# Patient Record
Sex: FEMALE | Race: WHITE | NOT HISPANIC OR LATINO | Employment: OTHER | URBAN - METROPOLITAN AREA
[De-identification: names, ages, dates, MRNs, and addresses within clinical notes are randomized per-mention and may not be internally consistent; named-entity substitution may affect disease eponyms.]

---

## 2017-01-26 ENCOUNTER — HOSPITAL ENCOUNTER (OUTPATIENT)
Dept: RADIOLOGY | Facility: HOSPITAL | Age: 82
Discharge: HOME | End: 2017-01-26
Attending: NURSE PRACTITIONER
Payer: MEDICARE

## 2017-01-26 ENCOUNTER — OFFICE VISIT (OUTPATIENT)
Dept: FAMILY MEDICINE | Facility: CLINIC | Age: 82
End: 2017-01-26
Payer: MEDICARE

## 2017-01-26 VITALS
RESPIRATION RATE: 18 BRPM | DIASTOLIC BLOOD PRESSURE: 58 MMHG | HEIGHT: 65 IN | OXYGEN SATURATION: 96 % | SYSTOLIC BLOOD PRESSURE: 132 MMHG | TEMPERATURE: 98 F | WEIGHT: 168.88 LBS | BODY MASS INDEX: 28.14 KG/M2 | HEART RATE: 75 BPM

## 2017-01-26 DIAGNOSIS — R05.9 COUGH: Primary | ICD-10-CM

## 2017-01-26 DIAGNOSIS — M25.552 LEFT HIP PAIN: ICD-10-CM
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
## 2017-01-26 DIAGNOSIS — M54.40 ACUTE BILATERAL LOW BACK PAIN WITH SCIATICA, SCIATICA LATERALITY UNSPECIFIED: ICD-10-CM

## 2017-01-26 PROCEDURE — 99213 OFFICE O/P EST LOW 20 MIN: CPT | Mod: S$GLB,,, | Performed by: NURSE PRACTITIONER

## 2017-01-26 PROCEDURE — 73502 X-RAY EXAM HIP UNI 2-3 VIEWS: CPT | Mod: 26,LT,, | Performed by: RADIOLOGY

## 2017-01-26 PROCEDURE — 72100 X-RAY EXAM L-S SPINE 2/3 VWS: CPT | Mod: 26,,, | Performed by: RADIOLOGY

## 2017-01-26 PROCEDURE — 99999 PR PBB SHADOW E&M-EST. PATIENT-LVL V: CPT | Mod: PBBFAC,,, | Performed by: NURSE PRACTITIONER

## 2017-01-26 PROCEDURE — 1160F RVW MEDS BY RX/DR IN RCRD: CPT | Mod: S$GLB,,, | Performed by: NURSE PRACTITIONER

## 2017-01-26 PROCEDURE — 1157F ADVNC CARE PLAN IN RCRD: CPT | Mod: S$GLB,,, | Performed by: NURSE PRACTITIONER

## 2017-01-26 PROCEDURE — 73502 X-RAY EXAM HIP UNI 2-3 VIEWS: CPT | Mod: TC,PO,LT

## 2017-01-26 PROCEDURE — 1159F MED LIST DOCD IN RCRD: CPT | Mod: S$GLB,,, | Performed by: NURSE PRACTITIONER

## 2017-01-26 PROCEDURE — 1125F AMNT PAIN NOTED PAIN PRSNT: CPT | Mod: S$GLB,,, | Performed by: NURSE PRACTITIONER

## 2017-01-26 PROCEDURE — 72100 X-RAY EXAM L-S SPINE 2/3 VWS: CPT | Mod: TC,PO

## 2017-01-26 RX ORDER — AZITHROMYCIN 250 MG/1
250 TABLET, FILM COATED ORAL DAILY
Qty: 10 TABLET | Refills: 0 | Status: SHIPPED | OUTPATIENT
Start: 2017-01-26 | End: 2017-02-05

## 2017-01-26 RX ORDER — METHYLPREDNISOLONE 4 MG/1
TABLET ORAL
Qty: 21 TABLET | Refills: 0 | Status: SHIPPED | OUTPATIENT
Start: 2017-01-26 | End: 2017-02-17

## 2017-01-26 NOTE — MR AVS SNAPSHOT
[facility]  [address] [facility] [place] [address]  Phone: [phone]  Fax: [phone]                  Sheree Pugh   2017 12:40 PM   Office Visit    Description:  Female : 1927   Provider:  Madyson Silva NP   Department:  [facility]           Reason for Visit     Cough     Fall     Fatigue           Diagnoses this Visit        Comments    Cough    -  Primary     Acute bilateral low back pain with sciatica, sciatica laterality unspecified         Fall, initial encounter         Left hip pain                To Do List           Future Appointments        Provider Department Dept Phone    2017 2:45 PM Gus Shahid MD [facility] [phone]      Goals (5 Years of Data)     None      Follow-Up and Disposition     Return if symptoms worsen or fail to improve.       These Medications        Disp Refills Start End    methylPREDNISolone (MEDROL DOSEPACK) 4 mg tablet 21 tablet 0 2017    Take as directed    Ph[facility]acy: [id number] [place] - [address] [facility] Ph #: [phone]       azithromycin (ZITHROMAX Z-TIM) 250 MG tablet 10 tablet 0 2017    Take 1 tablet (250 mg total) by mouth once daily. - Oral    Ph[facility]acy: [id number] [place] - [address] [facility] Ph #: [phone]         [facility]     [facility] Care [facility] -  Assistance  Registered nurses in the [facility] provide clinical advisement, health education, appointment booking, and other advisory services.  Call for this free service at [phone].             Medications           Message regarding Medications     Verify the changes and/or additions to your medication regime listed below are the same as discussed with your clinician today.  If any of these changes or additions are incorrect, please notify your healthcare provider.        START taking these NEW medications      "   Refills    methylPREDNISolone (MEDROL DOSEPACK) 4 mg tablet 0    Sig: Take as directed    Class: Normal    azithromycin (ZITHROMAX Z-TIM) 250 MG tablet 0    Sig: Take 1 tablet (250 mg total) by mouth once daily.    Class: Normal    Route: Oral           Verify that the below list of medications is an accurate representation of the medications you are currently taking.  If none reported, the list may be blank. If incorrect, please contact your healthcare provider. Carry this list with you in case of emergency.           Current Medications     ACETAMINOPHEN (TYLENOL ARTHRITIS ORAL) Take by mouth daily as needed.    albuterol (PROAIR HFA) 90 mcg/actuation inhaler Inhale 2 puffs into the lungs every 6 (six) hours as needed for Wheezing or Shortness of Breath.    ANTIOX #11/OM3/DHA/EPA/LUT/RAND (OCUVITE ADULT 50+ ORAL) Take by mouth once daily.      aspirin (ECOTRIN) 81 MG EC tablet Take by mouth once daily.     atorvastatin (LIPITOR) 20 MG tablet Take 1 tablet (20 mg total) by mouth once daily.    BD LUER-FRANCI SYRINGE 3 mL 25 x 5/8" Syrg FOLLOW DOCTORS DIRECTIONS    biotin 2,500 mcg Cap Take 2 capsules by mouth once daily. 2 capsules (5000 mcg daily)    budesonide-formoterol 160-4.5 mcg (SYMBICORT) 160-4.5 mcg/actuation HFAA Inhale 2 puffs into the lungs 2 (two) times daily.    calcium carbonate-vit D3-min (CALTRATE 600+D PLUS MINERALS) 600 mg calcium- 400 unit Tab Take 1 tablet by mouth once daily.    cholecalciferol, vitamin D3, (VITAMIN D3) 400 unit Tab Take 800 Units by mouth once daily.    clopidogrel (PLAVIX) 75 mg tablet Take 1 tablet (75 mg total) by mouth once daily.    cyanocobalamin 1,000 mcg/mL injection INJECT 1CC (1ML) INTRAMUSCULARLY every three weeks    fosinopril (MONOPRIL) 20 MG tablet Take 1 tablet (20 mg total) by mouth once daily.    furosemide (LASIX) 20 MG tablet Take 1 tablet (20 mg total) by mouth daily as needed.    GLUCOSAMINE HCL/CHONDR FLETCHER A NA (OSTEO BI-FLEX ORAL) Take by mouth.    " "MULTIVITAMIN/IRON/FOLIC ACID (CENTRUM ULTRA WOMEN'S ORAL) Take 1 tablet by mouth once daily.      potassium chloride SA (K-DUR,KLOR-CON) 20 MEQ tablet Take 1 tablet (20 mEq total) by mouth daily as needed.    salmon oil-omega-3 fatty acids (SALMON OIL-1000) 1,000-200 mg Cap Take 2 capsules by mouth once daily.     syringe, disposable, 3 mL Syrg As directed    tiotropium (SPIRIVA WITH HANDIHALER) 18 mcg inhalation capsule USE ONE INHALATION BY MOUTH DAILY    zolpidem (AMBIEN) 10 mg Tab TAKE ONE TABLET BY MOUTH AT BEDTIME AS NEEDED    azithromycin (ZITHROMAX Z-TIM) 250 MG tablet Take 1 tablet (250 mg total) by mouth once daily.    methylPREDNISolone (MEDROL DOSEPACK) 4 mg tablet Take as directed    mometasone (ELOCON) 0.1 % ointment Apply topically once daily. Apply BID x 1 month, then QD x 1 month, then QOD x 1 month, then as needed.           Clinical Reference Information           Vital Signs - Last Recorded  Most recent update: 1/26/2017 12:55 PM by Renetta Gasca MA    BP Pulse Temp Resp Ht Wt    (!) 132/58 75 98.3 °F (36.8 °C) (Oral) 18 5' 5" (1.651 m) 76.6 kg (168 lb 14 oz)    SpO2 BMI             96% 28.1 kg/m2         Blood Pressure          Most Recent Value    BP  (!)  132/58      Allergies as of 1/26/2017     Codeine    Gabapentin    Hydrocodone      Immunizations Administered on Date of Encounter - 1/26/2017     None      Orders Placed During Today's Visit     Future Labs/Procedures Expected by Expires    X-Ray Hip 2 or 3 views Left  1/26/2017 4/26/2017    X-Ray Lumbar Spine Ap And Lateral  1/26/2017 4/26/2017      [facility] Sign-Up     Activating your [facility] account is as easy as 1-2-3!     1) Visit [email or web address], select Sign Up Now, enter this activation code and your date of birth, then select Next.  [email or web address]  Expires: 3/12/2017  1:11 PM      2) Create a username and password to use when you visit [facility] in the future and select a security question in case you lose your password " and select Next.    3) Enter your e-mail address and click Sign Up!    Additional Information  If you have questions, please e-mail [email or web address] or call [phone] to talk to our [facility] staff. Remember, [facility] is NOT to be used for urgent needs. For medical emergencies, dial 911.

## 2017-01-26 NOTE — PROGRESS NOTES
Subjective:       Patient ID: Sheree Pugh is a 89 y.o. female.    Chief Complaint: Cough (otc meds not helping); Fall (fell tues night/ lost balence. lt hip, knees, hands sore); and Fatigue (tired, weak and doesnt feel like eating)    Cough   This is a new problem. Episode onset: over a week. The problem has been waxing and waning. The problem occurs constantly. The cough is productive of sputum. Associated symptoms include nasal congestion and postnasal drip. Pertinent negatives include no chest pain, chills, ear congestion, ear pain, fever, headaches, heartburn, hemoptysis, myalgias, rash, rhinorrhea, sore throat, shortness of breath, sweats, weight loss or wheezing. Treatments tried: tussinex otc for cough and anti-histamine once daily. Symbicort, spririva.   Fall   Incident onset: 3 days ago. She landed on carpet. There was no blood loss. Pain location: left hip and back. The pain is mild. Pertinent negatives include no abdominal pain, bowel incontinence, fever, headaches, hearing loss, hematuria, loss of consciousness, nausea, numbness, tingling, visual change or vomiting. Treatments tried: tylenol arthritis. The treatment provided moderate relief.     Review of Systems   Constitutional: Positive for fatigue. Negative for chills, fever and weight loss.   HENT: Positive for postnasal drip. Negative for ear pain, rhinorrhea and sore throat.    Respiratory: Positive for cough. Negative for hemoptysis, shortness of breath and wheezing.    Cardiovascular: Negative for chest pain.   Gastrointestinal: Negative for abdominal pain, bowel incontinence, heartburn, nausea and vomiting.   Genitourinary: Negative for hematuria.   Musculoskeletal: Negative for myalgias.   Skin: Negative for rash.   Neurological: Negative for tingling, loss of consciousness, numbness and headaches.       Objective:      Physical Exam   Constitutional: She is oriented to person, place, and time. She appears well-nourished.   HENT:   Head:  Normocephalic and atraumatic.   Right Ear: External ear normal.   Left Ear: External ear normal.   Nose: Nose normal.   Mouth/Throat: Oropharynx is clear and moist. No oropharyngeal exudate.   Eyes: Conjunctivae and EOM are normal. Pupils are equal, round, and reactive to light.   Neck: Normal range of motion. Neck supple.   Cardiovascular: Normal rate, regular rhythm, normal heart sounds and intact distal pulses.    Pulmonary/Chest: Effort normal and breath sounds normal. She has no wheezes. She has no rales.   Abdominal: Soft. Bowel sounds are normal. There is no tenderness.   Musculoskeletal:        Lumbar back: She exhibits decreased range of motion, tenderness, bony tenderness, pain and spasm.        Back:    Neurological: She is alert and oriented to person, place, and time.   Skin: Skin is warm and dry. No rash noted.   Vitals reviewed.      Assessment:       1. Cough    2. Acute bilateral low back pain with sciatica, sciatica laterality unspecified    3. Fall, initial encounter    4. Left hip pain        Plan:       Sheree was seen today for cough, fall and fatigue.    Diagnoses and all orders for this visit:    Cough  -     methylPREDNISolone (MEDROL DOSEPACK) 4 mg tablet; Take as directed  -     azithromycin (ZITHROMAX Z-TIM) 250 MG tablet; Take 1 tablet (250 mg total) by mouth once daily.    Acute bilateral low back pain with sciatica, sciatica laterality unspecified  -     X-Ray Lumbar Spine Ap And Lateral; Future  -     X-Ray Hip 2 or 3 views Left; Future  -     methylPREDNISolone (MEDROL DOSEPACK) 4 mg tablet; Take as directed  -     azithromycin (ZITHROMAX Z-TIM) 250 MG tablet; Take 1 tablet (250 mg total) by mouth once daily.    Fall, initial encounter  -     X-Ray Lumbar Spine Ap And Lateral; Future  -     X-Ray Hip 2 or 3 views Left; Future  -     methylPREDNISolone (MEDROL DOSEPACK) 4 mg tablet; Take as directed  -     azithromycin (ZITHROMAX Z-TIM) 250 MG tablet; Take 1 tablet (250 mg total) by  mouth once daily.    Left hip pain  -     X-Ray Lumbar Spine Ap And Lateral; Future  -     X-Ray Hip 2 or 3 views Left; Future  -     methylPREDNISolone (MEDROL DOSEPACK) 4 mg tablet; Take as directed  -     azithromycin (ZITHROMAX Z-TIM) 250 MG tablet; Take 1 tablet (250 mg total) by mouth once daily.

## 2017-02-06 DIAGNOSIS — J44.1 COPD EXACERBATION: ICD-10-CM

## 2017-02-06 NOTE — TELEPHONE ENCOUNTER
----- Message from Ursula Rubio sent at 2/6/2017 10:53 AM CST -----  Contact: [place]- [phone]  Patient called asking for a new rx Symbicort with Anthony Singh on file asap.  Thanks!

## 2017-02-07 RX ORDER — BUDESONIDE AND FORMOTEROL FUMARATE DIHYDRATE 160; 4.5 UG/1; UG/1
2 AEROSOL RESPIRATORY (INHALATION) 2 TIMES DAILY
Qty: 33 G | Refills: 9 | Status: SHIPPED | OUTPATIENT
Start: 2017-02-07 | End: 2017-05-24 | Stop reason: SDUPTHER

## 2017-02-17 ENCOUNTER — OFFICE VISIT (OUTPATIENT)
Dept: FAMILY MEDICINE | Facility: CLINIC | Age: 82
End: 2017-02-17
Payer: MEDICARE

## 2017-02-17 VITALS
RESPIRATION RATE: 18 BRPM | HEART RATE: 64 BPM | BODY MASS INDEX: 28.54 KG/M2 | HEIGHT: 65 IN | DIASTOLIC BLOOD PRESSURE: 60 MMHG | SYSTOLIC BLOOD PRESSURE: 130 MMHG | WEIGHT: 171.31 LBS

## 2017-02-17 DIAGNOSIS — I10 ESSENTIAL HYPERTENSION: Primary | Chronic | ICD-10-CM

## 2017-02-17 DIAGNOSIS — J44.9 CHRONIC OBSTRUCTIVE PULMONARY DISEASE, UNSPECIFIED COPD TYPE: ICD-10-CM

## 2017-02-17 DIAGNOSIS — I73.9 PERIPHERAL VASCULAR DISEASE, UNSPECIFIED: ICD-10-CM

## 2017-02-17 DIAGNOSIS — M51.36 DDD (DEGENERATIVE DISC DISEASE), LUMBAR: ICD-10-CM

## 2017-02-17 PROCEDURE — 1159F MED LIST DOCD IN RCRD: CPT | Mod: S$GLB,,, | Performed by: FAMILY MEDICINE

## 2017-02-17 PROCEDURE — 1157F ADVNC CARE PLAN IN RCRD: CPT | Mod: S$GLB,,, | Performed by: FAMILY MEDICINE

## 2017-02-17 PROCEDURE — 1160F RVW MEDS BY RX/DR IN RCRD: CPT | Mod: S$GLB,,, | Performed by: FAMILY MEDICINE

## 2017-02-17 PROCEDURE — 1125F AMNT PAIN NOTED PAIN PRSNT: CPT | Mod: S$GLB,,, | Performed by: FAMILY MEDICINE

## 2017-02-17 PROCEDURE — 99214 OFFICE O/P EST MOD 30 MIN: CPT | Mod: S$GLB,,, | Performed by: FAMILY MEDICINE

## 2017-02-17 PROCEDURE — 99499 UNLISTED E&M SERVICE: CPT | Mod: S$GLB,,, | Performed by: FAMILY MEDICINE

## 2017-02-17 PROCEDURE — 99999 PR PBB SHADOW E&M-EST. PATIENT-LVL III: CPT | Mod: PBBFAC,,, | Performed by: FAMILY MEDICINE

## 2017-02-17 NOTE — PROGRESS NOTES
Subjective:       Patient ID: Sheree Pugh is a 89 y.o. female    Chief Complaint: Degenerative disc disease (follow up; hm due: tetanus, zoster)    HPI  Here today for interval evaluation  Her primary concern is low back pain with radiation into the left leg.  She has seen Pain Management  They recommended epidural steroids, but she is not interested in that presently  Pain control with Tylenol now.   Improves if she stops during long walks, requesting walker with seat.  Breathing stable, occasional dyspnea.  Not able to afford inhalers    ROS      Objective:   Physical Exam   Constitutional: She appears well-developed and well-nourished.   HENT:   Head: Normocephalic and atraumatic.   Eyes: Conjunctivae and EOM are normal. Pupils are equal, round, and reactive to light. No scleral icterus.   Neck: Normal range of motion. Neck supple. No thyromegaly present.   Cardiovascular: Normal rate, regular rhythm and normal heart sounds.  Exam reveals no gallop and no friction rub.    No murmur heard.  Pulmonary/Chest: Effort normal and breath sounds normal. No respiratory distress. She has no wheezes. She has no rales.   Lymphadenopathy:     She has no cervical adenopathy.   Vitals reviewed.        Assessment:       1. Essential hypertension  Comprehensive metabolic panel    Lipid panel   2. Peripheral vascular disease, unspecified     3. DDD (degenerative disc disease), lumbar     4. Chronic obstructive pulmonary disease, unspecified COPD type           Plan:       Essential hypertension with Peripheral vascular disease, unspecified  -     Comprehensive metabolic panel; Future; Expected date: 2/17/17  -     Lipid panel; Future; Expected date: 2/17/17  - Continue current therapy  - Serial blood pressure monitoring  - Diet and exercise education.     DDD (degenerative disc disease), lumbar  - Walker with seat  - Continue current therapy  - Pain Management as needed    Chronic obstructive pulmonary disease, unspecified  COPD type  - Continue current therapy  - Pharmacy assistance

## 2017-02-17 NOTE — MR AVS SNAPSHOT
[facility]  [address]  [place] [address]  Phone: [phone]  Fax: [phone]                  Sheree Pugh   2017 2:45 PM   Office Visit    Description:  Female : 1927   Provider:  Gus Shahid MD   Department:  [facility]           Reason for Visit     Degenerative disc disease           Diagnoses this Visit        Comments    Essential hypertension    -  Primary     Peripheral vascular disease, unspecified         DDD (degenerative disc disease), lumbar         Chronic obstructive pulmonary disease, unspecified COPD type                To Do List           Future Appointments        Provider Department Dept Phone    2017 1:30 PM Gus Shahid MD [facility] [phone]      Goals (5 Years of Data)     None      Follow-Up and Disposition     Return in about 4 months (around 2017).      [facility]     [facility] On Call Nurse Care Line - 24/7 Assistance  Registered nurses in the [facility] provide clinical advisement, health education, appointment booking, and other advisory services.  Call for this free service at [phone].             Medications           Message regarding Medications     Verify the changes and/or additions to your medication regime listed below are the same as discussed with your clinician today.  If any of these changes or additions are incorrect, please notify your healthcare provider.        STOP taking these medications     methylPREDNISolone (MEDROL DOSEPACK) 4 mg tablet Take as directed           Verify that the below list of medications is an accurate representation of the medications you are currently taking.  If none reported, the list may be blank. If incorrect, please contact your healthcare provider. Carry this list with you in case of emergency.           Current Medications     ACETAMINOPHEN (TYLENOL ARTHRITIS ORAL) Take by mouth daily as needed.     "albuterol (PROAIR HFA) 90 mcg/actuation inhaler Inhale 2 puffs into the lungs every 6 (six) hours as needed for Wheezing or Shortness of Breath.    ANTIOX #11/OM3/DHA/EPA/LUT/RAND (OCUVITE ADULT 50+ ORAL) Take by mouth once daily.      aspirin (ECOTRIN) 81 MG EC tablet Take by mouth once daily.     atorvastatin (LIPITOR) 20 MG tablet Take 1 tablet (20 mg total) by mouth once daily.    BD LUER-FRANCI SYRINGE 3 mL 25 x 5/8" Syrg FOLLOW DOCTORS DIRECTIONS    biotin 2,500 mcg Cap Take 2 capsules by mouth once daily. 2 capsules (5000 mcg daily)    budesonide-formoterol 160-4.5 mcg (SYMBICORT) 160-4.5 mcg/actuation HFAA Inhale 2 puffs into the lungs 2 (two) times daily.    calcium carbonate-vit D3-min (CALTRATE 600+D PLUS MINERALS) 600 mg calcium- 400 unit Tab Take 1 tablet by mouth once daily.    cholecalciferol, vitamin D3, (VITAMIN D3) 400 unit Tab Take 800 Units by mouth once daily.    clopidogrel (PLAVIX) 75 mg tablet Take 1 tablet (75 mg total) by mouth once daily.    cyanocobalamin 1,000 mcg/mL injection INJECT 1CC (1ML) INTRAMUSCULARLY every three weeks    fosinopril (MONOPRIL) 20 MG tablet Take 1 tablet (20 mg total) by mouth once daily.    furosemide (LASIX) 20 MG tablet Take 1 tablet (20 mg total) by mouth daily as needed.    GLUCOSAMINE HCL/CHONDR FLETCHER A NA (OSTEO BI-FLEX ORAL) Take by mouth.    MULTIVITAMIN/IRON/FOLIC ACID (CENTRUM ULTRA WOMEN'S ORAL) Take 1 tablet by mouth once daily.      potassium chloride SA (K-DUR,KLOR-CON) 20 MEQ tablet Take 1 tablet (20 mEq total) by mouth daily as needed.    salmon oil-omega-3 fatty acids (SALMON OIL-1000) 1,000-200 mg Cap Take 2 capsules by mouth once daily.     syringe, disposable, 3 mL Syrg As directed    tiotropium (SPIRIVA WITH HANDIHALER) 18 mcg inhalation capsule USE ONE INHALATION BY MOUTH DAILY    zolpidem (AMBIEN) 10 mg Tab TAKE ONE TABLET BY MOUTH AT BEDTIME AS NEEDED    mometasone (ELOCON) 0.1 % ointment Apply topically once daily. Apply BID x 1 month, then QD x " "1 month, then QOD x 1 month, then as needed.           Clinical Reference Information           Your Vitals Were     BP Pulse Resp Height Weight BMI    130/60 (BP Location: Left arm, Patient Position: Sitting, BP Method: Manual) 64 18 5' 5" (1.651 m) 77.7 kg (171 lb 4.8 oz) 28.51 kg/m2      Blood Pressure          Most Recent Value    BP  130/60      Allergies as of 2/17/2017     Codeine    Gabapentin    Hydrocodone      Immunizations Administered on Date of Encounter - 2/17/2017     None      Orders Placed During Today's Visit     Future Labs/Procedures Expected by Expires    Comprehensive metabolic panel  2/17/2017 5/18/2017    Lipid panel  2/17/2017 5/18/2017      [facility] Sign-Up     Activating your [facility] account is as easy as 1-2-3!     1) Visit [email or web address], select Sign Up Now, enter this activation code and your date of birth, then select Next.  [email or web address]  Expires: 3/12/2017  1:11 PM      2) Create a username and password to use when you visit [facility] in the future and select a security question in case you lose your password and select Next.    3) Enter your e-mail address and click Sign Up!    Additional Information  If you have questions, please e-mail [email or web address] or call [phone] to talk to our [facility] staff. Remember, [facility] is NOT to be used for urgent needs. For medical emergencies, dial 911.         Language Assistance Services     ATTENTION: Language assistance services are available, free of charge. Please call [phone].      ATENCIÓN: Si habla español, tiene a jennings disposición servicios gratuitos de asistencia lingüística. Llame al [phone].     CHÚ Ý: N?u b?n nói Ti?ng Vi?t, có các d?ch v? h? tr? ngôn ng? mi?n phí dành cho b?n. G?i s? [phone].         [facility] complies with applicable Federal civil rights laws and does not discriminate on the basis of race, color, national origin, age, disability, or sex.        "

## 2017-04-20 ENCOUNTER — HOSPITAL ENCOUNTER (OUTPATIENT)
Dept: RADIOLOGY | Facility: HOSPITAL | Age: 82
Discharge: HOME | End: 2017-04-20
Attending: NURSE PRACTITIONER
Payer: MEDICARE

## 2017-04-20 ENCOUNTER — OFFICE VISIT (OUTPATIENT)
Dept: FAMILY MEDICINE | Facility: CLINIC | Age: 82
End: 2017-04-20
Payer: MEDICARE

## 2017-04-20 VITALS
BODY MASS INDEX: 28.06 KG/M2 | HEART RATE: 55 BPM | DIASTOLIC BLOOD PRESSURE: 64 MMHG | WEIGHT: 168.44 LBS | HEIGHT: 65 IN | OXYGEN SATURATION: 95 % | SYSTOLIC BLOOD PRESSURE: 128 MMHG

## 2017-04-20 DIAGNOSIS — S49.92XA LEFT UPPER ARM INJURY, INITIAL ENCOUNTER: ICD-10-CM

## 2017-04-20 DIAGNOSIS — J44.1 COPD EXACERBATION: Primary | ICD-10-CM

## 2017-04-20 PROCEDURE — 99999 PR PBB SHADOW E&M-EST. PATIENT-LVL IV: CPT | Mod: PBBFAC,,, | Performed by: NURSE PRACTITIONER

## 2017-04-20 PROCEDURE — 1159F MED LIST DOCD IN RCRD: CPT | Mod: S$GLB,,, | Performed by: NURSE PRACTITIONER

## 2017-04-20 PROCEDURE — 73060 X-RAY EXAM OF HUMERUS: CPT | Mod: 26,LT,, | Performed by: RADIOLOGY

## 2017-04-20 PROCEDURE — 1160F RVW MEDS BY RX/DR IN RCRD: CPT | Mod: S$GLB,,, | Performed by: NURSE PRACTITIONER

## 2017-04-20 PROCEDURE — 73060 X-RAY EXAM OF HUMERUS: CPT | Mod: TC,PO,LT

## 2017-04-20 PROCEDURE — 1125F AMNT PAIN NOTED PAIN PRSNT: CPT | Mod: S$GLB,,, | Performed by: NURSE PRACTITIONER

## 2017-04-20 PROCEDURE — 99213 OFFICE O/P EST LOW 20 MIN: CPT | Mod: S$GLB,,, | Performed by: NURSE PRACTITIONER

## 2017-04-20 RX ORDER — DOXYCYCLINE 100 MG/1
100 CAPSULE ORAL EVERY 12 HOURS
Qty: 14 CAPSULE | Refills: 0 | Status: SHIPPED | OUTPATIENT
Start: 2017-04-20 | End: 2017-05-17 | Stop reason: ALTCHOICE

## 2017-04-20 RX ORDER — AZELASTINE 1 MG/ML
1 SPRAY, METERED NASAL 2 TIMES DAILY
Qty: 30 ML | Refills: 0 | Status: SHIPPED | OUTPATIENT
Start: 2017-04-20 | End: 2017-05-17

## 2017-04-20 RX ORDER — PREDNISONE 10 MG/1
TABLET ORAL
Qty: 30 TABLET | Refills: 0 | Status: SHIPPED | OUTPATIENT
Start: 2017-04-20 | End: 2017-05-17 | Stop reason: ALTCHOICE

## 2017-04-20 NOTE — PROGRESS NOTES
Subjective:       Patient ID: Sheree Pugh is a 89 y.o. female.    Chief Complaint: Cough    HPI Comments: Patient says a family member was trying to help to get from a table and pulled on her left arm about 3 weeks ago. She did have some bruising after the incident, bruie has resolved but still having pain. 3-4/10.  TETANUS VACCINE due on 08/26/1945  Zoster Vaccine due on 08/26/1987  DEXA SCAN due on 04/25/2017    Past Medical History:  Past Medical History:  No date: Anticoagulant long-term use      Comment: Plavix & Aspirin  No date: ARMD (age related macular degeneration)      Comment: os  No date: Arthritis  No date: Cataract      Comment: os  No date: Coronary artery disease  No date: Disorder of kidney and ureter  No date: DE LOS SANTOS (dyspnea on exertion)  No date: Elevated cholesterol  No date: Heart disease  No date: Hypertension  No date: Insomnia  No date: Macular degeneration      Comment: OS  No date: Obstetrical blood-clot embolism, postpartum  No date: PVD (peripheral vascular disease)  No date: Retinal detachment      Comment: OS  No date: Urinary incontinence  Past Surgical History:  No date: CATARACT EXTRACTION      Comment: od d 6-12-13//  No date: CHOLECYSTECTOMY  2010,2011: CORONARY STENT PLACEMENT      Comment: 4 stents in 2010, 2 in 2011  No date: HYSTERECTOMY  No date: MOUTH SURGERY      Comment: upper & lower denture  No date: VAGINAL DELIVERY      Comment: times 6  Review of patient's allergies indicates:   -- Codeine -- Nausea And Vomiting   -- Gabapentin    -- Hydrocodone -- Nausea And Vomiting    --  Other reaction(s): Vomiting  Current Outpatient Prescriptions on File Prior to Visit:  ACETAMINOPHEN (TYLENOL ARTHRITIS ORAL), Take by mouth daily as needed., Disp: , Rfl:   albuterol (PROAIR HFA) 90 mcg/actuation inhaler, Inhale 2 puffs into the lungs every 6 (six) hours as needed for Wheezing or Shortness of Breath., Disp: 18 g, Rfl: 3  ANTIOX #11/OM3/DHA/EPA/LUT/RAND (OCUVITE ADULT 50+ ORAL),  "Take by mouth once daily.  , Disp: , Rfl:   aspirin (ECOTRIN) 81 MG EC tablet, Take by mouth once daily. , Disp: , Rfl:    atorvastatin (LIPITOR) 20 MG tablet, Take 1 tablet (20 mg total) by mouth once daily., Disp: 90 tablet, Rfl: 5  BD LUER-FRANCI SYRINGE 3 mL 25 x 5/8" Syrg, FOLLOW DOCTORS DIRECTIONS, Disp: 10 each, Rfl: 0  biotin 2,500 mcg Cap, Take 2 capsules by mouth once daily. 2 capsules (5000 mcg daily), Disp: , Rfl:   budesonide-formoterol 160-4.5 mcg (SYMBICORT) 160-4.5 mcg/actuation HFAA, Inhale 2 puffs into the lungs 2 (two) times daily., Disp: 33 g, Rfl: 9  calcium carbonate-vit D3-min (CALTRATE 600+D PLUS MINERALS) 600 mg calcium- 400 unit Tab, Take 1 tablet by mouth once daily., Disp: , Rfl:   cholecalciferol, vitamin D3, (VITAMIN D3) 400 unit Tab, Take 800 Units by mouth once daily., Disp: , Rfl:   clopidogrel (PLAVIX) 75 mg tablet, Take 1 tablet (75 mg total) by mouth once daily., Disp: 90 tablet, Rfl: 4  cyanocobalamin 1,000 mcg/mL injection, INJECT 1CC (1ML) INTRAMUSCULARLY every three weeks, Disp: 10 mL, Rfl: 3  fosinopril (MONOPRIL) 20 MG tablet, Take 1 tablet (20 mg total) by mouth once daily., Disp: 90 tablet, Rfl: 5  furosemide (LASIX) 20 MG tablet, Take 1 tablet (20 mg total) by mouth daily as needed., Disp: 90 tablet, Rfl: 4  GLUCOSAMINE HCL/CHONDR FLETCHER A NA (OSTEO BI-FLEX ORAL), Take by mouth., Disp: , Rfl:   MULTIVITAMIN/IRON/FOLIC ACID (CENTRUM ULTRA WOMEN'S ORAL), Take 1 tablet by mouth once daily.  , Disp: , Rfl:   potassium chloride SA (K-DUR,KLOR-CON) 20 MEQ tablet, Take 1 tablet (20 mEq total) by mouth daily as needed., Disp: 90 tablet, Rfl: 4  salmon oil-omega-3 fatty acids (SALMON OIL-1000) 1,000-200 mg Cap, Take 2 capsules by mouth once daily. , Disp: , Rfl:   syringe, disposable, 3 mL Syrg, As directed, Disp: , Rfl:   tiotropium (SPIRIVA WITH HANDIHALER) 18 mcg inhalation capsule, USE ONE INHALATION BY MOUTH DAILY, Disp: 30 capsule, Rfl: 9  zolpidem (AMBIEN) 10 mg Tab, TAKE ONE " TABLET BY MOUTH AT BEDTIME AS NEEDED, Disp: 30 tablet, Rfl: 3  mometasone (ELOCON) 0.1 % ointment, Apply topically once daily. Apply BID x 1 month, then QD x 1 month, then QOD x 1 month, then as needed., Disp: 45 g, Rfl: 3    No current facility-administered medications on file prior to visit.     Social History    Marital status:              Spouse name:                       Years of education:                 Number of children:               Occupational History    None on file    Social History Main Topics    Smoking status: Former Smoker                                                                Packs/day: 0.00      Years: 0.00           Quit date: 1/3/2000    Smokeless status: Never Used                        Alcohol use: No              Drug use: No              Sexual activity: Not on file          Other Topics            Concern    None on file    Social History Narrative    None on file      Review of patient's family history indicates:    Alzheimer's disease            Father                    Cancer                         Sister                    Cancer                         Sister                    Amblyopia                      Neg Hx                    Blindness                      Neg Hx                    Cataracts                      Neg Hx                    Diabetes                       Neg Hx                    Glaucoma                       Neg Hx                    Hypertension                   Neg Hx                    Macular degeneration           Neg Hx                    Retinal detachment             Neg Hx                    Strabismus                     Neg Hx                    Thyroid disease                Neg Hx                    Stroke                         Mother                    Heart disease                  Mother                      TETANUS VACCINE due on 08/26/1945  Zoster Vaccine due on 08/26/1987  DEXA SCAN due on 04/25/2017    Past Medical  "History:  Past Medical History:  No date: Anticoagulant long-term use      Comment: Plavix & Aspirin  No date: ARMD (age related macular degeneration)      Comment: os  No date: Arthritis  No date: Cataract      Comment: os  No date: Coronary artery disease  No date: Disorder of kidney and ureter  No date: DEL OS SANTOS (dyspnea on exertion)  No date: Elevated cholesterol  No date: Heart disease  No date: Hypertension  No date: Insomnia  No date: Macular degeneration      Comment: OS  No date: Obstetrical blood-clot embolism, postpartum  No date: PVD (peripheral vascular disease)  No date: Retinal detachment      Comment: OS  No date: Urinary incontinence  Past Surgical History:  No date: CATARACT EXTRACTION      Comment: od d 6-12-13//  No date: CHOLECYSTECTOMY  2010,2011: CORONARY STENT PLACEMENT      Comment: 4 stents in 2010, 2 in 2011  No date: HYSTERECTOMY  No date: MOUTH SURGERY      Comment: upper & lower denture  No date: VAGINAL DELIVERY      Comment: times 6  Review of patient's allergies indicates:   -- Codeine -- Nausea And Vomiting   -- Gabapentin    -- Hydrocodone -- Nausea And Vomiting    --  Other reaction(s): Vomiting  Current Outpatient Prescriptions on File Prior to Visit:  ACETAMINOPHEN (TYLENOL ARTHRITIS ORAL), Take by mouth daily as needed., Disp: , Rfl:   albuterol (PROAIR HFA) 90 mcg/actuation inhaler, Inhale 2 puffs into the lungs every 6 (six) hours as needed for Wheezing or Shortness of Breath., Disp: 18 g, Rfl: 3  ANTIOX #11/OM3/DHA/EPA/LUT/RAND (OCUVITE ADULT 50+ ORAL), Take by mouth once daily.  , Disp: , Rfl:   aspirin (ECOTRIN) 81 MG EC tablet, Take by mouth once daily. , Disp: , Rfl:    atorvastatin (LIPITOR) 20 MG tablet, Take 1 tablet (20 mg total) by mouth once daily., Disp: 90 tablet, Rfl: 5  BD LUER-FRANCI SYRINGE 3 mL 25 x 5/8" Syrg, FOLLOW DOCTORS DIRECTIONS, Disp: 10 each, Rfl: 0  biotin 2,500 mcg Cap, Take 2 capsules by mouth once daily. 2 capsules (5000 mcg daily), Disp: , Rfl: "   budesonide-formoterol 160-4.5 mcg (SYMBICORT) 160-4.5 mcg/actuation HFAA, Inhale 2 puffs into the lungs 2 (two) times daily., Disp: 33 g, Rfl: 9  calcium carbonate-vit D3-min (CALTRATE 600+D PLUS MINERALS) 600 mg calcium- 400 unit Tab, Take 1 tablet by mouth once daily., Disp: , Rfl:   cholecalciferol, vitamin D3, (VITAMIN D3) 400 unit Tab, Take 800 Units by mouth once daily., Disp: , Rfl:   clopidogrel (PLAVIX) 75 mg tablet, Take 1 tablet (75 mg total) by mouth once daily., Disp: 90 tablet, Rfl: 4  cyanocobalamin 1,000 mcg/mL injection, INJECT 1CC (1ML) INTRAMUSCULARLY every three weeks, Disp: 10 mL, Rfl: 3  fosinopril (MONOPRIL) 20 MG tablet, Take 1 tablet (20 mg total) by mouth once daily., Disp: 90 tablet, Rfl: 5  furosemide (LASIX) 20 MG tablet, Take 1 tablet (20 mg total) by mouth daily as needed., Disp: 90 tablet, Rfl: 4  GLUCOSAMINE HCL/CHONDR FLETCHER A NA (OSTEO BI-FLEX ORAL), Take by mouth., Disp: , Rfl:   MULTIVITAMIN/IRON/FOLIC ACID (CENTRUM ULTRA WOMEN'S ORAL), Take 1 tablet by mouth once daily.  , Disp: , Rfl:   potassium chloride SA (K-DUR,KLOR-CON) 20 MEQ tablet, Take 1 tablet (20 mEq total) by mouth daily as needed., Disp: 90 tablet, Rfl: 4  salmon oil-omega-3 fatty acids (SALMON OIL-1000) 1,000-200 mg Cap, Take 2 capsules by mouth once daily. , Disp: , Rfl:   syringe, disposable, 3 mL Syrg, As directed, Disp: , Rfl:   tiotropium (SPIRIVA WITH HANDIHALER) 18 mcg inhalation capsule, USE ONE INHALATION BY MOUTH DAILY, Disp: 30 capsule, Rfl: 9  zolpidem (AMBIEN) 10 mg Tab, TAKE ONE TABLET BY MOUTH AT BEDTIME AS NEEDED, Disp: 30 tablet, Rfl: 3  mometasone (ELOCON) 0.1 % ointment, Apply topically once daily. Apply BID x 1 month, then QD x 1 month, then QOD x 1 month, then as needed., Disp: 45 g, Rfl: 3    No current facility-administered medications on file prior to visit.     Social History    Marital status:              Spouse name:                       Years of education:                 Number  of children:               Occupational History    None on file    Social History Main Topics    Smoking status: Former Smoker                                                                Packs/day: 0.00      Years: 0.00           Quit date: 1/3/2000    Smokeless status: Never Used                        Alcohol use: No              Drug use: No              Sexual activity: Not on file          Other Topics            Concern    None on file    Social History Narrative    None on file      Review of patient's family history indicates:    Alzheimer's disease            Father                    Cancer                         Sister                    Cancer                         Sister                    Amblyopia                      Neg Hx                    Blindness                      Neg Hx                    Cataracts                      Neg Hx                    Diabetes                       Neg Hx                    Glaucoma                       Neg Hx                    Hypertension                   Neg Hx                    Macular degeneration           Neg Hx                    Retinal detachment             Neg Hx                    Strabismus                     Neg Hx                    Thyroid disease                Neg Hx                    Stroke                         Mother                    Heart disease                  Mother                          Cough   This is a recurrent (x 3-4 weeks. Last flare up treated with steroids and antibitoic was in january.) problem. The problem has been waxing and waning. The problem occurs constantly. The cough is productive of purulent sputum. Associated symptoms include nasal congestion, postnasal drip, rhinorrhea and wheezing. Pertinent negatives include no chest pain, chills, ear congestion, ear pain, fever, headaches, heartburn, hemoptysis, myalgias, rash, sore throat, shortness of breath, sweats or weight loss. She has tried OTC cough  suppressant, a beta-agonist inhaler, ipratropium inhaler and steroid inhaler (zyrtec, symbicort, spiriva) for the symptoms. The treatment provided no relief. Her past medical history is significant for COPD and environmental allergies. There is no history of asthma, bronchiectasis, bronchitis, emphysema or pneumonia.     Review of Systems   Constitutional: Negative for chills, fever and weight loss.   HENT: Positive for postnasal drip and rhinorrhea. Negative for ear pain and sore throat.    Respiratory: Positive for cough and wheezing. Negative for hemoptysis and shortness of breath.    Cardiovascular: Negative.  Negative for chest pain.   Gastrointestinal: Negative.  Negative for diarrhea, heartburn, nausea and vomiting.   Genitourinary: Negative.    Musculoskeletal: Positive for arthralgias (left arm pain). Negative for myalgias.   Skin: Negative for color change, rash and wound.   Allergic/Immunologic: Positive for environmental allergies.   Neurological: Negative for dizziness and headaches.       Objective:      Physical Exam   Constitutional: She is oriented to person, place, and time. No distress.   HENT:   Head: Head is without raccoon's eyes.   Right Ear: No middle ear effusion.   Left Ear:  No middle ear effusion.   Nose: Rhinorrhea present. No mucosal edema. Right sinus exhibits no maxillary sinus tenderness and no frontal sinus tenderness. Left sinus exhibits no maxillary sinus tenderness and no frontal sinus tenderness.   Mouth/Throat: Uvula is midline. Posterior oropharyngeal erythema present.   Eyes: Pupils are equal, round, and reactive to light.   Neck: Normal range of motion.   Cardiovascular: Normal rate and regular rhythm.  Exam reveals no friction rub.    No murmur heard.  Pulmonary/Chest: Effort normal. No respiratory distress. She has wheezes. She has no rales. She exhibits no tenderness.   Musculoskeletal:        Arms:  Lymphadenopathy:     She has no cervical adenopathy.   Neurological: She  is alert and oriented to person, place, and time.   Skin: She is not diaphoretic.   Vitals reviewed.      Assessment:       1. COPD exacerbation    2. Left upper arm injury, initial encounter        Plan:       1. COPD exacerbation  Follow up if not resolving or for any new or worsneing, continue symbicort and spiriva.  - doxycycline (VIBRAMYCIN) 100 MG Cap; Take 1 capsule (100 mg total) by mouth every 12 (twelve) hours.  Dispense: 14 capsule; Refill: 0  - predniSONE (DELTASONE) 10 MG tablet; 3 a day x 2 days, 2 a day x 2 days, 1 a day x 2 days  Dispense: 30 tablet; Refill: 0  - azelastine (ASTELIN) 137 mcg (0.1 %) nasal spray; 1 spray (137 mcg total) by Nasal route 2 (two) times daily.  Dispense: 30 mL; Refill: 0    2. Left upper arm injury, initial encounter  Likely just bruising, but will check xray to rule out fracture.  - X-Ray Humerus 2 View Left; Future,

## 2017-04-20 NOTE — MR AVS SNAPSHOT
[facility]  [address] [facility] [address] [place] [address]  Phone: [phone]  Fax: [phone]                  Sheree Pugh   2017 3:40 PM   Office Visit    Description:  Female : 1927   Provider:  Guillermina Ryan NP   Department:  [facility]           Reason for Visit     Cough           Diagnoses this Visit        Comments    COPD exacerbation    -  Primary     Left upper arm injury, initial encounter                To Do List           Future Appointments        Provider Department Dept Phone    2017 3:40 PM Guillermina Ryan NP [facility] [phone]    2017 4:00 PM [facility] XRFL1 [facility] [phone]    2017 1:30 PM Gus Shahid MD [facility] [phone]      Goals (5 Years of Data)     None       These Medications        Disp Refills Start End    doxycycline (VIBRAMYCIN) 100 MG Cap 14 capsule 0 2017     Take 1 capsule (100 mg total) by mouth every 12 (twelve) hours. - Oral    Pharmacy: [id number] [place] - [address] [facility] Ph #: [phone]       predniSONE (DELTASONE) 10 MG tablet 30 tablet 0 2017     3 a day x 2 days, 2 a day x 2 days, 1 a day x 2 days    Ph[facility]acy: [id number] - [place] - [address] [facility] Ph #: [phone]       azelastine (ASTELIN) 137 mcg (0.1 %) nasal spray 30 mL 0 2017    1 spray (137 mcg total) by Nasal route 2 (two) times daily. - Nasal    Pharmacy: [id number] - [place] - [address] [facility] Ph #: [phone]         [facility]     [facility] On [facility] Care Line - 24/7 Assistance  Unless otherwise directed by your provider, please contact [facility], our nurse care line that is available for 24/7 assistance.     Registered nurses in the [facility] provide: appointment scheduling, clinical advisement, health education, and other advisory services.  Call:  "[phone] (toll free)               Medications           Message regarding Medications     Verify the changes and/or additions to your medication regime listed below are the same as discussed with your clinician today.  If any of these changes or additions are incorrect, please notify your healthcare provider.        START taking these NEW medications        Refills    doxycycline (VIBRAMYCIN) 100 MG Cap 0    Sig: Take 1 capsule (100 mg total) by mouth every 12 (twelve) hours.    Class: Normal    Route: Oral    predniSONE (DELTASONE) 10 MG tablet 0    Sig: 3 a day x 2 days, 2 a day x 2 days, 1 a day x 2 days    Class: Normal    azelastine (ASTELIN) 137 mcg (0.1 %) nasal spray 0    Si spray (137 mcg total) by Nasal route 2 (two) times daily.    Class: Normal    Route: Nasal           Verify that the below list of medications is an accurate representation of the medications you are currently taking.  If none reported, the list may be blank. If incorrect, please contact your healthcare provider. Carry this list with you in case of emergency.           Current Medications     ACETAMINOPHEN (TYLENOL ARTHRITIS ORAL) Take by mouth daily as needed.    albuterol (PROAIR HFA) 90 mcg/actuation inhaler Inhale 2 puffs into the lungs every 6 (six) hours as needed for Wheezing or Shortness of Breath.    ANTIOX #11/OM3/DHA/EPA/LUT/RAND (OCUVITE ADULT 50+ ORAL) Take by mouth once daily.      aspirin (ECOTRIN) 81 MG EC tablet Take by mouth once daily.     atorvastatin (LIPITOR) 20 MG tablet Take 1 tablet (20 mg total) by mouth once daily.    BD LUER-FRANCI SYRINGE 3 mL 25 x 5/8" Syrg FOLLOW DOCTORS DIRECTIONS    biotin 2,500 mcg Cap Take 2 capsules by mouth once daily. 2 capsules (5000 mcg daily)    budesonide-formoterol 160-4.5 mcg (SYMBICORT) 160-4.5 mcg/actuation HFAA Inhale 2 puffs into the lungs 2 (two) times daily.    calcium carbonate-vit D3-min (CALTRATE 600+D PLUS MINERALS) 600 mg calcium- 400 unit Tab Take 1 tablet by " "mouth once daily.    cholecalciferol, vitamin D3, (VITAMIN D3) 400 unit Tab Take 800 Units by mouth once daily.    clopidogrel (PLAVIX) 75 mg tablet Take 1 tablet (75 mg total) by mouth once daily.    cyanocobalamin 1,000 mcg/mL injection INJECT 1CC (1ML) INTRAMUSCULARLY every three weeks    fosinopril (MONOPRIL) 20 MG tablet Take 1 tablet (20 mg total) by mouth once daily.    furosemide (LASIX) 20 MG tablet Take 1 tablet (20 mg total) by mouth daily as needed.    GLUCOSAMINE HCL/CHONDR FLETCHER A NA (OSTEO BI-FLEX ORAL) Take by mouth.    MULTIVITAMIN/IRON/FOLIC ACID (CENTRUM ULTRA WOMEN'S ORAL) Take 1 tablet by mouth once daily.      potassium chloride SA (K-DUR,KLOR-CON) 20 MEQ tablet Take 1 tablet (20 mEq total) by mouth daily as needed.    salmon oil-omega-3 fatty acids (SALMON OIL-1000) 1,000-200 mg Cap Take 2 capsules by mouth once daily.     syringe, disposable, 3 mL Syrg As directed    tiotropium (SPIRIVA WITH HANDIHALER) 18 mcg inhalation capsule USE ONE INHALATION BY MOUTH DAILY    zolpidem (AMBIEN) 10 mg Tab TAKE ONE TABLET BY MOUTH AT BEDTIME AS NEEDED    azelastine (ASTELIN) 137 mcg (0.1 %) nasal spray 1 spray (137 mcg total) by Nasal route 2 (two) times daily.    doxycycline (VIBRAMYCIN) 100 MG Cap Take 1 capsule (100 mg total) by mouth every 12 (twelve) hours.    mometasone (ELOCON) 0.1 % ointment Apply topically once daily. Apply BID x 1 month, then QD x 1 month, then QOD x 1 month, then as needed.    predniSONE (DELTASONE) 10 MG tablet 3 a day x 2 days, 2 a day x 2 days, 1 a day x 2 days           Clinical Reference Information           Your Vitals Were     BP Pulse Height Weight SpO2 BMI    128/64 55 5' 5" (1.651 m) 76.4 kg (168 lb 6.9 oz) 95% 28.03 kg/m2      Blood Pressure          Most Recent Value    BP  128/64      Allergies as of 4/20/2017     Codeine    Gabapentin    Hydrocodone      Immunizations Administered on Date of Encounter - 4/20/2017     None      Orders Placed During Today's Visit     " Future Labs/Procedures Expected by Expires    X-Ray Humerus 2 View Left  4/20/2017 4/20/2018      [facility] Sign-Up     Activating your [facility] account is as easy as 1-2-3!     1) Visit [email or web address], select Sign Up Now, enter this activation code and your date of birth, then select Next.  [email or web address]  Expires: 6/4/2017  2:57 PM      2) Create a username and password to use when you visit [facility] in the future and select a security question in case you lose your password and select Next.    3) Enter your e-mail address and click Sign Up!    Additional Information  If you have questions, please e-mail [email or web address] or call [phone] to talk to our [facility] staff. Remember, [facility] is NOT to be used for urgent needs. For medical emergencies, dial 911.         Language Assistance Services     ATTENTION: Language assistance services are available, free of charge. Please call [phone].      ATENCIÓN: Si habla español, tiene a jennings disposición servicios gratuitos de asistencia lingüística. Llame al [phone].     CHÚ Ý: N?u b?n nói Ti?ng Vi?t, có các d?ch v? h? tr? ngôn ng? mi?n phí dành cho b?n. G?i s? [phone].         [facility] complies with applicable Federal civil rights laws and does not discriminate on the basis of race, color, national origin, age, disability, or sex.

## 2017-05-09 ENCOUNTER — TELEPHONE (OUTPATIENT)
Dept: CARDIOLOGY | Facility: CLINIC | Age: 82
End: 2017-05-09

## 2017-05-09 DIAGNOSIS — I73.9 PVD (PERIPHERAL VASCULAR DISEASE): ICD-10-CM

## 2017-05-09 DIAGNOSIS — E78.5 DYSLIPIDEMIA: ICD-10-CM

## 2017-05-09 DIAGNOSIS — Z98.61 HISTORY OF PTCA: Primary | ICD-10-CM

## 2017-05-09 NOTE — TELEPHONE ENCOUNTER
----- Message from Tracy Arango sent at 5/9/2017 12:04 PM CDT -----  Contact: self  Has appt scheduled on 8/7 and needs lab orders put into the system before appt.  Please call patient to schedule lab at [phone] (home)

## 2017-05-17 ENCOUNTER — OFFICE VISIT (OUTPATIENT)
Dept: PRIMARY CARE CLINIC | Facility: CLINIC | Age: 82
End: 2017-05-17
Payer: MEDICARE

## 2017-05-17 VITALS
BODY MASS INDEX: 28.28 KG/M2 | TEMPERATURE: 100 F | WEIGHT: 169.75 LBS | SYSTOLIC BLOOD PRESSURE: 136 MMHG | OXYGEN SATURATION: 96 % | DIASTOLIC BLOOD PRESSURE: 60 MMHG | HEART RATE: 83 BPM | HEIGHT: 65 IN

## 2017-05-17 DIAGNOSIS — J44.1 COPD EXACERBATION: Primary | ICD-10-CM

## 2017-05-17 PROCEDURE — 1160F RVW MEDS BY RX/DR IN RCRD: CPT | Mod: S$GLB,,, | Performed by: NURSE PRACTITIONER

## 2017-05-17 PROCEDURE — 94640 AIRWAY INHALATION TREATMENT: CPT | Mod: S$GLB,,, | Performed by: NURSE PRACTITIONER

## 2017-05-17 PROCEDURE — 1125F AMNT PAIN NOTED PAIN PRSNT: CPT | Mod: S$GLB,,, | Performed by: NURSE PRACTITIONER

## 2017-05-17 PROCEDURE — 99213 OFFICE O/P EST LOW 20 MIN: CPT | Mod: 25,S$GLB,, | Performed by: NURSE PRACTITIONER

## 2017-05-17 PROCEDURE — 1159F MED LIST DOCD IN RCRD: CPT | Mod: S$GLB,,, | Performed by: NURSE PRACTITIONER

## 2017-05-17 PROCEDURE — 99999 PR PBB SHADOW E&M-EST. PATIENT-LVL V: CPT | Mod: PBBFAC,,, | Performed by: NURSE PRACTITIONER

## 2017-05-17 RX ORDER — ALBUTEROL SULFATE 0.83 MG/ML
2.5 SOLUTION RESPIRATORY (INHALATION)
Status: COMPLETED | OUTPATIENT
Start: 2017-05-17 | End: 2017-05-17

## 2017-05-17 RX ADMIN — ALBUTEROL SULFATE 2.5 MG: 0.83 SOLUTION RESPIRATORY (INHALATION) at 03:05

## 2017-05-17 NOTE — Clinical Note
Gus Shahid MD,  I saw your patient today in the [facility].  If you have any questions, please do not hesitate to contact me.  Thank you!  KALIE Verma

## 2017-05-17 NOTE — PATIENT INSTRUCTIONS
The blue-Ventolin--can help when you're having trouble breathing but can also help coughing.    Continue Spiriva, Symbicort.    Take the blue pills (antibiotic, doxyccyline) 1 twice a day.  Repeat the prednisone  3 daily for 2 days, 2 daily for 2 days then 1 daily.  Then stop.  These are medications you have at home.    Maintain hydration.    If you are not better in 3 days, call the on-call doctor or [facility].  If worse or you have concerns or questions, please do not hesitate to call.  You can reach us at [phone] Monday through Friday (except holidays) 12 nooon to 6 p.m.    Thank you for using the [facility]!    REEMA Verma, CNP, FNP-BC  [facility]  [facility]

## 2017-05-17 NOTE — PROGRESS NOTES
Sheree Pugh is a 89 y.o. female patient of Gus Shahid MD who presents to the clinic today for   Chief Complaint   Patient presents with    Cough     started 2 days ago    .    HPI    Pt, who is known to me, reports a new problem to me:  Coughing that's worsening.  Cough occ productive.  No RN, ear ache, ST.   Head feels full.  [place]chey.    These symptoms began 2 days ago and status is worsening.     Symptoms are + acute exac of chronic COPD.    Pt denies the following symptoms:  Fever, RN, earache, ST.    Aggravating factors include lying down .    Relieving factors include nothiong .    OTC Medications tried are Tussin--didn't help.    Prescription medications taken for symptoms are Spiriva, Symbicort.  Has ProAir inhaler but didn't try it.    Pertinent medical history:  H/o COPD.    Smoking status:  Former.    ROS    Constitutional:   No  fever, + fatigue, decresed appetite but not r/t the illness.    Head:   + headache  Ears:   No pain.  Eyes:  No sxs  Nose:   No sinus pain, + congestion, no runny nose, no post nasal drip.  Throat:  + ST pain.    Heart:  No palpitations, chest pain.    Lungs:  + difficulty breathing, + coughing, + sputum production, + wheezing.    GI/:  No sxs    MS:  No new bone, joint or muscle problems.    Skin:  No rashes, itching.      PAST MEDICAL HISTORY:  Past Medical History:   Diagnosis Date    Anticoagulant long-term use     Plavix & Aspirin    ARMD (age related macular degeneration)     os    Arthritis     Cataract     os    Coronary artery disease     Disorder of kidney and ureter     DE LOS SANTOS (dyspnea on exertion)     Elevated cholesterol     Heart disease     Hypertension     Insomnia     Macular degeneration     OS    Obstetrical blood-clot embolism, postpartum     PVD (peripheral vascular disease)     Retinal detachment     OS    Urinary incontinence        PAST SURGICAL HISTORY:  Past Surgical History:   Procedure Laterality Date    CATARACT EXTRACTION    "   od d 6-12-13//    CHOLECYSTECTOMY      CORONARY STENT PLACEMENT  2010,2011    4 stents in 2010, 2 in 2011    HYSTERECTOMY      MOUTH SURGERY      upper & lower denture    VAGINAL DELIVERY      times 6       SOCIAL HISTORY:  Social History     Social History    Marital status:      Spouse name: N/A    Number of children: N/A    Years of education: N/A     Occupational History    Not on file.     Social History Main Topics    Smoking status: Former Smoker     Quit date: 1/3/2000    Smokeless tobacco: Never Used    Alcohol use No    Drug use: No    Sexual activity: Not on file     Other Topics Concern    Not on file     Social History Narrative       FAMILY HISTORY:  Family History   Problem Relation Age of Onset    Alzheimer's disease Father     Cancer Sister     Cancer Sister     Amblyopia Neg Hx     Blindness Neg Hx     Cataracts Neg Hx     Diabetes Neg Hx     Glaucoma Neg Hx     Hypertension Neg Hx     Macular degeneration Neg Hx     Retinal detachment Neg Hx     Strabismus Neg Hx     Thyroid disease Neg Hx     Stroke Mother     Heart disease Mother        ALLERGIES AND MEDICATIONS: updated and reviewed.  Review of patient's allergies indicates:   Allergen Reactions    Codeine Nausea And Vomiting    Gabapentin     Hydrocodone Nausea And Vomiting     Other reaction(s): Vomiting     Current Outpatient Prescriptions   Medication Sig Dispense Refill    ACETAMINOPHEN (TYLENOL ARTHRITIS ORAL) Take by mouth daily as needed.      albuterol (PROAIR HFA) 90 mcg/actuation inhaler Inhale 2 puffs into the lungs every 6 (six) hours as needed for Wheezing or Shortness of Breath. 18 g 3    ANTIOX #11/OM3/DHA/EPA/LUT/RAND (OCUVITE ADULT 50+ ORAL) Take by mouth once daily.        aspirin (ECOTRIN) 81 MG EC tablet Take by mouth once daily.       atorvastatin (LIPITOR) 20 MG tablet Take 1 tablet (20 mg total) by mouth once daily. 90 tablet 5    BD LUER-FRANCI SYRINGE 3 mL 25 x 5/8" Syrg " FOLLOW DOCTORS DIRECTIONS 10 each 0    biotin 2,500 mcg Cap Take 2 capsules by mouth once daily. 2 capsules (5000 mcg daily)      budesonide-formoterol 160-4.5 mcg (SYMBICORT) 160-4.5 mcg/actuation HFAA Inhale 2 puffs into the lungs 2 (two) times daily. 33 g 9    calcium carbonate-vit D3-min (CALTRATE 600+D PLUS MINERALS) 600 mg calcium- 400 unit Tab Take 1 tablet by mouth once daily.      cholecalciferol, vitamin D3, (VITAMIN D3) 400 unit Tab Take 800 Units by mouth once daily.      clopidogrel (PLAVIX) 75 mg tablet Take 1 tablet (75 mg total) by mouth once daily. 90 tablet 4    cyanocobalamin 1,000 mcg/mL injection INJECT 1CC (1ML) INTRAMUSCULARLY every three weeks 10 mL 3    fosinopril (MONOPRIL) 20 MG tablet Take 1 tablet (20 mg total) by mouth once daily. 90 tablet 5    furosemide (LASIX) 20 MG tablet Take 1 tablet (20 mg total) by mouth daily as needed. (Patient taking differently: Take 20 mg by mouth every other day. ) 90 tablet 4    GLUCOSAMINE HCL/CHONDR FLETCHER A NA (OSTEO BI-FLEX ORAL) Take by mouth.      MULTIVITAMIN/IRON/FOLIC ACID (CENTRUM ULTRA WOMEN'S ORAL) Take 1 tablet by mouth once daily.        potassium chloride SA (K-DUR,KLOR-CON) 20 MEQ tablet Take 1 tablet (20 mEq total) by mouth daily as needed. (Patient taking differently: Take 20 mEq by mouth every other day. ) 90 tablet 4    salmon oil-omega-3 fatty acids (SALMON OIL-1000) 1,000-200 mg Cap Take 2 capsules by mouth once daily.       syringe, disposable, 3 mL Syrg As directed      tiotropium (SPIRIVA WITH HANDIHALER) 18 mcg inhalation capsule USE ONE INHALATION BY MOUTH DAILY 30 capsule 9    zolpidem (AMBIEN) 10 mg Tab TAKE ONE TABLET BY MOUTH AT BEDTIME AS NEEDED 30 tablet 3     No current facility-administered medications for this visit.              PHYSICAL EXAM    Alert, coop 89 y.o. female patient in distress r/t coughing.    Vitals:    05/17/17 1457   BP: 136/60   Pulse: 83   Temp: 99.9 °F (37.7 °C)     VS reviewed.  VS  stable.  CC, nursing note, medications & PMH all reviewed today.    Head:  Normocephalic, atraumatic.    EENT:  EACs patent.  TMs no erythema, dull LR, no effusions, no TM perforation.                              Eye lids normal, no discharge present.       Sinus tenderness to palp--none.               Nares--no edema, no d/c present.    Pharynx not injected.                Tonsils not erythematous , not enlarged, no exudate present.    No anterior, no posterior cervical lymph nodes palpable.    No submental, submandibular or supraclavicular lymph nodes palp.             Resp:  Respirations even, mildly labored.  Freq coughing.   Lungs CTA bilat.  No wheezing.  No crackles.  Diminished air to bases bilat.              Post neb moves air to bases, little or no coughing.    Heart:  RRR, no MRG.    MS:  Ambulates with assistance.             NEURO:  Alert and oriented x 4.  Responds appropriately during interaction.    Skin:  Warm, dry, color good.    COPD exacerbation  -     albuterol nebulizer solution 2.5 mg; Take 3 mLs (2.5 mg total) by nebulization one time.      Pt today presents with worsening COPD exac over the past 2-3 days, productive cough..  She has doxycycline and the balance of prednisone she received during an April visit.    She didn't understand why she was to take the doxycycline, so didn't.  She did take the prednisone but did had > needed, sufficient for another round.    This is a new problem to me.  No work up is planned.      Neb treatment given with good results.  Declines having one at home for use with future exacerbations.  Has her Ventolin that she can use, if needed.    Pt advised to perform comfort measures recommended on patient instruction sheet .    If not better in 3 days, the patient is advised to call us.  If worse or concerns, the patient is advised to call us.  Explained exam findings, diagnosis and treatment plan to patient.  Questions answered and patient states  understanding.

## 2017-05-17 NOTE — MR AVS SNAPSHOT
[facility]  [address] [facility] [address]  [facility] [address]  Phone: [phone]                  Sheree Pugh   2017 3:00 PM   Office Visit    Description:  Female : 1927   Provider:  Sarah Diggs NP   Department:  [facility]           Reason for Visit     Cough           Diagnoses this Visit        Comments    COPD exacerbation    -  Primary            To Do List           Future Appointments        Provider Department Dept Phone    2017 1:30 PM Gus Shahid MD [facility] [phone]    2017 2:00 PM Terell Jay MD [facility] [phone]      Goals (5 Years of Data)     None      [facility]     [facility] Nurse Care Line -  Assistance  Unless otherwise directed by your provider, please contact [facility], our nurse care line that is available for  assistance.     Registered nurses in the [facility] provide: appointment scheduling, clinical advisement, health education, and other advisory services.  Call: [phone] (toll free)               Medications           Message regarding Medications     Verify the changes and/or additions to your medication regime listed below are the same as discussed with your clinician today.  If any of these changes or additions are incorrect, please notify your healthcare provider.        These medications were administered today        Dose Freq    albuterol nebulizer solution 2.5 mg 2.5 mg Clinic/HOD 1 time    Sig: Take 3 mLs (2.5 mg total) by nebulization one time.    Class: Normal    Route: Nebulization      STOP taking these medications     azelastine (ASTELIN) 137 mcg (0.1 %) nasal spray 1 spray (137 mcg total) by Nasal route 2 (two) times daily.    doxycycline (VIBRAMYCIN) 100 MG Cap Take 1 capsule (100 mg total) by mouth every 12 (twelve) hours.    mometasone (ELOCON) 0.1 % ointment Apply topically once daily. Apply BID x 1 month, then QD x  "1 month, then QOD x 1 month, then as needed.    predniSONE (DELTASONE) 10 MG tablet 3 a day x 2 days, 2 a day x 2 days, 1 a day x 2 days           Verify that the below list of medications is an accurate representation of the medications you are currently taking.  If none reported, the list may be blank. If incorrect, please contact your healthcare provider. Carry this list with you in case of emergency.           Current Medications     ACETAMINOPHEN (TYLENOL ARTHRITIS ORAL) Take by mouth daily as needed.    albuterol (PROAIR HFA) 90 mcg/actuation inhaler Inhale 2 puffs into the lungs every 6 (six) hours as needed for Wheezing or Shortness of Breath.    ANTIOX #11/OM3/DHA/EPA/LUT/RAND (OCUVITE ADULT 50+ ORAL) Take by mouth once daily.      aspirin (ECOTRIN) 81 MG EC tablet Take by mouth once daily.     atorvastatin (LIPITOR) 20 MG tablet Take 1 tablet (20 mg total) by mouth once daily.    BD LUER-FRANCI SYRINGE 3 mL 25 x 5/8" Syrg FOLLOW DOCTORS DIRECTIONS    biotin 2,500 mcg Cap Take 2 capsules by mouth once daily. 2 capsules (5000 mcg daily)    budesonide-formoterol 160-4.5 mcg (SYMBICORT) 160-4.5 mcg/actuation HFAA Inhale 2 puffs into the lungs 2 (two) times daily.    calcium carbonate-vit D3-min (CALTRATE 600+D PLUS MINERALS) 600 mg calcium- 400 unit Tab Take 1 tablet by mouth once daily.    cholecalciferol, vitamin D3, (VITAMIN D3) 400 unit Tab Take 800 Units by mouth once daily.    clopidogrel (PLAVIX) 75 mg tablet Take 1 tablet (75 mg total) by mouth once daily.    cyanocobalamin 1,000 mcg/mL injection INJECT 1CC (1ML) INTRAMUSCULARLY every three weeks    fosinopril (MONOPRIL) 20 MG tablet Take 1 tablet (20 mg total) by mouth once daily.    furosemide (LASIX) 20 MG tablet Take 1 tablet (20 mg total) by mouth daily as needed.    GLUCOSAMINE HCL/CHONDR FLETCHER A NA (OSTEO BI-FLEX ORAL) Take by mouth.    MULTIVITAMIN/IRON/FOLIC ACID (CENTRUM ULTRA WOMEN'S ORAL) Take 1 tablet by mouth once daily.      potassium chloride " "SA (K-DUR,KLOR-CON) 20 MEQ tablet Take 1 tablet (20 mEq total) by mouth daily as needed.    salmon oil-omega-3 fatty acids (SALMON OIL-1000) 1,000-200 mg Cap Take 2 capsules by mouth once daily.     syringe, disposable, 3 mL Syrg As directed    tiotropium (SPIRIVA WITH HANDIHALER) 18 mcg inhalation capsule USE ONE INHALATION BY MOUTH DAILY    zolpidem (AMBIEN) 10 mg Tab TAKE ONE TABLET BY MOUTH AT BEDTIME AS NEEDED           Clinical Reference Information           Your Vitals Were     BP Pulse Temp Height Weight SpO2    136/60 (BP Location: Right arm, Patient Position: Sitting, BP Method: Manual) 83 99.9 °F (37.7 °C) (Oral) 5' 5" (1.651 m) 77 kg (169 lb 12.1 oz) 96%    BMI                28.25 kg/m2          Blood Pressure          Most Recent Value    BP  136/60      Allergies as of 5/17/2017     Codeine    Gabapentin    Hydrocodone      Immunizations Administered on Date of Encounter - 5/17/2017     None      Administrations This Visit     albuterol nebulizer solution 2.5 mg     Admin Date Action Dose Route Administered By             05/17/2017 Given 2.5 mg Nebulization Shelli Valdes LPN                      [facility] Sign-Up     Activating your [facility] account is as easy as 1-2-3!     1) Visit [email or web address], select Sign Up Now, enter this activation code and your date of birth, then select Next.  [email or web address]  Expires: 6/4/2017  2:57 PM      2) Create a username and password to use when you visit [facility] in the future and select a security question in case you lose your password and select Next.    3) Enter your e-mail address and click Sign Up!    Additional Information  If you have questions, please e-mail [email or web address] or call [phone] to talk to our [facility] staff. Remember, [facility] is NOT to be used for urgent needs. For medical emergencies, dial 911.         Instructions    The blue-Ventolin--can help when you're having trouble breathing but can also help coughing.  "   Continue Spiriva, Symbicort.    Take the blue pills (antibiotic, doxyccyline) 1 twice a day.  Repeat the prednisone  3 daily for 2 days, 2 daily for 2 days then 1 daily.  Then stop.  These are medications you have at home.    Maintain hydration.    If you are not better in 3 days, call the on-call doctor or [facility].  If worse or you have concerns or questions, please do not hesitate to call.  You can reach us at [phone] Monday through Friday (except holidays) 12 nooon to 6 p.m.    Thank you for using the [facility]!    Ale Dontae, REEMA, CNP, FNP-BC  [facility]  [facility]         Language Assistance Services     ATTENTION: Language assistance services are available, free of charge. Please call [phone].      ATENCIÓN: Si habla diana, tiene a jennings disposición servicios gratuitos de asistencia lingüística. Llame al [phone].     [facility] Ý: N?u b?n nói Ti?ng Vi?t, có các d?ch v? h? tr? ngôn ng? mi?n phí dành cho b?n. G?i s? [phone].         [facility] complies with applicable Federal civil rights laws and does not discriminate on the basis of race, color, national origin, age, disability, or sex.

## 2017-05-23 ENCOUNTER — TELEPHONE (OUTPATIENT)
Dept: PHARMACY | Facility: CLINIC | Age: 82
End: 2017-05-23

## 2017-05-23 ENCOUNTER — TELEPHONE (OUTPATIENT)
Dept: PRIMARY CARE CLINIC | Facility: CLINIC | Age: 82
End: 2017-05-23

## 2017-05-23 DIAGNOSIS — J44.1 COPD EXACERBATION: ICD-10-CM

## 2017-05-23 DIAGNOSIS — J42 CHRONIC BRONCHITIS, UNSPECIFIED CHRONIC BRONCHITIS TYPE: ICD-10-CM

## 2017-05-23 NOTE — TELEPHONE ENCOUNTER
----- Message from Lindsey Arrington sent at 5/23/2017  4:40 PM CDT -----  Contact: Lindsey Arrington, Pharmacy Patient Assistance  Hello,    I have assisted pt with the cost of her Spiriva, Symbicort and Ventolin thru [facility], however pt will need 90 day supplies of each medication sent to her local [facility] pharmacy to receive the assistance. Please send Rx's for each to pharmacy.       Thanks a jaime,    Lindsey ext [phone]

## 2017-05-24 DIAGNOSIS — J44.1 COPD EXACERBATION: ICD-10-CM

## 2017-05-24 DIAGNOSIS — J42 CHRONIC BRONCHITIS, UNSPECIFIED CHRONIC BRONCHITIS TYPE: ICD-10-CM

## 2017-05-24 RX ORDER — ALBUTEROL SULFATE 90 UG/1
2 AEROSOL, METERED RESPIRATORY (INHALATION) EVERY 6 HOURS PRN
Qty: 18 G | Refills: 2 | Status: SHIPPED | OUTPATIENT
Start: 2017-05-24 | End: 2017-05-24 | Stop reason: SDUPTHER

## 2017-05-24 RX ORDER — TIOTROPIUM BROMIDE 18 UG/1
CAPSULE ORAL; RESPIRATORY (INHALATION)
Qty: 90 CAPSULE | Refills: 2 | Status: SHIPPED | OUTPATIENT
Start: 2017-05-24 | End: 2018-07-26 | Stop reason: SDUPTHER

## 2017-05-24 RX ORDER — BUDESONIDE AND FORMOTEROL FUMARATE DIHYDRATE 160; 4.5 UG/1; UG/1
2 AEROSOL RESPIRATORY (INHALATION) 2 TIMES DAILY
Qty: 33 G | Refills: 2 | Status: SHIPPED | OUTPATIENT
Start: 2017-05-24 | End: 2017-11-17 | Stop reason: ALTCHOICE

## 2017-05-24 RX ORDER — TIOTROPIUM BROMIDE 18 UG/1
CAPSULE ORAL; RESPIRATORY (INHALATION)
Qty: 90 CAPSULE | Refills: 3 | Status: CANCELLED | OUTPATIENT
Start: 2017-05-24

## 2017-05-24 RX ORDER — BUDESONIDE AND FORMOTEROL FUMARATE DIHYDRATE 160; 4.5 UG/1; UG/1
2 AEROSOL RESPIRATORY (INHALATION) 2 TIMES DAILY
Qty: 33 G | Refills: 9 | Status: CANCELLED | OUTPATIENT
Start: 2017-05-24

## 2017-05-24 RX ORDER — ALBUTEROL SULFATE 90 UG/1
2 AEROSOL, METERED RESPIRATORY (INHALATION) EVERY 6 HOURS PRN
Qty: 18 G | Refills: 3 | Status: CANCELLED | OUTPATIENT
Start: 2017-05-24

## 2017-05-25 RX ORDER — ALBUTEROL SULFATE 90 UG/1
AEROSOL, METERED RESPIRATORY (INHALATION)
Qty: 18 G | Refills: 2 | Status: SHIPPED | OUTPATIENT
Start: 2017-05-25 | End: 2018-06-25 | Stop reason: SDUPTHER

## 2017-05-25 NOTE — TELEPHONE ENCOUNTER
----- Message from Kavita Mccormack sent at 5/25/2017 12:09 PM CDT -----  Contact: Patient  Patient needs a prescription Ventolin to be sent [facility]. Any questions call patient [phone].

## 2017-06-05 ENCOUNTER — TELEPHONE (OUTPATIENT)
Dept: FAMILY MEDICINE | Facility: CLINIC | Age: 82
End: 2017-06-05

## 2017-06-05 NOTE — TELEPHONE ENCOUNTER
----- Message from Kathi Trejo sent at 6/5/2017  1:39 PM CDT -----  Contact: self   Patient wants to speak with a nurse regarding clarification on scheduling bloodwork patient wants to know what she need please call back at [phone]

## 2017-06-06 NOTE — TELEPHONE ENCOUNTER
----- Message from Milton JEMIMA Frisard sent at 6/6/2017 12:46 PM CDT -----  Contact: same  Patient called in and stated that someone called her earlier today Tuesday 6/6/17 regarding getting her lab work done before her scheduled appt on 6/20/17.    Patient call back number is [phone]

## 2017-06-06 NOTE — TELEPHONE ENCOUNTER
Called pt, she needs CMP and lipid this week before appt with PCP. However, Dr. Jay ordered CMP, Lipid, and CBC to have done 2 weeks before she sees him in august. But pt is saying that she needs labs prior to PCP appt. Please advise.

## 2017-06-14 ENCOUNTER — LAB VISIT (OUTPATIENT)
Dept: LAB | Facility: HOSPITAL | Age: 82
End: 2017-06-14
Attending: INTERNAL MEDICINE
Payer: MEDICARE

## 2017-06-14 DIAGNOSIS — I73.9 PVD (PERIPHERAL VASCULAR DISEASE): ICD-10-CM

## 2017-06-14 DIAGNOSIS — I25.10 CORONARY ARTERY DISEASE INVOLVING NATIVE CORONARY ARTERY WITHOUT ANGINA PECTORIS, UNSPECIFIED WHETHER NATIVE OR TRANSPLANTED HEART: Chronic | ICD-10-CM

## 2017-06-14 DIAGNOSIS — E78.5 DYSLIPIDEMIA: ICD-10-CM

## 2017-06-14 DIAGNOSIS — I65.21 STENOSIS OF RIGHT CAROTID ARTERY: Chronic | ICD-10-CM

## 2017-06-14 DIAGNOSIS — I35.0 AORTIC VALVE STENOSIS: Chronic | ICD-10-CM

## 2017-06-14 DIAGNOSIS — R06.02 SOB (SHORTNESS OF BREATH) ON EXERTION: Chronic | ICD-10-CM

## 2017-06-14 DIAGNOSIS — Z91.81 AT RISK FOR FALLS: Chronic | ICD-10-CM

## 2017-06-14 DIAGNOSIS — Z98.61 HISTORY OF PTCA: Chronic | ICD-10-CM

## 2017-06-14 DIAGNOSIS — I73.9 PERIPHERAL VASCULAR DISEASE: ICD-10-CM

## 2017-06-14 LAB
ALBUMIN SERPL BCP-MCNC: 3.4 G/DL
ALP SERPL-CCNC: 96 U/L
ALT SERPL W/O P-5'-P-CCNC: 22 U/L
ANION GAP SERPL CALC-SCNC: 10 MMOL/L
AST SERPL-CCNC: 28 U/L
BASOPHILS # BLD AUTO: 0.05 K/UL
BASOPHILS # BLD AUTO: 0.05 K/UL
BASOPHILS NFR BLD: 0.9 %
BASOPHILS NFR BLD: 0.9 %
BILIRUB SERPL-MCNC: 0.5 MG/DL
BUN SERPL-MCNC: 25 MG/DL
CALCIUM SERPL-MCNC: 9.6 MG/DL
CHLORIDE SERPL-SCNC: 106 MMOL/L
CHOLEST/HDLC SERPL: 3.5 {RATIO}
CO2 SERPL-SCNC: 26 MMOL/L
CREAT SERPL-MCNC: 1.2 MG/DL
DIFFERENTIAL METHOD: ABNORMAL
DIFFERENTIAL METHOD: ABNORMAL
EOSINOPHIL # BLD AUTO: 0.3 K/UL
EOSINOPHIL # BLD AUTO: 0.3 K/UL
EOSINOPHIL NFR BLD: 4.5 %
EOSINOPHIL NFR BLD: 4.5 %
ERYTHROCYTE [DISTWIDTH] IN BLOOD BY AUTOMATED COUNT: 14.8 %
ERYTHROCYTE [DISTWIDTH] IN BLOOD BY AUTOMATED COUNT: 14.8 %
EST. GFR  (AFRICAN AMERICAN): 46.3 ML/MIN/1.73 M^2
EST. GFR  (NON AFRICAN AMERICAN): 40.2 ML/MIN/1.73 M^2
GLUCOSE SERPL-MCNC: 87 MG/DL
HCT VFR BLD AUTO: 35.7 %
HCT VFR BLD AUTO: 35.7 %
HDL/CHOLESTEROL RATIO: 28.9 %
HDLC SERPL-MCNC: 190 MG/DL
HDLC SERPL-MCNC: 55 MG/DL
HGB BLD-MCNC: 11.7 G/DL
HGB BLD-MCNC: 11.7 G/DL
LDLC SERPL CALC-MCNC: 110.8 MG/DL
LYMPHOCYTES # BLD AUTO: 2.2 K/UL
LYMPHOCYTES # BLD AUTO: 2.2 K/UL
LYMPHOCYTES NFR BLD: 40 %
LYMPHOCYTES NFR BLD: 40 %
MCH RBC QN AUTO: 30.6 PG
MCH RBC QN AUTO: 30.6 PG
MCHC RBC AUTO-ENTMCNC: 32.8 %
MCHC RBC AUTO-ENTMCNC: 32.8 %
MCV RBC AUTO: 94 FL
MCV RBC AUTO: 94 FL
MONOCYTES # BLD AUTO: 0.6 K/UL
MONOCYTES # BLD AUTO: 0.6 K/UL
MONOCYTES NFR BLD: 11.4 %
MONOCYTES NFR BLD: 11.4 %
NEUTROPHILS # BLD AUTO: 2.4 K/UL
NEUTROPHILS # BLD AUTO: 2.4 K/UL
NEUTROPHILS NFR BLD: 42.8 %
NEUTROPHILS NFR BLD: 42.8 %
NONHDLC SERPL-MCNC: 135 MG/DL
PLATELET # BLD AUTO: 239 K/UL
PLATELET # BLD AUTO: 239 K/UL
PMV BLD AUTO: 9.4 FL
PMV BLD AUTO: 9.4 FL
POTASSIUM SERPL-SCNC: 5 MMOL/L
PROT SERPL-MCNC: 6.8 G/DL
RBC # BLD AUTO: 3.82 M/UL
RBC # BLD AUTO: 3.82 M/UL
SODIUM SERPL-SCNC: 142 MMOL/L
TRIGL SERPL-MCNC: 121 MG/DL
WBC # BLD AUTO: 5.6 K/UL
WBC # BLD AUTO: 5.6 K/UL

## 2017-06-14 PROCEDURE — 80061 LIPID PANEL: CPT

## 2017-06-14 PROCEDURE — 80053 COMPREHEN METABOLIC PANEL: CPT

## 2017-06-14 PROCEDURE — 85025 COMPLETE CBC W/AUTO DIFF WBC: CPT

## 2017-06-14 PROCEDURE — 36415 COLL VENOUS BLD VENIPUNCTURE: CPT | Mod: PO

## 2017-06-20 ENCOUNTER — OFFICE VISIT (OUTPATIENT)
Dept: FAMILY MEDICINE | Facility: CLINIC | Age: 82
End: 2017-06-20
Payer: MEDICARE

## 2017-06-20 VITALS
HEIGHT: 65 IN | WEIGHT: 172.38 LBS | DIASTOLIC BLOOD PRESSURE: 60 MMHG | BODY MASS INDEX: 28.72 KG/M2 | HEART RATE: 60 BPM | SYSTOLIC BLOOD PRESSURE: 142 MMHG

## 2017-06-20 DIAGNOSIS — J44.1 COPD EXACERBATION: Primary | ICD-10-CM

## 2017-06-20 PROCEDURE — 1159F MED LIST DOCD IN RCRD: CPT | Mod: S$GLB,,, | Performed by: FAMILY MEDICINE

## 2017-06-20 PROCEDURE — 99213 OFFICE O/P EST LOW 20 MIN: CPT | Mod: S$GLB,,, | Performed by: FAMILY MEDICINE

## 2017-06-20 PROCEDURE — 1125F AMNT PAIN NOTED PAIN PRSNT: CPT | Mod: S$GLB,,, | Performed by: FAMILY MEDICINE

## 2017-06-20 PROCEDURE — 99999 PR PBB SHADOW E&M-EST. PATIENT-LVL III: CPT | Mod: PBBFAC,,, | Performed by: FAMILY MEDICINE

## 2017-06-20 NOTE — PROGRESS NOTES
Subjective:       Patient ID: Sheree Pugh is a 89 y.o. female    Chief Complaint: Follow-up (labs, congestion) and Cough (productive)    HPI  Here today for interval evaluation  She reports mild sciatic pain  She reports mild cough and congestion, but stable.    Review of Systems      Objective:   Physical Exam   Constitutional: She appears well-developed and well-nourished.   HENT:   Head: Normocephalic and atraumatic.   Eyes: Conjunctivae and EOM are normal. Pupils are equal, round, and reactive to light. No scleral icterus.   Neck: Normal range of motion. Neck supple. No thyromegaly present.   Cardiovascular: Normal rate, regular rhythm and normal heart sounds.  Exam reveals no gallop and no friction rub.    No murmur heard.  Pulmonary/Chest: Effort normal and breath sounds normal. No respiratory distress. She has no wheezes. She has no rales.   Lymphadenopathy:     She has no cervical adenopathy.   Vitals reviewed.    Results for orders placed or performed in visit on 06/14/17   CBC auto differential   Result Value Ref Range    WBC 5.60 3.90 - 12.70 K/uL    RBC 3.82 (L) 4.00 - 5.40 M/uL    Hemoglobin 11.7 (L) 12.0 - 16.0 g/dL    Hematocrit 35.7 (L) 37.0 - 48.5 %    MCV 94 82 - 98 fL    MCH 30.6 27.0 - 31.0 pg    MCHC 32.8 32.0 - 36.0 %    RDW 14.8 (H) 11.5 - 14.5 %    Platelets 239 150 - 350 K/uL    MPV 9.4 9.2 - 12.9 fL    Gran # 2.4 1.8 - 7.7 K/uL    Lymph # 2.2 1.0 - 4.8 K/uL    Mono # 0.6 0.3 - 1.0 K/uL    Eos # 0.3 0.0 - 0.5 K/uL    Baso # 0.05 0.00 - 0.20 K/uL    Gran% 42.8 38.0 - 73.0 %    Lymph% 40.0 18.0 - 48.0 %    Mono% 11.4 4.0 - 15.0 %    Eosinophil% 4.5 0.0 - 8.0 %    Basophil% 0.9 0.0 - 1.9 %    Differential Method Automated    Comprehensive metabolic panel   Result Value Ref Range    Sodium 142 136 - 145 mmol/L    Potassium 5.0 3.5 - 5.1 mmol/L    Chloride 106 95 - 110 mmol/L    CO2 26 23 - 29 mmol/L    Glucose 87 70 - 110 mg/dL    BUN, Bld 25 (H) 8 - 23 mg/dL    Creatinine 1.2 0.5 - 1.4  mg/dL    Calcium 9.6 8.7 - 10.5 mg/dL    Total Protein 6.8 6.0 - 8.4 g/dL    Albumin 3.4 (L) 3.5 - 5.2 g/dL    Total Bilirubin 0.5 0.1 - 1.0 mg/dL    Alkaline Phosphatase 96 55 - 135 U/L    AST 28 10 - 40 U/L    ALT 22 10 - 44 U/L    Anion Gap 10 8 - 16 mmol/L    eGFR if African American 46.3 (A) >60 mL/min/1.73 m^2    eGFR if non  40.2 (A) >60 mL/min/1.73 m^2   CBC auto differential   Result Value Ref Range    WBC 5.60 3.90 - 12.70 K/uL    RBC 3.82 (L) 4.00 - 5.40 M/uL    Hemoglobin 11.7 (L) 12.0 - 16.0 g/dL    Hematocrit 35.7 (L) 37.0 - 48.5 %    MCV 94 82 - 98 fL    MCH 30.6 27.0 - 31.0 pg    MCHC 32.8 32.0 - 36.0 %    RDW 14.8 (H) 11.5 - 14.5 %    Platelets 239 150 - 350 K/uL    MPV 9.4 9.2 - 12.9 fL    Gran # 2.4 1.8 - 7.7 K/uL    Lymph # 2.2 1.0 - 4.8 K/uL    Mono # 0.6 0.3 - 1.0 K/uL    Eos # 0.3 0.0 - 0.5 K/uL    Baso # 0.05 0.00 - 0.20 K/uL    Gran% 42.8 38.0 - 73.0 %    Lymph% 40.0 18.0 - 48.0 %    Mono% 11.4 4.0 - 15.0 %    Eosinophil% 4.5 0.0 - 8.0 %    Basophil% 0.9 0.0 - 1.9 %    Differential Method Automated    Lipid panel   Result Value Ref Range    Cholesterol 190 120 - 199 mg/dL    Triglycerides 121 30 - 150 mg/dL    HDL 55 40 - 75 mg/dL    LDL Cholesterol 110.8 63.0 - 159.0 mg/dL    HDL/Chol Ratio 28.9 20.0 - 50.0 %    Total Cholesterol/HDL Ratio 3.5 2.0 - 5.0    Non-HDL Cholesterol 135 mg/dL         Assessment:       1. COPD exacerbation           Plan:       COPD exacerbation  - Continue current therapy  - Return in about 6 months (around 12/20/2017).

## 2017-07-12 RX ORDER — SYRINGE WITH NEEDLE, 1 ML 25GX5/8"
SYRINGE, EMPTY DISPOSABLE MISCELLANEOUS
Qty: 10 EACH | Refills: 0 | Status: SHIPPED | OUTPATIENT
Start: 2017-07-12 | End: 2018-09-01

## 2017-08-07 ENCOUNTER — OFFICE VISIT (OUTPATIENT)
Dept: CARDIOLOGY | Facility: CLINIC | Age: 82
End: 2017-08-07
Payer: MEDICARE

## 2017-08-07 VITALS
SYSTOLIC BLOOD PRESSURE: 140 MMHG | WEIGHT: 173.06 LBS | HEIGHT: 65 IN | DIASTOLIC BLOOD PRESSURE: 58 MMHG | HEART RATE: 64 BPM | BODY MASS INDEX: 28.83 KG/M2

## 2017-08-07 DIAGNOSIS — I10 ESSENTIAL HYPERTENSION: Chronic | ICD-10-CM

## 2017-08-07 DIAGNOSIS — I25.10 CORONARY ARTERY DISEASE INVOLVING NATIVE CORONARY ARTERY OF NATIVE HEART WITHOUT ANGINA PECTORIS: Chronic | ICD-10-CM

## 2017-08-07 DIAGNOSIS — I35.0 NONRHEUMATIC AORTIC VALVE STENOSIS: Chronic | ICD-10-CM

## 2017-08-07 DIAGNOSIS — I65.21 STENOSIS OF RIGHT CAROTID ARTERY: Chronic | ICD-10-CM

## 2017-08-07 DIAGNOSIS — J44.9 CHRONIC OBSTRUCTIVE PULMONARY DISEASE, UNSPECIFIED COPD TYPE: Primary | ICD-10-CM

## 2017-08-07 DIAGNOSIS — Z98.61 HISTORY OF PTCA: Primary | Chronic | ICD-10-CM

## 2017-08-07 DIAGNOSIS — E78.5 DYSLIPIDEMIA: Chronic | ICD-10-CM

## 2017-08-07 DIAGNOSIS — I73.9 PERIPHERAL VASCULAR DISEASE: ICD-10-CM

## 2017-08-07 PROCEDURE — 1126F AMNT PAIN NOTED NONE PRSNT: CPT | Mod: S$GLB,,, | Performed by: INTERNAL MEDICINE

## 2017-08-07 PROCEDURE — 1159F MED LIST DOCD IN RCRD: CPT | Mod: S$GLB,,, | Performed by: INTERNAL MEDICINE

## 2017-08-07 PROCEDURE — 99999 PR PBB SHADOW E&M-EST. PATIENT-LVL III: CPT | Mod: PBBFAC,,, | Performed by: INTERNAL MEDICINE

## 2017-08-07 PROCEDURE — 99214 OFFICE O/P EST MOD 30 MIN: CPT | Mod: S$GLB,,, | Performed by: INTERNAL MEDICINE

## 2017-08-07 PROCEDURE — 99499 UNLISTED E&M SERVICE: CPT | Mod: S$GLB,,, | Performed by: INTERNAL MEDICINE

## 2017-08-07 NOTE — PROGRESS NOTES
Subjective:    Patient ID:  Sheree Pugh is a 89 y.o. female who presents for follow-up of Hypertension (9 month f/u - review labs ) and Aortic Stenosis      HPI  Here for f/u CAD/AS/ cutting PTCA for ISR of RCA. C/o increasing [place] now minimally relieved with inhaler. No CP. Pt's angina is SOB. Very active still drives.    Review of Systems   Constitution: Negative for malaise/fatigue.   Eyes: Negative for blurred vision.   Cardiovascular: Negative for chest pain, claudication, cyanosis, dyspnea on exertion, irregular heartbeat, leg swelling, near-syncope, orthopnea, palpitations, paroxysmal nocturnal dyspnea and syncope.   Respiratory: Negative for cough and shortness of breath.    Hematologic/Lymphatic: Does not bruise/bleed easily.   Musculoskeletal: Negative for back pain, falls, joint pain, muscle cramps, muscle weakness and myalgias.   Gastrointestinal: Negative for abdominal pain, change in bowel habit, nausea and vomiting.   Genitourinary: Negative for urgency.   Neurological: Negative for dizziness, focal weakness and light-headedness.        Objective:    Physical Exam   Constitutional: She is oriented to person, place, and time. She appears well-developed and well-nourished.   HENT:   Head: Normocephalic.   Eyes: Conjunctivae are normal.   Neck: Normal range of motion. Neck supple. No JVD present.   Cardiovascular: Normal rate, regular rhythm and intact distal pulses.    Murmur heard.   Harsh midsystolic murmur is present with a grade of 2/6  at the upper right sternal border radiating to the neck  Pulses:       Carotid pulses are 2+ on the right side, and 2+ on the left side.       Radial pulses are 2+ on the right side, and 2+ on the left side.        Dorsalis pedis pulses are 2+ on the right side, and 2+ on the left side.        Posterior tibial pulses are 2+ on the right side, and 2+ on the left side.   Pulmonary/Chest: Effort normal and breath sounds normal.   Abdominal: Soft. Bowel sounds are  normal.   Musculoskeletal: She exhibits no edema or tenderness.   Neurological: She is alert and oriented to person, place, and time. Gait normal.   Skin: Skin is warm, dry and intact. No cyanosis. Nails show no clubbing.   Psychiatric: She has a normal mood and affect. Her speech is normal and behavior is normal. Thought content normal.             ..    Chemistry        Component Value Date/Time     06/14/2017 0753    K 5.0 06/14/2017 0753     06/14/2017 0753    CO2 26 06/14/2017 0753    BUN 25 (H) 06/14/2017 0753    CREATININE 1.2 06/14/2017 0753    GLU 87 06/14/2017 0753        Component Value Date/Time    CALCIUM 9.6 06/14/2017 0753    ALKPHOS 96 06/14/2017 0753    AST 28 06/14/2017 0753    ALT 22 06/14/2017 0753    BILITOT 0.5 06/14/2017 0753    ESTGFRAFRICA 46.3 (A) 06/14/2017 0753    EGFRNONAA 40.2 (A) 06/14/2017 0753            ..  Lab Results   Component Value Date    CHOL 190 06/14/2017    CHOL 170 02/19/2016    CHOL 188 03/03/2015     Lab Results   Component Value Date    HDL 55 06/14/2017    HDL 46 02/19/2016    HDL 59 03/03/2015     Lab Results   Component Value Date    LDLCALC 110.8 06/14/2017    LDLCALC 85.2 02/19/2016    LDLCALC 109.8 03/03/2015     Lab Results   Component Value Date    TRIG 121 06/14/2017    TRIG 194 (H) 02/19/2016    TRIG 96 03/03/2015     Lab Results   Component Value Date    CHOLHDL 28.9 06/14/2017    CHOLHDL 27.1 02/19/2016    CHOLHDL 31.4 03/03/2015     ..  Lab Results   Component Value Date    WBC 5.60 06/14/2017    WBC 5.60 06/14/2017    HGB 11.7 (L) 06/14/2017    HGB 11.7 (L) 06/14/2017    HCT 35.7 (L) 06/14/2017    HCT 35.7 (L) 06/14/2017    MCV 94 06/14/2017    MCV 94 06/14/2017     06/14/2017     06/14/2017       Test(s) Reviewed  I have reviewed the following in detail:  [] Stress test   [] Angiography   [x] Echocardiogram   [x] Labs   [] Other:       Assessment:         ICD-10-CM ICD-9-CM   1. History of PTCA Z98.61 V45.82   2. Dyslipidemia  E78.5 272.4   3. Stenosis of right carotid artery I65.21 433.10   4. Coronary artery disease involving native coronary artery of native heart without angina pectoris I25.10 414.01   5. Nonrheumatic aortic valve stenosis I35.0 424.1   6. Peripheral vascular disease I73.9 443.9   7. Essential hypertension I10 401.9     Problem List Items Addressed This Visit     Aortic valve stenosis (Chronic)    Coronary artery disease involving native coronary artery of native heart without angina pectoris (Chronic)    Dyslipidemia (Chronic)    Essential hypertension (Chronic)    History of PTCA - Primary (Chronic)    Overview     4/14 1. Moderate disease LAD/CX.  2. Diffuse significant ISR of RCA successfully treated 2.5 and 3.0 POBA only.  3. Patent LM and right renal stent    9/10 LM- promus 4x8, LAD [id number], RCA promus 2.5x23 & 2.75x8           Peripheral vascular disease    Stenosis of right carotid artery (Chronic)      Other Visit Diagnoses    None.          Plan:           Return to clinic 4 months   Low level/low impact aerobic exercise 5x's/wk. Heart healthy diet and risk factor modification.    See labs and med orders.  porsha-stress, cfd if negative f/u pulmonary

## 2017-08-08 ENCOUNTER — TELEPHONE (OUTPATIENT)
Dept: CARDIOLOGY | Facility: CLINIC | Age: 82
End: 2017-08-08

## 2017-08-16 ENCOUNTER — OFFICE VISIT (OUTPATIENT)
Dept: FAMILY MEDICINE | Facility: CLINIC | Age: 82
End: 2017-08-16
Payer: MEDICARE

## 2017-08-16 VITALS
WEIGHT: 166 LBS | HEIGHT: 65 IN | DIASTOLIC BLOOD PRESSURE: 59 MMHG | BODY MASS INDEX: 27.66 KG/M2 | HEART RATE: 61 BPM | SYSTOLIC BLOOD PRESSURE: 124 MMHG

## 2017-08-16 DIAGNOSIS — I65.21 STENOSIS OF RIGHT CAROTID ARTERY: Chronic | ICD-10-CM

## 2017-08-16 DIAGNOSIS — H25.12 NUCLEAR SCLEROSIS OF LEFT EYE: ICD-10-CM

## 2017-08-16 DIAGNOSIS — N18.30 CHRONIC KIDNEY DISEASE, STAGE III (MODERATE): ICD-10-CM

## 2017-08-16 DIAGNOSIS — G62.9 PERIPHERAL POLYNEUROPATHY: ICD-10-CM

## 2017-08-16 DIAGNOSIS — Z91.81 AT RISK FOR FALLS: Chronic | ICD-10-CM

## 2017-08-16 DIAGNOSIS — Z00.00 ENCOUNTER FOR PREVENTIVE HEALTH EXAMINATION: Primary | ICD-10-CM

## 2017-08-16 DIAGNOSIS — H43.813 POSTERIOR VITREOUS DETACHMENT OF BOTH EYES: ICD-10-CM

## 2017-08-16 DIAGNOSIS — N90.4 LICHEN SCLEROSUS ET ATROPHICUS OF THE VULVA: ICD-10-CM

## 2017-08-16 DIAGNOSIS — I73.9 PERIPHERAL VASCULAR DISEASE: ICD-10-CM

## 2017-08-16 DIAGNOSIS — H35.3220 EXUDATIVE AGE-RELATED MACULAR DEGENERATION OF LEFT EYE: ICD-10-CM

## 2017-08-16 DIAGNOSIS — I35.0 NONRHEUMATIC AORTIC VALVE STENOSIS: Chronic | ICD-10-CM

## 2017-08-16 DIAGNOSIS — M19.90 ARTHRITIS: ICD-10-CM

## 2017-08-16 DIAGNOSIS — E78.5 DYSLIPIDEMIA: Chronic | ICD-10-CM

## 2017-08-16 DIAGNOSIS — I77.819 ECTASIS AORTA: ICD-10-CM

## 2017-08-16 DIAGNOSIS — Z98.61 HISTORY OF PTCA: Chronic | ICD-10-CM

## 2017-08-16 DIAGNOSIS — I25.10 CORONARY ARTERY DISEASE INVOLVING NATIVE CORONARY ARTERY OF NATIVE HEART WITHOUT ANGINA PECTORIS: Chronic | ICD-10-CM

## 2017-08-16 DIAGNOSIS — I77.1 TORTUOUS AORTA: ICD-10-CM

## 2017-08-16 DIAGNOSIS — Z96.1 PSEUDOPHAKIA OF RIGHT EYE: ICD-10-CM

## 2017-08-16 DIAGNOSIS — I10 ESSENTIAL HYPERTENSION: Chronic | ICD-10-CM

## 2017-08-16 DIAGNOSIS — J44.9 CHRONIC OBSTRUCTIVE PULMONARY DISEASE, UNSPECIFIED COPD TYPE: ICD-10-CM

## 2017-08-16 DIAGNOSIS — H35.3110 NONEXUDATIVE AGE-RELATED MACULAR DEGENERATION OF RIGHT EYE: ICD-10-CM

## 2017-08-16 DIAGNOSIS — G47.00 INSOMNIA, UNSPECIFIED TYPE: ICD-10-CM

## 2017-08-16 DIAGNOSIS — D51.9 ANEMIA DUE TO VITAMIN B12 DEFICIENCY, UNSPECIFIED B12 DEFICIENCY TYPE: ICD-10-CM

## 2017-08-16 DIAGNOSIS — I70.0 AORTIC ATHEROSCLEROSIS: ICD-10-CM

## 2017-08-16 DIAGNOSIS — M51.36 DDD (DEGENERATIVE DISC DISEASE), LUMBAR: ICD-10-CM

## 2017-08-16 DIAGNOSIS — M85.80 OSTEOPENIA, UNSPECIFIED LOCATION: ICD-10-CM

## 2017-08-16 DIAGNOSIS — H54.8 LEGAL BLINDNESS: ICD-10-CM

## 2017-08-16 DIAGNOSIS — R32 URINARY INCONTINENCE, UNSPECIFIED TYPE: ICD-10-CM

## 2017-08-16 PROCEDURE — G0439 PPPS, SUBSEQ VISIT: HCPCS | Mod: S$GLB,,, | Performed by: NURSE PRACTITIONER

## 2017-08-16 PROCEDURE — 99999 PR PBB SHADOW E&M-EST. PATIENT-LVL V: CPT | Mod: PBBFAC,,, | Performed by: NURSE PRACTITIONER

## 2017-08-16 PROCEDURE — 99499 UNLISTED E&M SERVICE: CPT | Mod: S$GLB,,, | Performed by: NURSE PRACTITIONER

## 2017-08-16 NOTE — PATIENT INSTRUCTIONS
Counseling and Referral of Other Preventative  (Italic type indicates deductible and co-insurance are waived)    Patient Name: Sheree Pugh  Today's Date: 8/16/2017      SERVICE LIMITATIONS RECOMMENDATION    Vaccines    · Pneumococcal (once after 65)    · Influenza (annually)    · Hepatitis B (if medium/high risk)    · Prevnar 13      Hepatitis B medium/high risk factors:       - End-stage renal disease       - Hemophiliacs who received Factor VII or         IX concentrates       - Clients of institutions for the mentally             retarded       - Persons who live in the same house as          a HepB carrier       - Homosexual men       - Illicit injectable drug abusers     Pneumococcal: Done, no repeat necessary     Influenza: N/A     Hepatitis B: N/A     Prevnar 13: Done, no repeat necessary    Mammogram (biennial age 50-74)  Annually (age 40 or over)  N/A    Pap (up to age 70 and after 70 if unknown history or abnormal study last 10 years)    N/A     The USPSTF recommends against screening for cervical cancer in women older than age 65 years who have had adequate prior screening and are not otherwise at high risk for cervical cancer.      Colorectal cancer screening (to age 75)    · Fecal occult blood test (annual)  · Flexible sigmoidoscopy (5y)  · Screening colonoscopy (10y)  · Barium enema   N/A    Diabetes self-management training (no USPSTF recommendations)  Requires referral by treating physician for patient with diabetes or renal disease. 10 hours of initial DSMT sessions of no less than 30 minutes each in a continuous 12-month period. 2 hours of follow-up DSMT in subsequent years.  N/A    Bone mass measurements (age 65 & older, biennial)  Requires diagnosis related to osteoporosis or estrogen deficiency. Biennial benefit unless patient has history of long-term glucocorticoid  N/A    Glaucoma screening (no USPSTF recommendation)  Diabetes mellitus, family history   , age 50 or over     American, age 65 or over  Recommend follow up with eye care professional regularly    Medical nutrition therapy for diabetes or renal disease (no recommended schedule)  Requires referral by treating physician for patient with diabetes or renal disease or kidney transplant within the past 3 years.  Can be provided in same year as diabetes self-management training (DSMT), and CMS recommends medical nutrition therapy take place after DSMT. Up to 3 hours for initial year and 2 hours in subsequent years.  N/A    Cardiovascular screening blood tests (every 5 years)  · Fasting lipid panel  Order as a panel if possible  Done this year, repeat every year    Diabetes screening tests (at least every 3 years, Medicare covers annually or at 6-month intervals for prediabetic patients)  · Fasting blood sugar (FBS) or glucose tolerance test (GTT)  Patient must be diagnosed with one of the following:       - Hypertension       - Dyslipidemia       - Obesity (BMI 30kg/m2)       - Previous elevated impaired FBS or GTT       ... or any two of the following:       - Overweight (BMI 25 but <30)       - Family history of diabetes       - Age 65 or older       - History of gestational diabetes or birth of baby weighing more than 9 pounds  Done this year, repeat every year    HIV screening (annually for increased risk patients)  · HIV-1 and HIV-2 by EIA, or IRA, rapid antibody test or oral mucosa transudate  Patients must be at increased risk for HIV infection per USPSTF guidelines or pregnant. Tests covered annually for patient at increased risk or as requested by the patient. Pregnant patients may receive up to 3 tests during pregnancy.  Risks discussed, screening is not recommended    Smoking cessation counseling (up to 8 sessions per year)  Patients must be asymptomatic of tobacco-related conditions to receive as a preventative service.  Non-smoker    Subsequent annual wellness visit  At least 12 months since last AWV  Return  in one year     The following information is provided to all patients.  This information is to help you find resources for any of the problems found today that may be affecting your health:                Living healthy guide: www.[facility].[place].[facility]      Understanding Diabetes: www.diabetes.org      Eating healthy: www.cdc.gov/healthyweight      CDC home safety checklist: www.cdc.gov/steadi/patient.html      Agency on Aging: www.goea.[place].[facility]      Alcoholics anonymous (AA): www.aa.org      Physical Activity: www.josr.nih.gov/rz0dxoq      Tobacco use: www.quitwithusla.org

## 2017-08-17 NOTE — PROGRESS NOTES
"Sheree Pugh presented for a  Medicare AWV and comprehensive Health Risk Assessment today. The following components were reviewed and updated:    · Medical history  · Family History  · Social history  · Allergies and Current Medications  · Health Risk Assessment  · Health Maintenance  · Care Team     ** See Completed Assessments for Annual Wellness Visit within the encounter summary.**       The following assessments were completed:  · Living Situation  · CAGE  · Depression Screening  · Timed Get Up and Go  · Whisper Test  · Cognitive Function Screening  · Nutrition Screening  · ADL Screening  · PAQ Screening    Vitals:    08/16/17 1309   BP: (!) 124/59   BP Location: Left arm   Patient Position: Sitting   BP Method: Medium (Automatic)   Pulse: 61   Weight: 75.3 kg (166 lb 0.1 oz)   Height: 5' 5" (1.651 m)     Body mass index is 27.62 kg/m².  Physical Exam   Constitutional: She is oriented to person, place, and time. She appears well-developed and well-nourished. No distress.   HENT:   Head: Normocephalic and atraumatic.   Neck: Carotid bruit is not present.   Cardiovascular: Normal rate, regular rhythm and normal heart sounds.    No murmur heard.  Pulmonary/Chest: Effort normal and breath sounds normal. No respiratory distress. She has no wheezes.   Neurological: She is alert and oriented to person, place, and time. No cranial nerve deficit.   Skin: Skin is warm and dry.   Psychiatric: She has a normal mood and affect. Her behavior is normal. Judgment and thought content normal.         Diagnoses and health risks identified today and associated recommendations/orders:    1. Encounter for preventive health examination  Recommend yearly HRA visit    2. Aortic atherosclerosis  Taking statin  Followed by Misty.   Lumbar XR 1/26/17    3. Ectasis aorta  Bp well controlled  Followed by Misty.   Lumbar XR 1/26/17    4. Tortuous aorta  Bp well controlled  Followed by Misty.   CXR 12/6/16    5. Nonexudative age-related macular " degeneration of right eye  Stable.     Followed by ophthalmology.       6. Stenosis of right carotid artery  Continue to monitor  Followed by Misty.       7. Exudative age-related macular degeneration of left eye  Follow ophthalmology recs  Followed by ophthalmology.       8. Chronic obstructive pulmonary disease, unspecified COPD type  Stable.   Using symbicort and ventolin  Followed by Gus Shahid MD .       9. Coronary artery disease involving native coronary artery of native heart without angina pectoris  Stable.   No CP  Followed by Misty.       10. Legal blindness - Left Eye  Unchanged  Followed by ophthalomology.       11. Nonrheumatic aortic valve stenosis  Continue to monitor   Followed by Misty.       12. Essential hypertension  Stable.   Taking acei  Followed by Misty.       13. History of PTCA  Stable.     Followed by misty.       14. Peripheral vascular disease  Elevate ble while at rest  Followed by Gus Shahid MD .       15. Dyslipidemia  Stable.   Taking statin  Followed by Misty.       16. Chronic kidney disease, stage III (moderate)  Stable.   Continue to monitor   Followed by Gus Shahid MD .       17. Anemia due to vitamin B12 deficiency, unspecified B12 deficiency type  Stable.   Continue to monitor   Followed by Gus Shahid MD .       18. Osteopenia, unspecified location  Taking vit D + Ca  Followed by Gus Shahid MD .       19. Arthritis  Stable.     Followed by Gus Shahid MD .       20. At risk for falls  Stable.     Followed by Gus Shahid MD .       21. Peripheral polyneuropathy  Stable.     Followed by Gus Shahid MD .       22. DDD (degenerative disc disease), lumbar  Stable.     Followed by Gus Shahid MD .       23. Insomnia, unspecified type  Stable.   Taking ambien  Followed by Gus Shahid MD .       24. Urinary incontinence, unspecified type  Stable.     Followed by Gus Shahid MD .       25. Lichen sclerosus et  atrophicus of the vulva  Stable.     Followed by Gian.       26. Pseudophakia of right eye  Stable.     Followed by ophthalmology.       27. Posterior vitreous detachment of both eyes  Stable.     Followed by ophthalmology.       28. Nuclear sclerosis of left eye  Stable.     Followed by ophthalmology.         Provided Sheree with a 5-10 year written screening schedule and personal prevention plan. Recommendations were developed using the USPSTF age appropriate recommendations. Education, counseling, and referrals were provided as needed. After Visit Summary printed and given to patient which includes a list of additional screenings\tests needed.    Return in about 1 year (around 8/16/2018).    Lea Flores NP

## 2017-09-07 ENCOUNTER — HOSPITAL ENCOUNTER (OUTPATIENT)
Dept: RADIOLOGY | Facility: HOSPITAL | Age: 82
Discharge: HOME | End: 2017-09-07
Attending: FAMILY MEDICINE
Payer: MEDICARE

## 2017-09-07 ENCOUNTER — CLINICAL SUPPORT (OUTPATIENT)
Dept: CARDIOLOGY | Facility: CLINIC | Age: 82
End: 2017-09-07
Payer: MEDICARE

## 2017-09-07 DIAGNOSIS — I65.21 STENOSIS OF RIGHT CAROTID ARTERY: Chronic | ICD-10-CM

## 2017-09-07 DIAGNOSIS — I25.10 CORONARY ARTERY DISEASE INVOLVING NATIVE CORONARY ARTERY OF NATIVE HEART WITHOUT ANGINA PECTORIS: Chronic | ICD-10-CM

## 2017-09-07 DIAGNOSIS — Z98.61 HISTORY OF PTCA: Chronic | ICD-10-CM

## 2017-09-07 LAB
AORTIC VALVE REGURGITATION: ABNORMAL
DIASTOLIC DYSFUNCTION: NO
DIASTOLIC DYSFUNCTION: YES
ESTIMATED PA SYSTOLIC PRESSURE: 31.14
MITRAL VALVE REGURGITATION: ABNORMAL
RETIRED EF AND QEF - SEE NOTES: 60 (ref 55–65)
TRICUSPID VALVE REGURGITATION: ABNORMAL

## 2017-09-07 PROCEDURE — 78452 HT MUSCLE IMAGE SPECT MULT: CPT | Mod: 26,,, | Performed by: INTERNAL MEDICINE

## 2017-09-07 PROCEDURE — 93306 TTE W/DOPPLER COMPLETE: CPT | Mod: S$GLB,,, | Performed by: INTERNAL MEDICINE

## 2017-09-07 PROCEDURE — 93015 CV STRESS TEST SUPVJ I&R: CPT | Mod: S$GLB,,, | Performed by: INTERNAL MEDICINE

## 2017-09-20 RX ORDER — ZOLPIDEM TARTRATE 10 MG/1
TABLET ORAL
Qty: 30 TABLET | Refills: 3 | Status: SHIPPED | OUTPATIENT
Start: 2017-09-20 | End: 2018-05-02 | Stop reason: ALTCHOICE

## 2017-09-26 ENCOUNTER — TELEPHONE (OUTPATIENT)
Dept: FAMILY MEDICINE | Facility: CLINIC | Age: 82
End: 2017-09-26

## 2017-09-26 NOTE — TELEPHONE ENCOUNTER
----- Message from Ying Joshi sent at 9/26/2017 12:53 PM CDT -----  Contact: self  Patient [phone] is calling to check when she had a pneumonia vaccine and TB vaccine/please advise

## 2017-09-27 NOTE — TELEPHONE ENCOUNTER
Needs date of last pneumococcal for pulmonology appt.  Advised Prevnar 13 was administered on 2/19/2016.

## 2017-09-27 NOTE — TELEPHONE ENCOUNTER
----- Message from Adolfo Garcia sent at 9/27/2017 11:55 AM CDT -----  Contact: pt   Pt is returning arturo, call placed to pod no answer   Call Back#[phone]  Thanks

## 2017-10-21 ENCOUNTER — OFFICE VISIT (OUTPATIENT)
Dept: URGENT CARE | Facility: CLINIC | Age: 82
End: 2017-10-21
Payer: MEDICARE

## 2017-10-21 VITALS
DIASTOLIC BLOOD PRESSURE: 64 MMHG | BODY MASS INDEX: 28.66 KG/M2 | TEMPERATURE: 98 F | WEIGHT: 172 LBS | RESPIRATION RATE: 18 BRPM | OXYGEN SATURATION: 97 % | SYSTOLIC BLOOD PRESSURE: 140 MMHG | HEIGHT: 65 IN | HEART RATE: 74 BPM

## 2017-10-21 DIAGNOSIS — J20.9 ACUTE BRONCHITIS, UNSPECIFIED ORGANISM: Primary | ICD-10-CM

## 2017-10-21 PROCEDURE — 99213 OFFICE O/P EST LOW 20 MIN: CPT | Mod: S$GLB,,, | Performed by: INTERNAL MEDICINE

## 2017-10-21 RX ORDER — BENZONATATE 200 MG/1
200 CAPSULE ORAL 3 TIMES DAILY PRN
Qty: 30 CAPSULE | Refills: 1 | Status: SHIPPED | OUTPATIENT
Start: 2017-10-21 | End: 2017-10-31

## 2017-10-21 RX ORDER — AMOXICILLIN AND CLAVULANATE POTASSIUM 875; 125 MG/1; MG/1
1 TABLET, FILM COATED ORAL 2 TIMES DAILY
Qty: 20 TABLET | Refills: 0 | Status: SHIPPED | OUTPATIENT
Start: 2017-10-21 | End: 2017-10-31

## 2017-10-21 NOTE — PATIENT INSTRUCTIONS
Acute Bronchitis  Your healthcare provider has told you that you have acute bronchitis. Bronchitis is infection or inflammation of the bronchial tubes (airways in the lungs). Normally, air moves easily in and out of the airways. Bronchitis narrows the airways, making it harder for air to flow in and out of the lungs. This causes symptoms such as shortness of breath, coughing up yellow or green mucus, and wheezing. Bronchitis can be acute or chronic. Acute means the condition comes on quickly and goes away in a short time, usually within 3 to 10 days. Chronic means a condition lasts a long time and often comes back.    What causes acute bronchitis?  Acute bronchitis almost always starts as a viral respiratory infection, such as a cold or the flu. Certain factors make it more likely for a cold or flu to turn into bronchitis. These include being very young, being elderly, having a heart or lung problem, or having a weak immune system. Cigarette smoking also makes bronchitis more likely.  When bronchitis develops, the airways become swollen. The airways may also become infected with bacteria. This is known as a secondary infection.  Diagnosing acute bronchitis  Your healthcare provider will examine you and ask about your symptoms and health history. You may also have a sputum culture to test the fluid in your lungs. Chest X-rays may be done to look for infection in the lungs.  Treating acute bronchitis  Bronchitis usually clears up as the cold or flu goes away. You can help feel better faster by doing the following:  · Take medicine as directed. You may be told to take ibuprofen or other over-the-counter medicines. These help relieve inflammation in your bronchial tubes. Your healthcare provider may prescribe an inhaler to help open up the bronchial tubes. Most of the time, acute bronchitis is caused by a viral infection. Antibiotics are usually not prescribed for viral infections.  · Drink plenty of fluids, such as  water, juice, or warm soup. Fluids loosen mucus so that you can cough it up. This helps you breathe more easily. Fluids also prevent dehydration.  · Make sure you get plenty of rest.  · Do not smoke. Do not allow anyone else to smoke in your home.  Recovery and follow-up  Follow up with your doctor as you are told. You will likely feel better in a week or two. But a dry cough can linger beyond that time. Let your doctor know if you still have symptoms (other than a dry cough) after 2 weeks, or if youre prone to getting bronchial infections. Take steps to protect yourself from future infections. These steps include stopping smoking and avoiding tobacco smoke, washing your hands often, and getting a yearly flu shot.  When to call your healthcare provider  Call the healthcare provider if you have any of the following:  · Fever of 100.4°F (38.0°C) or higher, or as advised  · Symptoms that get worse, or new symptoms  · Trouble breathing  · Symptoms that dont start to improve within a week, or within 3 days of taking antibiotics

## 2017-10-21 NOTE — PROGRESS NOTES
"Subjective:       Patient ID: Sheree Pugh is a 89 y.o. female.    Vitals:  height is 5' 5" (1.651 m) and weight is 78 kg (172 lb). Her oral temperature is 97.6 °F (36.4 °C). Her blood pressure is 140/64 (abnormal) and her pulse is 74. Her respiration is 18 and oxygen saturation is 97%.     Chief Complaint: Cough (Productive cough with yellow mucus for 2 days)    Cough worse at night and productive of yellow sputum for 2 days.      Cough   This is a new problem. The current episode started in the past 7 days. The problem has been gradually worsening. The problem occurs every few minutes. The cough is productive of sputum. Associated symptoms include a sore throat. Pertinent negatives include no chest pain, chills, ear pain, eye redness, fever, headaches, myalgias, shortness of breath or wheezing. She has tried prescription cough suppressant for the symptoms. The treatment provided moderate relief. Her past medical history is significant for bronchitis and COPD.     Review of Systems   Constitution: Negative for chills, fever and malaise/fatigue.   HENT: Positive for sore throat. Negative for congestion, ear pain and hoarse voice.    Eyes: Negative for discharge and redness.   Cardiovascular: Negative for chest pain, dyspnea on exertion and leg swelling.   Respiratory: Positive for cough. Negative for shortness of breath, sputum production and wheezing.    Hematologic/Lymphatic: Negative for adenopathy.   Musculoskeletal: Negative for myalgias.   Gastrointestinal: Negative for abdominal pain, diarrhea and nausea.   Neurological: Negative for dizziness, headaches and light-headedness.       Objective:      Physical Exam   Constitutional: She is oriented to person, place, and time. She appears well-developed and well-nourished. She is cooperative.  Non-toxic appearance. She does not appear ill. No distress.   HENT:   Head: Normocephalic and atraumatic.   Right Ear: Hearing, tympanic membrane, external ear and ear " canal normal.   Left Ear: Hearing, tympanic membrane, external ear and ear canal normal.   Nose: Nose normal. No mucosal edema, rhinorrhea or nasal deformity. No epistaxis. Right sinus exhibits no maxillary sinus tenderness and no frontal sinus tenderness. Left sinus exhibits no maxillary sinus tenderness and no frontal sinus tenderness.   Mouth/Throat: Uvula is midline, oropharynx is clear and moist and mucous membranes are normal. No posterior oropharyngeal erythema.   Eyes: Conjunctivae, EOM and lids are normal. Pupils are equal, round, and reactive to light.   Sclera clear bilat   Neck: Trachea normal, full passive range of motion without pain and phonation normal. Neck supple.   Cardiovascular: Normal rate, regular rhythm, normal heart sounds, intact distal pulses and normal pulses.    Pulmonary/Chest: Effort normal and breath sounds normal. No respiratory distress.   Few coarse rales at left base   Abdominal: Soft. Normal appearance and bowel sounds are normal. She exhibits no distension. There is no tenderness.   Musculoskeletal: Normal range of motion. She exhibits no edema.   Lymphadenopathy:     She has no cervical adenopathy.   Neurological: She is alert and oriented to person, place, and time. She exhibits normal muscle tone. Coordination normal.   Skin: Skin is warm, dry and intact. No rash noted. She is not diaphoretic. No pallor.   Psychiatric: She has a normal mood and affect. Her speech is normal. Cognition and memory are normal.   Nursing note and vitals reviewed.      Assessment:       1. Acute bronchitis, unspecified organism        Plan:         Acute bronchitis, unspecified organism    Other orders  -     amoxicillin-clavulanate 875-125mg (AUGMENTIN) 875-125 mg per tablet; Take 1 tablet by mouth 2 (two) times daily.  Dispense: 20 tablet; Refill: 0  -     benzonatate (TESSALON) 200 MG capsule; Take 1 capsule (200 mg total) by mouth 3 (three) times daily as needed for Cough.  Dispense: 30  capsule; Refill: 1

## 2017-10-24 ENCOUNTER — OFFICE VISIT (OUTPATIENT)
Dept: FAMILY MEDICINE | Facility: CLINIC | Age: 82
End: 2017-10-24
Payer: MEDICARE

## 2017-10-24 ENCOUNTER — TELEPHONE (OUTPATIENT)
Dept: URGENT CARE | Facility: CLINIC | Age: 82
End: 2017-10-24

## 2017-10-24 VITALS
TEMPERATURE: 98 F | OXYGEN SATURATION: 95 % | HEIGHT: 65 IN | BODY MASS INDEX: 29.02 KG/M2 | RESPIRATION RATE: 18 BRPM | SYSTOLIC BLOOD PRESSURE: 122 MMHG | HEART RATE: 68 BPM | DIASTOLIC BLOOD PRESSURE: 54 MMHG | WEIGHT: 174.19 LBS

## 2017-10-24 DIAGNOSIS — J44.1 CHRONIC OBSTRUCTIVE PULMONARY DISEASE WITH ACUTE EXACERBATION: Primary | ICD-10-CM

## 2017-10-24 DIAGNOSIS — R05.9 COUGH: ICD-10-CM

## 2017-10-24 DIAGNOSIS — I10 ESSENTIAL HYPERTENSION: Chronic | ICD-10-CM

## 2017-10-24 PROCEDURE — 96372 THER/PROPH/DIAG INJ SC/IM: CPT | Mod: S$GLB,,, | Performed by: FAMILY MEDICINE

## 2017-10-24 PROCEDURE — 99214 OFFICE O/P EST MOD 30 MIN: CPT | Mod: 25,S$GLB,, | Performed by: NURSE PRACTITIONER

## 2017-10-24 PROCEDURE — 99499 UNLISTED E&M SERVICE: CPT | Mod: S$GLB,,, | Performed by: NURSE PRACTITIONER

## 2017-10-24 PROCEDURE — 99999 PR PBB SHADOW E&M-EST. PATIENT-LVL III: CPT | Mod: PBBFAC,,, | Performed by: NURSE PRACTITIONER

## 2017-10-24 RX ORDER — METHYLPREDNISOLONE 4 MG/1
TABLET ORAL
Qty: 1 PACKAGE | Refills: 0 | Status: SHIPPED | OUTPATIENT
Start: 2017-10-25 | End: 2017-11-14

## 2017-10-24 RX ORDER — DEXAMETHASONE SODIUM PHOSPHATE 4 MG/ML
4 INJECTION, SOLUTION INTRA-ARTICULAR; INTRALESIONAL; INTRAMUSCULAR; INTRAVENOUS; SOFT TISSUE
Status: COMPLETED | OUTPATIENT
Start: 2017-10-24 | End: 2017-10-24

## 2017-10-24 RX ADMIN — DEXAMETHASONE SODIUM PHOSPHATE 4 MG: 4 INJECTION, SOLUTION INTRA-ARTICULAR; INTRALESIONAL; INTRAMUSCULAR; INTRAVENOUS; SOFT TISSUE at 04:10

## 2017-10-24 NOTE — PROGRESS NOTES
Subjective:       Patient ID: Sheree Pugh is a 89 y.o. female.    Chief Complaint: Follow-up (urgent care f/u)    HPI   Ms. Pugh is a new patient to me. She presents today for bronchitis follow up. She was seen in urgent care on 10/21/17 for this problem, prescribed augmentin and tessalon pearls. She reports cough has not subsided--tessalon pearls do not provide any relief. Cough nonproductive. She denies SOB, +intermittent wheezing. She does suffer from COPD and is compliant with daily symbicort and spiriva. Has not needed to use rescue lately. She has an appointment to establish with pulmonology next month.   Vitals:    10/24/17 1358   BP: (!) 122/54   Pulse: 68   Resp: 18   Temp: 98.2 °F (36.8 °C)     Review of Systems   Constitutional: Positive for fatigue. Negative for chills, diaphoresis and fever.   HENT: Negative.  Negative for facial swelling and trouble swallowing.    Eyes: Negative.  Negative for discharge and redness.   Respiratory: Positive for cough and wheezing. Negative for shortness of breath.    Cardiovascular: Negative.  Negative for chest pain and palpitations.   Gastrointestinal: Negative.  Negative for abdominal pain and vomiting.   Genitourinary: Negative.  Negative for difficulty urinating and flank pain.   Musculoskeletal: Negative.  Negative for back pain and neck pain.   Skin: Negative.  Negative for rash and wound.   Neurological: Negative.  Negative for dizziness and light-headedness.   Hematological: Negative.    Psychiatric/Behavioral: Negative.  Negative for confusion. The patient is not nervous/anxious.        Past Medical History:   Diagnosis Date    Anticoagulant long-term use     Plavix & Aspirin    ARMD (age related macular degeneration)     os    Arthritis     Cataract     os    Coronary artery disease     Disorder of kidney and ureter     DE LOS SANTOS (dyspnea on exertion)     Elevated cholesterol     Heart disease     Hypertension     Insomnia     Macular  degeneration     OS    Obstetrical blood-clot embolism, postpartum     PVD (peripheral vascular disease)     Retinal detachment     OS    Urinary incontinence      Objective:      Physical Exam   Constitutional: She is oriented to person, place, and time. She appears well-developed.  Non-toxic appearance. She does not have a sickly appearance. She does not appear ill. No distress.   HENT:   Head: Normocephalic and atraumatic.   Right Ear: Hearing and external ear normal.   Left Ear: Hearing and external ear normal.   Nose: Nose normal.   Eyes: Conjunctivae, EOM and lids are normal. Right eye exhibits no discharge. Left eye exhibits no discharge.   Neck: Trachea normal and normal range of motion. Neck supple. No JVD present. No tracheal deviation present.   Cardiovascular: Normal rate, regular rhythm, S1 normal and S2 normal.   No extrasystoles are present. Exam reveals no gallop, no S3, no S4, no distant heart sounds and no friction rub.    Murmur heard.  Pulmonary/Chest: Effort normal. No accessory muscle usage. No respiratory distress. She has no wheezes. She has no rhonchi. She has no rales. She exhibits no tenderness.   Tight breath sounds noted to all lobes; +intermittent dry cough   Abdominal: Soft. Normal appearance and bowel sounds are normal. She exhibits no distension.   Musculoskeletal: Normal range of motion. She exhibits no edema or deformity.   Neurological: She is alert and oriented to person, place, and time.   Skin: Skin is warm, dry and intact. She is not diaphoretic. No pallor.   Psychiatric: She has a normal mood and affect. Her speech is normal and behavior is normal. Judgment and thought content normal. Cognition and memory are normal.   Nursing note and vitals reviewed.      Assessment:       1. Chronic obstructive pulmonary disease with acute exacerbation    2. Cough    3. Essential hypertension        Plan:       Chronic obstructive pulmonary disease with acute exacerbation  -      dexamethasone injection 4 mg; Inject 1 mL (4 mg total) into the muscle one time.  -     methylPREDNISolone (MEDROL DOSEPACK) 4 mg tablet; use as directed  Dispense: 1 Package; Refill: 0  - Continue augmentin as now    Cough   Delsym otc    Essential hypertension   stable        Return if symptoms worsen or fail to improve.   FU with pulmonology as scheduled  FU with PCP in December as scheduled

## 2017-10-31 ENCOUNTER — HOSPITAL ENCOUNTER (OUTPATIENT)
Dept: RADIOLOGY | Facility: HOSPITAL | Age: 82
Discharge: HOME | End: 2017-10-31
Attending: INTERNAL MEDICINE
Payer: MEDICARE

## 2017-10-31 DIAGNOSIS — J44.9 CHRONIC OBSTRUCTIVE PULMONARY DISEASE (COPD): ICD-10-CM

## 2017-10-31 PROCEDURE — 71020 XR CHEST PA AND LATERAL: CPT | Mod: 26,,, | Performed by: RADIOLOGY

## 2017-10-31 PROCEDURE — 71020 XR CHEST PA AND LATERAL: CPT | Mod: TC

## 2017-11-17 ENCOUNTER — IMMUNIZATION (OUTPATIENT)
Dept: FAMILY MEDICINE | Facility: CLINIC | Age: 82
End: 2017-11-17
Payer: MEDICARE

## 2017-11-17 ENCOUNTER — OFFICE VISIT (OUTPATIENT)
Dept: PRIMARY CARE CLINIC | Facility: CLINIC | Age: 82
End: 2017-11-17
Payer: MEDICARE

## 2017-11-17 VITALS
SYSTOLIC BLOOD PRESSURE: 116 MMHG | WEIGHT: 171.06 LBS | BODY MASS INDEX: 28.5 KG/M2 | HEIGHT: 65 IN | DIASTOLIC BLOOD PRESSURE: 52 MMHG | HEART RATE: 72 BPM

## 2017-11-17 DIAGNOSIS — R07.81 RIB PAIN ON RIGHT SIDE: Primary | ICD-10-CM

## 2017-11-17 PROCEDURE — 99999 PR PBB SHADOW E&M-EST. PATIENT-LVL V: CPT | Mod: PBBFAC,,, | Performed by: NURSE PRACTITIONER

## 2017-11-17 PROCEDURE — G0008 ADMIN INFLUENZA VIRUS VAC: HCPCS | Mod: S$GLB,,, | Performed by: NURSE PRACTITIONER

## 2017-11-17 PROCEDURE — 90662 IIV NO PRSV INCREASED AG IM: CPT | Mod: S$GLB,,, | Performed by: NURSE PRACTITIONER

## 2017-11-17 PROCEDURE — 99213 OFFICE O/P EST LOW 20 MIN: CPT | Mod: S$GLB,,, | Performed by: NURSE PRACTITIONER

## 2017-11-17 NOTE — Clinical Note
Gus Shahid MD,  I saw your patient today in the [facility].  If you have any questions, please do not hesitate to contact me.  Thank you!  KALIE Verma

## 2017-11-17 NOTE — PATIENT INSTRUCTIONS
The rib pain seems to be due inflammation.      Treatment is rest and analgesics.  Tylenol Arthritis 2 tabs twice a day until impoved.    Rib pain can last up to 8 weeks but should continue to get better over time.    Often we breathe more shallowly with rib pain and pneumonia can develop.    Cough and deep breathe 4 times daily to prevent fluid build up in the lungs that can lead to pneumonia.    If fever, yellow sputum or feeling ill, call or recheck.  If you do not continue to improve over time, call or recheck    If you have any questions, please call.  You can reach us at [phone] Monday and Tuesday next week 10 a.m. 6 p.m.    Thank you for using the [facility]!    REEMA Verma, CNP, FNP-BC  [facility]  [facility]

## 2017-11-17 NOTE — MEDICAL/APP STUDENT
"Subjective:       Patient ID: Sheree Pugh is a 89 y.o. female.    Chief Complaint: Rib Injury (right side ) and Muscle Pain (right side )    HPI     Presents to the clinic with right sided rib pain which began yesterday (11/16/17) while she was cooking. She reports she was unable to sleep last night (11/16/17) and was having "андрей horses" down her right leg. Denies fall or injury. She has sharp pain when she takes a deep breath. She reports it does not radiate down her leg anymore today.  Describes the pain as achy and "like a bruise", rated 4-6/10, and hurts worse when she presses on the location.  She denies shortness of breath but reports its difficult to take a deep breath d/t pain. Recently saw pulmonologist and he stopped her Spiriva and Symbicort.  She took 2 tylenol yesterday when the pain began which allowed her to continue her ADLs.      Review of Systems   Constitutional: Negative for chills and fatigue.   Respiratory: Negative for cough, chest tightness and shortness of breath.    Cardiovascular: Negative for chest pain and palpitations.   Gastrointestinal: Positive for diarrhea (11/16/17 ). Negative for constipation, nausea and vomiting.   Musculoskeletal: Positive for arthralgias. Negative for back pain and neck stiffness.   Skin: Negative for rash and wound.   Neurological: Negative for dizziness, weakness, numbness and headaches.   Psychiatric/Behavioral: Positive for sleep disturbance (d/t pain and lorena horses.).       Objective:      Physical Exam   Constitutional: She is oriented to person, place, and time. She appears well-developed and well-nourished. No distress.   HENT:   Head: Normocephalic and atraumatic.   Eyes: EOM are normal. Pupils are equal, round, and reactive to light. Right eye exhibits no discharge. Left eye exhibits no discharge.   Neck: Normal range of motion.   Cardiovascular: Normal rate and regular rhythm.    No murmur heard.  Pulmonary/Chest: Effort normal and breath " sounds normal. No respiratory distress. She has no wheezes. She exhibits bony tenderness (R lateral chest wall with palpation/pressure). She exhibits no laceration, no crepitus, no edema and no swelling.       Abdominal: Soft. Bowel sounds are normal. She exhibits no distension and no mass. There is no tenderness. There is no rebound and no guarding.   Musculoskeletal: Normal range of motion. She exhibits tenderness. She exhibits no edema.   Neurological: She is alert and oriented to person, place, and time. No sensory deficit.   Skin: Skin is warm and dry. She is not diaphoretic.   Psychiatric: She has a normal mood and affect. Her behavior is normal.       Assessment:       No diagnosis found.    Plan:       Rib pain on right side    -Possible costochonriditis.  -Take Tylenol Arthritis 2 tabs twice daily for sx relief.  -Apply muscle rubs and ice for sx relief as well.  -Cough and deep breathe 4x daily to keep lung fields open and prevent infection in the lungs.  -Red flags reviewed. Go to urgent care or ER if sx do not improve or worsen, or development of SOB, productive cough, or fever, over the weekend.

## 2017-11-20 ENCOUNTER — HOSPITAL ENCOUNTER (OUTPATIENT)
Dept: RADIOLOGY | Facility: HOSPITAL | Age: 82
Discharge: HOME | End: 2017-11-20
Attending: NURSE PRACTITIONER
Payer: MEDICARE

## 2017-11-20 ENCOUNTER — OFFICE VISIT (OUTPATIENT)
Dept: PRIMARY CARE CLINIC | Facility: CLINIC | Age: 82
End: 2017-11-20
Payer: MEDICARE

## 2017-11-20 VITALS
DIASTOLIC BLOOD PRESSURE: 50 MMHG | TEMPERATURE: 98 F | HEIGHT: 65 IN | HEART RATE: 68 BPM | BODY MASS INDEX: 28.69 KG/M2 | WEIGHT: 172.19 LBS | SYSTOLIC BLOOD PRESSURE: 106 MMHG

## 2017-11-20 DIAGNOSIS — J18.9 PNEUMONIA OF RIGHT LOWER LOBE DUE TO INFECTIOUS ORGANISM: Primary | ICD-10-CM

## 2017-11-20 DIAGNOSIS — M54.6 ACUTE RIGHT-SIDED THORACIC BACK PAIN: Primary | ICD-10-CM

## 2017-11-20 DIAGNOSIS — M54.16 LUMBAR BACK PAIN WITH RADICULOPATHY AFFECTING RIGHT LOWER EXTREMITY: ICD-10-CM

## 2017-11-20 DIAGNOSIS — R10.9 ACUTE RIGHT FLANK PAIN: ICD-10-CM

## 2017-11-20 DIAGNOSIS — R10.9 ACUTE RIGHT FLANK PAIN: Primary | ICD-10-CM

## 2017-11-20 PROCEDURE — 71100 X-RAY EXAM RIBS UNI 2 VIEWS: CPT | Mod: 26,,, | Performed by: RADIOLOGY

## 2017-11-20 PROCEDURE — 72100 X-RAY EXAM L-S SPINE 2/3 VWS: CPT | Mod: 26,,, | Performed by: RADIOLOGY

## 2017-11-20 PROCEDURE — 72100 X-RAY EXAM L-S SPINE 2/3 VWS: CPT | Mod: TC,PO

## 2017-11-20 PROCEDURE — 99214 OFFICE O/P EST MOD 30 MIN: CPT | Mod: S$GLB,,, | Performed by: NURSE PRACTITIONER

## 2017-11-20 PROCEDURE — 71100 X-RAY EXAM RIBS UNI 2 VIEWS: CPT | Mod: TC,PO

## 2017-11-20 PROCEDURE — 99999 PR PBB SHADOW E&M-EST. PATIENT-LVL V: CPT | Mod: PBBFAC,,, | Performed by: NURSE PRACTITIONER

## 2017-11-20 RX ORDER — DOXYCYCLINE 100 MG/1
100 CAPSULE ORAL 2 TIMES DAILY
Qty: 20 CAPSULE | Refills: 0 | Status: SHIPPED | OUTPATIENT
Start: 2017-11-20 | End: 2017-11-30

## 2017-11-20 NOTE — Clinical Note
Gus Shahid MD,  I saw your patient today in the [facility].  If you have any questions, please do not hesitate to contact me.  Thank you!  KALIE Verma

## 2017-11-20 NOTE — PROGRESS NOTES
Returning for re-eval of right flank pain that's radiating to the leg.  Xrays ordered prior to visit.

## 2017-11-20 NOTE — PROGRESS NOTES
Pt coming in later today for re-evaluation of the right side pain.  Today's complaint states the pain is radiating down the right leg.  Xrays ordered to be done before the visit.    Sheree Pugh is a 89 y.o. female patient of Dr. Gus Shahid MD who presents to the clinic today for   Chief Complaint   Patient presents with    Shoulder Pain     right     Flank Pain     right side going up to shoulder    .    HPI      Pt, who is known to me, reports a changing problem to me:  Right side pain still continues, tender to touch.  But now she has shoulder pain bilat.  Seems like the pain is going up to the right shoulder now.  The bilat shoulders are painful, as well.  No pain with breathing.  No rash..    These symptoms began 11/16/17  ago and status is changing, now in the right shoulder and some pain in the left as well.     Pt denies the following symptoms:  Falling, rahs    Aggravating factors include none, no pain with insp/exp .    Relieving factors include Tylenol Arthritis helped a lot. .    OTC Medications tried are Tylenol arthritis.  Also uses [facility] for left sciatic pain..    Prescription medications taken for symptoms are none.    Pertinent medical history:  H/o left sided sciatic nerve pain.    ROS    Constitutional:  No fever, no unusual fatigue.    HEENT:  Negative    Heart/Lung:  No new sxs    GI/:  No new sxs.    MS:  See Chief Complaint/HPI    Skin:  No rashes, itching..      PAST MEDICAL HISTORY:  Past Medical History:   Diagnosis Date    Anticoagulant long-term use     Plavix & Aspirin    ARMD (age related macular degeneration)     os    Arthritis     Cataract     os    Coronary artery disease     Disorder of kidney and ureter     DE LOS SANTOS (dyspnea on exertion)     Elevated cholesterol     Heart disease     Hypertension     Insomnia     Macular degeneration     OS    Obstetrical blood-clot embolism, postpartum     PVD (peripheral vascular disease)     Retinal detachment     OS  "   Urinary incontinence        PAST SURGICAL HISTORY:  Past Surgical History:   Procedure Laterality Date    CATARACT EXTRACTION      od d 6-12-13//    CHOLECYSTECTOMY      CORONARY STENT PLACEMENT  2010,2011    4 stents in 2010, 2 in 2011    HYSTERECTOMY      MOUTH SURGERY      upper & lower denture    VAGINAL DELIVERY      times 6       SOCIAL HISTORY:  Social History     Social History    Marital status:      Spouse name: N/A    Number of children: N/A    Years of education: N/A     Occupational History    Not on file.     Social History Main Topics    Smoking status: Former Smoker     Quit date: 1/3/2000    Smokeless tobacco: Never Used    Alcohol use No    Drug use: No    Sexual activity: Not on file     Other Topics Concern    Not on file     Social History Narrative    No narrative on file       FAMILY HISTORY:  Family History   Problem Relation Age of Onset    Alzheimer's disease Father     Cancer Sister     Cancer Sister     Stroke Mother     Heart disease Mother     Amblyopia Neg Hx     Blindness Neg Hx     Cataracts Neg Hx     Diabetes Neg Hx     Glaucoma Neg Hx     Hypertension Neg Hx     Macular degeneration Neg Hx     Retinal detachment Neg Hx     Strabismus Neg Hx     Thyroid disease Neg Hx        ALLERGIES AND MEDICATIONS: updated and reviewed.  Review of patient's allergies indicates:   Allergen Reactions    Codeine Nausea And Vomiting    Gabapentin     Hydrocodone Nausea And Vomiting     Other reaction(s): Vomiting     Current Outpatient Prescriptions   Medication Sig Dispense Refill    ACETAMINOPHEN (TYLENOL ARTHRITIS ORAL) Take by mouth daily as needed.      ANTIOX #11/OM3/DHA/EPA/LUT/RAND (OCUVITE ADULT 50+ ORAL) Take by mouth once daily.        aspirin (ECOTRIN) 81 MG EC tablet Take by mouth once daily.       atorvastatin (LIPITOR) 20 MG tablet Take 1 tablet (20 mg total) by mouth once daily. 90 tablet 5    BD LUER-FRANCI SYRINGE 3 mL 25 x 5/8" Syrg " FOLLOW DOCTORS DIRECTIONS 10 each 0    biotin 5,000 mcg TbDL Take 1 capsule by mouth once daily. 2 capsules (5000 mcg daily)       calcium carbonate-vit D3-min (CALTRATE 600+D PLUS MINERALS) 600 mg calcium- 400 unit Tab Take 1 tablet by mouth once daily.      clopidogrel (PLAVIX) 75 mg tablet Take 1 tablet (75 mg total) by mouth once daily. 90 tablet 4    cyanocobalamin 1,000 mcg/mL injection INJECT 1CC (1ML) INTRAMUSCULARLY every three weeks 10 mL 3    fosinopril (MONOPRIL) 20 MG tablet Take 1 tablet (20 mg total) by mouth once daily. 90 tablet 5    furosemide (LASIX) 20 MG tablet Take 1 tablet (20 mg total) by mouth daily as needed. (Patient taking differently: Take 20 mg by mouth every other day. Monday, Wednesday and Friday) 90 tablet 4    GLUCOSAMINE HCL/CHONDR FLETCHER A NA (OSTEO BI-FLEX ORAL) Take by mouth.      MULTIVITAMIN/IRON/FOLIC ACID (CENTRUM ULTRA WOMEN'S ORAL) Take 1 tablet by mouth once daily.        potassium chloride SA (K-DUR,KLOR-CON) 20 MEQ tablet Take 1 tablet (20 mEq total) by mouth daily as needed. (Patient taking differently: Take 20 mEq by mouth every other day. Monday Wednesday and Friday with lasix) 90 tablet 4    salmon oil-omega-3 fatty acids (SALMON OIL-1000) 1,000-200 mg Cap Take 2 capsules by mouth once daily.       syringe, disposable, 3 mL Syrg As directed      tiotropium (SPIRIVA WITH HANDIHALER) 18 mcg inhalation capsule USE ONE INHALATION BY MOUTH DAILY 90 capsule 2    VENTOLIN HFA 90 mcg/actuation inhaler TWO PUFFS INTO THE LUNGS EVERY 6 HOURS AS NEEDED FOR WHEEZING OR SHORTNESS OF BREATH 18 g 2    zolpidem (AMBIEN) 10 mg Tab TAKE ONE TABLET BY MOUTH AT BEDTIME AS NEEDED 30 tablet 3     No current facility-administered medications for this visit.          PHYSICAL EXAM    Alert, coop 89 y.o. female patient in no acute distress.    Vitals:    11/20/17 1525   BP: (!) 106/50   Pulse: 68   Temp: 98.1 °F (36.7 °C)     VS reviewed.  VS SBP lower the past 3 readings than  usual.  CC, nursing note, medications & PMH all reviewed today.    Head:  Normocephalic, atraumatic.    EENT: Ext nose/ears normal.  Eyes with normal lids, not injected.           Resp:  Respirations even, unlabored.              Lungs CTA bilat.     Heart:  Normal rate.  RRR, no MRG  CV:  Cap RF brisk.    MS:  The right lat rib area is/are noted to have no edema, no erythema, no ecchymosis.  The affected area is tender to palp.  Pain elicited with palp over the right lat 6th rib .  Muscle tension and soreness is not noted .  ROM of the affected area is normal.  ROM of the upper extremities is symmetrical.  The other areas of the back and ribs are without erythema, edema, ecchymosis or pain.  Bilat shoulder joints are tender to palp in the AC joints, right > left               NEURO:  Alert and oriented x 4.  Responds appropriately during interaction.                  Sensation to light touch intact over UEs.    Skin:  Warm, dry, color good.    Psych:  Responds appropriately throughout the visit.               Relaxed.  Well-groomed.    Acute right-sided thoracic back pain    Lumbar back pain with radiculopathy affecting right lower extremity    Other orders  -     Cancel: X-Ray Ribs 2 View Right; Future; Expected date: 11/20/2017  -     Cancel: X-Ray Lumbar Spine AP And Lateral; Future; Expected date: 11/20/2017      Pt today presents with continued right side pain.  Now both shoulders are hurting.   She does have sciatica but that is under control..    This is a new problem to me and the following work up is planned.    Lab & Radiological Tests Ordered: lumbar spine and right rib xray.  The results are pending.      Pt advised to perform comfort measures recommended on patient instruction sheet .    Further recommendations will be based on the outcome of the xrays.  Pt wanted to leave before they were read.  Explained exam findings, diagnosis and treatment plan to patient and her daughter-in-law, with her during  the visit.  Questions answered and patient states understanding.      Addendum:  Right lower lobe with effusion.  This could be causing the pain.  Will treat with AB and have her f/u with primary care next week.  If any worse or short of breath.  Pt to go to the ER.  (See xray result note from today.)

## 2017-11-20 NOTE — PATIENT INSTRUCTIONS
The exam today shows no concerns.  We still are waiting for the xrays, though.  Ice or heat to the area.  Activity as tolerated.  Muscle rub to the shoulders and the ribs.  We'll call with the xray results.     If you have any questions, please call.  You can reach us at [phone] Tuesday through Friday (except holidays) 10 a.m. to 6 p.m.    Thank you for using the [facility]!    REEMA Verma, CNP, FNP-BC  [facility]  [facility]

## 2017-11-21 ENCOUNTER — TELEPHONE (OUTPATIENT)
Dept: PRIMARY CARE CLINIC | Facility: CLINIC | Age: 82
End: 2017-11-21

## 2017-11-21 NOTE — PROGRESS NOTES
Pt has been having sudden onset of right rib area pain 11/16/17.  No injury or unusual activity.  No cough or illness sxs.  Xray shows right lower lobe atelectasis vs pneumonitis but no acute bony abnormalities.  Will treat with AB doxycycline.  F/u with PCP next week.

## 2017-11-21 NOTE — TELEPHONE ENCOUNTER
Made follow up call today to schedule pt an appt with ROSAMARIA Nichole and we scheduled appt. Pt wanted to know if she should be taking her bp meds as she is having the low bp as in office. Please advise.

## 2017-11-21 NOTE — TELEPHONE ENCOUNTER
Please advise her to take 1/2 of the monopril and monitor her BP daily.  If it goes up over 130/80, she can resume taking the entire dose.  If it remains low, ask her to call Dr. Jay's nurse.  Thank you!

## 2017-11-22 NOTE — TELEPHONE ENCOUNTER
Informed pt take 1/2 tablet of Monopril (10 mg) and monitor her BP daily to make sure her BP does not get too high or too low. Informed pt to resume taking entire dose of monopril (20 mg) if BP gets above 130/80. Pt confirmed and wrote it down on paper. Stated she has an old BP machine but not sure if it works. Asked if there was family yo ensure BP is being monitored and med taken as prescribed but pt stated she only takes care of herself and has no family. Informed pt there are BP machines that are readily available at local drug stores like [employer], and [employer]. Pt confirmed and said she will use that if her BP machine at home does not work. Confirmed ROSAMARIA Vaca appt for 12/1 and call Misty nurse or us with any questions or concerns.

## 2017-11-28 RX ORDER — CYANOCOBALAMIN 1000 UG/ML
INJECTION, SOLUTION INTRAMUSCULAR; SUBCUTANEOUS
Qty: 10 ML | Refills: 3 | Status: SHIPPED | OUTPATIENT
Start: 2017-11-28 | End: 2019-02-21

## 2017-12-01 ENCOUNTER — OFFICE VISIT (OUTPATIENT)
Dept: FAMILY MEDICINE | Facility: CLINIC | Age: 82
End: 2017-12-01
Payer: MEDICARE

## 2017-12-01 VITALS
SYSTOLIC BLOOD PRESSURE: 118 MMHG | WEIGHT: 169.06 LBS | DIASTOLIC BLOOD PRESSURE: 50 MMHG | BODY MASS INDEX: 28.17 KG/M2 | HEART RATE: 69 BPM | OXYGEN SATURATION: 97 % | RESPIRATION RATE: 18 BRPM | HEIGHT: 65 IN

## 2017-12-01 DIAGNOSIS — J18.9 PNEUMONIA OF RIGHT LOWER LOBE DUE TO INFECTIOUS ORGANISM: Primary | ICD-10-CM

## 2017-12-01 PROCEDURE — 99999 PR PBB SHADOW E&M-EST. PATIENT-LVL IV: CPT | Mod: PBBFAC,,, | Performed by: PHYSICIAN ASSISTANT

## 2017-12-01 PROCEDURE — 99214 OFFICE O/P EST MOD 30 MIN: CPT | Mod: S$GLB,,, | Performed by: PHYSICIAN ASSISTANT

## 2017-12-01 NOTE — PROGRESS NOTES
Subjective:       Patient ID: Sheree Pugh is a 89 y.o. female    Chief Complaint: Pneumonia (follo wup on pneumonia.Feeling much better)    HPI  Mrs Pugh presents today for follow up after she was diagnosed with Pneumonia of the left lower lobe.  She has complted doxycycline 100mg BID X 10 days.  She reports that she feels great.  She denies any back pain or shortness of breath.  She states that she never had a cough.      Review of Systems   Constitutional: Negative for chills and fever.   HENT: Negative for congestion.    Eyes: Negative for visual disturbance.   Respiratory: Negative for cough.    Cardiovascular: Negative for chest pain.   Gastrointestinal: Negative for abdominal pain, diarrhea, nausea and vomiting.   Skin: Negative for rash.   Neurological: Negative for headaches.        Objective:   Physical Exam   Constitutional: She is oriented to person, place, and time. She appears well-developed and well-nourished. No distress.   HENT:   Head: Normocephalic and atraumatic.   Eyes: EOM are normal. Pupils are equal, round, and reactive to light.   Neck: Normal range of motion. Neck supple.   Cardiovascular: Normal rate, regular rhythm, normal heart sounds and intact distal pulses.  Exam reveals no gallop and no friction rub.    No murmur heard.  Pulmonary/Chest: Effort normal and breath sounds normal. No respiratory distress. She has no wheezes. She has no rales. She exhibits no tenderness.   Musculoskeletal: Normal range of motion.   Neurological: She is alert and oriented to person, place, and time.   Skin: Skin is warm and dry. No rash noted. She is not diaphoretic.   Psychiatric: She has a normal mood and affect. Her behavior is normal. Judgment and thought content normal.        Assessment:       1. Pneumonia of right lower lobe due to infectious organism          Plan:       Pneumonia of right lower lobe due to infectious organism    - resolved   - patient instructed to RTC immediately for any  pain, cough or any other new or worsening symtpoms

## 2017-12-27 ENCOUNTER — OFFICE VISIT (OUTPATIENT)
Dept: URGENT CARE | Facility: CLINIC | Age: 82
End: 2017-12-27
Payer: MEDICARE

## 2017-12-27 VITALS
RESPIRATION RATE: 18 BRPM | TEMPERATURE: 98 F | OXYGEN SATURATION: 97 % | DIASTOLIC BLOOD PRESSURE: 69 MMHG | SYSTOLIC BLOOD PRESSURE: 145 MMHG | BODY MASS INDEX: 31.91 KG/M2 | HEART RATE: 75 BPM | WEIGHT: 169 LBS | HEIGHT: 61 IN

## 2017-12-27 DIAGNOSIS — M79.674 GREAT TOE PAIN, RIGHT: Primary | ICD-10-CM

## 2017-12-27 PROCEDURE — 99214 OFFICE O/P EST MOD 30 MIN: CPT | Mod: 25,S$GLB,, | Performed by: PHYSICIAN ASSISTANT

## 2017-12-27 PROCEDURE — 96372 THER/PROPH/DIAG INJ SC/IM: CPT | Mod: S$GLB,,, | Performed by: EMERGENCY MEDICINE

## 2017-12-27 RX ORDER — BETAMETHASONE SODIUM PHOSPHATE AND BETAMETHASONE ACETATE 3; 3 MG/ML; MG/ML
6 INJECTION, SUSPENSION INTRA-ARTICULAR; INTRALESIONAL; INTRAMUSCULAR; SOFT TISSUE ONCE
Status: COMPLETED | OUTPATIENT
Start: 2017-12-27 | End: 2017-12-27

## 2017-12-27 RX ORDER — METHYLPREDNISOLONE 4 MG/1
TABLET ORAL
Qty: 1 PACKAGE | Refills: 0 | Status: SHIPPED | OUTPATIENT
Start: 2017-12-28 | End: 2018-01-18

## 2017-12-27 RX ADMIN — BETAMETHASONE SODIUM PHOSPHATE AND BETAMETHASONE ACETATE 6 MG: 3; 3 INJECTION, SUSPENSION INTRA-ARTICULAR; INTRALESIONAL; INTRAMUSCULAR; SOFT TISSUE at 05:12

## 2017-12-27 NOTE — PATIENT INSTRUCTIONS
- Please return here or go to the Emergency Department for any concerns or worsening of condition.   - If you were given a steroid shot in the clinic and have also been given a prescription for a steroid such as Prednisone or a Medrol Dose Pack, please begin taking them tomorrow as instructed or as listed on medication directions.    - Please follow up with your primary care provider (PCP) or discussed specialist(s) as needed.             Gout    Gout is an inflammation of a joint due to a build-up of gout crystals in the joint fluid. This occurs when there is an excess of uric acid (a normal waste product) in the body. Uric acid builds up in the body when the kidneys are unable to filter enough of it from the blood. This may occur with age. It is also associated with kidney disease. Gout occurs more often in people with obesity, diabetes, high blood pressure, or high levels of fats in the blood. It may run in families. Gout tends to come and go. A flare up of gout is called an attack. Drinking alcohol or eating certain foods (such as shellfish or foods with additives such as high-fructose corn syrup) may increase uric acid levels in the blood and cause a gout attack.  During a gout attack, the affected joint may become a hot, red, swollen and painful. If you have had one attack of gout, you are likely to have another. An attack of gout can be treated with medicine. If these attacks become frequent, a daily medicine may be prescribed to help the kidneys remove uric acid from the body.  Home care  During a gout attack:  · Rest painful joints. If gout affects the joints of your foot or leg, you may want to use crutches for the first few days to keep from bearing weight on the affected joint.  · When sitting or lying down, raise the painful joint to a level higher than your heart.  · Apply an ice pack (ice cubes in a plastic bag wrapped in a thin towel) over the injured area for 20 minutes every 1 to 2 hours the first  day for pain relief. Continue this 3 to 4 times a day for swelling and pain.  · Avoid alcohol and foods listed below (see Preventing attacks) during a gout attack. Drink extra fluid to help flush the uric acid through your kidneys.  · If you were prescribed a medicine to treat gout, take it as your healthcare provider has instructed. Don't skip doses.  · Take anti-inflammatory medicine as directed.   · If pain medicines have been prescribed, take them exactly as directed.    Preventing attacks  · Minimize or avoid alcohol use. Excess alcohol intake can cause a gout attack.  · Limit these foods and beverages:  ¨ Organ meats, such as kidneys and liver  ¨ Certain seafoods (anchovies, sardines, shrimp, scallops, herring, mackerel)  ¨ Wild game, meat extracts and meat gravies  ¨ Foods and beverages sweetened with high-fructose corn syrup, such as sodas  · Eat a healthy diet including low-fat and nonfat dairy, whole grains, and vegetables.  · If you are overweight, talk to your healthcare provider about a weight reduction plan. Avoid fasting or extreme low calorie diets (less than 900 calories per day). This will increase uric acid levels in the body.  · If you have diabetes or high blood pressure, work with your doctor to manage these conditions.  · Protect the joint from injury. Trauma can trigger a gout attack.  Follow-up care  Follow up with your healthcare provider, or as advised.   When to seek medical advice  Call your healthcare provider if you have any of the following:  · Fever over 100.4°F (38.ºC) with worsening joint pain  · Increasing redness around the joint  · Pain developing in another joint  · Repeated vomiting, abdominal pain, or blood in the vomit or stool (black or red color)  Date Last Reviewed: 3/1/2017  © 8207-7635 [employer]. [address] [place] [address], [place] [address]. All rights reserved. This information is not intended as a substitute for professional medical care. Always follow  your healthcare professional's instructions.

## 2017-12-27 NOTE — PROGRESS NOTES
"Subjective:       Patient ID: Sheree Pugh is a 89 y.o. female.    Vitals:  height is 5' 1" (1.549 m) and weight is 76.7 kg (169 lb). Her oral temperature is 97.5 °F (36.4 °C). Her blood pressure is 145/69 (abnormal) and her pulse is 75. Her respiration is 18 and oxygen saturation is 97%.     Chief Complaint: Foot Swelling (Right foot swelling and pain, no known injury)    Patient states her right foot started hurting last night after taking her shoe off, no known injury, pain is constant and worse with weight bearing, and is achy in nature sharp radiating to her ankle at times. No history of gouty arthritis reported. No history of recent or remote trauma reported.       Pain   This is a new problem. The current episode started yesterday. The problem occurs constantly. The problem has been unchanged. Associated symptoms include joint swelling (great right toe). Pertinent negatives include no abdominal pain, chest pain, chills, congestion, coughing, fever, headaches, nausea, neck pain, numbness, rash, sore throat or vomiting. The symptoms are aggravated by walking. She has tried nothing for the symptoms.     Active Ambulatory Problems     Diagnosis Date Noted    History of PTCA 01/03/2012    Dyslipidemia 01/03/2012    Legal blindness - Left Eye 01/13/2012    At risk for falls 05/25/2012    Exudative age-related macular degeneration of left eye 08/13/2012    Posterior vitreous detachment of both eyes 08/13/2012    Peripheral vascular disease 04/17/2013    Lichen sclerosus et atrophicus of the vulva 03/29/2016    Chronic kidney disease, stage III (moderate) 06/23/2016    Urinary incontinence 06/23/2016    Chronic obstructive pulmonary disease 06/23/2016    Stenosis of right carotid artery 06/23/2016    Coronary artery disease involving native coronary artery of native heart without angina pectoris 06/23/2016    Aortic valve stenosis 06/23/2016    Essential hypertension 06/23/2016    Nuclear sclerosis " of left eye 06/23/2016    Pseudophakia of right eye 06/23/2016    Anemia due to vitamin B12 deficiency 06/23/2016    Peripheral polyneuropathy 06/23/2016    Insomnia 06/23/2016    Osteopenia 06/23/2016    Arthritis 06/23/2016    DDD (degenerative disc disease), lumbar 08/31/2016    Aortic atherosclerosis 08/16/2017    Ectasis aorta 08/16/2017    Tortuous aorta 08/16/2017     Resolved Ambulatory Problems     Diagnosis Date Noted    DE LOS SANTOS (dyspnea on exertion) 02/27/2013    Renal failure 04/17/2013    Obstructive chronic bronchitis with acute bronchitis 05/22/2013    Urinary complication 06/23/2016     Past Medical History:   Diagnosis Date    Anticoagulant long-term use     ARMD (age related macular degeneration)     Arthritis     Cataract     Coronary artery disease     Disorder of kidney and ureter     DE LOS SANTOS (dyspnea on exertion)     Elevated cholesterol     Heart disease     Hypertension     Insomnia     Macular degeneration     Obstetrical blood-clot embolism, postpartum     PVD (peripheral vascular disease)     Retinal detachment     Urinary incontinence       Review of Systems   Constitution: Negative for chills, fever and malaise/fatigue.   HENT: Negative for congestion, ear pain and sore throat.    Eyes: Negative for blurred vision, pain and visual disturbance.   Cardiovascular: Negative for chest pain, near-syncope and syncope.   Respiratory: Negative for cough, shortness of breath and wheezing.    Hematologic/Lymphatic: Negative for adenopathy.   Skin: Negative for itching and rash.   Musculoskeletal: Positive for joint pain (great right toe) and joint swelling (great right toe). Negative for back pain, neck pain and stiffness.   Gastrointestinal: Negative for abdominal pain, diarrhea, nausea and vomiting.   Neurological: Negative for dizziness, headaches, light-headedness and numbness.   Psychiatric/Behavioral: Negative for altered mental status. The patient is not  nervous/anxious.    Allergic/Immunologic: Negative for hives.   All other systems reviewed and are negative.      Objective:      Physical Exam   Constitutional: She is oriented to person, place, and time. She appears well-developed and well-nourished.   HENT:   Head: Normocephalic and atraumatic. Head is without abrasion, without contusion and without laceration.   Right Ear: External ear normal.   Left Ear: External ear normal.   Nose: Nose normal.   Mouth/Throat: Oropharynx is clear and moist.   Eyes: Conjunctivae, EOM and lids are normal. Pupils are equal, round, and reactive to light.   Neck: Trachea normal, full passive range of motion without pain and phonation normal. Neck supple.   Cardiovascular: Normal rate, regular rhythm and normal heart sounds.    Pulmonary/Chest: Effort normal and breath sounds normal. No stridor. No respiratory distress.   Musculoskeletal:        Right foot: There is decreased range of motion (1st MTP), tenderness (1st MTP) and swelling (1st MTP). There is no bony tenderness, normal capillary refill, no crepitus, no deformity and no laceration.        Feet:    Neurological: She is alert and oriented to person, place, and time.   Skin: Skin is warm, dry and intact. Capillary refill takes less than 2 seconds. No abrasion, no bruising, no burn, no ecchymosis, no laceration, no lesion and no rash noted. No erythema.   Psychiatric: She has a normal mood and affect. Her speech is normal and behavior is normal. Judgment and thought content normal. Cognition and memory are normal.   Nursing note and vitals reviewed.      Assessment:       1. Great toe pain, right        Plan:         Great toe pain, right  -     betamethasone acetate-betamethasone sodium phosphate injection 6 mg; Inject 1 mL (6 mg total) into the muscle once.  -     methylPREDNISolone (MEDROL DOSEPACK) 4 mg tablet; use as directed  Dispense: 1 Package; Refill: 0    - Please return here or go to the Emergency Department for  any concerns or worsening of condition.   - If you were given a steroid shot in the clinic and have also been given a prescription for a steroid such as Prednisone or a Medrol Dose Pack, please begin taking them tomorrow as instructed or as listed on medication directions.    - Please follow up with your primary care provider (PCP) or discussed specialist(s) as needed.

## 2017-12-31 ENCOUNTER — OFFICE VISIT (OUTPATIENT)
Dept: URGENT CARE | Facility: CLINIC | Age: 82
End: 2017-12-31
Payer: MEDICARE

## 2017-12-31 VITALS
RESPIRATION RATE: 18 BRPM | SYSTOLIC BLOOD PRESSURE: 155 MMHG | HEIGHT: 61 IN | WEIGHT: 169 LBS | DIASTOLIC BLOOD PRESSURE: 71 MMHG | HEART RATE: 95 BPM | TEMPERATURE: 101 F | OXYGEN SATURATION: 95 % | BODY MASS INDEX: 31.91 KG/M2

## 2017-12-31 DIAGNOSIS — R50.9 FEVER, UNSPECIFIED FEVER CAUSE: ICD-10-CM

## 2017-12-31 DIAGNOSIS — J20.9 ACUTE BRONCHITIS, UNSPECIFIED ORGANISM: Primary | ICD-10-CM

## 2017-12-31 LAB
CTP QC/QA: YES
FLUAV AG NPH QL: NEGATIVE
FLUBV AG NPH QL: NEGATIVE

## 2017-12-31 PROCEDURE — 87804 INFLUENZA ASSAY W/OPTIC: CPT | Mod: QW,S$GLB,, | Performed by: INTERNAL MEDICINE

## 2017-12-31 PROCEDURE — 99213 OFFICE O/P EST LOW 20 MIN: CPT | Mod: S$GLB,,, | Performed by: INTERNAL MEDICINE

## 2017-12-31 RX ORDER — ACETAMINOPHEN 325 MG/1
325 TABLET ORAL
Status: COMPLETED | OUTPATIENT
Start: 2017-12-31 | End: 2017-12-31

## 2017-12-31 RX ORDER — AMOXICILLIN AND CLAVULANATE POTASSIUM 875; 125 MG/1; MG/1
1 TABLET, FILM COATED ORAL 2 TIMES DAILY
Qty: 20 TABLET | Refills: 0 | Status: SHIPPED | OUTPATIENT
Start: 2017-12-31 | End: 2018-01-10

## 2017-12-31 RX ORDER — BENZONATATE 200 MG/1
200 CAPSULE ORAL 3 TIMES DAILY PRN
Qty: 30 CAPSULE | Refills: 1 | Status: SHIPPED | OUTPATIENT
Start: 2017-12-31 | End: 2018-01-10

## 2017-12-31 RX ORDER — ACETAMINOPHEN 325 MG/1
650 TABLET ORAL
Status: COMPLETED | OUTPATIENT
Start: 2017-12-31 | End: 2017-12-31

## 2017-12-31 RX ADMIN — ACETAMINOPHEN 325 MG: 325 TABLET ORAL at 03:12

## 2017-12-31 RX ADMIN — ACETAMINOPHEN 650 MG: 325 TABLET ORAL at 03:12

## 2017-12-31 NOTE — PROGRESS NOTES
"Subjective:       Patient ID: Sheree Pugh is a 89 y.o. female.    Vitals:  height is 5' 1" (1.549 m) and weight is 76.7 kg (169 lb). Her oral temperature is 101.1 °F (38.4 °C) (abnormal). Her blood pressure is 155/71 (abnormal) and her pulse is 95. Her respiration is 18 and oxygen saturation is 95%.     Chief Complaint: Cough (Cough for 2 days)    Cough   This is a new problem. The current episode started in the past 7 days. The problem has been gradually worsening. The problem occurs every few minutes. Associated symptoms include a fever. Pertinent negatives include no chest pain, chills, ear pain, eye redness, headaches, myalgias, sore throat, shortness of breath or wheezing. She has tried OTC cough suppressant for the symptoms. The treatment provided mild relief.     Review of Systems   Constitution: Positive for fever. Negative for chills and malaise/fatigue.   HENT: Negative for congestion, ear pain, hoarse voice and sore throat.    Eyes: Negative for discharge and redness.   Cardiovascular: Negative for chest pain, dyspnea on exertion and leg swelling.   Respiratory: Positive for cough. Negative for shortness of breath, sputum production and wheezing.    Musculoskeletal: Negative for myalgias.   Gastrointestinal: Negative for abdominal pain and nausea.   Neurological: Negative for headaches.       Objective:      Physical Exam   Constitutional: She is oriented to person, place, and time. She appears well-developed and well-nourished.   HENT:   Head: Normocephalic and atraumatic.   Right Ear: External ear normal.   Left Ear: External ear normal.   Nose: Nose normal.   Mouth/Throat: Oropharynx is clear and moist.   Eyes: Conjunctivae, EOM and lids are normal. Pupils are equal, round, and reactive to light.   Neck: Trachea normal, full passive range of motion without pain and phonation normal. Neck supple.   Cardiovascular: Normal rate, regular rhythm and normal heart sounds.    Pulmonary/Chest: Effort " normal. She has wheezes.   Mild diffuse expiratory wheezes   Musculoskeletal: Normal range of motion.   Lymphadenopathy:     She has no cervical adenopathy.   Neurological: She is alert and oriented to person, place, and time.   Skin: Skin is warm, dry and intact. No rash noted.   Psychiatric: She has a normal mood and affect. Her speech is normal. Cognition and memory are normal.   Nursing note and vitals reviewed.      Assessment:       1. Acute bronchitis, unspecified organism    2. Fever, unspecified fever cause        Plan:         Acute bronchitis, unspecified organism  -     amoxicillin-clavulanate 875-125mg (AUGMENTIN) 875-125 mg per tablet; Take 1 tablet by mouth 2 (two) times daily.  Dispense: 20 tablet; Refill: 0  -     benzonatate (TESSALON) 200 MG capsule; Take 1 capsule (200 mg total) by mouth 3 (three) times daily as needed for Cough.  Dispense: 30 capsule; Refill: 1    Fever, unspecified fever cause  -     POCT Influenza A/B  -     acetaminophen tablet 650 mg; Take 2 tablets (650 mg total) by mouth one time.  -     acetaminophen tablet 325 mg; Take 1 tablet (325 mg total) by mouth one time.

## 2017-12-31 NOTE — PATIENT INSTRUCTIONS
Bronchitis, Antibiotic Treatment (Adult)    Bronchitis is an infection of the air passages (bronchial tubes) in your lungs. It often occurs when you have a cold. This illness is contagious during the first few days and is spread through the air by coughing and sneezing, or by direct contact (touching the sick person and then touching your own eyes, nose, or mouth).  Symptoms of bronchitis include cough with mucus (phlegm) and low-grade fever. Bronchitis usually lasts 7 to 14 days. Mild cases can be treated with simple home remedies. More severe infection is treated with an antibiotic.  Home care  Follow these guidelines when caring for yourself at home:  · If your symptoms are severe, rest at home for the first 2 to 3 days. When you go back to your usual activities, don't let yourself get too tired.  · Do not smoke. Also avoid being exposed to secondhand smoke.  · You may use over-the-counter medicines to control fever or pain, unless another medicine was prescribed. (Note: If you have chronic liver or kidney disease or have ever had a stomach ulcer or gastrointestinal bleeding, talk with your healthcare provider before using these medicines. Also talk to your provider if you are taking medicine to prevent blood clots.) Aspirin should never be given to anyone younger than 18 years of age who is ill with a viral infection or fever. It may cause severe liver or brain damage.  · Your appetite may be poor, so a light diet is fine. Avoid dehydration by drinking 6 to 8 glasses of fluids per day (such as water, soft drinks, sports drinks, juices, tea, or soup). Extra fluids will help loosen secretions in the nose and lungs.  · Over-the-counter cough, cold, and sore-throat medicines will not shorten the length of the illness, but they may be helpful to reduce symptoms. (Note: Do not use decongestants if you have high blood pressure.)  · Finish all antibiotic medicine. Do this even if you are feeling better after only a  few days.  Follow-up care  Follow up with your healthcare provider, or as advised. If you had an X-ray or ECG (electrocardiogram), a specialist will review it. You will be notified of any new findings that may affect your care.  Note: If you are age 65 or older, or if you have a chronic lung disease or condition that affects your immune system, or you smoke, talk to your healthcare provider about having pneumococcal vaccinations and a yearly influenza vaccination (flu shot).  When to seek medical advice  Call your healthcare provider right away if any of these occur:  · Fever of 100.4°F (38°C) or higher  · Coughing up increased amounts of colored sputum  · Weakness, drowsiness, headache, facial pain, ear pain, or a stiff neck  Call 911, or get immediate medical care  Contact emergency services right away if any of these occur.  · Coughing up blood  · Worsening weakness, drowsiness, headache, or stiff neck  · Trouble breathing, wheezing, or pain with breathing    Follow up with your PCP in 1- 2 days.

## 2018-01-02 DIAGNOSIS — I65.21 STENOSIS OF RIGHT CAROTID ARTERY: Chronic | ICD-10-CM

## 2018-01-02 DIAGNOSIS — E78.5 DYSLIPIDEMIA: Chronic | ICD-10-CM

## 2018-01-02 DIAGNOSIS — N18.30 CHRONIC KIDNEY DISEASE, STAGE III (MODERATE): ICD-10-CM

## 2018-01-02 DIAGNOSIS — I25.10 CORONARY ARTERY DISEASE INVOLVING NATIVE CORONARY ARTERY OF NATIVE HEART WITHOUT ANGINA PECTORIS: Chronic | ICD-10-CM

## 2018-01-02 DIAGNOSIS — I35.0 NONRHEUMATIC AORTIC VALVE STENOSIS: Chronic | ICD-10-CM

## 2018-01-02 DIAGNOSIS — I73.9 PERIPHERAL VASCULAR DISEASE: ICD-10-CM

## 2018-01-02 DIAGNOSIS — Z98.61 HISTORY OF PTCA: Chronic | ICD-10-CM

## 2018-01-02 RX ORDER — ATORVASTATIN CALCIUM 20 MG/1
20 TABLET, FILM COATED ORAL DAILY
Qty: 90 TABLET | Refills: 0 | Status: SHIPPED | OUTPATIENT
Start: 2018-01-02 | End: 2018-01-25 | Stop reason: SDUPTHER

## 2018-01-02 NOTE — TELEPHONE ENCOUNTER
----- Message from Francie Spence sent at 1/2/2018  9:26 AM CST -----  Contact: self  Patient called requesting a medication refill. Patient states the pharmacy is telling the patient they don't know the patient. Please call the patient. Please see details below.  Medication Name:atorvastatin  Medication Strength:20 mg  [facility]  First time calling about this refill (y/n):n  Call Back Number:[phone]    [id number] [place] - [address]  [address]  [place] [address]  Phone: [phone] Fax: [phone]

## 2018-01-18 ENCOUNTER — OFFICE VISIT (OUTPATIENT)
Dept: FAMILY MEDICINE | Facility: CLINIC | Age: 83
End: 2018-01-18
Payer: MEDICARE

## 2018-01-18 VITALS
HEART RATE: 68 BPM | WEIGHT: 166.25 LBS | RESPIRATION RATE: 18 BRPM | HEIGHT: 61 IN | DIASTOLIC BLOOD PRESSURE: 64 MMHG | BODY MASS INDEX: 31.39 KG/M2 | SYSTOLIC BLOOD PRESSURE: 122 MMHG

## 2018-01-18 DIAGNOSIS — M47.816 LUMBAR FACET ARTHROPATHY: ICD-10-CM

## 2018-01-18 DIAGNOSIS — J44.89 CHRONIC OBSTRUCTIVE BRONCHITIS: Primary | ICD-10-CM

## 2018-01-18 DIAGNOSIS — I70.0 AORTIC ATHEROSCLEROSIS: ICD-10-CM

## 2018-01-18 PROCEDURE — 99499 UNLISTED E&M SERVICE: CPT | Mod: S$GLB,,, | Performed by: FAMILY MEDICINE

## 2018-01-18 PROCEDURE — 99213 OFFICE O/P EST LOW 20 MIN: CPT | Mod: S$GLB,,, | Performed by: FAMILY MEDICINE

## 2018-01-18 PROCEDURE — 99999 PR PBB SHADOW E&M-EST. PATIENT-LVL III: CPT | Mod: PBBFAC,,, | Performed by: FAMILY MEDICINE

## 2018-01-19 DIAGNOSIS — I35.0 NONRHEUMATIC AORTIC VALVE STENOSIS: Chronic | ICD-10-CM

## 2018-01-19 DIAGNOSIS — I25.10 CORONARY ARTERY DISEASE INVOLVING NATIVE CORONARY ARTERY OF NATIVE HEART WITHOUT ANGINA PECTORIS: Chronic | ICD-10-CM

## 2018-01-19 DIAGNOSIS — N18.30 CHRONIC KIDNEY DISEASE, STAGE III (MODERATE): ICD-10-CM

## 2018-01-19 DIAGNOSIS — Z98.61 HISTORY OF PTCA: Chronic | ICD-10-CM

## 2018-01-19 DIAGNOSIS — I73.9 PERIPHERAL VASCULAR DISEASE: ICD-10-CM

## 2018-01-19 DIAGNOSIS — I65.21 STENOSIS OF RIGHT CAROTID ARTERY: Chronic | ICD-10-CM

## 2018-01-19 DIAGNOSIS — E78.5 DYSLIPIDEMIA: Chronic | ICD-10-CM

## 2018-01-19 RX ORDER — CLOPIDOGREL BISULFATE 75 MG/1
75 TABLET ORAL DAILY
Qty: 90 TABLET | Refills: 2 | Status: SHIPPED | OUTPATIENT
Start: 2018-01-19 | End: 2018-01-25 | Stop reason: SDUPTHER

## 2018-01-22 ENCOUNTER — TELEPHONE (OUTPATIENT)
Dept: PHARMACY | Facility: CLINIC | Age: 83
End: 2018-01-22

## 2018-01-22 ENCOUNTER — TELEPHONE (OUTPATIENT)
Dept: FAMILY MEDICINE | Facility: CLINIC | Age: 83
End: 2018-01-22

## 2018-01-22 NOTE — TELEPHONE ENCOUNTER
----- Message from Nury Gillis sent at 1/22/2018  9:30 AM CST -----  Contact: self [phone]  She is requesting that you reapply to the [facility] so she will be able to get the Spiriva and symbicort.  Thank you!

## 2018-01-23 NOTE — PROGRESS NOTES
Subjective:       Patient ID: Sheree Pugh is a 89 y.o. female    Chief Complaint: Hypertension (follow up; hm due: tetanus, zoster ) and Bronchitis (pt states this has been going on for about 3 weeks and states she is still coughing )    HPI  Here today to discuss persistent symptoms of cough with productive phlegm.  No fever.  No new SOB, but still baseline dyspnea with exertion.  No chest pain.  She does have nasal congestion.  Symptoms for 3 weeks.  Treated 12/31 with Augmentin    Review of Systems     Objective:   Physical Exam   Constitutional: She appears well-developed and well-nourished.   HENT:   Head: Normocephalic and atraumatic.   Eyes: Conjunctivae and EOM are normal. Pupils are equal, round, and reactive to light. No scleral icterus.   Neck: Normal range of motion. Neck supple. No thyromegaly present.   Cardiovascular: Normal rate, regular rhythm and normal heart sounds.  Exam reveals no gallop and no friction rub.    No murmur heard.  Pulmonary/Chest: Effort normal and breath sounds normal. No respiratory distress. She has no wheezes. She has no rales.   Lymphadenopathy:     She has no cervical adenopathy.   Vitals reviewed.       Assessment:       1. Chronic obstructive bronchitis     2. Aortic atherosclerosis     3. Lumbar facet arthropathy          Plan:       Chronic obstructive bronchitis  - Continue current therapy  - Consider prednisone taper    Aortic atherosclerosis  - Continue current therapy  - Serial blood pressure monitoring  - Diet and exercise education.    Lumbar facet arthropathy  - Continue current therapy  - Pain Management as needed

## 2018-01-25 ENCOUNTER — OFFICE VISIT (OUTPATIENT)
Dept: CARDIOLOGY | Facility: CLINIC | Age: 83
End: 2018-01-25
Payer: MEDICARE

## 2018-01-25 VITALS
SYSTOLIC BLOOD PRESSURE: 123 MMHG | WEIGHT: 166 LBS | BODY MASS INDEX: 27.66 KG/M2 | HEART RATE: 78 BPM | DIASTOLIC BLOOD PRESSURE: 59 MMHG | HEIGHT: 65 IN

## 2018-01-25 DIAGNOSIS — E78.5 DYSLIPIDEMIA: Chronic | ICD-10-CM

## 2018-01-25 DIAGNOSIS — I65.21 STENOSIS OF RIGHT CAROTID ARTERY: Chronic | ICD-10-CM

## 2018-01-25 DIAGNOSIS — I25.10 CORONARY ARTERY DISEASE INVOLVING NATIVE CORONARY ARTERY OF NATIVE HEART WITHOUT ANGINA PECTORIS: Chronic | ICD-10-CM

## 2018-01-25 DIAGNOSIS — N18.30 CHRONIC KIDNEY DISEASE, STAGE III (MODERATE): ICD-10-CM

## 2018-01-25 DIAGNOSIS — I73.9 PERIPHERAL VASCULAR DISEASE: Primary | ICD-10-CM

## 2018-01-25 DIAGNOSIS — Z98.61 HISTORY OF PTCA: Chronic | ICD-10-CM

## 2018-01-25 DIAGNOSIS — I35.0 NONRHEUMATIC AORTIC VALVE STENOSIS: Chronic | ICD-10-CM

## 2018-01-25 DIAGNOSIS — I10 ESSENTIAL HYPERTENSION: Chronic | ICD-10-CM

## 2018-01-25 PROCEDURE — 99999 PR PBB SHADOW E&M-EST. PATIENT-LVL III: CPT | Mod: PBBFAC,,, | Performed by: INTERNAL MEDICINE

## 2018-01-25 PROCEDURE — 99214 OFFICE O/P EST MOD 30 MIN: CPT | Mod: S$GLB,,, | Performed by: INTERNAL MEDICINE

## 2018-01-25 PROCEDURE — 99499 UNLISTED E&M SERVICE: CPT | Mod: S$GLB,,, | Performed by: INTERNAL MEDICINE

## 2018-01-25 RX ORDER — ATORVASTATIN CALCIUM 20 MG/1
20 TABLET, FILM COATED ORAL DAILY
Qty: 90 TABLET | Refills: 4 | Status: SHIPPED | OUTPATIENT
Start: 2018-01-25 | End: 2018-03-29 | Stop reason: SDUPTHER

## 2018-01-25 RX ORDER — CLOPIDOGREL BISULFATE 75 MG/1
75 TABLET ORAL DAILY
Qty: 90 TABLET | Refills: 3 | Status: SHIPPED | OUTPATIENT
Start: 2018-01-25 | End: 2018-03-29 | Stop reason: SDUPTHER

## 2018-01-25 NOTE — PROGRESS NOTES
Subjective:    Patient ID:  Sheree Pugh is a 89 y.o. female who presents for follow-up of No chief complaint on file.      HPI  Here for f/u CAD/AS/ cutting PTCA for ISR of RCA. Stopped Spiriva SOB increased resumed it for past week feeling better. No CP.     Review of Systems   Constitution: Negative for malaise/fatigue.   Eyes: Negative for blurred vision.   Cardiovascular: Negative for chest pain, claudication, cyanosis, dyspnea on exertion, irregular heartbeat, leg swelling, near-syncope, orthopnea, palpitations, paroxysmal nocturnal dyspnea and syncope.   Respiratory: Negative for cough and shortness of breath.    Hematologic/Lymphatic: Does not bruise/bleed easily.   Musculoskeletal: Negative for back pain, falls, joint pain, muscle cramps, muscle weakness and myalgias.   Gastrointestinal: Negative for abdominal pain, change in bowel habit, nausea and vomiting.   Genitourinary: Negative for urgency.   Neurological: Negative for dizziness, focal weakness and light-headedness.        Objective:    Physical Exam   Constitutional: She is oriented to person, place, and time. She appears well-developed and well-nourished.   HENT:   Head: Normocephalic.   Eyes: Conjunctivae are normal.   Neck: Normal range of motion. Neck supple. No JVD present.   Cardiovascular: Normal rate, regular rhythm and intact distal pulses.    Murmur heard.   Harsh midsystolic murmur is present with a grade of 2/6  at the upper right sternal border radiating to the neck  Pulses:       Carotid pulses are 2+ on the right side, and 2+ on the left side.       Radial pulses are 2+ on the right side, and 2+ on the left side.        Dorsalis pedis pulses are 2+ on the right side, and 2+ on the left side.        Posterior tibial pulses are 2+ on the right side, and 2+ on the left side.   Pulmonary/Chest: Effort normal and breath sounds normal.   Abdominal: Soft. Bowel sounds are normal.   Musculoskeletal: She exhibits no edema or tenderness.    Neurological: She is alert and oriented to person, place, and time. Gait normal.   Skin: Skin is warm, dry and intact. No cyanosis. Nails show no clubbing.   Psychiatric: She has a normal mood and affect. Her speech is normal and behavior is normal. Thought content normal.             ..    Chemistry        Component Value Date/Time     06/14/2017 0753    K 5.0 06/14/2017 0753     06/14/2017 0753    CO2 26 06/14/2017 0753    BUN 25 (H) 06/14/2017 0753    CREATININE 1.2 06/14/2017 0753    GLU 87 06/14/2017 0753        Component Value Date/Time    CALCIUM 9.6 06/14/2017 0753    ALKPHOS 96 06/14/2017 0753    AST 28 06/14/2017 0753    ALT 22 06/14/2017 0753    BILITOT 0.5 06/14/2017 0753    ESTGFRAFRICA 46.3 (A) 06/14/2017 0753    EGFRNONAA 40.2 (A) 06/14/2017 0753            ..  Lab Results   Component Value Date    CHOL 190 06/14/2017    CHOL 170 02/19/2016    CHOL 188 03/03/2015     Lab Results   Component Value Date    HDL 55 06/14/2017    HDL 46 02/19/2016    HDL 59 03/03/2015     Lab Results   Component Value Date    LDLCALC 110.8 06/14/2017    LDLCALC 85.2 02/19/2016    LDLCALC 109.8 03/03/2015     Lab Results   Component Value Date    TRIG 121 06/14/2017    TRIG 194 (H) 02/19/2016    TRIG 96 03/03/2015     Lab Results   Component Value Date    CHOLHDL 28.9 06/14/2017    CHOLHDL 27.1 02/19/2016    CHOLHDL 31.4 03/03/2015     ..  Lab Results   Component Value Date    WBC 5.60 06/14/2017    WBC 5.60 06/14/2017    HGB 11.7 (L) 06/14/2017    HGB 11.7 (L) 06/14/2017    HCT 35.7 (L) 06/14/2017    HCT 35.7 (L) 06/14/2017    MCV 94 06/14/2017    MCV 94 06/14/2017     06/14/2017     06/14/2017       Test(s) Reviewed  I have reviewed the following in detail:  [x] Stress test   [] Angiography   [x] Echocardiogram   [x] Labs   [] Other:       Assessment:         ICD-10-CM ICD-9-CM   1. Peripheral vascular disease I73.9 443.9   2. Essential hypertension I10 401.9   3. Nonrheumatic aortic valve  stenosis I35.0 424.1   4. Stenosis of right carotid artery I65.21 433.10   5. Dyslipidemia E78.5 272.4   6. Coronary artery disease involving native coronary artery of native heart without angina pectoris I25.10 414.01   7. History of PTCA Z98.61 V45.82     Problem List Items Addressed This Visit     Aortic valve stenosis (Chronic)    Coronary artery disease involving native coronary artery of native heart without angina pectoris (Chronic)    Dyslipidemia (Chronic)    Essential hypertension (Chronic)    Peripheral vascular disease - Primary    Stenosis of right carotid artery (Chronic)    Overview     4/14   1. Moderate disease LAD/CX.  2. Diffuse significant ISR of RCA successfully treated 2.5 and 3.0 POBA only.  3. Patent LM and right renal stent    9/10 LM- promus 4x8, LAD [id number], RCA promus 2.5x23 & 2.75x8               Other Visit Diagnoses     History of PTCA  (Chronic)              Plan:           Return to clinic 1 year   See labs and med orders.  Stable CV status

## 2018-02-01 DIAGNOSIS — N18.30 CHRONIC KIDNEY DISEASE, STAGE III (MODERATE): ICD-10-CM

## 2018-02-01 DIAGNOSIS — I35.0 NONRHEUMATIC AORTIC VALVE STENOSIS: Chronic | ICD-10-CM

## 2018-02-01 DIAGNOSIS — E78.5 DYSLIPIDEMIA: Chronic | ICD-10-CM

## 2018-02-01 DIAGNOSIS — I65.21 STENOSIS OF RIGHT CAROTID ARTERY: Chronic | ICD-10-CM

## 2018-02-01 DIAGNOSIS — I73.9 PERIPHERAL VASCULAR DISEASE: ICD-10-CM

## 2018-02-01 DIAGNOSIS — I10 ESSENTIAL HYPERTENSION: Primary | ICD-10-CM

## 2018-02-01 DIAGNOSIS — I25.10 CORONARY ARTERY DISEASE INVOLVING NATIVE CORONARY ARTERY OF NATIVE HEART WITHOUT ANGINA PECTORIS: Chronic | ICD-10-CM

## 2018-02-01 DIAGNOSIS — Z98.61 HISTORY OF PTCA: Chronic | ICD-10-CM

## 2018-02-02 RX ORDER — FOSINOPRIL SODIUM 20 MG/1
20 TABLET ORAL DAILY
Qty: 90 TABLET | Refills: 4 | Status: SHIPPED | OUTPATIENT
Start: 2018-02-02 | End: 2018-03-29 | Stop reason: SDUPTHER

## 2018-02-21 DIAGNOSIS — I35.0 NONRHEUMATIC AORTIC VALVE STENOSIS: Chronic | ICD-10-CM

## 2018-02-21 DIAGNOSIS — Z98.61 HISTORY OF PTCA: Chronic | ICD-10-CM

## 2018-02-21 DIAGNOSIS — I73.9 PERIPHERAL VASCULAR DISEASE: ICD-10-CM

## 2018-02-21 DIAGNOSIS — N18.30 CHRONIC KIDNEY DISEASE, STAGE III (MODERATE): ICD-10-CM

## 2018-02-21 DIAGNOSIS — I25.10 CORONARY ARTERY DISEASE INVOLVING NATIVE CORONARY ARTERY OF NATIVE HEART WITHOUT ANGINA PECTORIS: Chronic | ICD-10-CM

## 2018-02-21 DIAGNOSIS — I65.21 STENOSIS OF RIGHT CAROTID ARTERY: Chronic | ICD-10-CM

## 2018-02-21 DIAGNOSIS — E78.5 DYSLIPIDEMIA: Chronic | ICD-10-CM

## 2018-02-21 NOTE — TELEPHONE ENCOUNTER
----- Message from Nabila Garg sent at 2/21/2018  2:03 PM CST -----  Patient states that office needs to call in refill to her pharmacy for her furosemide (LASIX) 20 MG tablet and potassium chloride SA (K-DUR,KLOR-CON) 20 MEQ tablet. She only has a few days left. Please remit to:      [id number] - [place], [place] - [address]  [address]  [place] [address]  Phone: [phone] Fax: [phone]    Please call her when completed as she is worried at [phone].

## 2018-02-26 ENCOUNTER — TELEPHONE (OUTPATIENT)
Dept: CARDIOLOGY | Facility: CLINIC | Age: 83
End: 2018-02-26

## 2018-02-26 NOTE — TELEPHONE ENCOUNTER
----- Message from Cynthia Brand sent at 2/26/2018  8:55 AM CST -----  Patient requested refill on furosemide (LASIX) 20 MG tablet,potassium chloride SA (K-DUR,KLOR-CON) 20 MEQ tablet call [facility] at  [phone], patient states pharmacy has requested this refill several time and patient has called in with request with no response Please call patient at  [phone] when medication has been sent to  pharmacy or  if you have any questions. Thank you.         [id number] [place] - [address]  [address]  [place] [address]  Phone: [phone] Fax: [phone]

## 2018-02-26 NOTE — TELEPHONE ENCOUNTER
Spoke to patient, informed her Dr. Jay has the refill request and will get to them as soon as he can. Patient verbalized understanding.

## 2018-02-27 RX ORDER — POTASSIUM CHLORIDE 20 MEQ/1
20 TABLET, EXTENDED RELEASE ORAL DAILY PRN
Qty: 90 TABLET | Refills: 3 | Status: SHIPPED | OUTPATIENT
Start: 2018-02-27 | End: 2018-03-29 | Stop reason: SDUPTHER

## 2018-02-27 RX ORDER — FUROSEMIDE 20 MG/1
20 TABLET ORAL DAILY PRN
Qty: 90 TABLET | Refills: 3 | Status: SHIPPED | OUTPATIENT
Start: 2018-02-27 | End: 2018-03-29 | Stop reason: SDUPTHER

## 2018-03-23 RX ORDER — BUDESONIDE AND FORMOTEROL FUMARATE DIHYDRATE 160; 4.5 UG/1; UG/1
AEROSOL RESPIRATORY (INHALATION)
Qty: 33 INHALER | Refills: 3 | Status: SHIPPED | OUTPATIENT
Start: 2018-03-23 | End: 2018-04-18 | Stop reason: SDUPTHER

## 2018-03-29 DIAGNOSIS — N18.30 CHRONIC KIDNEY DISEASE, STAGE III (MODERATE): ICD-10-CM

## 2018-03-29 DIAGNOSIS — I25.10 CORONARY ARTERY DISEASE INVOLVING NATIVE CORONARY ARTERY OF NATIVE HEART WITHOUT ANGINA PECTORIS: Chronic | ICD-10-CM

## 2018-03-29 DIAGNOSIS — I35.0 NONRHEUMATIC AORTIC VALVE STENOSIS: Chronic | ICD-10-CM

## 2018-03-29 DIAGNOSIS — E78.5 DYSLIPIDEMIA: Chronic | ICD-10-CM

## 2018-03-29 DIAGNOSIS — I73.9 PERIPHERAL VASCULAR DISEASE: ICD-10-CM

## 2018-03-29 DIAGNOSIS — Z98.61 HISTORY OF PTCA: Chronic | ICD-10-CM

## 2018-03-29 DIAGNOSIS — I10 ESSENTIAL HYPERTENSION: ICD-10-CM

## 2018-03-29 DIAGNOSIS — I65.21 STENOSIS OF RIGHT CAROTID ARTERY: Chronic | ICD-10-CM

## 2018-03-29 RX ORDER — ATORVASTATIN CALCIUM 20 MG/1
20 TABLET, FILM COATED ORAL DAILY
Qty: 90 TABLET | Refills: 4 | Status: SHIPPED | OUTPATIENT
Start: 2018-03-29 | End: 2019-06-17

## 2018-03-29 RX ORDER — POTASSIUM CHLORIDE 20 MEQ/1
20 TABLET, EXTENDED RELEASE ORAL DAILY PRN
Qty: 90 TABLET | Refills: 3 | Status: SHIPPED | OUTPATIENT
Start: 2018-03-29 | End: 2019-05-29

## 2018-03-29 RX ORDER — FOSINOPRIL SODIUM 20 MG/1
20 TABLET ORAL DAILY
Qty: 90 TABLET | Refills: 4 | Status: SHIPPED | OUTPATIENT
Start: 2018-03-29 | End: 2018-11-02 | Stop reason: SDUPTHER

## 2018-03-29 RX ORDER — FUROSEMIDE 20 MG/1
20 TABLET ORAL DAILY PRN
Qty: 90 TABLET | Refills: 3 | Status: SHIPPED | OUTPATIENT
Start: 2018-03-29 | End: 2019-05-29

## 2018-03-29 RX ORDER — CLOPIDOGREL BISULFATE 75 MG/1
75 TABLET ORAL DAILY
Qty: 90 TABLET | Refills: 3 | Status: SHIPPED | OUTPATIENT
Start: 2018-03-29 | End: 2019-04-10

## 2018-03-29 NOTE — TELEPHONE ENCOUNTER
----- Message from Mirian Breen sent at 3/29/2018 10:14 AM CDT -----  Contact: Self  Patient states that she needs the doctor send all of her scripts to [facility] [address] [facility].  Her scripts are fosinopril (MONOPRIL) 20 MG tablet, clopidogrel (PLAVIX) 75 mg tablet,  atorvastatin (LIPITOR) 20 MG tablet, furosemide (LASIX) 20 MG tablet, and  potassium chloride SA (K-DUR,KLOR-CON) 20 MEQ tablet.  She is moving all of her scripts to the [facility] on [address].  She will be out of her Lipitor this Sunday.  Thank you!    [facility]   Phone: [phone]

## 2018-04-06 ENCOUNTER — OFFICE VISIT (OUTPATIENT)
Dept: URGENT CARE | Facility: CLINIC | Age: 83
End: 2018-04-06
Payer: MEDICARE

## 2018-04-06 ENCOUNTER — TELEPHONE (OUTPATIENT)
Dept: FAMILY MEDICINE | Facility: CLINIC | Age: 83
End: 2018-04-06

## 2018-04-06 VITALS
BODY MASS INDEX: 27.66 KG/M2 | DIASTOLIC BLOOD PRESSURE: 82 MMHG | HEIGHT: 65 IN | SYSTOLIC BLOOD PRESSURE: 140 MMHG | HEART RATE: 78 BPM | WEIGHT: 166 LBS | OXYGEN SATURATION: 97 % | TEMPERATURE: 97 F | RESPIRATION RATE: 16 BRPM

## 2018-04-06 DIAGNOSIS — J40 BRONCHITIS: Primary | ICD-10-CM

## 2018-04-06 PROCEDURE — 99214 OFFICE O/P EST MOD 30 MIN: CPT | Mod: S$GLB,,, | Performed by: PHYSICIAN ASSISTANT

## 2018-04-06 PROCEDURE — 96372 THER/PROPH/DIAG INJ SC/IM: CPT | Mod: S$GLB,,, | Performed by: FAMILY MEDICINE

## 2018-04-06 RX ORDER — BETAMETHASONE SODIUM PHOSPHATE AND BETAMETHASONE ACETATE 3; 3 MG/ML; MG/ML
9 INJECTION, SUSPENSION INTRA-ARTICULAR; INTRALESIONAL; INTRAMUSCULAR; SOFT TISSUE ONCE
Status: COMPLETED | OUTPATIENT
Start: 2018-04-06 | End: 2018-04-06

## 2018-04-06 RX ORDER — AZITHROMYCIN 250 MG/1
TABLET, FILM COATED ORAL
Qty: 6 TABLET | Refills: 0 | Status: SHIPPED | OUTPATIENT
Start: 2018-04-06 | End: 2018-04-18

## 2018-04-06 RX ADMIN — BETAMETHASONE SODIUM PHOSPHATE AND BETAMETHASONE ACETATE 9 MG: 3; 3 INJECTION, SUSPENSION INTRA-ARTICULAR; INTRALESIONAL; INTRAMUSCULAR; SOFT TISSUE at 03:04

## 2018-04-06 NOTE — PROGRESS NOTES
"Subjective:       Patient ID: Sheree Pugh is a 89 y.o. female.    Vitals:  height is 5' 5" (1.651 m) and weight is 75.3 kg (166 lb). Her temperature is 97.2 °F (36.2 °C). Her blood pressure is 140/82 (abnormal) and her pulse is 78. Her respiration is 16 and oxygen saturation is 97%.     Chief Complaint: Cough    Cough   This is a new problem. The current episode started today. The problem has been unchanged. The cough is productive of bloody sputum (blood twinged. 1 episode. Also had a mild nose bleed eariler today that resolved.). Pertinent negatives include no chest pain, chills, ear congestion, ear pain, eye redness, fever, headaches, heartburn, myalgias, nasal congestion, postnasal drip, rash, rhinorrhea, sore throat, shortness of breath, sweats or wheezing. Her past medical history is significant for bronchitis and COPD.     Active Ambulatory Problems     Diagnosis Date Noted    Dyslipidemia 01/03/2012    Legal blindness - Left Eye 01/13/2012    At risk for falls 05/25/2012    Exudative age-related macular degeneration of left eye 08/13/2012    Posterior vitreous detachment of both eyes 08/13/2012    Peripheral vascular disease 04/17/2013    Lichen sclerosus et atrophicus of the vulva 03/29/2016    Chronic kidney disease, stage III (moderate) 06/23/2016    Urinary incontinence 06/23/2016    Chronic obstructive pulmonary disease 06/23/2016    Stenosis of right carotid artery 06/23/2016    Coronary artery disease involving native coronary artery of native heart without angina pectoris 06/23/2016    Aortic valve stenosis 06/23/2016    Essential hypertension 06/23/2016    Nuclear sclerosis of left eye 06/23/2016    Pseudophakia of right eye 06/23/2016    Anemia due to vitamin B12 deficiency 06/23/2016    Peripheral polyneuropathy 06/23/2016    Insomnia 06/23/2016    Osteopenia 06/23/2016    Arthritis 06/23/2016    DDD (degenerative disc disease), lumbar 08/31/2016    Aortic " atherosclerosis 08/16/2017    Tortuous aorta 08/16/2017     Resolved Ambulatory Problems     Diagnosis Date Noted    DE LOS SANTOS (dyspnea on exertion) 02/27/2013    Renal failure 04/17/2013    Obstructive chronic bronchitis with acute bronchitis 05/22/2013    Urinary complication 06/23/2016     Past Medical History:   Diagnosis Date    Anticoagulant long-term use     ARMD (age related macular degeneration)     Arthritis     Cataract     Coronary artery disease     Disorder of kidney and ureter     DE LOS SANTOS (dyspnea on exertion)     Elevated cholesterol     Heart disease     Hypertension     Insomnia     Macular degeneration     Obstetrical blood-clot embolism, postpartum     PVD (peripheral vascular disease)     Retinal detachment     Urinary incontinence       Past Surgical History:   Procedure Laterality Date    CATARACT EXTRACTION      od d 6-12-13//    CHOLECYSTECTOMY      CORONARY STENT PLACEMENT  2010,2011    4 stents in 2010, 2 in 2011    HYSTERECTOMY      MOUTH SURGERY      upper & lower denture    VAGINAL DELIVERY      times 6      Review of Systems   Constitution: Negative for chills, fever and malaise/fatigue.   HENT: Negative for congestion, ear pain, hoarse voice, postnasal drip, rhinorrhea and sore throat.    Eyes: Negative for blurred vision, discharge, pain, redness and visual disturbance.   Cardiovascular: Negative for chest pain, dyspnea on exertion, leg swelling, near-syncope and syncope.   Respiratory: Positive for cough and sputum production. Negative for shortness of breath and wheezing.    Hematologic/Lymphatic: Negative for adenopathy.   Skin: Negative for itching and rash.   Musculoskeletal: Negative for back pain, myalgias, neck pain and stiffness.   Gastrointestinal: Negative for abdominal pain, diarrhea, heartburn, nausea and vomiting.   Neurological: Negative for dizziness, headaches, light-headedness and numbness.   Psychiatric/Behavioral: Negative for altered mental  status.   Allergic/Immunologic: Negative for hives.   All other systems reviewed and are negative.      Objective:      Physical Exam   Constitutional: She is oriented to person, place, and time. She appears well-developed and well-nourished.  Non-toxic appearance. She does not have a sickly appearance. She does not appear ill. No distress.   HENT:   Head: Normocephalic and atraumatic.   Right Ear: Hearing, tympanic membrane, external ear and ear canal normal.   Left Ear: Hearing, tympanic membrane, external ear and ear canal normal.   Nose: No mucosal edema. No epistaxis. Right sinus exhibits no maxillary sinus tenderness and no frontal sinus tenderness. Left sinus exhibits no maxillary sinus tenderness and no frontal sinus tenderness.   Mouth/Throat: Uvula is midline and mucous membranes are normal. No uvula swelling. Posterior oropharyngeal erythema present. No oropharyngeal exudate.   Eyes: Pupils are equal, round, and reactive to light.   Neck: Normal range of motion and full passive range of motion without pain. Neck supple. No neck rigidity.   Cardiovascular: Normal rate, regular rhythm and normal heart sounds.  Exam reveals no gallop and no friction rub.    No murmur heard.  Pulmonary/Chest: Effort normal. No accessory muscle usage. No tachypnea and no bradypnea. No respiratory distress. She has no decreased breath sounds. She has no wheezes. She has rhonchi (mild) in the right upper field, the right middle field, the right lower field, the left upper field, the left middle field and the left lower field. She has no rales. She exhibits no tenderness and no crepitus.   Musculoskeletal: Normal range of motion.   Lymphadenopathy:        Head (right side): No submental, no submandibular, no preauricular, no posterior auricular and no occipital adenopathy present.        Head (left side): No submental, no submandibular, no preauricular, no posterior auricular and no occipital adenopathy present.     She has no  cervical adenopathy.        Right cervical: No posterior cervical adenopathy present.       Left cervical: No posterior cervical adenopathy present.        Right: No supraclavicular adenopathy present.        Left: No supraclavicular adenopathy present.   Neurological: She is alert and oriented to person, place, and time. She is not disoriented. Coordination and gait normal.   Skin: No abrasion, no ecchymosis, no laceration and no rash noted. No erythema.   Psychiatric: She has a normal mood and affect. Her behavior is normal. Judgment normal. Cognition and memory are normal.   Nursing note and vitals reviewed.      Assessment:       1. Bronchitis        Plan:         Bronchitis  -     azithromycin (ZITHROMAX Z-TIM) 250 MG tablet; Two tablets once on day one followed by one tablet daily for 5 days  Dispense: 6 tablet; Refill: 0  -     betamethasone acetate-betamethasone sodium phosphate injection 9 mg; Inject 1.5 mLs (9 mg total) into the muscle once.    - Please return here or go to the Emergency Department for any concerns or worsening of condition.   - If you were prescribed antibiotics, please take them to completion.  - Please follow up with your primary care provider (PCP) or discussed specialist(s) as needed.

## 2018-04-06 NOTE — TELEPHONE ENCOUNTER
----- Message from Hodan Panchal sent at 4/6/2018 12:04 PM CDT -----  Contact: Pt  Pt is calling states she's coming down with bronchitis and is coughing up blood. Pt is requesting appointment access for today. Pt can be reached at [phone] or [phone] (home)

## 2018-04-06 NOTE — TELEPHONE ENCOUNTER
Spoke to pt. Pt states she thinks she is getting bronchitis and would like an appt today. Advised pt that there are no available appointments for this afternoon. Pt verbalized understanding and states she will go to an urgent care clinic today to be evaluated.

## 2018-04-08 ENCOUNTER — TELEPHONE (OUTPATIENT)
Dept: URGENT CARE | Facility: CLINIC | Age: 83
End: 2018-04-08

## 2018-04-18 ENCOUNTER — OFFICE VISIT (OUTPATIENT)
Dept: FAMILY MEDICINE | Facility: CLINIC | Age: 83
End: 2018-04-18
Payer: MEDICARE

## 2018-04-18 VITALS
OXYGEN SATURATION: 95 % | HEART RATE: 69 BPM | HEIGHT: 65 IN | SYSTOLIC BLOOD PRESSURE: 118 MMHG | BODY MASS INDEX: 27.81 KG/M2 | WEIGHT: 166.88 LBS | DIASTOLIC BLOOD PRESSURE: 64 MMHG

## 2018-04-18 DIAGNOSIS — I77.1 TORTUOUS AORTA: ICD-10-CM

## 2018-04-18 DIAGNOSIS — J44.9 CHRONIC OBSTRUCTIVE PULMONARY DISEASE, UNSPECIFIED COPD TYPE: ICD-10-CM

## 2018-04-18 DIAGNOSIS — I70.0 AORTIC ATHEROSCLEROSIS: ICD-10-CM

## 2018-04-18 DIAGNOSIS — I73.9 PERIPHERAL VASCULAR DISEASE: ICD-10-CM

## 2018-04-18 DIAGNOSIS — M85.80 OSTEOPENIA, UNSPECIFIED LOCATION: ICD-10-CM

## 2018-04-18 DIAGNOSIS — I10 ESSENTIAL HYPERTENSION: Chronic | ICD-10-CM

## 2018-04-18 DIAGNOSIS — I25.10 CORONARY ARTERY DISEASE INVOLVING NATIVE CORONARY ARTERY OF NATIVE HEART WITHOUT ANGINA PECTORIS: Chronic | ICD-10-CM

## 2018-04-18 DIAGNOSIS — E78.5 DYSLIPIDEMIA: Chronic | ICD-10-CM

## 2018-04-18 DIAGNOSIS — Z00.00 ENCOUNTER FOR PREVENTIVE HEALTH EXAMINATION: Primary | ICD-10-CM

## 2018-04-18 DIAGNOSIS — R32 URINARY INCONTINENCE, UNSPECIFIED TYPE: ICD-10-CM

## 2018-04-18 DIAGNOSIS — Z91.81 AT RISK FOR FALLS: Chronic | ICD-10-CM

## 2018-04-18 DIAGNOSIS — N18.30 CHRONIC KIDNEY DISEASE, STAGE III (MODERATE): ICD-10-CM

## 2018-04-18 DIAGNOSIS — D51.9 ANEMIA DUE TO VITAMIN B12 DEFICIENCY, UNSPECIFIED B12 DEFICIENCY TYPE: ICD-10-CM

## 2018-04-18 DIAGNOSIS — G47.00 INSOMNIA, UNSPECIFIED TYPE: ICD-10-CM

## 2018-04-18 DIAGNOSIS — I35.0 NONRHEUMATIC AORTIC VALVE STENOSIS: Chronic | ICD-10-CM

## 2018-04-18 DIAGNOSIS — I65.21 STENOSIS OF RIGHT CAROTID ARTERY: Chronic | ICD-10-CM

## 2018-04-18 PROCEDURE — G0439 PPPS, SUBSEQ VISIT: HCPCS | Mod: S$GLB,,, | Performed by: NURSE PRACTITIONER

## 2018-04-18 PROCEDURE — 99999 PR PBB SHADOW E&M-EST. PATIENT-LVL V: CPT | Mod: PBBFAC,,, | Performed by: NURSE PRACTITIONER

## 2018-04-18 PROCEDURE — 99499 UNLISTED E&M SERVICE: CPT | Mod: S$GLB,,, | Performed by: NURSE PRACTITIONER

## 2018-04-18 RX ORDER — BUDESONIDE AND FORMOTEROL FUMARATE DIHYDRATE 160; 4.5 UG/1; UG/1
2 AEROSOL RESPIRATORY (INHALATION) 2 TIMES DAILY
Qty: 33 INHALER | Refills: 3 | Status: SHIPPED | OUTPATIENT
Start: 2018-04-18 | End: 2018-08-16 | Stop reason: SDUPTHER

## 2018-04-18 NOTE — PROGRESS NOTES
"[facility] presented for a  Medicare AWV and comprehensive Health Risk Assessment today. The following components were reviewed and updated:    · Medical history  · Family History  · Social history  · Allergies and Current Medications  · Health Risk Assessment  · Health Maintenance  · Care Team     Review of Systems   Constitutional: Negative for chills, fever, malaise/fatigue and weight loss.   Respiratory: Negative for cough, shortness of breath and wheezing.    Cardiovascular: Negative for chest pain.   Gastrointestinal: Negative for abdominal pain, nausea and vomiting.   Skin: Negative for rash.   Neurological: Negative for weakness.     ** See Completed Assessments for Annual Wellness Visit within the encounter summary.**     The following assessments were completed:  · Living Situation  · CAGE  · Depression Screening  · Timed Get Up and Go  · Whisper Test  · Cognitive Function Screening        Nutrition Screening  · ADL Screening  · PAQ Screening    Vitals:    04/18/18 1338   BP: 118/64   BP Location: Left arm   Patient Position: Sitting   BP Method: Medium (Manual)   Pulse: 69   SpO2: 95%   Weight: 75.7 kg (166 lb 14.2 oz)   Height: 5' 5" (1.651 m)     Body mass index is 27.77 kg/m².  Physical Exam   Constitutional: She is oriented to person, place, and time. She appears well-nourished.   Cardiovascular: Normal rate, regular rhythm, normal heart sounds and intact distal pulses.    Pulmonary/Chest: Effort normal and breath sounds normal. She has no wheezes. She has no rales.   Neurological: She is alert and oriented to person, place, and time.   Skin: Skin is warm and dry. No rash noted.   Vitals reviewed.        Diagnoses and health risks identified today and associated recommendations/orders:    1. Encounter for preventive health examination  Reviewed and discussed health maintenance.    Declined update on tetanus and shingles vaccines today    2. Chronic obstructive pulmonary disease, unspecified COPD " type  Stable- continue current treatment and follow up routinely with PCP   - budesonide-formoterol 160-4.5 mcg (SYMBICORT) 160-4.5 mcg/actuation HFAA; Inhale 2 puffs into the lungs 2 (two) times daily. Controller  Dispense: 33 Inhaler; Refill: 3    3. Dyslipidemia  Stable- continue current treatment and follow up routinely with PCP and cardiology (Dr. Jay)    4. Tortuous aorta  Stable- continue current treatment and follow up routinely with PCP and cardiology (Dr. Jay)    5. Aortic atherosclerosis  Stable- continue current treatment and follow up routinely with PCP and cardiology (Dr. Jay)    6. Peripheral vascular disease  Stable- continue current treatment and follow up routinely with PCP and cardiology (Dr. Jay)  Compression stockings. Elevate extremities     7. Essential hypertension  Stable- continue current treatment and follow up routinely with PCP and cardiology (Dr. Jay)    8. Nonrheumatic aortic valve stenosis  Stable- continue current treatment and follow up routinely with PCP and cardiology (Dr. Jay)    9. Coronary artery disease involving native coronary artery of native heart without angina pectoris  Stable- continue current treatment and follow up routinely with PCP and cardiology (Dr. Jay)    10. Stenosis of right carotid artery  Stable- continue current treatment and follow up routinely with PCP and cardiology (Dr. Jay)    11. Chronic kidney disease, stage III (moderate)  Stable- continue current treatment and follow up routinely with PCP  Labs reviewed. Avoid NSAIDs and increase water intake    12. Urinary incontinence, unspecified type  Stable- continue current treatment and follow up routinely with PCP     13. Anemia due to vitamin B12 deficiency, unspecified B12 deficiency type  Stable- continue current treatment and follow up routinely with PCP    14. Osteopenia, unspecified location  Stable- continue current treatment and follow up routinely with PCP    15. Insomnia, unspecified type  Stable-  continue current treatment and follow up routinely with PCP    16. At risk for falls  Safety issues discussed and reviewed     I offered to discuss end of life issues, including information on how to make advance directives that the patient could use to name someone who would make medical decisions on their behalf if they became too ill to make themselves.    ___Patient declined  _X_Patient is interested, I provided paper work and offered to discuss.    Provided Sheree with a 5-10 year written screening schedule and personal prevention plan. Recommendations were developed using the USPSTF age appropriate recommendations. Education, counseling, and referrals were provided as needed. After Visit Summary printed and given to patient which includes a list of additional screenings\tests needed.    Madyson Silva, NP

## 2018-04-18 NOTE — PATIENT INSTRUCTIONS
Counseling and Referral of Other Preventative  (Italic type indicates deductible and co-insurance are waived)    Patient Name: Sheree Pugh  Today's Date: 4/18/2018    Health Maintenance       Date Due Completion Date    TETANUS VACCINE 08/26/1945 ---    Zoster Vaccine 08/26/1987 ---    Lipid Panel 06/14/2022 6/14/2017        No orders of the defined types were placed in this encounter.    The following information is provided to all patients.  This information is to help you find resources for any of the problems found today that may be affecting your health:                Living healthy guide: www.[facility].[place].[facility]      Understanding Diabetes: www.diabetes.org      Eating healthy: www.cdc.gov/healthyweight      CDC home safety checklist: www.cdc.gov/steadi/patient.html      Agency on Aging: www.goea.[place].[facility]      Alcoholics anonymous (AA): www.aa.org      Physical Activity: www.josr.nih.gov/yf8jkhg      Tobacco use: www.quitwithusla.org

## 2018-05-02 ENCOUNTER — OFFICE VISIT (OUTPATIENT)
Dept: FAMILY MEDICINE | Facility: CLINIC | Age: 83
End: 2018-05-02
Payer: MEDICARE

## 2018-05-02 ENCOUNTER — DOCUMENTATION ONLY (OUTPATIENT)
Dept: FAMILY MEDICINE | Facility: CLINIC | Age: 83
End: 2018-05-02

## 2018-05-02 VITALS
OXYGEN SATURATION: 96 % | WEIGHT: 168 LBS | DIASTOLIC BLOOD PRESSURE: 82 MMHG | HEIGHT: 65 IN | HEART RATE: 68 BPM | BODY MASS INDEX: 27.99 KG/M2 | SYSTOLIC BLOOD PRESSURE: 130 MMHG

## 2018-05-02 DIAGNOSIS — J44.9 CHRONIC OBSTRUCTIVE PULMONARY DISEASE, UNSPECIFIED COPD TYPE: Primary | ICD-10-CM

## 2018-05-02 DIAGNOSIS — M54.31 SCIATICA OF RIGHT SIDE: ICD-10-CM

## 2018-05-02 PROCEDURE — 99999 PR PBB SHADOW E&M-EST. PATIENT-LVL III: CPT | Mod: PBBFAC,,, | Performed by: FAMILY MEDICINE

## 2018-05-02 PROCEDURE — 99499 UNLISTED E&M SERVICE: CPT | Mod: S$PBB,,, | Performed by: FAMILY MEDICINE

## 2018-05-02 PROCEDURE — 99214 OFFICE O/P EST MOD 30 MIN: CPT | Mod: S$GLB,,, | Performed by: FAMILY MEDICINE

## 2018-05-02 RX ORDER — NORTRIPTYLINE HYDROCHLORIDE 10 MG/1
10 CAPSULE ORAL NIGHTLY
Qty: 30 CAPSULE | Refills: 11 | Status: SHIPPED | OUTPATIENT
Start: 2018-05-02 | End: 2018-11-02 | Stop reason: SDUPTHER

## 2018-05-02 NOTE — PROGRESS NOTES
Subjective:       Patient ID: Sheree Pugh is a 89 y.o. female    Chief Complaint: Cough (Patient here for a 3 month follow up claims the cough is still there and is productive) and Hip Pain    HPI  Cough has improved after betamethasone IM at urgent care  Now with persistent pain in the right hip.  Radiates to the right leg.    Review of Systems     Objective:   Physical Exam   Constitutional: She is oriented to person, place, and time. She appears well-developed and well-nourished.   HENT:   Head: Normocephalic and atraumatic.   Eyes: Conjunctivae and EOM are normal. Pupils are equal, round, and reactive to light. No scleral icterus.   Neck: Normal range of motion. Neck supple. No thyromegaly present.   Cardiovascular: Normal rate, regular rhythm and normal heart sounds.  Exam reveals no gallop and no friction rub.    No murmur heard.  Pulmonary/Chest: Effort normal and breath sounds normal. No respiratory distress. She has no wheezes. She has no rales.   Lymphadenopathy:     She has no cervical adenopathy.   Neurological: She is alert and oriented to person, place, and time.   Vitals reviewed.    Assessment:       1. Chronic obstructive pulmonary disease, unspecified COPD type     2. Sciatica of right side  nortriptyline (PAMELOR) 10 MG capsule        Plan:       Chronic obstructive pulmonary disease, unspecified COPD type  - Continue current therapy  - Recheck as needed  - Follow-up in about 6 months (around 11/2/2018).    Sciatica of right side  - Discussed PT  - Add Pamelor 10mg QHS    Will stop Ambien due to no benefit

## 2018-06-25 RX ORDER — ALBUTEROL SULFATE 90 UG/1
AEROSOL, METERED RESPIRATORY (INHALATION)
Qty: 18 G | Refills: 3 | Status: SHIPPED | OUTPATIENT
Start: 2018-06-25 | End: 2019-10-31 | Stop reason: ALTCHOICE

## 2018-06-25 NOTE — TELEPHONE ENCOUNTER
----- Message from Chad Melendez sent at 6/25/2018 10:13 AM CDT -----  Contact: Patient  Type:  RX Refill Request    Who Called:  Patient  Refill or New Rx:  refill  RX Name and Strength:  VENTOLIN HFA 90 mcg/actuation inhaler  How is the patient currently taking it? (ex. 1XDay):    Is this a 30 day or 90 day RX:    Preferred Pharmacy with phone number:  [facility] [phone]  Local or Mail Order:  local  Ordering Provider:  Dr.Riddell Lindsay Call Back Number:  [phone]  Additional Information:  Requesting a call back when script has been sent

## 2018-07-23 RX ORDER — ZOLPIDEM TARTRATE 10 MG/1
10 TABLET ORAL NIGHTLY PRN
Qty: 30 TABLET | Refills: 3 | OUTPATIENT
Start: 2018-07-23

## 2018-07-23 NOTE — TELEPHONE ENCOUNTER
----- Message from Nick Saunders sent at 7/23/2018  4:19 PM CDT -----  Contact: [phone]  Patient requesting a refill on zolpidem.       Patient will be using   [id number] - [place] - [address]  [address]  [place] [address]  Phone: [phone] Fax: [phone]    Please call patient at [phone]. Thanks!

## 2018-07-26 DIAGNOSIS — J42 CHRONIC BRONCHITIS, UNSPECIFIED CHRONIC BRONCHITIS TYPE: ICD-10-CM

## 2018-07-26 RX ORDER — TIOTROPIUM BROMIDE 18 UG/1
1 CAPSULE ORAL; RESPIRATORY (INHALATION) DAILY
Qty: 90 CAPSULE | Refills: 3 | Status: SHIPPED | OUTPATIENT
Start: 2018-07-26 | End: 2019-08-01 | Stop reason: SDUPTHER

## 2018-07-31 DIAGNOSIS — G47.00 INSOMNIA, UNSPECIFIED TYPE: Primary | ICD-10-CM

## 2018-08-01 RX ORDER — ZOLPIDEM TARTRATE 10 MG/1
TABLET ORAL
Qty: 30 TABLET | Refills: 3 | Status: SHIPPED | OUTPATIENT
Start: 2018-08-01 | End: 2019-02-06

## 2018-08-16 DIAGNOSIS — J44.9 CHRONIC OBSTRUCTIVE PULMONARY DISEASE, UNSPECIFIED COPD TYPE: ICD-10-CM

## 2018-08-16 RX ORDER — BUDESONIDE AND FORMOTEROL FUMARATE DIHYDRATE 160; 4.5 UG/1; UG/1
AEROSOL RESPIRATORY (INHALATION)
Qty: 11 G | Refills: 2 | Status: SHIPPED | OUTPATIENT
Start: 2018-08-16 | End: 2018-11-07 | Stop reason: SDUPTHER

## 2018-09-01 PROBLEM — K57.92 DIVERTICULITIS: Status: ACTIVE | Noted: 2018-09-01

## 2018-11-02 ENCOUNTER — LAB VISIT (OUTPATIENT)
Dept: LAB | Facility: HOSPITAL | Age: 83
End: 2018-11-02
Attending: FAMILY MEDICINE
Payer: MEDICARE

## 2018-11-02 ENCOUNTER — OFFICE VISIT (OUTPATIENT)
Dept: FAMILY MEDICINE | Facility: CLINIC | Age: 83
End: 2018-11-02
Payer: MEDICARE

## 2018-11-02 VITALS
RESPIRATION RATE: 16 BRPM | BODY MASS INDEX: 28.65 KG/M2 | OXYGEN SATURATION: 94 % | HEIGHT: 65 IN | SYSTOLIC BLOOD PRESSURE: 90 MMHG | TEMPERATURE: 98 F | WEIGHT: 171.94 LBS | HEART RATE: 67 BPM | DIASTOLIC BLOOD PRESSURE: 44 MMHG

## 2018-11-02 DIAGNOSIS — E78.5 DYSLIPIDEMIA: Chronic | ICD-10-CM

## 2018-11-02 DIAGNOSIS — R60.9 EDEMA, UNSPECIFIED TYPE: ICD-10-CM

## 2018-11-02 DIAGNOSIS — I10 ESSENTIAL HYPERTENSION: ICD-10-CM

## 2018-11-02 DIAGNOSIS — I65.21 STENOSIS OF RIGHT CAROTID ARTERY: Chronic | ICD-10-CM

## 2018-11-02 DIAGNOSIS — I25.10 CORONARY ARTERY DISEASE INVOLVING NATIVE CORONARY ARTERY OF NATIVE HEART WITHOUT ANGINA PECTORIS: Chronic | ICD-10-CM

## 2018-11-02 DIAGNOSIS — M54.31 SCIATICA OF RIGHT SIDE: ICD-10-CM

## 2018-11-02 DIAGNOSIS — Z98.61 HISTORY OF PTCA: Chronic | ICD-10-CM

## 2018-11-02 DIAGNOSIS — K57.92 DIVERTICULITIS: Primary | ICD-10-CM

## 2018-11-02 DIAGNOSIS — H35.3220 EXUDATIVE AGE-RELATED MACULAR DEGENERATION OF LEFT EYE, UNSPECIFIED STAGE: ICD-10-CM

## 2018-11-02 DIAGNOSIS — I73.9 PERIPHERAL VASCULAR DISEASE: ICD-10-CM

## 2018-11-02 DIAGNOSIS — I35.0 NONRHEUMATIC AORTIC VALVE STENOSIS: Chronic | ICD-10-CM

## 2018-11-02 DIAGNOSIS — N18.30 CHRONIC KIDNEY DISEASE, STAGE III (MODERATE): ICD-10-CM

## 2018-11-02 DIAGNOSIS — K57.92 DIVERTICULITIS: ICD-10-CM

## 2018-11-02 LAB
ALBUMIN SERPL BCP-MCNC: 3.4 G/DL
ALP SERPL-CCNC: 102 U/L
ALT SERPL W/O P-5'-P-CCNC: 19 U/L
ANION GAP SERPL CALC-SCNC: 9 MMOL/L
AST SERPL-CCNC: 19 U/L
BASOPHILS # BLD AUTO: 0.05 K/UL
BASOPHILS NFR BLD: 0.7 %
BILIRUB SERPL-MCNC: 0.4 MG/DL
BUN SERPL-MCNC: 36 MG/DL
CALCIUM SERPL-MCNC: 9.4 MG/DL
CHLORIDE SERPL-SCNC: 110 MMOL/L
CO2 SERPL-SCNC: 23 MMOL/L
CREAT SERPL-MCNC: 1.2 MG/DL
DIFFERENTIAL METHOD: ABNORMAL
EOSINOPHIL # BLD AUTO: 0.2 K/UL
EOSINOPHIL NFR BLD: 2.2 %
ERYTHROCYTE [DISTWIDTH] IN BLOOD BY AUTOMATED COUNT: 13.5 %
EST. GFR  (AFRICAN AMERICAN): 45.7 ML/MIN/1.73 M^2
EST. GFR  (NON AFRICAN AMERICAN): 39.6 ML/MIN/1.73 M^2
GLUCOSE SERPL-MCNC: 129 MG/DL
HCT VFR BLD AUTO: 35.4 %
HGB BLD-MCNC: 11.5 G/DL
IMM GRANULOCYTES # BLD AUTO: 0.03 K/UL
IMM GRANULOCYTES NFR BLD AUTO: 0.4 %
LYMPHOCYTES # BLD AUTO: 1.9 K/UL
LYMPHOCYTES NFR BLD: 24.9 %
MCH RBC QN AUTO: 31.1 PG
MCHC RBC AUTO-ENTMCNC: 32.5 G/DL
MCV RBC AUTO: 96 FL
MONOCYTES # BLD AUTO: 0.8 K/UL
MONOCYTES NFR BLD: 10 %
NEUTROPHILS # BLD AUTO: 4.8 K/UL
NEUTROPHILS NFR BLD: 61.8 %
NRBC BLD-RTO: 0 /100 WBC
PLATELET # BLD AUTO: 225 K/UL
PMV BLD AUTO: 10.1 FL
POTASSIUM SERPL-SCNC: 4.8 MMOL/L
PROT SERPL-MCNC: 6.7 G/DL
RBC # BLD AUTO: 3.7 M/UL
SODIUM SERPL-SCNC: 142 MMOL/L
WBC # BLD AUTO: 7.68 K/UL

## 2018-11-02 PROCEDURE — 99499 UNLISTED E&M SERVICE: CPT | Mod: S$GLB,,, | Performed by: FAMILY MEDICINE

## 2018-11-02 PROCEDURE — 80053 COMPREHEN METABOLIC PANEL: CPT | Mod: HCWC

## 2018-11-02 PROCEDURE — 1101F PT FALLS ASSESS-DOCD LE1/YR: CPT | Mod: CPTII,HCWC,S$GLB, | Performed by: FAMILY MEDICINE

## 2018-11-02 PROCEDURE — G0008 ADMIN INFLUENZA VIRUS VAC: HCPCS | Mod: HCWC,S$GLB,, | Performed by: FAMILY MEDICINE

## 2018-11-02 PROCEDURE — 99999 PR PBB SHADOW E&M-EST. PATIENT-LVL V: CPT | Mod: PBBFAC,HCWC,, | Performed by: FAMILY MEDICINE

## 2018-11-02 PROCEDURE — 90662 IIV NO PRSV INCREASED AG IM: CPT | Mod: HCWC,S$GLB,, | Performed by: FAMILY MEDICINE

## 2018-11-02 PROCEDURE — 36415 COLL VENOUS BLD VENIPUNCTURE: CPT | Mod: HCWC,PO

## 2018-11-02 PROCEDURE — 85025 COMPLETE CBC W/AUTO DIFF WBC: CPT | Mod: HCWC

## 2018-11-02 PROCEDURE — 99214 OFFICE O/P EST MOD 30 MIN: CPT | Mod: HCWC,25,S$GLB, | Performed by: FAMILY MEDICINE

## 2018-11-02 RX ORDER — FOSINOPIRL SODIUM 10 MG/1
10 TABLET ORAL DAILY
Qty: 90 TABLET | Refills: 3 | Status: SHIPPED | OUTPATIENT
Start: 2018-11-02 | End: 2019-08-27

## 2018-11-02 RX ORDER — NORTRIPTYLINE HYDROCHLORIDE 10 MG/1
10 CAPSULE ORAL NIGHTLY
Qty: 90 CAPSULE | Refills: 3 | Status: SHIPPED | OUTPATIENT
Start: 2018-11-02 | End: 2019-10-08 | Stop reason: SDUPTHER

## 2018-11-07 DIAGNOSIS — J44.9 CHRONIC OBSTRUCTIVE PULMONARY DISEASE, UNSPECIFIED COPD TYPE: ICD-10-CM

## 2018-11-07 RX ORDER — BUDESONIDE AND FORMOTEROL FUMARATE DIHYDRATE 160; 4.5 UG/1; UG/1
AEROSOL RESPIRATORY (INHALATION)
Qty: 11 G | Refills: 1 | Status: SHIPPED | OUTPATIENT
Start: 2018-11-07 | End: 2019-01-07

## 2018-11-13 ENCOUNTER — OFFICE VISIT (OUTPATIENT)
Dept: PRIMARY CARE CLINIC | Facility: CLINIC | Age: 83
End: 2018-11-13
Payer: MEDICARE

## 2018-11-13 VITALS
HEIGHT: 65 IN | BODY MASS INDEX: 28.43 KG/M2 | HEART RATE: 75 BPM | TEMPERATURE: 98 F | SYSTOLIC BLOOD PRESSURE: 138 MMHG | WEIGHT: 170.63 LBS | DIASTOLIC BLOOD PRESSURE: 60 MMHG | OXYGEN SATURATION: 95 %

## 2018-11-13 DIAGNOSIS — M62.830 MUSCLE SPASM OF BACK: Primary | ICD-10-CM

## 2018-11-13 DIAGNOSIS — M79.89 SWELLING OF RIGHT THUMB: ICD-10-CM

## 2018-11-13 PROCEDURE — 1101F PT FALLS ASSESS-DOCD LE1/YR: CPT | Mod: CPTII,HCWC,S$GLB, | Performed by: NURSE PRACTITIONER

## 2018-11-13 PROCEDURE — 99999 PR PBB SHADOW E&M-EST. PATIENT-LVL V: CPT | Mod: PBBFAC,HCWC,, | Performed by: NURSE PRACTITIONER

## 2018-11-13 PROCEDURE — 99214 OFFICE O/P EST MOD 30 MIN: CPT | Mod: HCWC,S$GLB,, | Performed by: NURSE PRACTITIONER

## 2018-11-13 RX ORDER — METHYLPREDNISOLONE 4 MG/1
TABLET ORAL
Qty: 1 PACKAGE | Refills: 0 | Status: SHIPPED | OUTPATIENT
Start: 2018-11-13 | End: 2019-05-10

## 2018-11-13 NOTE — PROGRESS NOTES
Sheree Pugh is a 89 y.o. female patient of Dr. Gus Shahid MD who presents to the clinic today for   Chief Complaint   Patient presents with    Generalized Body Aches    Swelling     right hand    .    HPI      Pt, who is known to me, reports a new problem to me:  Got the flu shot on 11/3/18.  Then 3 days later she started with coughing and spitting up.  The nurse where she lives thought it could be r/t the flu shot.  Yesterday morning her left neck started to hurt.  The nurse came and rubbed her neck with Aspercreme, which felt good. Throughout the day the pain went down the left back.  Hurts to look up, straighten up to walk.    Right hand (thumb) swelled up around the same time.  She's had gout in the right foot in the past.    These symptoms began 1 day ago and status is unchanged.     Pt denies the following symptoms:  injury    Aggravating factors include moving, trying to stand up straight, using her right hand .    Relieving factors include none .    OTC Medications tried are Tylenol.    Prescription medications taken for symptoms are none.    Pertinent medical history:  Remote h/o gout >20 yrs ago    ROS    Constitutional:  No fever, no unusual fatigue.      MS:  See Chief Complaint/HPI    Skin:  No rashes, itching..      PAST MEDICAL HISTORY:  Past Medical History:   Diagnosis Date    Anticoagulant long-term use     Plavix & Aspirin    ARMD (age related macular degeneration)     os    Arthritis     Cataract     os    Coronary artery disease     Disorder of kidney and ureter     DE LOS SANTOS (dyspnea on exertion)     Elevated cholesterol     Heart disease     Hypertension     Insomnia     Macular degeneration     OS    Obstetrical blood-clot embolism, postpartum     PVD (peripheral vascular disease)     Retinal detachment     OS    Urinary incontinence        PAST SURGICAL HISTORY:  Past Surgical History:   Procedure Laterality Date    CATARACT EXTRACTION      od d 6-12-13//     CATHETERIZATION, HEART, LEFT Left 2014    Performed [facility] at [facility] CATH LAB    CHOLECYSTECTOMY      CORONARY STENT PLACEMENT  ,    4 stents in , 2 in     HYSTERECTOMY      INSERTION, CATHETER, RIGHT HEART Right 2014    Performed by [facility]rell [facility] at [facility] CATH LAB    MOUTH SURGERY      upper & lower denture    PHACOEMULSIFICATION, CATARACT Right 2013    Performed by Jennifer Lance [facility] at [facility] OR    VAGINAL DELIVERY      times 6       SOCIAL HISTORY:  Social History     Socioeconomic History    Marital status:      Spouse name: Not on file    Number of children: Not on file    Years of education: Not on file    Highest education level: Not on file   Social Needs    Financial resource strain: Not on file    Food insecurity - worry: Not on file    Food insecurity - inability: Not on file    Transportation needs - medical: Not on file    Transportation needs - non-medical: Not on file   Occupational History    Not on file   Tobacco Use    Smoking status: Former Smoker     Last attempt to quit: 1/3/2000     Years since quittin.8    Smokeless tobacco: Never Used   Substance and Sexual Activity    Alcohol use: No    Drug use: No    Sexual activity: Not on file   Other Topics Concern    Not on file   Social History Narrative    Not on file       FAMILY HISTORY:  Family History   Problem Relation Age of Onset    Alzheimer's disease Father     Cancer Sister     Cancer Sister     Stroke Mother     Heart disease Mother     Amblyopia Neg Hx     Blindness Neg Hx     Cataracts Neg Hx     Diabetes Neg Hx     Glaucoma Neg Hx     Hypertension Neg Hx     Macular degeneration Neg Hx     Retinal detachment Neg Hx     Strabismus Neg Hx     Thyroid disease Neg Hx        ALLERGIES AND MEDICATIONS: updated and reviewed.  Review of patient's allergies indicates:   Allergen Reactions    Codeine Nausea And Vomiting    Gabapentin      Hydrocodone Nausea And Vomiting     Other reaction(s): Vomiting     Current Outpatient Medications   Medication Sig Dispense Refill    ACETAMINOPHEN (TYLENOL ARTHRITIS ORAL) Take 2 tablets by mouth daily as needed.       ANTIOX #11/OM3/DHA/EPA/LUT/RAND (OCUVITE ADULT 50+ ORAL) Take by mouth once daily.        aspirin (ECOTRIN) 81 MG EC tablet Take by mouth once daily.       atorvastatin (LIPITOR) 20 MG tablet Take 1 tablet (20 mg total) by mouth once daily. (Patient taking differently: Take 20 mg by mouth every evening. ) 90 tablet 4    biotin 5,000 mcg TbDL Take 5,000 mcg by mouth once daily.       calcium carbonate-vit D3-min (CALTRATE 600+D PLUS MINERALS) 600 mg calcium- 400 unit Tab Take 1 tablet by mouth once daily.      cholecalciferol, vitamin D3, (VITAMIN D3) 400 unit Chew Take 800 Units by mouth once daily.      clopidogrel (PLAVIX) 75 mg tablet Take 1 tablet (75 mg total) by mouth once daily. 90 tablet 3    cyanocobalamin 1,000 mcg/mL injection INJECT 1CC (1ML) INTRAMUSCULARLY EVERY THREE WEEKS 10 mL 3    fosinopril (MONOPRIL) 10 MG Tab Take 1 tablet (10 mg total) by mouth once daily. 90 tablet 3    furosemide (LASIX) 20 MG tablet Take 1 tablet (20 mg total) by mouth daily as needed. (Patient taking differently: Take 20 mg by mouth once daily. ) 90 tablet 3    GLUCOSAMINE HCL/CHONDR FLETCHER A NA (OSTEO BI-FLEX ORAL) Take 2 tablets by mouth.       L.acidophil,parac-S.therm-Bif. (RISAQUAD) Cap capsule Take 1 capsule by mouth once daily.      MULTIVITAMIN/IRON/FOLIC ACID (CENTRUM ULTRA WOMEN'S ORAL) Take 1 tablet by mouth once daily.        nortriptyline (PAMELOR) 10 MG capsule Take 1 capsule (10 mg total) by mouth every evening. 90 capsule 3    potassium chloride SA (K-DUR,KLOR-CON) 20 MEQ tablet Take 1 tablet (20 mEq total) by mouth daily as needed. (Patient taking differently: Take 20 mEq by mouth once daily. ) 90 tablet 3    salmon oil-omega-3 fatty acids (SALMON OIL-1000) 1,000-200 mg Cap Take  2 capsules by mouth once daily.       SPIRIVA WITH HANDIHALER 18 mcg inhalation capsule ONE INHALATION ONCE A DAY AS DIRECTED (Patient taking differently: ONE INHALATION ONCE A DAY AS DIRECTED after lunch) 90 capsule 3    SYMBICORT 160-4.5 mcg/actuation HFAA INHALE 2 PUFFS INTO THE LUNGS 2 (TWO) TIMES DAILY. CONTROLLER 11 g 1    syringe, disposable, 3 mL Syrg As directed      VENTOLIN HFA 90 mcg/actuation inhaler TWO PUFFS INTO THE LUNGS EVERY 6 HOURS AS NEEDED FOR WHEEZING OR SHORTNESS OF BREATH 18 g 3    zolpidem (AMBIEN) 10 mg Tab TAKE ONE TABLET BY MOUTH AT BEDTIME AS NEEDED (Patient taking differently: Take 3.3 mg by mouth nightly as needed. Pt cuts 10mg tablet in 3 pieces) 30 tablet 3     No current facility-administered medications for this visit.          PHYSICAL EXAM    Alert, coop 89 y.o. female patient in some distress r/t pain.    Vitals:    11/13/18 1440   BP: 138/60   Pulse: 75   Temp: 97.5 °F (36.4 °C)     VS reviewed.  VS stable.  CC, nursing note, medications & PMH all reviewed today.    Head:  Normocephalic, atraumatic.    EENT: Ext nose/ears normal.  Eyes with normal lids, not injected.           Resp:  Respirations even, unlabored     Heart:  Normal rate.  CV:  Cap RF brisk.      MS:  The paraspinal muscles of the right neck and upper back is/are noted to have no edema, no erythema, no ecchymosis.  The affected area is tender to palp.  Pain elicited with palp, hyperextension of her neck .  Muscle tension and soreness is noted in several areas .  No vertebrae are tender to palp.  ROM of the affected area is limited by pain.  Right thumb and MCP are edematous, tender to touch.  ROM of the left upper extremity and right hand is limited by pain, as well.  The other areas of the back and right wrist/arm/shoulder are NT to palp.            NEURO:  Alert and oriented x 4.  Responds appropriately during interaction.                  Sensation to light touch intact over both UEs and back.     Skin:   Warm, dry, color good.    Psych:  Responds appropriately throughout the visit.               Relaxed.  Well-groomed.    Muscle spasm of back  -     methylPREDNISolone (MEDROL DOSEPACK) 4 mg tablet; use as directed  Dispense: 1 Package; Refill: 0    Swelling of right thumb  -     methylPREDNISolone (MEDROL DOSEPACK) 4 mg tablet; use as directed  Dispense: 1 Package; Refill: 0      Pt today presents with pain in the left neck and upper back muscles w/o h/o injury x 2 days.  Swelling and pain in the right thumb and MCP w/o h/o injury x 2 days.  On exam there are several areas of tender muscles noted in the right paraspinal muscles of the neck and upper back.  No vertebrae are tender to palp.   The right thumb is notably edematous and tender to palp.  She has had medrol dose packs many times in the past for COPD and tolerated them well.    This is a new problem to me.  No work up is planned.        Pt advised to perform comfort measures recommended on patient instruction sheet .    If not better in 3 days, the patient is advised to call her PCP.  If worse or concerns, the patient is advised to call us or her PCP.  Explained exam findings, diagnosis and treatment plan to patient.  Questions answered and patient states understanding.

## 2018-11-13 NOTE — Clinical Note
Gus Shahid MD,  I saw your patient today in the [facility] ([place]).  If you have any questions, please do not hesitate to contact me.  Thank you!  KALIE Verma

## 2018-11-13 NOTE — PATIENT INSTRUCTIONS
Your exam and symptoms today are consistent with left upper back muscle pain, left arm pain, right thumb swelling an pain.     Use steroid pack tablets for inflammation and pain.    Comfort measures:    Aspercreme to the sore areas 2-3 x daily until improved.    Ice or heat to the area.    Activity as tolerated.    If you are not better in Friday, if worse or you have concerns or questions, please do not hesitate to call.  You can reach us at [phone] Monday through Thursday (except holidays) 10 a.m. to 6 p.m.  Call Dr. Shahid or Dayanara Mcintyre's nurse or go to the Urgent Care on Friday.    Suggestion:  Fill the ambien before the end of the year.  Talk with Dr. Shahid at your appointment about what to do next.      Thank you for using the [facility]!    REEMA Verma, CNP, FNP-BC  [facility]

## 2018-11-16 ENCOUNTER — TELEPHONE (OUTPATIENT)
Dept: FAMILY MEDICINE | Facility: CLINIC | Age: 83
End: 2018-11-16

## 2018-11-16 NOTE — TELEPHONE ENCOUNTER
----- Message from Mahsa Sheridan sent at 11/16/2018 11:58 AM CST -----    Pt  Is  Calling  With a  Good  Report  Doing  Better and  doesn't  t  Have  To  Be seen    Anytime  Soon , unless she  Has a  Relapse //info  only

## 2019-01-07 DIAGNOSIS — J44.9 CHRONIC OBSTRUCTIVE PULMONARY DISEASE, UNSPECIFIED COPD TYPE: ICD-10-CM

## 2019-01-07 RX ORDER — BUDESONIDE AND FORMOTEROL FUMARATE DIHYDRATE 160; 4.5 UG/1; UG/1
AEROSOL RESPIRATORY (INHALATION)
Qty: 11 G | Refills: 11 | Status: SHIPPED | OUTPATIENT
Start: 2019-01-07 | End: 2019-12-03 | Stop reason: SDUPTHER

## 2019-01-11 ENCOUNTER — OFFICE VISIT (OUTPATIENT)
Dept: URGENT CARE | Facility: CLINIC | Age: 84
End: 2019-01-11
Payer: MEDICARE

## 2019-01-11 VITALS
HEART RATE: 89 BPM | SYSTOLIC BLOOD PRESSURE: 149 MMHG | HEIGHT: 65 IN | WEIGHT: 170 LBS | TEMPERATURE: 98 F | OXYGEN SATURATION: 97 % | DIASTOLIC BLOOD PRESSURE: 73 MMHG | BODY MASS INDEX: 28.32 KG/M2 | RESPIRATION RATE: 18 BRPM

## 2019-01-11 DIAGNOSIS — L03.115 CELLULITIS OF RIGHT LOWER EXTREMITY: Primary | ICD-10-CM

## 2019-01-11 PROCEDURE — 99213 PR OFFICE/OUTPT VISIT, EST, LEVL III, 20-29 MIN: ICD-10-PCS | Mod: S$GLB,,, | Performed by: FAMILY MEDICINE

## 2019-01-11 PROCEDURE — 99213 OFFICE O/P EST LOW 20 MIN: CPT | Mod: S$GLB,,, | Performed by: FAMILY MEDICINE

## 2019-01-11 RX ORDER — SULFAMETHOXAZOLE AND TRIMETHOPRIM 800; 160 MG/1; MG/1
1 TABLET ORAL 2 TIMES DAILY
Qty: 20 TABLET | Refills: 0 | Status: SHIPPED | OUTPATIENT
Start: 2019-01-11 | End: 2019-01-21

## 2019-01-11 NOTE — PROGRESS NOTES
"Subjective:       Patient ID: Sheree Pugh is a 89 y.o. female.    Vitals:  height is 5' 5" (1.651 m) and weight is 77.1 kg (170 lb). Her oral temperature is 97.7 °F (36.5 °C). Her blood pressure is 149/73 (abnormal) and her pulse is 89. Her respiration is 18 and oxygen saturation is 97%.     Chief Complaint: Leg Pain    New problem last night pt states looked at her leg and noticed right leg is red and some swelling      Leg Pain    The incident occurred 12 to 24 hours ago. The incident occurred at a nursing home. There was no injury mechanism. She reports no foreign bodies present. The symptoms are aggravated by weight bearing and palpation. She has tried nothing for the symptoms. The treatment provided no relief.       Constitution: Negative for chills, fatigue and fever.   HENT: Negative for congestion and sore throat.    Neck: Negative for painful lymph nodes.   Cardiovascular: Negative for chest pain and leg swelling.   Eyes: Negative for double vision and blurred vision.   Respiratory: Negative for cough and shortness of breath.    Gastrointestinal: Negative for nausea, vomiting and diarrhea.   Genitourinary: Negative for dysuria, frequency, urgency and history of kidney stones.   Musculoskeletal: Negative for joint pain, joint swelling, muscle cramps and muscle ache.   Skin: Negative for color change, pale, rash and bruising.   Allergic/Immunologic: Negative for seasonal allergies.   Neurological: Negative for dizziness, history of vertigo, light-headedness, passing out and headaches.   Hematologic/Lymphatic: Negative for swollen lymph nodes.   Psychiatric/Behavioral: Negative for nervous/anxious, sleep disturbance and depression. The patient is not nervous/anxious.        Objective:      Physical Exam   Constitutional: She appears well-developed and well-nourished.   HENT:   Head: Normocephalic and atraumatic.   Right Ear: External ear normal.   Left Ear: External ear normal.   Nose: Nose normal. "   Mouth/Throat: Oropharynx is clear and moist. No oropharyngeal exudate.   Eyes: Conjunctivae are normal. Right eye exhibits no discharge. Left eye exhibits no discharge.   Cardiovascular: Normal rate, regular rhythm and normal heart sounds.   Pulmonary/Chest: Effort normal and breath sounds normal. She has no wheezes.   Skin: Skin is warm and dry.   Right leg with erythema and warmth to low leg at ankle extending about 4-5 cm   Psychiatric: She has a normal mood and affect. Her behavior is normal.   Vitals reviewed.      Assessment:       1. Cellulitis of right lower extremity        Plan:         Cellulitis of right lower extremity    Other orders  -     sulfamethoxazole-trimethoprim 800-160mg (BACTRIM DS) 800-160 mg Tab; Take 1 tablet by mouth 2 (two) times daily. for 10 days  Dispense: 20 tablet; Refill: 0    rtc tomorrow for recheck

## 2019-01-11 NOTE — PATIENT INSTRUCTIONS
Please return here or go to the Emergency Department for any concerns or worsening of condition.  If you were prescribed antibiotics, please take them to completion.  If you were prescribed a narcotic medication, do not drive or operate heavy equipment or machinery while taking these medications.  Please follow up with your primary care doctor or specialist as needed.  If you  smoke, please stop smoking.  Abscess/cellulitis    If you have been diagnosed with an abscess, this is an infection which causes a collection of pus under the skin. Symptoms include swelling, redness and pain.   If your abscess does not need to be opened and drained, in the next few days, your absess MAY improve with the antibiotics and warm compresses, OR it may collect in a manner which will necessitate opening and draining it.   If it was drained, you should return in 2-3 days to have the packing removed.     If you were diagnosed with cellulitis, this is an infection in the layers of the skin. It is usually the result of an infected bite or injury to the top surface of the skin or from poor venous circulation.     You should take your antibiotics as directed - until gone.  You should follow-up with your Primary Care Doctor in 2-3 days for a re-check.  Watch for worseniong symptoms such as increased swelling, worsening of infection, increased pain, red streaks, and fever.   Go to the ER if you suddenly worsen with severe symptoms    Please return here or go to the Emergency Department for any concerns or worsening of condition.  If you were prescribed antibiotics, please take them to completion.  If you were prescribed a narcotic medication, do not drive or operate heavy equipment or machinery while taking these medications.  Please follow up with your primary care doctor or specialist as needed.  If you  smoke, please stop smoking.      Return tomorrow for followup

## 2019-01-12 ENCOUNTER — OFFICE VISIT (OUTPATIENT)
Dept: URGENT CARE | Facility: CLINIC | Age: 84
End: 2019-01-12
Payer: MEDICARE

## 2019-01-12 VITALS
BODY MASS INDEX: 28.32 KG/M2 | TEMPERATURE: 98 F | WEIGHT: 170 LBS | HEART RATE: 94 BPM | RESPIRATION RATE: 16 BRPM | OXYGEN SATURATION: 95 % | HEIGHT: 65 IN | SYSTOLIC BLOOD PRESSURE: 157 MMHG | DIASTOLIC BLOOD PRESSURE: 73 MMHG

## 2019-01-12 DIAGNOSIS — L03.115 CELLULITIS OF RIGHT LOWER EXTREMITY: Primary | ICD-10-CM

## 2019-01-12 PROCEDURE — 99214 PR OFFICE/OUTPT VISIT, EST, LEVL IV, 30-39 MIN: ICD-10-PCS | Mod: S$GLB,,, | Performed by: PHYSICIAN ASSISTANT

## 2019-01-12 PROCEDURE — 99214 OFFICE O/P EST MOD 30 MIN: CPT | Mod: S$GLB,,, | Performed by: PHYSICIAN ASSISTANT

## 2019-01-12 RX ORDER — MUPIROCIN 20 MG/G
OINTMENT TOPICAL
Qty: 22 G | Refills: 1 | Status: SHIPPED | OUTPATIENT
Start: 2019-01-12 | End: 2019-05-10

## 2019-01-12 NOTE — PROGRESS NOTES
"Subjective:       Patient ID: Sheree Pugh is a 89 y.o. female.    Vitals:  height is 5' 5" (1.651 m) and weight is 77.1 kg (170 lb). Her temperature is 98.1 °F (36.7 °C). Her blood pressure is 157/73 (abnormal) and her pulse is 94. Her respiration is 16 and oxygen saturation is 95%.     Chief Complaint: Recurrent Skin Infections    Pt was seen here yesterday and was told to come back today if cellulitis wasn't improving. Pt was advised if cellulitis wasn't improving, she may be sent to get IV antibiotics due to the severity.      Rash   This is a new problem. The current episode started yesterday. The problem is unchanged. Pertinent negatives include no cough, fever or sore throat.       Constitution: Negative for chills and fever.   HENT: Negative for facial swelling and sore throat.    Neck: Negative for painful lymph nodes.   Eyes: Negative for eye itching and eyelid swelling.   Respiratory: Negative for cough.    Musculoskeletal: Negative for joint pain and joint swelling.   Skin: Positive for rash. Negative for color change, pale, wound, abrasion, laceration, lesion, skin thickening/induration, puncture wound, erythema, bruising, abscess, avulsion and hives.   Allergic/Immunologic: Positive for itching. Negative for environmental allergies, immunocompromised state and hives.   Hematologic/Lymphatic: Negative for swollen lymph nodes.       Objective:      Physical Exam   Constitutional: She is oriented to person, place, and time. She appears well-developed and well-nourished.   HENT:   Head: Normocephalic and atraumatic. Head is without abrasion, without contusion and without laceration.   Right Ear: External ear normal.   Left Ear: External ear normal.   Nose: Nose normal.   Mouth/Throat: Oropharynx is clear and moist.   Eyes: Conjunctivae, EOM and lids are normal. Pupils are equal, round, and reactive to light.   Neck: Trachea normal, full passive range of motion without pain and phonation normal. Neck " supple.   Cardiovascular: Normal rate, regular rhythm and normal heart sounds.   Pulmonary/Chest: Effort normal and breath sounds normal. No stridor. No respiratory distress.   Musculoskeletal: Normal range of motion.   Neurological: She is alert and oriented to person, place, and time.   Skin: Skin is warm, dry and intact. Capillary refill takes less than 2 seconds. No abrasion, no bruising, no burn, no ecchymosis, no laceration, no lesion and no rash noted. No erythema.        Psychiatric: She has a normal mood and affect. Her speech is normal and behavior is normal. Judgment and thought content normal. Cognition and memory are normal.   Nursing note and vitals reviewed.      Assessment:       1. Cellulitis of right lower extremity        Plan:         Cellulitis of right lower extremity  -     mupirocin (BACTROBAN) 2 % ointment; Apply to affected area 3 times daily  Dispense: 22 g; Refill: 1      Patient Instructions       Discharge Instructions for Cellulitis  You have been diagnosed with cellulitis. This is an infection in the deepest layer of the skin. In some cases, the infection also affects the muscle. Cellulitis is caused by bacteria. The bacteria can enter the body through broken skin. This can happen with a cut, scratch, animal bite, or an insect bite that has been scratched. You may have been treated in the hospital with antibiotics and fluids. You will likely be given a prescription for antibiotics to take at home. This sheet will help you take care of yourself at home.  Home care  When you are home:  · Take the prescribed antibiotic medicine you are given as directed until it is gone. Take it even if you feel better. It treats the infection and stops it from returning. Not taking all the medicine can make future infections hard to treat.  · Keep the infected area clean.  · When possible, raise the infected area above the level of your heart. This helps keep swelling down.  · Talk with your healthcare  provider if you are in pain. Ask what kind of over-the-counter medicine you can take for pain.  · Apply clean bandages as advised.  · Take your temperature once a day for a week.  · Wash your hands often to prevent spreading the infection.  In the future, wash your hands before and after you touch cuts, scratches, or bandages. This will help prevent infection.   When to call your healthcare provider  Call your healthcare provider immediately if you have any of the following:  · Difficulty or pain when moving the joints above or below the infected area  · Discharge or pus draining from the area  · Fever of 100.4°F (38°C) or higher, or as directed by your healthcare provider  · Pain that gets worse in or around the infected   · Redness that gets worse in or around the infected area, particularly if the area of redness expands to a wider area  · Shaking chills  · Swelling of the infected area  · Vomiting   Date Last Reviewed: 8/1/2016  © 8911-5800 [employer] [address] [place] [address]. All rights reserved. This information is not intended as a substitute for professional medical care. Always follow your healthcare professional's instructions.       If not allergic,take tylenol (acetominophen) for fever control, chills, or body aches every 4 hours. Do not exceed 4000 mg/ day.If not allergic, take Motrin (Ibuprofen) every 4 hours for fever, chills, pain or inflammation. Do not exceed 2400 mg/day. You can alternate taking tylenol and motrin.  If you were prescribed a narcotic medication, do not drive or operate heavy equipment or machinery while taking these medications.  You must understand that you've received an Urgent Care treatment only and that you may be released before all your medical problems are known or treated. You, the patient, will arrange for follow up care as instructed.  Follow up with your PCP or specialty clinic as directed in the next 1-2 weeks if not improved or as  needed.  You can call [phone] to schedule an appointment with the appropriate provider.  If your condition worsens we recommend that you receive another evaluation at the emergency room immediately or contact your primary medical clinics after hours call service to discuss your concerns.  Please return here or go to the Emergency Department for any concerns or worsening of condition.

## 2019-01-12 NOTE — PATIENT INSTRUCTIONS
Discharge Instructions for Cellulitis  You have been diagnosed with cellulitis. This is an infection in the deepest layer of the skin. In some cases, the infection also affects the muscle. Cellulitis is caused by bacteria. The bacteria can enter the body through broken skin. This can happen with a cut, scratch, animal bite, or an insect bite that has been scratched. You may have been treated in the hospital with antibiotics and fluids. You will likely be given a prescription for antibiotics to take at home. This sheet will help you take care of yourself at home.  Home care  When you are home:  · Take the prescribed antibiotic medicine you are given as directed until it is gone. Take it even if you feel better. It treats the infection and stops it from returning. Not taking all the medicine can make future infections hard to treat.  · Keep the infected area clean.  · When possible, raise the infected area above the level of your heart. This helps keep swelling down.  · Talk with your healthcare provider if you are in pain. Ask what kind of over-the-counter medicine you can take for pain.  · Apply clean bandages as advised.  · Take your temperature once a day for a week.  · Wash your hands often to prevent spreading the infection.  In the future, wash your hands before and after you touch cuts, scratches, or bandages. This will help prevent infection.   When to call your healthcare provider  Call your healthcare provider immediately if you have any of the following:  · Difficulty or pain when moving the joints above or below the infected area  · Discharge or pus draining from the area  · Fever of 100.4°F (38°C) or higher, or as directed by your healthcare provider  · Pain that gets worse in or around the infected   · Redness that gets worse in or around the infected area, particularly if the area of redness expands to a wider area  · Shaking chills  · Swelling of the infected area  · Vomiting   Date Last Reviewed:  8/1/2016  © 5320-3995 [employer]. [address] [place] [address], [place] [address]. All rights reserved. This information is not intended as a substitute for professional medical care. Always follow your healthcare professional's instructions.       If not allergic,take tylenol (acetominophen) for fever control, chills, or body aches every 4 hours. Do not exceed 4000 mg/ day.If not allergic, take Motrin (Ibuprofen) every 4 hours for fever, chills, pain or inflammation. Do not exceed 2400 mg/day. You can alternate taking tylenol and motrin.  If you were prescribed a narcotic medication, do not drive or operate heavy equipment or machinery while taking these medications.  You must understand that you've received an Urgent Care treatment only and that you may be released before all your medical problems are known or treated. You, the patient, will arrange for follow up care as instructed.  Follow up with your PCP or specialty clinic as directed in the next 1-2 weeks if not improved or as needed.  You can call [phone] to schedule an appointment with the appropriate provider.  If your condition worsens we recommend that you receive another evaluation at the emergency room immediately or contact your primary medical clinics after hours call service to discuss your concerns.  Please return here or go to the Emergency Department for any concerns or worsening of condition.

## 2019-01-14 ENCOUNTER — TELEPHONE (OUTPATIENT)
Dept: URGENT CARE | Facility: CLINIC | Age: 84
End: 2019-01-14

## 2019-01-15 ENCOUNTER — TELEPHONE (OUTPATIENT)
Dept: URGENT CARE | Facility: CLINIC | Age: 84
End: 2019-01-15

## 2019-02-06 ENCOUNTER — OFFICE VISIT (OUTPATIENT)
Dept: FAMILY MEDICINE | Facility: CLINIC | Age: 84
End: 2019-02-06
Payer: MEDICARE

## 2019-02-06 VITALS
WEIGHT: 170.44 LBS | HEART RATE: 72 BPM | BODY MASS INDEX: 28.36 KG/M2 | DIASTOLIC BLOOD PRESSURE: 82 MMHG | SYSTOLIC BLOOD PRESSURE: 124 MMHG

## 2019-02-06 DIAGNOSIS — I10 ESSENTIAL HYPERTENSION: Chronic | ICD-10-CM

## 2019-02-06 DIAGNOSIS — H35.3220 EXUDATIVE AGE-RELATED MACULAR DEGENERATION OF LEFT EYE, UNSPECIFIED STAGE: ICD-10-CM

## 2019-02-06 DIAGNOSIS — N18.30 CHRONIC KIDNEY DISEASE, STAGE III (MODERATE): ICD-10-CM

## 2019-02-06 DIAGNOSIS — R60.9 EDEMA, UNSPECIFIED TYPE: Primary | ICD-10-CM

## 2019-02-06 DIAGNOSIS — I70.0 AORTIC ATHEROSCLEROSIS: ICD-10-CM

## 2019-02-06 DIAGNOSIS — J44.9 CHRONIC OBSTRUCTIVE PULMONARY DISEASE, UNSPECIFIED COPD TYPE: ICD-10-CM

## 2019-02-06 PROCEDURE — 99499 UNLISTED E&M SERVICE: CPT | Mod: HCNC,S$GLB,, | Performed by: FAMILY MEDICINE

## 2019-02-06 PROCEDURE — 1101F PR PT FALLS ASSESS DOC 0-1 FALLS W/OUT INJ PAST YR: ICD-10-PCS | Mod: CPTII,S$GLB,, | Performed by: FAMILY MEDICINE

## 2019-02-06 PROCEDURE — 99214 PR OFFICE/OUTPT VISIT, EST, LEVL IV, 30-39 MIN: ICD-10-PCS | Mod: S$GLB,,, | Performed by: FAMILY MEDICINE

## 2019-02-06 PROCEDURE — 99214 OFFICE O/P EST MOD 30 MIN: CPT | Mod: S$GLB,,, | Performed by: FAMILY MEDICINE

## 2019-02-06 PROCEDURE — 99499 RISK ADDL DX/OHS AUDIT: ICD-10-PCS | Mod: HCNC,S$GLB,, | Performed by: FAMILY MEDICINE

## 2019-02-06 PROCEDURE — 99999 PR PBB SHADOW E&M-EST. PATIENT-LVL III: CPT | Mod: PBBFAC,,, | Performed by: FAMILY MEDICINE

## 2019-02-06 PROCEDURE — 1101F PT FALLS ASSESS-DOCD LE1/YR: CPT | Mod: CPTII,S$GLB,, | Performed by: FAMILY MEDICINE

## 2019-02-06 PROCEDURE — 99999 PR PBB SHADOW E&M-EST. PATIENT-LVL III: ICD-10-PCS | Mod: PBBFAC,,, | Performed by: FAMILY MEDICINE

## 2019-02-06 RX ORDER — TRAZODONE HYDROCHLORIDE 50 MG/1
50 TABLET ORAL NIGHTLY PRN
Qty: 90 TABLET | Refills: 3 | Status: SHIPPED | OUTPATIENT
Start: 2019-02-06 | End: 2019-12-23

## 2019-02-18 ENCOUNTER — TELEPHONE (OUTPATIENT)
Dept: CARDIOLOGY | Facility: CLINIC | Age: 84
End: 2019-02-18

## 2019-02-18 NOTE — TELEPHONE ENCOUNTER
----- Message from Cynthia Brand sent at 2/18/2019 10:23 AM CST -----  Type: Needs Medical Advice    Who Called:  Patient   Best Call Back Number: [phone]  Additional Information: patient Is scheduled on 03/04/19 for her annual visit, please contact to advise if any labs or test are needed prior to this visit.     Thank you

## 2019-02-18 NOTE — TELEPHONE ENCOUNTER
Spoke to pt. Reschedule appointment, informed pt. No labs nor stress test has been ordered. Pt. Verbally accepted appointment and verbalized understanding.

## 2019-02-22 RX ORDER — CYANOCOBALAMIN 1000 UG/ML
INJECTION, SOLUTION INTRAMUSCULAR; SUBCUTANEOUS
Qty: 10 ML | Refills: 3 | Status: SHIPPED | OUTPATIENT
Start: 2019-02-22 | End: 2020-04-23

## 2019-02-25 ENCOUNTER — OFFICE VISIT (OUTPATIENT)
Dept: CARDIOLOGY | Facility: CLINIC | Age: 84
End: 2019-02-25
Payer: MEDICARE

## 2019-02-25 VITALS
SYSTOLIC BLOOD PRESSURE: 139 MMHG | BODY MASS INDEX: 28.72 KG/M2 | HEART RATE: 68 BPM | HEIGHT: 65 IN | DIASTOLIC BLOOD PRESSURE: 61 MMHG | WEIGHT: 172.38 LBS

## 2019-02-25 DIAGNOSIS — I25.10 CORONARY ARTERY DISEASE INVOLVING NATIVE CORONARY ARTERY OF NATIVE HEART WITHOUT ANGINA PECTORIS: Chronic | ICD-10-CM

## 2019-02-25 DIAGNOSIS — I70.1 RAS (RENAL ARTERY STENOSIS): ICD-10-CM

## 2019-02-25 DIAGNOSIS — I65.21 STENOSIS OF RIGHT CAROTID ARTERY: Primary | Chronic | ICD-10-CM

## 2019-02-25 DIAGNOSIS — Z95.5 STENTED CORONARY ARTERY: ICD-10-CM

## 2019-02-25 DIAGNOSIS — I10 ESSENTIAL HYPERTENSION: Chronic | ICD-10-CM

## 2019-02-25 DIAGNOSIS — Z98.61 HISTORY OF PTCA: ICD-10-CM

## 2019-02-25 DIAGNOSIS — I35.0 NONRHEUMATIC AORTIC VALVE STENOSIS: Chronic | ICD-10-CM

## 2019-02-25 DIAGNOSIS — I70.0 AORTIC ATHEROSCLEROSIS: ICD-10-CM

## 2019-02-25 DIAGNOSIS — I73.9 PERIPHERAL VASCULAR DISEASE: ICD-10-CM

## 2019-02-25 PROCEDURE — 3288F PR FALLS RISK ASSESSMENT DOCUMENTED: ICD-10-PCS | Mod: HCNC,CPTII,S$GLB, | Performed by: INTERNAL MEDICINE

## 2019-02-25 PROCEDURE — 99214 PR OFFICE/OUTPT VISIT, EST, LEVL IV, 30-39 MIN: ICD-10-PCS | Mod: HCNC,S$GLB,, | Performed by: INTERNAL MEDICINE

## 2019-02-25 PROCEDURE — 99999 PR PBB SHADOW E&M-EST. PATIENT-LVL IV: ICD-10-PCS | Mod: PBBFAC,HCNC,, | Performed by: INTERNAL MEDICINE

## 2019-02-25 PROCEDURE — 3288F FALL RISK ASSESSMENT DOCD: CPT | Mod: HCNC,CPTII,S$GLB, | Performed by: INTERNAL MEDICINE

## 2019-02-25 PROCEDURE — 99214 OFFICE O/P EST MOD 30 MIN: CPT | Mod: HCNC,S$GLB,, | Performed by: INTERNAL MEDICINE

## 2019-02-25 PROCEDURE — 1100F PR PT FALLS ASSESS DOC 2+ FALLS/FALL W/INJURY/YR: ICD-10-PCS | Mod: HCNC,CPTII,S$GLB, | Performed by: INTERNAL MEDICINE

## 2019-02-25 PROCEDURE — 1100F PTFALLS ASSESS-DOCD GE2>/YR: CPT | Mod: HCNC,CPTII,S$GLB, | Performed by: INTERNAL MEDICINE

## 2019-02-25 PROCEDURE — 99999 PR PBB SHADOW E&M-EST. PATIENT-LVL IV: CPT | Mod: PBBFAC,HCNC,, | Performed by: INTERNAL MEDICINE

## 2019-02-25 NOTE — PROGRESS NOTES
Subjective:    Patient ID:  Sheree Pugh is a 89 y.o. female who presents for follow-up of CAD F/U      HPI  Here for follow up of CAD-PCI (4/14 )/ANTWAN. Doing well only c/o mild LE edema. No Cp.     Review of Systems   Constitution: Negative for malaise/fatigue.   Eyes: Negative for blurred vision.   Cardiovascular: Negative for chest pain, claudication, cyanosis, dyspnea on exertion, irregular heartbeat, leg swelling, near-syncope, orthopnea, palpitations, paroxysmal nocturnal dyspnea and syncope.   Respiratory: Negative for cough and shortness of breath.    Hematologic/Lymphatic: Does not bruise/bleed easily.   Musculoskeletal: Negative for back pain, falls, joint pain, muscle cramps, muscle weakness and myalgias.   Gastrointestinal: Negative for abdominal pain, change in bowel habit, nausea and vomiting.   Genitourinary: Negative for urgency.   Neurological: Negative for dizziness, focal weakness and light-headedness.        Objective:    Physical Exam   Constitutional: She is oriented to person, place, and time. She appears well-developed and well-nourished.   HENT:   Head: Normocephalic.   Eyes: Conjunctivae are normal.   Neck: Normal range of motion. Neck supple. No JVD present.   Cardiovascular: Normal rate, regular rhythm and intact distal pulses.   Murmur heard.   Harsh midsystolic murmur is present with a grade of 2/6 at the upper right sternal border radiating to the neck.  Pulses:       Carotid pulses are 2+ on the right side, and 2+ on the left side.       Radial pulses are 2+ on the right side, and 2+ on the left side.        Dorsalis pedis pulses are 2+ on the right side, and 2+ on the left side.        Posterior tibial pulses are 2+ on the right side, and 2+ on the left side.   Pulmonary/Chest: Effort normal and breath sounds normal.   Abdominal: Soft. Bowel sounds are normal.   Musculoskeletal: She exhibits no edema or tenderness.   Neurological: She is alert and oriented to person, place, and  time. Gait normal.   Skin: Skin is warm, dry and intact. No cyanosis. Nails show no clubbing.   Psychiatric: She has a normal mood and affect. Her speech is normal and behavior is normal. Thought content normal.               ..    Chemistry        Component Value Date/Time     11/02/2018 1400    K 4.8 11/02/2018 1400     11/02/2018 1400    CO2 23 11/02/2018 1400    BUN 36 (H) 11/02/2018 1400    CREATININE 1.2 11/02/2018 1400     (H) 11/02/2018 1400        Component Value Date/Time    CALCIUM 9.4 11/02/2018 1400    ALKPHOS 102 11/02/2018 1400    AST 19 11/02/2018 1400    ALT 19 11/02/2018 1400    BILITOT 0.4 11/02/2018 1400    ESTGFRAFRICA 45.7 (A) 11/02/2018 1400    EGFRNONAA 39.6 (A) 11/02/2018 1400            ..  Lab Results   Component Value Date    CHOL 190 06/14/2017    CHOL 170 02/19/2016    CHOL 188 03/03/2015     Lab Results   Component Value Date    HDL 55 06/14/2017    HDL 46 02/19/2016    HDL 59 03/03/2015     Lab Results   Component Value Date    LDLCALC 110.8 06/14/2017    LDLCALC 85.2 02/19/2016    LDLCALC 109.8 03/03/2015     Lab Results   Component Value Date    TRIG 121 06/14/2017    TRIG 194 (H) 02/19/2016    TRIG 96 03/03/2015     Lab Results   Component Value Date    CHOLHDL 28.9 06/14/2017    CHOLHDL 27.1 02/19/2016    CHOLHDL 31.4 03/03/2015     ..  Lab Results   Component Value Date    WBC 7.68 11/02/2018    HGB 11.5 (L) 11/02/2018    HCT 35.4 (L) 11/02/2018    MCV 96 11/02/2018     11/02/2018       Test(s) Reviewed  I have reviewed the following in detail:  [] Stress test   [] Angiography   [x] Echocardiogram   [x] Labs   [] Other:       Assessment:         ICD-10-CM ICD-9-CM   1. Stenosis of right carotid artery I65.21 433.10   2. Peripheral vascular disease I73.9 443.9   3. Essential hypertension I10 401.9   4. Coronary artery disease involving native coronary artery of native heart without angina pectoris I25.10 414.01   5. Nonrheumatic aortic valve stenosis  I35.0 424.1   6. Aortic atherosclerosis I70.0 440.0   7. History of PTCA Z98.61 V45.82   8. ANTWAN (renal artery stenosis) I70.1 440.1   9. Stented coronary artery Z95.5 V45.82     Problem List Items Addressed This Visit     Stented coronary artery    Overview     4/14  1. Moderate disease LAD/CX.  2. Diffuse significant ISR of RCA successfully treated 2.5 and 3.0 POBA only.  3. Patent LM and right renal stent.         Stenosis of right carotid artery - Primary (Chronic)    Overview     4/14   1. Moderate disease LAD/CX.  2. Diffuse significant ISR of RCA successfully treated 2.5 and 3.0 POBA only.  3. Patent LM and right renal stent    9/10 LM- promus 4x8, LAD [id number], RCA promus 2.5x23 & 2.75x8             ANTWAN (renal artery stenosis)    Overview     4/2014   Patent right renal stent.         Peripheral vascular disease    Essential hypertension (Chronic)    Coronary artery disease involving native coronary artery of native heart without angina pectoris (Chronic)    Aortic valve stenosis (Chronic)    Aortic atherosclerosis    Overview     Lumbar xr 1/26/17           Other Visit Diagnoses     History of PTCA               Plan:           Return to clinic 9 month    cfd due to AS  See labs and med orders.      Portions of this note may have been created with voice recognition software.  Grammatical, syntax and spelling errors may be inevitable.

## 2019-03-18 ENCOUNTER — OFFICE VISIT (OUTPATIENT)
Dept: FAMILY MEDICINE | Facility: CLINIC | Age: 84
End: 2019-03-18
Payer: MEDICARE

## 2019-03-18 VITALS
DIASTOLIC BLOOD PRESSURE: 68 MMHG | HEART RATE: 69 BPM | SYSTOLIC BLOOD PRESSURE: 128 MMHG | OXYGEN SATURATION: 98 % | BODY MASS INDEX: 28.73 KG/M2 | WEIGHT: 172.63 LBS

## 2019-03-18 DIAGNOSIS — M51.36 DDD (DEGENERATIVE DISC DISEASE), LUMBAR: ICD-10-CM

## 2019-03-18 DIAGNOSIS — H43.813 POSTERIOR VITREOUS DETACHMENT OF BOTH EYES: ICD-10-CM

## 2019-03-18 DIAGNOSIS — Z91.81 AT RISK FOR FALLS: Chronic | ICD-10-CM

## 2019-03-18 DIAGNOSIS — I35.0 NONRHEUMATIC AORTIC VALVE STENOSIS: Chronic | ICD-10-CM

## 2019-03-18 DIAGNOSIS — N90.4 LICHEN SCLEROSUS ET ATROPHICUS OF THE VULVA: ICD-10-CM

## 2019-03-18 DIAGNOSIS — H25.12 NUCLEAR SCLEROSIS OF LEFT EYE: ICD-10-CM

## 2019-03-18 DIAGNOSIS — K57.92 DIVERTICULITIS: ICD-10-CM

## 2019-03-18 DIAGNOSIS — Z96.1 PSEUDOPHAKIA OF RIGHT EYE: ICD-10-CM

## 2019-03-18 DIAGNOSIS — R32 URINARY INCONTINENCE, UNSPECIFIED TYPE: ICD-10-CM

## 2019-03-18 DIAGNOSIS — I77.1 TORTUOUS AORTA: ICD-10-CM

## 2019-03-18 DIAGNOSIS — H54.8 LEGAL BLINDNESS: ICD-10-CM

## 2019-03-18 DIAGNOSIS — I65.21 STENOSIS OF RIGHT CAROTID ARTERY: Chronic | ICD-10-CM

## 2019-03-18 DIAGNOSIS — G62.9 PERIPHERAL POLYNEUROPATHY: ICD-10-CM

## 2019-03-18 DIAGNOSIS — I73.9 PERIPHERAL VASCULAR DISEASE: ICD-10-CM

## 2019-03-18 DIAGNOSIS — H35.3220 EXUDATIVE AGE-RELATED MACULAR DEGENERATION OF LEFT EYE, UNSPECIFIED STAGE: ICD-10-CM

## 2019-03-18 DIAGNOSIS — J44.9 CHRONIC OBSTRUCTIVE PULMONARY DISEASE, UNSPECIFIED COPD TYPE: ICD-10-CM

## 2019-03-18 DIAGNOSIS — I10 ESSENTIAL HYPERTENSION: Chronic | ICD-10-CM

## 2019-03-18 DIAGNOSIS — D51.8 OTHER VITAMIN B12 DEFICIENCY ANEMIA: ICD-10-CM

## 2019-03-18 DIAGNOSIS — G47.00 INSOMNIA, UNSPECIFIED TYPE: ICD-10-CM

## 2019-03-18 DIAGNOSIS — E78.5 DYSLIPIDEMIA: Chronic | ICD-10-CM

## 2019-03-18 DIAGNOSIS — I70.0 AORTIC ATHEROSCLEROSIS: ICD-10-CM

## 2019-03-18 DIAGNOSIS — M19.90 ARTHRITIS: ICD-10-CM

## 2019-03-18 DIAGNOSIS — Z00.00 ENCOUNTER FOR PREVENTIVE HEALTH EXAMINATION: Primary | ICD-10-CM

## 2019-03-18 DIAGNOSIS — N18.30 CHRONIC KIDNEY DISEASE, STAGE III (MODERATE): ICD-10-CM

## 2019-03-18 DIAGNOSIS — Z95.5 STENTED CORONARY ARTERY: ICD-10-CM

## 2019-03-18 DIAGNOSIS — I25.10 CORONARY ARTERY DISEASE INVOLVING NATIVE CORONARY ARTERY OF NATIVE HEART WITHOUT ANGINA PECTORIS: Chronic | ICD-10-CM

## 2019-03-18 DIAGNOSIS — I70.1 RAS (RENAL ARTERY STENOSIS): ICD-10-CM

## 2019-03-18 DIAGNOSIS — M85.80 OSTEOPENIA, UNSPECIFIED LOCATION: ICD-10-CM

## 2019-03-18 PROCEDURE — 99999 PR PBB SHADOW E&M-EST. PATIENT-LVL V: ICD-10-PCS | Mod: PBBFAC,,, | Performed by: NURSE PRACTITIONER

## 2019-03-18 PROCEDURE — 99999 PR PBB SHADOW E&M-EST. PATIENT-LVL V: CPT | Mod: PBBFAC,,, | Performed by: NURSE PRACTITIONER

## 2019-03-18 PROCEDURE — G0439 PPPS, SUBSEQ VISIT: HCPCS | Mod: S$GLB,,, | Performed by: NURSE PRACTITIONER

## 2019-03-18 PROCEDURE — G0439 PR MEDICARE ANNUAL WELLNESS SUBSEQUENT VISIT: ICD-10-PCS | Mod: S$GLB,,, | Performed by: NURSE PRACTITIONER

## 2019-03-18 NOTE — PATIENT INSTRUCTIONS
Counseling and Referral of Other Preventative  (Italic type indicates deductible and co-insurance are waived)    Patient Name: Sheree Pugh  Today's Date: 3/18/2019    Health Maintenance       Date Due Completion Date    TETANUS VACCINE 08/26/1945 ---    Zoster Vaccine 08/26/1987 ---    Aspirin/Antiplatelet Therapy 03/18/2020 3/18/2019    Lipid Panel 06/14/2022 6/14/2017        No orders of the defined types were placed in this encounter.    The following information is provided to all patients.  This information is to help you find resources for any of the problems found today that may be affecting your health:                Living healthy guide: www.[facility].[place].[facility]      Understanding Diabetes: www.diabetes.org      Eating healthy: www.cdc.gov/healthyweight      [facility] home safety checklist: www.cdc.gov/steadi/patient.html      Agency on Aging: www.goea.[place].[facility]      Alcoholics anonymous (AA): www.aa.org      Physical Activity: www.josr.nih.gov/oj7tpjv      Tobacco use: www.quitwithusla.org

## 2019-03-22 NOTE — PROGRESS NOTES
Sheree Pugh presented for a  Medicare AWV and comprehensive Health Risk Assessment today. The following components were reviewed and updated:    · Medical history  · Family History  · Social history  · Allergies and Current Medications  · Health Risk Assessment  · Health Maintenance  · Care Team     ** See Completed Assessments for Annual Wellness Visit within the encounter summary.**       The following assessments were completed:  · Living Situation  · CAGE  · Depression Screening  · Timed Get Up and Go  · Whisper Test  · Cognitive Function Screening          · Nutrition Screening  · ADL Screening  · PAQ Screening    Vitals:    03/18/19 1235   BP: 128/68   Pulse: 69   SpO2: 98%   Weight: 78.3 kg (172 lb 9.9 oz)     Body mass index is 28.73 kg/m².  Physical Exam   Constitutional: She is oriented to person, place, and time. She appears well-nourished.   Cardiovascular: Normal rate, regular rhythm, normal heart sounds and intact distal pulses.   Pulmonary/Chest: Effort normal and breath sounds normal. She has no wheezes. She has no rales.   Neurological: She is alert and oriented to person, place, and time.   Skin: Skin is warm and dry. No rash noted.   Psychiatric: She has a normal mood and affect.   Vitals reviewed.        Diagnoses and health risks identified today and associated recommendations/orders:    1. Encounter for preventive health examination  Reviewed health maintenance and provided recommendations    declines tdap at this time    2. DDD (degenerative disc disease), lumbar  Continue to monitor  Followed by Gus Shahid MD .      3. Peripheral polyneuropathy  Continue to monitor  Followed by Gus Shahid MD .      4. Exudative age-related macular degeneration of left eye, unspecified stage  Continue to monitor  Followed by Gus Shahid MD .      5. Legal blindness - Left Eye  Continue to monitor  Followed by Gus Shahid MD .      6. Nuclear sclerosis of left eye  Continue to  monitor  Followed by Gus Shahid MD .      7. Posterior vitreous detachment of both eyes  Continue to monitor  Followed by Gus Shahid MD .      8. Pseudophakia of right eye  Continue to monitor  Followed by Gus Shahid MD .      9. Chronic obstructive pulmonary disease, unspecified COPD type  Continue to monitor  Followed by Gus Shahid MD .      10. Aortic atherosclerosis  Continue to monitor  Followed by Gus Shahid MD .      11. Nonrheumatic aortic valve stenosis  Continue to monitor  Followed by Gus Shahid MD .      12. Coronary artery disease involving native coronary artery of native heart without angina pectoris  Continue to monitor  Followed by Misty.      13. Dyslipidemia  Continue to monitor  Followed by Gus Shahid MD .      14. Essential hypertension  Continue to monitor  Followed by Gus Shahid MD .      15. Peripheral vascular disease  Continue to monitor  Followed by Gus Shahid MD .      16. ANTWAN (renal artery stenosis)  Continue to monitor  Followed by Karyn.      17. Stenosis of right carotid artery  Continue to monitor  Followed by Misty.      18. Stented coronary artery  Continue to monitor  Followed by Misty  No CP.      19. Tortuous aorta  Continue to monitor  Followed by Misty.      20. Chronic kidney disease, stage III (moderate)  Continue to monitor  Followed by Gus Shahid MD   Encourage adequate hydration avoid nsaids.      21. Lichen sclerosus et atrophicus of the vulva  Continue to monitor  Followed by Miquel.      22. Urinary incontinence, unspecified type  Continue to monitor  Followed by Miquel.      23. Other vitamin B12 deficiency anemia  Continue to monitor  Followed by Gus Shahid MD .      24. Diverticulitis  Continue to monitor  Followed by Gus Shahid MD .      25. Arthritis  Continue to monitor  Followed by Gus Shahid MD .      26. Osteopenia, unspecified location  Continue to monitor  Followed  by Gus Shahid MD .      27. At risk for falls  Continue to monitor  Followed by Gus Shahid MD .      28. Insomnia, unspecified type  Continue to monitor  Followed by Gus Shahid MD .        Provided Sheree with a 5-10 year written screening schedule and personal prevention plan. Recommendations were developed using the USPSTF age appropriate recommendations. Education, counseling, and referrals were provided as needed. After Visit Summary printed and given to patient which includes a list of additional screenings\tests needed.    Follow-up in about 1 year (around 3/18/2020).    Lea Flores NP

## 2019-04-10 DIAGNOSIS — Z98.61 HISTORY OF PTCA: Chronic | ICD-10-CM

## 2019-04-10 DIAGNOSIS — I35.0 NONRHEUMATIC AORTIC VALVE STENOSIS: Chronic | ICD-10-CM

## 2019-04-10 DIAGNOSIS — I25.10 CORONARY ARTERY DISEASE INVOLVING NATIVE CORONARY ARTERY OF NATIVE HEART WITHOUT ANGINA PECTORIS: Chronic | ICD-10-CM

## 2019-04-10 DIAGNOSIS — I73.9 PERIPHERAL VASCULAR DISEASE: ICD-10-CM

## 2019-04-10 DIAGNOSIS — I65.21 STENOSIS OF RIGHT CAROTID ARTERY: Chronic | ICD-10-CM

## 2019-04-10 RX ORDER — CLOPIDOGREL BISULFATE 75 MG/1
75 TABLET ORAL DAILY
Qty: 90 TABLET | Refills: 4 | Status: SHIPPED | OUTPATIENT
Start: 2019-04-10 | End: 2020-06-09

## 2019-05-10 ENCOUNTER — OFFICE VISIT (OUTPATIENT)
Dept: FAMILY MEDICINE | Facility: CLINIC | Age: 84
End: 2019-05-10
Payer: MEDICARE

## 2019-05-10 VITALS
BODY MASS INDEX: 28.28 KG/M2 | WEIGHT: 169.75 LBS | HEIGHT: 65 IN | DIASTOLIC BLOOD PRESSURE: 64 MMHG | SYSTOLIC BLOOD PRESSURE: 130 MMHG | HEART RATE: 68 BPM

## 2019-05-10 DIAGNOSIS — J44.9 CHRONIC OBSTRUCTIVE PULMONARY DISEASE, UNSPECIFIED COPD TYPE: Primary | ICD-10-CM

## 2019-05-10 PROCEDURE — 99499 RISK ADDL DX/OHS AUDIT: ICD-10-PCS | Mod: HCNC,S$GLB,, | Performed by: FAMILY MEDICINE

## 2019-05-10 PROCEDURE — 1100F PR PT FALLS ASSESS DOC 2+ FALLS/FALL W/INJURY/YR: ICD-10-PCS | Mod: HCNC,CPTII,S$GLB, | Performed by: FAMILY MEDICINE

## 2019-05-10 PROCEDURE — 99213 PR OFFICE/OUTPT VISIT, EST, LEVL III, 20-29 MIN: ICD-10-PCS | Mod: HCNC,S$GLB,, | Performed by: FAMILY MEDICINE

## 2019-05-10 PROCEDURE — 99499 UNLISTED E&M SERVICE: CPT | Mod: HCNC,S$GLB,, | Performed by: FAMILY MEDICINE

## 2019-05-10 PROCEDURE — 99213 OFFICE O/P EST LOW 20 MIN: CPT | Mod: HCNC,S$GLB,, | Performed by: FAMILY MEDICINE

## 2019-05-10 PROCEDURE — 99999 PR PBB SHADOW E&M-EST. PATIENT-LVL III: CPT | Mod: PBBFAC,HCNC,, | Performed by: FAMILY MEDICINE

## 2019-05-10 PROCEDURE — 3288F PR FALLS RISK ASSESSMENT DOCUMENTED: ICD-10-PCS | Mod: HCNC,CPTII,S$GLB, | Performed by: FAMILY MEDICINE

## 2019-05-10 PROCEDURE — 1100F PTFALLS ASSESS-DOCD GE2>/YR: CPT | Mod: HCNC,CPTII,S$GLB, | Performed by: FAMILY MEDICINE

## 2019-05-10 PROCEDURE — 3288F FALL RISK ASSESSMENT DOCD: CPT | Mod: HCNC,CPTII,S$GLB, | Performed by: FAMILY MEDICINE

## 2019-05-10 PROCEDURE — 99999 PR PBB SHADOW E&M-EST. PATIENT-LVL III: ICD-10-PCS | Mod: PBBFAC,HCNC,, | Performed by: FAMILY MEDICINE

## 2019-05-10 NOTE — PROGRESS NOTES
Subjective:       Patient ID: Sheree Pugh is a 89 y.o. female    Chief Complaint: Edema (follow up. Hm due tetanus shingles. states noticed a raised mole on left upper arm)    HPI  Here today for interval evaluation  She reports that her dyspnea is improved.  She has some increased thick clear phlegm    Review of Systems     Objective:   Physical Exam   Constitutional: She appears well-developed and well-nourished.   HENT:   Head: Normocephalic and atraumatic.   Eyes: Pupils are equal, round, and reactive to light. Conjunctivae and EOM are normal. No scleral icterus.   Neck: Normal range of motion. Neck supple. No thyromegaly present.   Cardiovascular: Normal rate, regular rhythm and normal heart sounds. Exam reveals no gallop and no friction rub.   No murmur heard.  Pulmonary/Chest: Effort normal and breath sounds normal. No respiratory distress. She has no wheezes. She has no rales.   Lymphadenopathy:     She has no cervical adenopathy.   Vitals reviewed.       Assessment:       1. Chronic obstructive pulmonary disease, unspecified COPD type          Plan:       Chronic obstructive pulmonary disease, unspecified COPD type  - Continue current therapy  - Mucinex as needed  - Follow up in about 6 months (around 11/10/2019).

## 2019-05-29 DIAGNOSIS — I25.10 CORONARY ARTERY DISEASE INVOLVING NATIVE CORONARY ARTERY OF NATIVE HEART WITHOUT ANGINA PECTORIS: Chronic | ICD-10-CM

## 2019-05-29 DIAGNOSIS — N18.30 CHRONIC KIDNEY DISEASE, STAGE III (MODERATE): ICD-10-CM

## 2019-05-29 DIAGNOSIS — Z98.61 HISTORY OF PTCA: Chronic | ICD-10-CM

## 2019-05-29 DIAGNOSIS — E78.5 DYSLIPIDEMIA: Chronic | ICD-10-CM

## 2019-05-29 DIAGNOSIS — I65.21 STENOSIS OF RIGHT CAROTID ARTERY: Chronic | ICD-10-CM

## 2019-05-29 DIAGNOSIS — I35.0 NONRHEUMATIC AORTIC VALVE STENOSIS: Chronic | ICD-10-CM

## 2019-05-29 DIAGNOSIS — I73.9 PERIPHERAL VASCULAR DISEASE: ICD-10-CM

## 2019-05-29 RX ORDER — POTASSIUM CHLORIDE 20 MEQ/1
20 TABLET, EXTENDED RELEASE ORAL DAILY PRN
Qty: 90 TABLET | Refills: 3 | Status: CANCELLED | OUTPATIENT
Start: 2019-05-29

## 2019-05-29 RX ORDER — FUROSEMIDE 20 MG/1
20 TABLET ORAL DAILY PRN
Qty: 90 TABLET | Refills: 3 | Status: CANCELLED | OUTPATIENT
Start: 2019-05-29

## 2019-05-30 RX ORDER — FUROSEMIDE 20 MG/1
20 TABLET ORAL DAILY PRN
Qty: 90 TABLET | Refills: 4 | Status: SHIPPED | OUTPATIENT
Start: 2019-05-30 | End: 2020-06-09

## 2019-05-30 RX ORDER — POTASSIUM CHLORIDE 20 MEQ/1
20 TABLET, EXTENDED RELEASE ORAL DAILY PRN
Qty: 90 TABLET | Refills: 4 | Status: SHIPPED | OUTPATIENT
Start: 2019-05-30 | End: 2020-06-09

## 2019-06-17 DIAGNOSIS — I73.9 PERIPHERAL VASCULAR DISEASE: ICD-10-CM

## 2019-06-17 DIAGNOSIS — I35.0 NONRHEUMATIC AORTIC VALVE STENOSIS: Chronic | ICD-10-CM

## 2019-06-17 DIAGNOSIS — E78.5 DYSLIPIDEMIA: Chronic | ICD-10-CM

## 2019-06-17 DIAGNOSIS — I25.10 CORONARY ARTERY DISEASE INVOLVING NATIVE CORONARY ARTERY OF NATIVE HEART WITHOUT ANGINA PECTORIS: Chronic | ICD-10-CM

## 2019-06-17 DIAGNOSIS — Z98.61 HISTORY OF PTCA: Chronic | ICD-10-CM

## 2019-06-17 DIAGNOSIS — N18.30 CHRONIC KIDNEY DISEASE, STAGE III (MODERATE): ICD-10-CM

## 2019-06-17 DIAGNOSIS — I65.21 STENOSIS OF RIGHT CAROTID ARTERY: Chronic | ICD-10-CM

## 2019-06-19 RX ORDER — ATORVASTATIN CALCIUM 20 MG/1
20 TABLET, FILM COATED ORAL DAILY
Qty: 90 TABLET | Refills: 4 | Status: SHIPPED | OUTPATIENT
Start: 2019-06-19 | End: 2020-08-27 | Stop reason: SDUPTHER

## 2019-07-08 ENCOUNTER — TELEPHONE (OUTPATIENT)
Dept: FAMILY MEDICINE | Facility: CLINIC | Age: 84
End: 2019-07-08

## 2019-07-08 NOTE — TELEPHONE ENCOUNTER
----- Message from Milton Rojo sent at 7/8/2019 11:14 AM CDT -----  Contact: same  Patient called in and wanted to see if Dr. Shahid could recommend her to a pulmonologist in the [facility] area?    Patient call back number is [phone])

## 2019-08-01 DIAGNOSIS — J42 CHRONIC BRONCHITIS, UNSPECIFIED CHRONIC BRONCHITIS TYPE: ICD-10-CM

## 2019-08-01 RX ORDER — TIOTROPIUM BROMIDE 18 UG/1
1 CAPSULE ORAL; RESPIRATORY (INHALATION) DAILY
Qty: 90 CAPSULE | Refills: 0 | Status: SHIPPED | OUTPATIENT
Start: 2019-08-01 | End: 2019-10-09

## 2019-08-06 ENCOUNTER — OFFICE VISIT (OUTPATIENT)
Dept: FAMILY MEDICINE | Facility: CLINIC | Age: 84
End: 2019-08-06
Payer: MEDICARE

## 2019-08-06 VITALS
HEART RATE: 71 BPM | SYSTOLIC BLOOD PRESSURE: 138 MMHG | BODY MASS INDEX: 28.79 KG/M2 | WEIGHT: 172.81 LBS | DIASTOLIC BLOOD PRESSURE: 52 MMHG | HEIGHT: 65 IN | TEMPERATURE: 99 F | OXYGEN SATURATION: 96 %

## 2019-08-06 DIAGNOSIS — N63.20 LEFT BREAST LUMP: Primary | ICD-10-CM

## 2019-08-06 PROCEDURE — 99999 PR PBB SHADOW E&M-EST. PATIENT-LVL V: ICD-10-PCS | Mod: PBBFAC,HCNC,, | Performed by: NURSE PRACTITIONER

## 2019-08-06 PROCEDURE — 1101F PR PT FALLS ASSESS DOC 0-1 FALLS W/OUT INJ PAST YR: ICD-10-PCS | Mod: HCNC,CPTII,S$GLB, | Performed by: NURSE PRACTITIONER

## 2019-08-06 PROCEDURE — 99999 PR PBB SHADOW E&M-EST. PATIENT-LVL V: CPT | Mod: PBBFAC,HCNC,, | Performed by: NURSE PRACTITIONER

## 2019-08-06 PROCEDURE — 99214 OFFICE O/P EST MOD 30 MIN: CPT | Mod: HCNC,S$GLB,, | Performed by: NURSE PRACTITIONER

## 2019-08-06 PROCEDURE — 1101F PT FALLS ASSESS-DOCD LE1/YR: CPT | Mod: HCNC,CPTII,S$GLB, | Performed by: NURSE PRACTITIONER

## 2019-08-06 PROCEDURE — 99214 PR OFFICE/OUTPT VISIT, EST, LEVL IV, 30-39 MIN: ICD-10-PCS | Mod: HCNC,S$GLB,, | Performed by: NURSE PRACTITIONER

## 2019-08-06 NOTE — PATIENT INSTRUCTIONS
Get the mammogram.  Let us know if any concerns in the meanwhile (like discharge from the nipple or lesion developing on the skin).    I'll call with the results.  I'll let Dr. Shahid know.    If you have any questions, please call.  You can reach us at [phone] Monday through Thursday (except holidays) 8:30 a.m. to 5:30 p.m. or call Dr. Shahid/ROSAMARIA Mcintyre     Note:  I do not work on Fridays.  So if you have concerns or questions, please contact your primary care provider team or [facility] or go to the Urgent Care on Friday for concerns.     Thank you for using our Primary Care Service!    REEMA Verma, CNP, FNP-BC [facility]    To rate your experience with KALIE Verma, click on the link below:      [email or web address]

## 2019-08-06 NOTE — PROGRESS NOTES
Sheree Pugh is a 89 y.o. female patient of Dr. Gus Shahid MD who presents to the clinic today for lump in her left breast.  .    HPI    Patient, who is known to me, reports a new problem to me: breast lump on the left.    These symptoms began 2-3 weeks  ago and status is continuing.  Noted there is some pain and the lump is not changing in any way.     Pt denies the following symptoms:  Discharge from the breast    Aggravating factors include touching the area makes it tender .    Relieving factors include n/a .    OTC Medications tried are N/A.    Prescription medications taken for symptoms are N/A.    Pertinent medical history:  In her 20s had benigh breast lump removed in right breast.  FH:  Both daughters survived breast cancer, youngest sister  of breast cancer and another sister had bone cancer.    Td pt cannot remember the last time she had a Td.  She would like to discuss with Dr. Shahid at her upcoming PCP appt      ROS    Constitutional:  No fever, no fatigue.    All other ROS neg.    Patient was last seen by Gus Shahid MD on 5/10/19 and by me 11/10/18.    Past Medical History:   Diagnosis Date    Anticoagulant long-term use     Plavix & Aspirin    ARMD (age related macular degeneration)     os    Arthritis     Cataract     os    Coronary artery disease     Disorder of kidney and ureter     DE LOS SANTOS (dyspnea on exertion)     Elevated cholesterol     Heart disease     Hypertension     Insomnia     Macular degeneration     OS    Obstetrical blood-clot embolism, postpartum     PVD (peripheral vascular disease)     Retinal detachment     OS    Stented coronary artery 2019 1. Moderate disease LAD/CX. 2. Diffuse significant ISR of RCA successfully treated 2.5 and 3.0 POBA only. 3. Patent LM and right renal stent.    Urinary incontinence        Current Outpatient Medications:     ACETAMINOPHEN (TYLENOL ARTHRITIS ORAL), Take 2 tablets by mouth daily as needed. ,  Disp: , Rfl:     ANTIOX #11/OM3/DHA/EPA/LUT/RAND (OCUVITE ADULT 50+ ORAL), Take by mouth once daily.  , Disp: , Rfl:     aspirin (ECOTRIN) 81 MG EC tablet, Take by mouth once daily. , Disp: , Rfl:     atorvastatin (LIPITOR) 20 MG tablet, Take 1 tablet (20 mg total) by mouth once daily., Disp: 90 tablet, Rfl: 4    biotin 5,000 mcg TbDL, Take 5,000 mcg by mouth once daily. , Disp: , Rfl:     budesonide-formoterol 160-4.5 mcg (SYMBICORT) 160-4.5 mcg/actuation HFAA, INHALE 2 PUFFS INTO THE LUNGS 2 (TWO) TIMES DAILY. CONTROLLER, Disp: 11 g, Rfl: 11    calcium carbonate-vit D3-min (CALTRATE 600+D PLUS MINERALS) 600 mg calcium- 400 unit Tab, Take 1 tablet by mouth once daily., Disp: , Rfl:     cholecalciferol, vitamin D3, (VITAMIN D3) 400 unit Chew, Take 800 Units by mouth once daily., Disp: , Rfl:     clopidogrel (PLAVIX) 75 mg tablet, Take 1 tablet (75 mg total) by mouth once daily., Disp: 90 tablet, Rfl: 4    cyanocobalamin 1,000 mcg/mL injection, INJECT 1CC (1ML) INTRAMUSCULARLY EVERY THREE WEEKS, Disp: 10 mL, Rfl: 3    fosinopril (MONOPRIL) 10 MG Tab, Take 1 tablet (10 mg total) by mouth once daily., Disp: 90 tablet, Rfl: 3    furosemide (LASIX) 20 MG tablet, Take 1 tablet (20 mg total) by mouth daily as needed., Disp: 90 tablet, Rfl: 4    GLUCOSAMINE HCL/CHONDR FLETCHER A NA (OSTEO BI-FLEX ORAL), Take 2 tablets by mouth. , Disp: , Rfl:     L.acidophil,parac-S.therm-Bif. (RISAQUAD) Cap capsule, Take 1 capsule by mouth once daily., Disp: , Rfl:     MULTIVITAMIN/IRON/FOLIC ACID (CENTRUM ULTRA WOMEN'S ORAL), Take 1 tablet by mouth once daily.  , Disp: , Rfl:     nortriptyline (PAMELOR) 10 MG capsule, Take 1 capsule (10 mg total) by mouth every evening., Disp: 90 capsule, Rfl: 3    potassium chloride SA (K-DUR,KLOR-CON) 20 MEQ tablet, Take 1 tablet (20 mEq total) by mouth daily as needed., Disp: 90 tablet, Rfl: 4    salmon oil-omega-3 fatty acids (SALMON OIL-1000) 1,000-200 mg Cap, Take 2 capsules by mouth once  "daily. , Disp: , Rfl:     syringe with needle (BD LUER-FRANCI SYRINGE) 3 mL 25 gauge x 1" Syrg, USE TO INJECT B-12 AS DIRECTED, Disp: 10 Syringe, Rfl: 11    tiotropium (SPIRIVA) 18 mcg inhalation capsule, Inhale 1 capsule (18 mcg total) into the lungs once daily using handihaler., Disp: 90 capsule, Rfl: 0    traZODone (DESYREL) 50 MG tablet, Take 1 tablet (50 mg total) by mouth nightly as needed for Insomnia., Disp: 90 tablet, Rfl: 3    VENTOLIN HFA 90 mcg/actuation inhaler, TWO PUFFS INTO THE LUNGS EVERY 6 HOURS AS NEEDED FOR WHEEZING OR SHORTNESS OF BREATH, Disp: 18 g, Rfl: 3    Patient is a former smoker.    Last mammogram 6/6/11    PHYSICAL EXAM    Alert, coop 89 y.o. female patient in no acute distress, not ill appearing.    Vitals:    08/06/19 1311   BP: (!) 138/52   BP Location: Right arm   Patient Position: Sitting   BP Method: Medium (Manual)   Pulse: 71   Temp: 98.5 °F (36.9 °C)   TempSrc: Oral   SpO2: 96%   Weight: 78.4 kg (172 lb 13.5 oz)   Height: 5' 5" (1.651 m)       VS reviewed.  VS stable.  CC, nursing note, medications & PMH all reviewed today.    Head:  Normocephalic, atraumatic.    EENT:  Ext nose/ears normal.  Hard of hearing.  Eyes with normal lids, not injected.             Resp:  Respirations even, unlabored at rrest    Heart:  Regular rate    Left breast with firm lump palp at 3:00 near areola.  No other lesions palp either breast.  No nipple d/c.  Nipples erect bilat.    MS:   Ambulates independently. .    NEURO:  Alert and oriented x 4.  Responds appropriately during interaction.                  Gait steady but slow.       Skin:  Warm, dry, color good.    Psych:  Responds appropriately throughout the visit.               Relaxed.  Well-groomed.    Left breast lump  -     Mammo Digital Diagnostic Left w/ Toño; Future; Expected date: 08/06/2019  -      Breast Left Complete; Future; Expected date: 08/06/2019        Pt today presents with report of lump in her left breast.  Last mammogram " in --read a negative for cancer.  Both of her daughters are breast cancer survivors and her sister  of breast cancer.    This is a new problem to me and the following work up is planned.    Lab & Radiological Tests Ordered: left breast diagnostic mammogram with elba and left breast US.  The results are pending.    I have reviewed the following additional tests today: Mammogram bilat screening in  showed no malignancy.      Pt advised to perform comfort measures recommended on patient instruction sheet .    If worse or concerns, the patient is advised to call us.  Explained exam findings, diagnosis and treatment plan to patient.  Questions answered and patient states understanding.

## 2019-08-19 ENCOUNTER — CLINICAL SUPPORT (OUTPATIENT)
Dept: CARDIOLOGY | Facility: CLINIC | Age: 84
End: 2019-08-19
Attending: INTERNAL MEDICINE
Payer: MEDICARE

## 2019-08-19 ENCOUNTER — HOSPITAL ENCOUNTER (OUTPATIENT)
Dept: RADIOLOGY | Facility: HOSPITAL | Age: 84
Discharge: HOME | End: 2019-08-19
Attending: NURSE PRACTITIONER
Payer: MEDICARE

## 2019-08-19 VITALS
WEIGHT: 172 LBS | BODY MASS INDEX: 28.66 KG/M2 | DIASTOLIC BLOOD PRESSURE: 70 MMHG | HEART RATE: 65 BPM | SYSTOLIC BLOOD PRESSURE: 146 MMHG | HEIGHT: 65 IN

## 2019-08-19 DIAGNOSIS — I10 ESSENTIAL HYPERTENSION: ICD-10-CM

## 2019-08-19 DIAGNOSIS — N63.20 LEFT BREAST LUMP: ICD-10-CM

## 2019-08-19 DIAGNOSIS — I65.21 STENOSIS OF RIGHT CAROTID ARTERY: ICD-10-CM

## 2019-08-19 DIAGNOSIS — I25.10 CORONARY ARTERY DISEASE INVOLVING NATIVE CORONARY ARTERY OF NATIVE HEART WITHOUT ANGINA PECTORIS: ICD-10-CM

## 2019-08-19 DIAGNOSIS — I73.9 PERIPHERAL VASCULAR DISEASE: ICD-10-CM

## 2019-08-19 DIAGNOSIS — I35.0 NONRHEUMATIC AORTIC VALVE STENOSIS: ICD-10-CM

## 2019-08-19 LAB
ASCENDING AORTA: 2.54 CM
AV INDEX (PROSTH): 0.86
AV MEAN GRADIENT: 3 MMHG
AV PEAK GRADIENT: 5 MMHG
AV VALVE AREA: 2.6 CM2
AV VELOCITY RATIO: 0.81
BSA FOR ECHO PROCEDURE: 1.89 M2
CV ECHO LV RWT: 0.67 CM
DOP CALC AO PEAK VEL: 1.1 M/S
DOP CALC AO VTI: 29.05 CM
DOP CALC LVOT AREA: 3 CM2
DOP CALC LVOT DIAMETER: 1.96 CM
DOP CALC LVOT PEAK VEL: 0.89 M/S
DOP CALC LVOT STROKE VOLUME: 75.48 CM3
DOP CALCLVOT PEAK VEL VTI: 25.03 CM
E WAVE DECELERATION TIME: 324.62 MSEC
E/A RATIO: 0.53
E/E' RATIO: 8.92 M/S
ECHO LV POSTERIOR WALL: 1.15 CM (ref 0.6–1.1)
FRACTIONAL SHORTENING: 28 % (ref 28–44)
INTERVENTRICULAR SEPTUM: 1.29 CM (ref 0.6–1.1)
IVRT: 0.19 MSEC
LA MAJOR: 5.11 CM
LA MINOR: 5.9 CM
LA WIDTH: 3.52 CM
LEFT ATRIUM SIZE: 3.92 CM
LEFT ATRIUM VOLUME INDEX: 34.6 ML/M2
LEFT ATRIUM VOLUME: 64.23 CM3
LEFT INTERNAL DIMENSION IN SYSTOLE: 2.47 CM (ref 2.1–4)
LEFT VENTRICLE DIASTOLIC VOLUME INDEX: 26.37 ML/M2
LEFT VENTRICLE DIASTOLIC VOLUME: 48.93 ML
LEFT VENTRICLE MASS INDEX: 73 G/M2
LEFT VENTRICLE SYSTOLIC VOLUME INDEX: 11.7 ML/M2
LEFT VENTRICLE SYSTOLIC VOLUME: 21.76 ML
LEFT VENTRICULAR INTERNAL DIMENSION IN DIASTOLE: 3.44 CM (ref 3.5–6)
LEFT VENTRICULAR MASS: 135.87 G
LV LATERAL E/E' RATIO: 8.29 M/S
LV SEPTAL E/E' RATIO: 9.67 M/S
MV PEAK A VEL: 1.1 M/S
MV PEAK E VEL: 0.58 M/S
PISA TR MAX VEL: 2.4 M/S
PULM VEIN S/D RATIO: 0.56
PV PEAK D VEL: 0.63 M/S
PV PEAK S VEL: 0.35 M/S
RA MAJOR: 4.51 CM
RA PRESSURE: 3 MMHG
RA WIDTH: 3.29 CM
RIGHT VENTRICULAR END-DIASTOLIC DIMENSION: 2.35 CM
SINUS: 2.7 CM
STJ: 2.34 CM
TDI LATERAL: 0.07 M/S
TDI SEPTAL: 0.06 M/S
TDI: 0.07 M/S
TR MAX PG: 23 MMHG
TRICUSPID ANNULAR PLANE SYSTOLIC EXCURSION: 1.65 CM
TV REST PULMONARY ARTERY PRESSURE: 26 MMHG

## 2019-08-19 PROCEDURE — 77062 MAMMO DIGITAL DIAGNOSTIC BILAT WITH TOMOSYNTHESIS_CAD: ICD-10-PCS | Mod: 26,HCNC,, | Performed by: RADIOLOGY

## 2019-08-19 PROCEDURE — 99999 PR PBB SHADOW E&M-EST. PATIENT-LVL II: CPT | Mod: PBBFAC,HCNC,,

## 2019-08-19 PROCEDURE — 93306 TRANSTHORACIC ECHO (TTE) COMPLETE (CUPID ONLY): ICD-10-PCS | Mod: HCNC,S$GLB,, | Performed by: INTERNAL MEDICINE

## 2019-08-19 PROCEDURE — 76642 US BREAST LEFT LIMITED: ICD-10-PCS | Mod: 26,HCNC,LT, | Performed by: RADIOLOGY

## 2019-08-19 PROCEDURE — 76642 ULTRASOUND BREAST LIMITED: CPT | Mod: 26,HCNC,LT, | Performed by: RADIOLOGY

## 2019-08-19 PROCEDURE — 76642 ULTRASOUND BREAST LIMITED: CPT | Mod: TC,HCNC,PO,LT

## 2019-08-19 PROCEDURE — 77066 MAMMO DIGITAL DIAGNOSTIC BILAT WITH TOMOSYNTHESIS_CAD: ICD-10-PCS | Mod: 26,HCNC,, | Performed by: RADIOLOGY

## 2019-08-19 PROCEDURE — 77062 BREAST TOMOSYNTHESIS BI: CPT | Mod: 26,HCNC,, | Performed by: RADIOLOGY

## 2019-08-19 PROCEDURE — 77066 DX MAMMO INCL CAD BI: CPT | Mod: 26,HCNC,, | Performed by: RADIOLOGY

## 2019-08-19 PROCEDURE — 99999 PR PBB SHADOW E&M-EST. PATIENT-LVL II: ICD-10-PCS | Mod: PBBFAC,HCNC,,

## 2019-08-19 PROCEDURE — 77062 BREAST TOMOSYNTHESIS BI: CPT | Mod: TC,HCNC,PO

## 2019-08-19 PROCEDURE — 93306 TTE W/DOPPLER COMPLETE: CPT | Mod: HCNC,S$GLB,, | Performed by: INTERNAL MEDICINE

## 2019-08-20 ENCOUNTER — TELEPHONE (OUTPATIENT)
Dept: CARDIOLOGY | Facility: CLINIC | Age: 84
End: 2019-08-20

## 2019-08-20 ENCOUNTER — TELEPHONE (OUTPATIENT)
Dept: RADIOLOGY | Facility: HOSPITAL | Age: 84
End: 2019-08-20

## 2019-08-20 ENCOUNTER — TELEPHONE (OUTPATIENT)
Dept: FAMILY MEDICINE | Facility: CLINIC | Age: 84
End: 2019-08-20

## 2019-08-20 NOTE — TELEPHONE ENCOUNTER
Spoke to patient, informed her of echo results are within normal parameters as per Dr. Jay. Patient verbalized understanding.

## 2019-08-20 NOTE — TELEPHONE ENCOUNTER
Dr. Kleber Diggs wants me to schedule this patient ASAP to see you to discuss a breast lump she says is cancer. When can I squeeze her in (date/time) Please advise

## 2019-08-20 NOTE — TELEPHONE ENCOUNTER
..Patient notified of diagnostic mammogram results Needs left breast biopsy appointment is scheduled with Dr. Dennis on 9/9/2019.

## 2019-08-20 NOTE — TELEPHONE ENCOUNTER
----- Message from Manuel Wise sent at 8/20/2019  9:10 AM CDT -----  Contact: Patient  Type:  Patient Returning Call    Who Called:  Patient  Who Left Message for Patient:  Rudy  Does the patient know what this is regarding?:  Test results from 8/19/19  Best Call Back Number:  [phone]

## 2019-08-27 ENCOUNTER — OFFICE VISIT (OUTPATIENT)
Dept: CARDIOLOGY | Facility: CLINIC | Age: 84
End: 2019-08-27
Attending: INTERNAL MEDICINE
Payer: MEDICARE

## 2019-08-27 VITALS
DIASTOLIC BLOOD PRESSURE: 66 MMHG | SYSTOLIC BLOOD PRESSURE: 138 MMHG | HEIGHT: 65 IN | BODY MASS INDEX: 28.57 KG/M2 | WEIGHT: 171.5 LBS | HEART RATE: 83 BPM

## 2019-08-27 DIAGNOSIS — I25.10 CORONARY ARTERY DISEASE INVOLVING NATIVE CORONARY ARTERY OF NATIVE HEART WITHOUT ANGINA PECTORIS: Chronic | ICD-10-CM

## 2019-08-27 DIAGNOSIS — Z95.5 STENTED CORONARY ARTERY: ICD-10-CM

## 2019-08-27 DIAGNOSIS — I65.21 STENOSIS OF RIGHT CAROTID ARTERY: Primary | Chronic | ICD-10-CM

## 2019-08-27 DIAGNOSIS — I10 ESSENTIAL HYPERTENSION: Chronic | ICD-10-CM

## 2019-08-27 DIAGNOSIS — I73.9 PERIPHERAL VASCULAR DISEASE: ICD-10-CM

## 2019-08-27 DIAGNOSIS — I70.1 RAS (RENAL ARTERY STENOSIS): ICD-10-CM

## 2019-08-27 PROCEDURE — 99499 UNLISTED E&M SERVICE: CPT | Mod: HCNC,S$GLB,, | Performed by: INTERNAL MEDICINE

## 2019-08-27 PROCEDURE — 99999 PR PBB SHADOW E&M-EST. PATIENT-LVL III: CPT | Mod: PBBFAC,HCNC,, | Performed by: INTERNAL MEDICINE

## 2019-08-27 PROCEDURE — 99214 OFFICE O/P EST MOD 30 MIN: CPT | Mod: HCNC,S$GLB,, | Performed by: INTERNAL MEDICINE

## 2019-08-27 PROCEDURE — 1101F PT FALLS ASSESS-DOCD LE1/YR: CPT | Mod: HCNC,CPTII,S$GLB, | Performed by: INTERNAL MEDICINE

## 2019-08-27 PROCEDURE — 1101F PR PT FALLS ASSESS DOC 0-1 FALLS W/OUT INJ PAST YR: ICD-10-PCS | Mod: HCNC,CPTII,S$GLB, | Performed by: INTERNAL MEDICINE

## 2019-08-27 PROCEDURE — 99499 RISK ADDL DX/OHS AUDIT: ICD-10-PCS | Mod: HCNC,S$GLB,, | Performed by: INTERNAL MEDICINE

## 2019-08-27 PROCEDURE — 99999 PR PBB SHADOW E&M-EST. PATIENT-LVL III: ICD-10-PCS | Mod: PBBFAC,HCNC,, | Performed by: INTERNAL MEDICINE

## 2019-08-27 PROCEDURE — 99214 PR OFFICE/OUTPT VISIT, EST, LEVL IV, 30-39 MIN: ICD-10-PCS | Mod: HCNC,S$GLB,, | Performed by: INTERNAL MEDICINE

## 2019-08-27 RX ORDER — DILTIAZEM HYDROCHLORIDE 180 MG/1
180 CAPSULE, COATED, EXTENDED RELEASE ORAL EVERY MORNING
Qty: 90 CAPSULE | Refills: 4 | Status: SHIPPED | OUTPATIENT
Start: 2019-08-27 | End: 2020-09-02 | Stop reason: SDUPTHER

## 2019-08-27 RX ORDER — ISOSORBIDE MONONITRATE 30 MG/1
30 TABLET, EXTENDED RELEASE ORAL NIGHTLY
Qty: 90 TABLET | Refills: 4 | Status: SHIPPED | OUTPATIENT
Start: 2019-08-27 | End: 2020-10-21 | Stop reason: SDUPTHER

## 2019-08-27 NOTE — PROGRESS NOTES
Subjective:    Patient ID:  Sheree Pugh is a 89 y.o. female who presents for follow-up of CAD F/U and Results      HPI  Here for follow up of CAD-PCI (4/14 )/ANTWAN.  Stable exertional angina relieved with rest.     Review of Systems   Constitution: Negative for malaise/fatigue.   Eyes: Negative for blurred vision.   Cardiovascular: Negative for chest pain, claudication, cyanosis, dyspnea on exertion, irregular heartbeat, leg swelling, near-syncope, orthopnea, palpitations, paroxysmal nocturnal dyspnea and syncope.   Respiratory: Negative for cough and shortness of breath.    Hematologic/Lymphatic: Does not bruise/bleed easily.   Musculoskeletal: Negative for back pain, falls, joint pain, muscle cramps, muscle weakness and myalgias.   Gastrointestinal: Negative for abdominal pain, change in bowel habit, nausea and vomiting.   Genitourinary: Negative for urgency.   Neurological: Negative for dizziness, focal weakness and light-headedness.       Past Medical History:   Diagnosis Date    Anticoagulant long-term use     Plavix & Aspirin    ARMD (age related macular degeneration)     os    Arthritis     Cataract     os    Coronary artery disease     Disorder of kidney and ureter     DE LOS SANTOS (dyspnea on exertion)     Elevated cholesterol     Heart disease     Hypertension     Insomnia     Macular degeneration     OS    Obstetrical blood-clot embolism, postpartum     PVD (peripheral vascular disease)     Retinal detachment     OS    Stented coronary artery 2/25/2019 4/14 1. Moderate disease LAD/CX. 2. Diffuse significant ISR of RCA successfully treated 2.5 and 3.0 POBA only. 3. Patent LM and right renal stent.    Urinary incontinence           Objective:     Vitals:    08/27/19 1401   BP: 138/66   Pulse: 83        Physical Exam   Constitutional: She is oriented to person, place, and time. She appears well-developed and well-nourished.   HENT:   Head: Normocephalic.   Eyes: Conjunctivae are normal.   Neck:  Normal range of motion. Neck supple. No JVD present.   Cardiovascular: Normal rate, regular rhythm and intact distal pulses.   Murmur heard.   Harsh midsystolic murmur is present with a grade of 2/6 at the upper right sternal border radiating to the neck.  Pulses:       Carotid pulses are 2+ on the right side, and 2+ on the left side.       Radial pulses are 2+ on the right side, and 2+ on the left side.        Dorsalis pedis pulses are 2+ on the right side, and 2+ on the left side.        Posterior tibial pulses are 2+ on the right side, and 2+ on the left side.   Pulmonary/Chest: Effort normal and breath sounds normal.   Abdominal: Soft. Bowel sounds are normal.   Musculoskeletal: She exhibits no edema or tenderness.   Neurological: She is alert and oriented to person, place, and time. Gait normal.   Skin: Skin is warm, dry and intact. No cyanosis. Nails show no clubbing.   Psychiatric: She has a normal mood and affect. Her speech is normal and behavior is normal. Thought content normal.             ..    Chemistry        Component Value Date/Time     08/19/2019 0934    K 5.0 08/19/2019 0934     08/19/2019 0934    CO2 24 08/19/2019 0934    BUN 32 (H) 08/19/2019 0934    CREATININE 1.1 08/19/2019 0934    GLU 96 08/19/2019 0934        Component Value Date/Time    CALCIUM 10.1 08/19/2019 0934    ALKPHOS 111 08/19/2019 0934    AST 22 08/19/2019 0934    ALT 16 08/19/2019 0934    BILITOT 0.3 08/19/2019 0934    ESTGFRAFRICA 50.7 (A) 08/19/2019 0934    EGFRNONAA 44.0 (A) 08/19/2019 0934            ..  Lab Results   Component Value Date    CHOL 170 08/19/2019    CHOL 190 06/14/2017    CHOL 170 02/19/2016     Lab Results   Component Value Date    HDL 51 08/19/2019    HDL 55 06/14/2017    HDL 46 02/19/2016     Lab Results   Component Value Date    LDLCALC 93.6 08/19/2019    LDLCALC 110.8 06/14/2017    LDLCALC 85.2 02/19/2016     Lab Results   Component Value Date    TRIG 127 08/19/2019    TRIG 121 06/14/2017     TRIG 194 (H) 02/19/2016     Lab Results   Component Value Date    CHOLHDL 30.0 08/19/2019    CHOLHDL 28.9 06/14/2017    CHOLHDL 27.1 02/19/2016     ..  Lab Results   Component Value Date    WBC 7.68 11/02/2018    HGB 11.5 (L) 11/02/2018    HCT 35.4 (L) 11/02/2018    MCV 96 11/02/2018     11/02/2018       Test(s) Reviewed  I have reviewed the following in detail:  [] Stress test   [] Angiography   [] Echocardiogram   [] Labs   [] Other:       Assessment:         ICD-10-CM ICD-9-CM   1. Stenosis of right carotid artery I65.21 433.10   2. ANTWAN (renal artery stenosis) I70.1 440.1   3. Peripheral vascular disease I73.9 443.9   4. Essential hypertension I10 401.9   5. Coronary artery disease involving native coronary artery of native heart without angina pectoris I25.10 414.01   6. Stented coronary artery Z95.5 V45.82     Problem List Items Addressed This Visit     Stented coronary artery    Overview     4/14  1. Moderate disease LAD/CX.  2. Diffuse significant ISR of RCA successfully treated 2.5 and 3.0 POBA only.  3. Patent LM and right renal stent.         Stenosis of right carotid artery - Primary (Chronic)    Overview     4/14   1. Moderate disease LAD/CX.  2. Diffuse significant ISR of RCA successfully treated 2.5 and 3.0 POBA only.  3. Patent LM and right renal stent    9/10 LM- promus 4x8, LAD [id number], RCA promus 2.5x23 & 2.75x8             ANTWAN (renal artery stenosis)    Overview     4/2014   Patent right renal stent.         Peripheral vascular disease    Essential hypertension (Chronic)    Coronary artery disease involving native coronary artery of native heart without angina pectoris (Chronic)           Plan:           Return to clinic 9 months   Low level/low impact aerobic exercise 5x's/wk. Heart healthy diet and risk factor modification.    See labs and med orders.  Change to cardizem/imdur goal is sx's relief.     Portions of this note may have been created with voice recognition software.   Grammatical, syntax and spelling errors may be inevitable.

## 2019-09-09 ENCOUNTER — TELEPHONE (OUTPATIENT)
Dept: RADIOLOGY | Facility: HOSPITAL | Age: 84
End: 2019-09-09

## 2019-09-09 ENCOUNTER — OFFICE VISIT (OUTPATIENT)
Dept: SURGERY | Facility: CLINIC | Age: 84
End: 2019-09-09
Payer: MEDICARE

## 2019-09-09 VITALS — BODY MASS INDEX: 28.76 KG/M2 | WEIGHT: 172.81 LBS

## 2019-09-09 DIAGNOSIS — N63.0 BREAST MASS IN FEMALE: Primary | ICD-10-CM

## 2019-09-09 PROCEDURE — 99999 PR PBB SHADOW E&M-EST. PATIENT-LVL II: ICD-10-PCS | Mod: PBBFAC,HCNC,, | Performed by: SURGERY

## 2019-09-09 PROCEDURE — 99999 PR PBB SHADOW E&M-EST. PATIENT-LVL II: CPT | Mod: PBBFAC,HCNC,, | Performed by: SURGERY

## 2019-09-09 PROCEDURE — 1101F PT FALLS ASSESS-DOCD LE1/YR: CPT | Mod: HCNC,CPTII,S$GLB, | Performed by: SURGERY

## 2019-09-09 PROCEDURE — 99204 PR OFFICE/OUTPT VISIT, NEW, LEVL IV, 45-59 MIN: ICD-10-PCS | Mod: HCNC,S$GLB,, | Performed by: SURGERY

## 2019-09-09 PROCEDURE — 99204 OFFICE O/P NEW MOD 45 MIN: CPT | Mod: HCNC,S$GLB,, | Performed by: SURGERY

## 2019-09-09 PROCEDURE — 1101F PR PT FALLS ASSESS DOC 0-1 FALLS W/OUT INJ PAST YR: ICD-10-PCS | Mod: HCNC,CPTII,S$GLB, | Performed by: SURGERY

## 2019-09-09 NOTE — LETTER
September 16, 2019      Gus Shahid MD  [address] [facility] [address]  [facility] [address]           [facility]  [address] [facility] [address] [place] [address]  Phone: [phone]          Patient: Sheree Pugh   MR Number: [id number]   YOB: 1927   Date of Visit: 9/9/2019       Dear Dr. Gus Shahid:    Thank you for referring Sheree Pugh to me for evaluation. Attached you will find relevant portions of my assessment and plan of care.    If you have questions, please do not hesitate to call me. I look forward to following Sheree Pugh along with you.    Sincerely,    Ronan Dennis MD    Enclosure  CC:  No Recipients    If you would like to receive this communication electronically, please contact [email or web address] or [phone] to request more information on [employer] Link access.    For providers and/or their staff who would like to refer a patient to [facility], please contact us through our one-stop-shop provider referral line, [facility] , at [phone].    If you feel you have received this communication in error or would no longer like to receive these types of communications, please e-mail [email or web address]

## 2019-09-09 NOTE — TELEPHONE ENCOUNTER
..Patient notified bilateral breast biopsies are scheduled on  9/17/2019.  She is an Plavix and aspirin would like to hold for 5 days prior to procedures

## 2019-09-16 NOTE — PROGRESS NOTES
Subjective:       Patient ID: Sheree Pugh is a 89 y.o. female.    Chief Complaint: Consult (abnormal mammo )      HPI 89-year-old female referred for evaluation of an abnormal mammogram and ultrasound of the bilateral breast.  She can feel a lesion on the left side. She had a right breast biopsy in her 20s.  Family history is significant for a sister who  of breast cancer.  Ultrasound reveals a lesion on the left side and microcalcifications on the right side.  Biopsy was recommended for both.    Past Medical History:   Diagnosis Date    Anticoagulant long-term use     Plavix & Aspirin    ARMD (age related macular degeneration)     os    Arthritis     Cataract     os    Coronary artery disease     Disorder of kidney and ureter     DE LOS SANTOS (dyspnea on exertion)     Elevated cholesterol     Heart disease     Hypertension     Insomnia     Macular degeneration     OS    Obstetrical blood-clot embolism, postpartum     PVD (peripheral vascular disease)     Retinal detachment     OS    Stented coronary artery 2019 1. Moderate disease LAD/CX. 2. Diffuse significant ISR of RCA successfully treated 2.5 and 3.0 POBA only. 3. Patent LM and right renal stent.    Urinary incontinence      Past Surgical History:   Procedure Laterality Date    BREAST BIOPSY Right     benign @ AGE 20YRS.    CATARACT EXTRACTION      od d 13/    CATHETERIZATION, HEART, LEFT Left 2014    Performed by [facility]rell [facility] at [facility] CATH LAB    CHOLECYSTECTOMY      CORONARY STENT PLACEMENT  ,    4 stents in , 2 in     HYSTERECTOMY      INSERTION, CATHETER, RIGHT HEART Right 2014    Performed by [facility]rell [facility] at [facility] CATH LAB    MOUTH SURGERY      upper & lower denture    PHACOEMULSIFICATION, CATARACT Right 2013    Performed by Jennifer Lance MD at [facility] OR    VAGINAL DELIVERY      times 6         Current Outpatient Medications:     ACETAMINOPHEN (TYLENOL ARTHRITIS  ORAL), Take 2 tablets by mouth daily as needed. , Disp: , Rfl:     ANTIOX #11/OM3/DHA/EPA/LUT/RAND (OCUVITE ADULT 50+ ORAL), Take by mouth once daily.  , Disp: , Rfl:     aspirin (ECOTRIN) 81 MG EC tablet, Take by mouth once daily. , Disp: , Rfl:     atorvastatin (LIPITOR) 20 MG tablet, Take 1 tablet (20 mg total) by mouth once daily., Disp: 90 tablet, Rfl: 4    biotin 5,000 mcg TbDL, Take 5,000 mcg by mouth once daily. , Disp: , Rfl:     budesonide-formoterol 160-4.5 mcg (SYMBICORT) 160-4.5 mcg/actuation HFAA, INHALE 2 PUFFS INTO THE LUNGS 2 (TWO) TIMES DAILY. CONTROLLER, Disp: 11 g, Rfl: 11    calcium carbonate-vit D3-min (CALTRATE 600+D PLUS MINERALS) 600 mg calcium- 400 unit Tab, Take 1 tablet by mouth once daily., Disp: , Rfl:     cholecalciferol, vitamin D3, (VITAMIN D3) 400 unit Chew, Take 800 Units by mouth once daily., Disp: , Rfl:     clopidogrel (PLAVIX) 75 mg tablet, Take 1 tablet (75 mg total) by mouth once daily., Disp: 90 tablet, Rfl: 4    cyanocobalamin 1,000 mcg/mL injection, INJECT 1CC (1ML) INTRAMUSCULARLY EVERY THREE WEEKS, Disp: 10 mL, Rfl: 3    diltiaZEM (CARDIZEM CD) 180 MG 24 hr capsule, Take 1 capsule (180 mg total) by mouth every morning., Disp: 90 capsule, Rfl: 4    furosemide (LASIX) 20 MG tablet, Take 1 tablet (20 mg total) by mouth daily as needed., Disp: 90 tablet, Rfl: 4    GLUCOSAMINE HCL/CHONDR FLETCHER A NA (OSTEO BI-FLEX ORAL), Take 2 tablets by mouth. , Disp: , Rfl:     isosorbide mononitrate (IMDUR) 30 MG 24 hr tablet, Take 1 tablet (30 mg total) by mouth every evening., Disp: 90 tablet, Rfl: 4    L.acidophil,parac-S.therm-Bif. (RISAQUAD) Cap capsule, Take 1 capsule by mouth once daily., Disp: , Rfl:     MULTIVITAMIN/IRON/FOLIC ACID (CENTRUM ULTRA WOMEN'S ORAL), Take 1 tablet by mouth once daily.  , Disp: , Rfl:     nortriptyline (PAMELOR) 10 MG capsule, Take 1 capsule (10 mg total) by mouth every evening., Disp: 90 capsule, Rfl: 3    potassium chloride SA  "(K-DUR,KLOR-CON) 20 MEQ tablet, Take 1 tablet (20 mEq total) by mouth daily as needed., Disp: 90 tablet, Rfl: 4    salmon oil-omega-3 fatty acids (SALMON OIL-1000) 1,000-200 mg Cap, Take 2 capsules by mouth once daily. , Disp: , Rfl:     syringe with needle (BD LUER-FRANCI SYRINGE) 3 mL 25 gauge x 1" Syrg, USE TO INJECT B-12 AS DIRECTED, Disp: 10 Syringe, Rfl: 11    tiotropium (SPIRIVA) 18 mcg inhalation capsule, Inhale 1 capsule (18 mcg total) into the lungs once daily using handihaler., Disp: 90 capsule, Rfl: 0    traZODone (DESYREL) 50 MG tablet, Take 1 tablet (50 mg total) by mouth nightly as needed for Insomnia., Disp: 90 tablet, Rfl: 3    VENTOLIN HFA 90 mcg/actuation inhaler, TWO PUFFS INTO THE LUNGS EVERY 6 HOURS AS NEEDED FOR WHEEZING OR SHORTNESS OF BREATH, Disp: 18 g, Rfl: 3    Review of patient's allergies indicates:   Allergen Reactions    Codeine Nausea And Vomiting    Gabapentin     Hydrocodone Nausea And Vomiting     Other reaction(s): Vomiting       Family History   Problem Relation Age of Onset    Alzheimer's disease Father     Cancer Sister     Cancer Sister         1 sister  of breast cancer    Breast cancer Sister 36    Stroke Mother     Heart disease Mother     Cancer Daughter         breast    Breast cancer Daughter 55    Breast cancer Daughter 60    Amblyopia Neg Hx     Blindness Neg Hx     Cataracts Neg Hx     Diabetes Neg Hx     Glaucoma Neg Hx     Hypertension Neg Hx     Macular degeneration Neg Hx     Retinal detachment Neg Hx     Strabismus Neg Hx     Thyroid disease Neg Hx      Social History     Socioeconomic History    Marital status:      Spouse name: Not on file    Number of children: Not on file    Years of education: Not on file    Highest education level: Not on file   Occupational History    Not on file   Social Needs    Financial resource strain: Not on file    Food insecurity:     Worry: Not on file     Inability: Not on file    " Transportation needs:     Medical: Not on file     Non-medical: Not on file   Tobacco Use    Smoking status: Former Smoker     Last attempt to quit: 1/3/2000     Years since quittin.7    Smokeless tobacco: Never Used   Substance and Sexual Activity    Alcohol use: No    Drug use: No    Sexual activity: Not on file   Lifestyle    Physical activity:     Days per week: Not on file     Minutes per session: Not on file    Stress: Not on file   Relationships    Social connections:     Talks on phone: Not on file     Gets together: Not on file     Attends [employer] service: Not on file     Active member of club or organization: Not on file     Attends meetings of clubs or organizations: Not on file     Relationship status: Not on file   Other Topics Concern    Not on file   Social History Narrative    Not on file       Review of Systems   Constitutional: Negative for activity change and chills.   HENT: Negative.    Respiratory: Negative for cough, shortness of breath and wheezing.    Cardiovascular: Negative for chest pain and palpitations.   Gastrointestinal: Negative for abdominal pain, blood in stool, nausea and vomiting.   Endocrine: Negative.    Genitourinary: Negative for dysuria, frequency and hematuria.   Musculoskeletal: Negative for arthralgias, back pain and neck pain.   Skin: Negative for rash and wound.   Allergic/Immunologic: Negative.    Neurological: Negative for dizziness, weakness and headaches.   Hematological: Negative for adenopathy.   Psychiatric/Behavioral: Negative for agitation and dysphoric mood. The patient is not nervous/anxious.      Objective:     Physical Exam   Constitutional: She is oriented to person, place, and time. She appears well-developed and well-nourished. No distress.   HENT:   Head: Normocephalic and atraumatic.   Eyes: Pupils are equal, round, and reactive to light. Conjunctivae and EOM are normal.   Neck: Normal range of motion. Neck supple.   Cardiovascular:  Normal rate, regular rhythm, normal heart sounds and intact distal pulses.   No murmur heard.  Pulmonary/Chest: No stridor. No respiratory distress. She has no wheezes. She has no rales.   Left Breast Exam:  Palpable is superficial mass at 3:00 a.m. in the left breast.  This is nontender.  There are no overlying skin changes.  There is no contour change of the breast.  There is no nipple discharge.  There is no significant breast tenderness.  There is no palpable axillary or supraclavicular adenopathy.    There is no abnormality detected on physical exam of the contralateral breast.   Abdominal: She exhibits no distension and no mass. There is no tenderness. There is no rebound and no guarding.   Musculoskeletal: Normal range of motion. She exhibits no edema.   Lymphadenopathy:     She has no cervical adenopathy.   Neurological: She is alert and oriented to person, place, and time. No cranial nerve deficit. She exhibits normal muscle tone.   Skin: Skin is warm and dry. No rash noted. No erythema.   Psychiatric: She has a normal mood and affect. Her behavior is normal. Judgment normal.     The breasts have scattered areas of fibroglandular density.   Left   US Breast Left Limited   There is a 9 mm irregularly shaped, hypoechoic mass with spiculated margins with shadowing seen in the left breast at 3 o'clock in the anterior depth. The mass correlates with the palpable mass noted during physical examination. THIS IS NEAR THE CUTANEOUS SURFACE AND EASILY PALPABLE. BIOPSY IS SUGGESTED. THE PATIENT AND HER SON WERE GIVEN THE INFORMATION   Right   Mammo Digital Diagnostic Bilat w/ Toño   There are 14 mm x 14 mm x 5 mm amorphous calcifications in a grouped distribution seen in the central region of the right breast in the middle depth. This may relate to DCIS. Biopsy may be helpful for confirmation   Impression:   Left   Mass: Left breast 9 mm mass at the anterior 3 o'clock position. Assessment: 5 - Highly suggestive of  malignancy. Biopsy is recommended.   Right   Calcifications: Right breast 14 mm x 14 mm x 5 mm calcifications at the central middle position. Assessment: 4 - Suspicious finding. Biopsy is recommended.   BI-RADS Category:   Overall: 5 - Highly Suggestive of Malignancy         Assessment:   1.  Left breast mass  2.  Microcalcifications in the right breast.    Plan:      1.  Plan image guided biopsy of both lesions in Radiology.

## 2019-09-17 ENCOUNTER — HOSPITAL ENCOUNTER (OUTPATIENT)
Dept: RADIOLOGY | Facility: HOSPITAL | Age: 84
Discharge: HOME | End: 2019-09-17
Attending: SURGERY
Payer: MEDICARE

## 2019-09-17 DIAGNOSIS — R92.8 ABNORMAL MAMMOGRAM OF LEFT BREAST: ICD-10-CM

## 2019-09-17 DIAGNOSIS — R92.8 ABNORMAL MAMMOGRAM OF RIGHT BREAST: ICD-10-CM

## 2019-09-17 PROCEDURE — 88305 TISSUE EXAM BY PATHOLOGIST: CPT | Mod: HCNC,59 | Performed by: PATHOLOGY

## 2019-09-17 PROCEDURE — 88360 TISSUE SPECIMEN TO PATHOLOGY, RADIOLOGY: ICD-10-PCS | Mod: 26,HCNC,, | Performed by: PATHOLOGY

## 2019-09-17 PROCEDURE — 19081 BX BREAST 1ST LESION STRTCTC: CPT | Mod: HCNC,PO,RT

## 2019-09-17 PROCEDURE — 19083 US BREAST BIOPSY WITH IMAGING 1ST SITE LEFT: ICD-10-PCS | Mod: HCNC,LT,, | Performed by: RADIOLOGY

## 2019-09-17 PROCEDURE — 19081 BX BREAST 1ST LESION STRTCTC: CPT | Mod: HCNC,RT,, | Performed by: RADIOLOGY

## 2019-09-17 PROCEDURE — 88360 TUMOR IMMUNOHISTOCHEM/MANUAL: CPT | Mod: 26,HCNC,, | Performed by: PATHOLOGY

## 2019-09-17 PROCEDURE — 25000003 PHARM REV CODE 250: Mod: HCNC,PO | Performed by: SURGERY

## 2019-09-17 PROCEDURE — 88360 TUMOR IMMUNOHISTOCHEM/MANUAL: CPT | Mod: HCNC,59 | Performed by: PATHOLOGY

## 2019-09-17 PROCEDURE — 88305 TISSUE SPECIMEN TO PATHOLOGY, RADIOLOGY: ICD-10-PCS | Mod: 26,HCNC,, | Performed by: PATHOLOGY

## 2019-09-17 PROCEDURE — 27201044 US BREAST BIOPSY WITH IMAGING 1ST SITE LEFT: Mod: HCNC,PO

## 2019-09-17 PROCEDURE — A4648 IMPLANTABLE TISSUE MARKER: HCPCS | Mod: HCNC,PO

## 2019-09-17 PROCEDURE — 19081 MAMMO BREAST STEREOTACTIC BREAST BIOPSY RIGHT: ICD-10-PCS | Mod: HCNC,RT,, | Performed by: RADIOLOGY

## 2019-09-17 PROCEDURE — 19083 BX BREAST 1ST LESION US IMAG: CPT | Mod: HCNC,LT,, | Performed by: RADIOLOGY

## 2019-09-17 PROCEDURE — 88305 TISSUE EXAM BY PATHOLOGIST: CPT | Mod: 26,HCNC,, | Performed by: PATHOLOGY

## 2019-09-17 RX ORDER — LIDOCAINE HYDROCHLORIDE 10 MG/ML
5 INJECTION INFILTRATION; PERINEURAL ONCE
Status: COMPLETED | OUTPATIENT
Start: 2019-09-17 | End: 2019-09-17

## 2019-09-17 RX ORDER — LIDOCAINE HYDROCHLORIDE AND EPINEPHRINE 20; 10 MG/ML; UG/ML
20 INJECTION, SOLUTION INFILTRATION; PERINEURAL ONCE
Status: DISCONTINUED | OUTPATIENT
Start: 2019-09-17 | End: 2019-09-18 | Stop reason: HOSPADM

## 2019-09-17 RX ADMIN — LIDOCAINE HYDROCHLORIDE 5 ML: 10 INJECTION, SOLUTION EPIDURAL; INFILTRATION; INTRACAUDAL; PERINEURAL at 08:09

## 2019-09-17 RX ADMIN — LIDOCAINE HYDROCHLORIDE 5 ML: 10 INJECTION, SOLUTION EPIDURAL; INFILTRATION; INTRACAUDAL; PERINEURAL at 09:09

## 2019-09-20 ENCOUNTER — TELEPHONE (OUTPATIENT)
Dept: RADIOLOGY | Facility: HOSPITAL | Age: 84
End: 2019-09-20

## 2019-09-20 NOTE — TELEPHONE ENCOUNTER
Patient notified of breast biopsies results.    FINAL PATHOLOGIC DIAGNOSIS  1. Right breast, central area with calcifications, stereotactic biopsy:  Benign breast tissue with sclerosing intraductal papilloma, columnar cell changes and focal apocrine metaplasia.  Abundant microcalcifications are identified in association with papilloma.  Negative for atypia or malignancy.    2. Right breast, central area without calcifications, stereotactic biopsy:  Benign breast tissue with cystic ducts and columnar cell changes.  Negative for atypia or malignancy.    3. Left breast, 3 o'clock 2 cm from nipple, vacuum assisted biopsy:  Invasive ductal carcinoma, Lucio histologic grade 2 (tubular formation: 3, nuclear pleomorphism: 2, mitotic  activity: 1).  Comment: The largest linear focus of invasive disease in the biopsy sample measures at least 5 mm. No  lymphovascular invasion is identified. Breast biomarkers are in progress and will be issued in a supplemental  report.      Appointment scheduled with Dr Dennis on 9/23/2019.

## 2019-09-23 ENCOUNTER — OFFICE VISIT (OUTPATIENT)
Dept: SURGERY | Facility: CLINIC | Age: 84
End: 2019-09-23
Payer: MEDICARE

## 2019-09-23 ENCOUNTER — LAB VISIT (OUTPATIENT)
Dept: LAB | Facility: HOSPITAL | Age: 84
End: 2019-09-23
Attending: SURGERY
Payer: MEDICARE

## 2019-09-23 VITALS
DIASTOLIC BLOOD PRESSURE: 65 MMHG | SYSTOLIC BLOOD PRESSURE: 143 MMHG | HEART RATE: 77 BPM | WEIGHT: 172.38 LBS | BODY MASS INDEX: 28.69 KG/M2

## 2019-09-23 DIAGNOSIS — Z01.818 PREOPERATIVE TESTING: ICD-10-CM

## 2019-09-23 DIAGNOSIS — C50.912 MALIGNANT NEOPLASM OF LEFT FEMALE BREAST, UNSPECIFIED ESTROGEN RECEPTOR STATUS, UNSPECIFIED SITE OF BREAST: Primary | ICD-10-CM

## 2019-09-23 DIAGNOSIS — C50.912 BREAST CANCER, LEFT BREAST: ICD-10-CM

## 2019-09-23 LAB
BASOPHILS # BLD AUTO: 0.07 K/UL (ref 0–0.2)
BASOPHILS NFR BLD: 0.8 % (ref 0–1.9)
DIFFERENTIAL METHOD: ABNORMAL
EOSINOPHIL # BLD AUTO: 0.2 K/UL (ref 0–0.5)
EOSINOPHIL NFR BLD: 2.5 % (ref 0–8)
ERYTHROCYTE [DISTWIDTH] IN BLOOD BY AUTOMATED COUNT: 14 % (ref 11.5–14.5)
HCT VFR BLD AUTO: 39 % (ref 37–48.5)
HGB BLD-MCNC: 11.8 G/DL (ref 12–16)
IMM GRANULOCYTES # BLD AUTO: 0.05 K/UL (ref 0–0.04)
IMM GRANULOCYTES NFR BLD AUTO: 0.5 % (ref 0–0.5)
LYMPHOCYTES # BLD AUTO: 1.6 K/UL (ref 1–4.8)
LYMPHOCYTES NFR BLD: 17.1 % (ref 18–48)
MCH RBC QN AUTO: 30.5 PG (ref 27–31)
MCHC RBC AUTO-ENTMCNC: 30.3 G/DL (ref 32–36)
MCV RBC AUTO: 101 FL (ref 82–98)
MONOCYTES # BLD AUTO: 1.1 K/UL (ref 0.3–1)
MONOCYTES NFR BLD: 11.7 % (ref 4–15)
NEUTROPHILS # BLD AUTO: 6.2 K/UL (ref 1.8–7.7)
NEUTROPHILS NFR BLD: 67.4 % (ref 38–73)
NRBC BLD-RTO: 0 /100 WBC
PLATELET # BLD AUTO: 262 K/UL (ref 150–350)
PMV BLD AUTO: 10.2 FL (ref 9.2–12.9)
RBC # BLD AUTO: 3.87 M/UL (ref 4–5.4)
WBC # BLD AUTO: 9.25 K/UL (ref 3.9–12.7)

## 2019-09-23 PROCEDURE — 99999 PR PBB SHADOW E&M-EST. PATIENT-LVL IV: CPT | Mod: PBBFAC,HCNC,, | Performed by: SURGERY

## 2019-09-23 PROCEDURE — 99499 RISK ADDL DX/OHS AUDIT: ICD-10-PCS | Mod: HCNC,S$GLB,, | Performed by: SURGERY

## 2019-09-23 PROCEDURE — 99999 PR PBB SHADOW E&M-EST. PATIENT-LVL IV: ICD-10-PCS | Mod: PBBFAC,HCNC,, | Performed by: SURGERY

## 2019-09-23 PROCEDURE — 36415 COLL VENOUS BLD VENIPUNCTURE: CPT | Mod: HCNC,PO

## 2019-09-23 PROCEDURE — 99499 UNLISTED E&M SERVICE: CPT | Mod: HCNC,S$GLB,, | Performed by: SURGERY

## 2019-09-23 PROCEDURE — 1101F PT FALLS ASSESS-DOCD LE1/YR: CPT | Mod: HCNC,CPTII,S$GLB, | Performed by: SURGERY

## 2019-09-23 PROCEDURE — 85025 COMPLETE CBC W/AUTO DIFF WBC: CPT | Mod: HCNC

## 2019-09-23 PROCEDURE — 99214 OFFICE O/P EST MOD 30 MIN: CPT | Mod: HCNC,S$GLB,, | Performed by: SURGERY

## 2019-09-23 PROCEDURE — 99214 PR OFFICE/OUTPT VISIT, EST, LEVL IV, 30-39 MIN: ICD-10-PCS | Mod: HCNC,S$GLB,, | Performed by: SURGERY

## 2019-09-23 PROCEDURE — 1101F PR PT FALLS ASSESS DOC 0-1 FALLS W/OUT INJ PAST YR: ICD-10-PCS | Mod: HCNC,CPTII,S$GLB, | Performed by: SURGERY

## 2019-09-23 NOTE — PATIENT INSTRUCTIONS
..PRE-OP INSTRUCTIONS    Your procedure has been scheduled at:    [facility]pre-admit nurse  [phone]      DAY monday    DATE 10/7/2019       Please call the pre-op nurse to schedule your pre-op testing and registration  Someone from the hospital will call you the evening before surgery to let you know what time you need to be at the hospital for surgery.                                               1:  DO NOT EAT OR DRINK ANYTHING AFTER MIDNIGHT THE NIGHT BEFORE SURGERY.     2:  You will need to stop any blood thinners 1 week prior to surgery.  This includes Aspirin, fish oil, Pradaxa, Coumadin, Plavix, Pletal.  Please contact the prescribing doctor to be sure it is ok to stop these medicines.    3:  Pre-admit nurse will go over your medicines and let you know which ones not to take the morning of surgery    4:  Plan to have someone drive you home after you are released from the hospital.  You WILL NOT be able to drive yourself.    5:  If you have any questions or need to change your surgery date, please call Jennifer at [phone]    AFTER SURGERY:    You can shower 48 hours after surgery, REMOVE WET BANDAGES AND BANDAIDS, leave the steri- strips on.  If you have not had a bowel movement within 3 days after surgery you may take a laxative of your choice.  Do not lift anything over 5-10 pounds.    You need to have a follow up appointment 7-14 days after surgery, call the office to schedule or if you have questions or concerns.    For [facility] patients, you will receive a [facility] message with a link to schedule your post op appointment.

## 2019-09-26 RX ORDER — LIDOCAINE HYDROCHLORIDE 10 MG/ML
1 INJECTION, SOLUTION EPIDURAL; INFILTRATION; INTRACAUDAL; PERINEURAL ONCE
Status: CANCELLED | OUTPATIENT
Start: 2019-10-07

## 2019-09-26 RX ORDER — SODIUM CHLORIDE 9 MG/ML
INJECTION, SOLUTION INTRAVENOUS CONTINUOUS
Status: CANCELLED | OUTPATIENT
Start: 2019-10-07

## 2019-09-30 NOTE — H&P (VIEW-ONLY)
Patient ID: Sheree Pugh is a 89 y.o. female.     Chief Complaint: Consult (abnormal mammo )        HPI 89-year-old female referred for evaluation of an abnormal mammogram and ultrasound of the bilateral breast.  She can feel a lesion on the left side. She had a right breast biopsy in her 20s.  Family history is significant for a sister who  of breast cancer.  Ultrasound reveals a lesion on the left side and microcalcifications on the right side.  Biopsy was recommended for both.  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 The patient was considering doing nothing but after a lengthy discussion with her and her family she has agreed to proceed with a left partial mastectomy.  Whether not she will receive radiation or chemotherapy will depend on those evaluations given her age.  No complications from the biopsy.          Past Medical History:   Diagnosis Date    Anticoagulant long-term use       Plavix & Aspirin    ARMD (age related macular degeneration)       os    Arthritis      Cataract       os    Coronary artery disease      Disorder of kidney and ureter      DE LOS SANTOS (dyspnea on exertion)      Elevated cholesterol      Heart disease      Hypertension      Insomnia      Macular degeneration       OS    Obstetrical blood-clot embolism, postpartum      PVD (peripheral vascular disease)      Retinal detachment       OS    Stented coronary artery 2019 1. Moderate disease LAD/CX. 2. Diffuse significant ISR of RCA successfully treated 2.5 and 3.0 POBA only. 3. Patent LM and right renal stent.    Urinary incontinence              Past Surgical History:   Procedure Laterality Date    BREAST BIOPSY Right       benign @ AGE 20YRS.    CATARACT EXTRACTION         od d     CATHETERIZATION, HEART, LEFT Left 2014     Performed by [facility] at [facility] CATH LAB    CHOLECYSTECTOMY        CORONARY STENT PLACEMENT    2010,2011     4 stents in 2010, 2 in 2011    HYSTERECTOMY        INSERTION, CATHETER, RIGHT HEART Right 4/17/2014     Performed by [facility] at [facility] CATH LAB    MOUTH SURGERY         upper & lower denture    PHACOEMULSIFICATION, CATARACT Right 6/12/2013     Performed by Jennifer Lance [facility] at [facility] OR    VAGINAL DELIVERY         times 6            Current Outpatient Medications:     ACETAMINOPHEN (TYLENOL ARTHRITIS ORAL), Take 2 tablets by mouth daily as needed. , Disp: , Rfl:     ANTIOX #11/OM3/DHA/EPA/LUT/RAND (OCUVITE ADULT 50+ ORAL), Take by mouth once daily.  , Disp: , Rfl:     aspirin (ECOTRIN) 81 MG EC tablet, Take by mouth once daily. , Disp: , Rfl:     atorvastatin (LIPITOR) 20 MG tablet, Take 1 tablet (20 mg total) by mouth once daily., Disp: 90 tablet, Rfl: 4    biotin 5,000 mcg TbDL, Take 5,000 mcg by mouth once daily. , Disp: , Rfl:     budesonide-formoterol 160-4.5 mcg (SYMBICORT) 160-4.5 mcg/actuation HFAA, INHALE 2 PUFFS INTO THE LUNGS 2 (TWO) TIMES DAILY. CONTROLLER, Disp: 11 g, Rfl: 11    calcium carbonate-vit D3-min (CALTRATE 600+D PLUS MINERALS) 600 mg calcium- 400 unit Tab, Take 1 tablet by mouth once daily., Disp: , Rfl:     cholecalciferol, vitamin D3, (VITAMIN D3) 400 unit Chew, Take 800 Units by mouth once daily., Disp: , Rfl:     clopidogrel (PLAVIX) 75 mg tablet, Take 1 tablet (75 mg total) by mouth once daily., Disp: 90 tablet, Rfl: 4    cyanocobalamin 1,000 mcg/mL injection, INJECT 1CC (1ML) INTRAMUSCULARLY EVERY THREE WEEKS, Disp: 10 mL, Rfl: 3    diltiaZEM (CARDIZEM CD) 180 MG 24 hr capsule, Take 1 capsule (180 mg total) by mouth every morning., Disp: 90 capsule, Rfl: 4    furosemide (LASIX) 20 MG tablet, Take 1 tablet (20 mg total) by mouth daily as needed., Disp: 90 tablet, Rfl: 4    GLUCOSAMINE HCL/CHONDR FLETCHER A NA (OSTEO BI-FLEX ORAL), Take 2 tablets by mouth. , Disp: , Rfl:     isosorbide mononitrate (IMDUR) 30 MG 24 hr tablet, Take 1 tablet (30 mg  "total) by mouth every evening., Disp: 90 tablet, Rfl: 4    L.acidophil,parac-S.therm-Bif. (RISAQUAD) Cap capsule, Take 1 capsule by mouth once daily., Disp: , Rfl:     MULTIVITAMIN/IRON/FOLIC ACID (CENTRUM ULTRA WOMEN'S ORAL), Take 1 tablet by mouth once daily.  , Disp: , Rfl:     nortriptyline (PAMELOR) 10 MG capsule, Take 1 capsule (10 mg total) by mouth every evening., Disp: 90 capsule, Rfl: 3    potassium chloride SA (K-DUR,KLOR-CON) 20 MEQ tablet, Take 1 tablet (20 mEq total) by mouth daily as needed., Disp: 90 tablet, Rfl: 4    salmon oil-omega-3 fatty acids (SALMON OIL-1000) 1,000-200 mg Cap, Take 2 capsules by mouth once daily. , Disp: , Rfl:     syringe with needle (BD LUER-FRANCI SYRINGE) 3 mL 25 gauge x 1" Syrg, USE TO INJECT B-12 AS DIRECTED, Disp: 10 Syringe, Rfl: 11    tiotropium (SPIRIVA) 18 mcg inhalation capsule, Inhale 1 capsule (18 mcg total) into the lungs once daily using handihaler., Disp: 90 capsule, Rfl: 0    traZODone (DESYREL) 50 MG tablet, Take 1 tablet (50 mg total) by mouth nightly as needed for Insomnia., Disp: 90 tablet, Rfl: 3    VENTOLIN HFA 90 mcg/actuation inhaler, TWO PUFFS INTO THE LUNGS EVERY 6 HOURS AS NEEDED FOR WHEEZING OR SHORTNESS OF BREATH, Disp: 18 g, Rfl: 3           Review of patient's allergies indicates:   Allergen Reactions    Codeine Nausea And Vomiting    Gabapentin      Hydrocodone Nausea And Vomiting       Other reaction(s): Vomiting               Family History   Problem Relation Age of Onset    Alzheimer's disease Father      Cancer Sister      Cancer Sister           1 sister  of breast cancer    Breast cancer Sister 36    Stroke Mother      Heart disease Mother      Cancer Daughter           breast    Breast cancer Daughter 55    Breast cancer Daughter 60    Amblyopia Neg Hx      Blindness Neg Hx      Cataracts Neg Hx      Diabetes Neg Hx      Glaucoma Neg Hx      Hypertension Neg Hx      Macular degeneration Neg Hx      Retinal " detachment Neg Hx      Strabismus Neg Hx      Thyroid disease Neg Hx        Social History               Socioeconomic History    Marital status:        Spouse name: Not on file    Number of children: Not on file    Years of education: Not on file    Highest education level: Not on file   Occupational History    Not on file   Social Needs    Financial resource strain: Not on file    Food insecurity:       Worry: Not on file       Inability: Not on file    Transportation needs:       Medical: Not on file       Non-medical: Not on file   Tobacco Use    Smoking status: Former Smoker       Last attempt to quit: 1/3/2000       Years since quittin.7    Smokeless tobacco: Never Used   Substance and Sexual Activity    Alcohol use: No    Drug use: No    Sexual activity: Not on file   Lifestyle    Physical activity:       Days per week: Not on file       Minutes per session: Not on file    Stress: Not on file   Relationships    Social connections:       Talks on phone: Not on file       Gets together: Not on file       Attends Qu[employer] service: Not on file       Active member of club or organization: Not on file       Attends meetings of clubs or organizations: Not on file       Relationship status: Not on file   Other Topics Concern    Not on file   Social History Narrative    Not on file            Review of Systems   Constitutional: Negative for activity change and chills.   HENT: Negative.    Respiratory: Negative for cough, shortness of breath and wheezing.    Cardiovascular: Negative for chest pain and palpitations.   Gastrointestinal: Negative for abdominal pain, blood in stool, nausea and vomiting.   Endocrine: Negative.    Genitourinary: Negative for dysuria, frequency and hematuria.   Musculoskeletal: Negative for arthralgias, back pain and neck pain.   Skin: Negative for rash and wound.   Allergic/Immunologic: Negative.    Neurological: Negative for dizziness, weakness and  headaches.   Hematological: Negative for adenopathy.   Psychiatric/Behavioral: Negative for agitation and dysphoric mood. The patient is not nervous/anxious.       Objective:      Physical Exam   Constitutional: She is oriented to person, place, and time. She appears well-developed and well-nourished. No distress.   HENT:   Head: Normocephalic and atraumatic.   Eyes: Pupils are equal, round, and reactive to light. Conjunctivae and EOM are normal.   Neck: Normal range of motion. Neck supple.   Cardiovascular: Normal rate, regular rhythm, normal heart sounds and intact distal pulses.   No murmur heard.  Pulmonary/Chest: No stridor. No respiratory distress. She has no wheezes. She has no rales.   Left Breast Exam:  Palpable is superficial mass at 3:00 a.m. in the left breast.  This is nontender.  There are no overlying skin changes.  There is no contour change of the breast.  There is no nipple discharge.  There is no significant breast tenderness.  There is no palpable axillary or supraclavicular adenopathy.    There is no abnormality detected on physical exam of the contralateral breast.   Abdominal: She exhibits no distension and no mass. There is no tenderness. There is no rebound and no guarding.   Musculoskeletal: Normal range of motion. She exhibits no edema.   Lymphadenopathy:     She has no cervical adenopathy.   Neurological: She is alert and oriented to person, place, and time. No cranial nerve deficit. She exhibits normal muscle tone.   Skin: Skin is warm and dry. No rash noted. No erythema.   Psychiatric: She has a normal mood and affect. Her behavior is normal. Judgment normal.      The breasts have scattered areas of fibroglandular density.   Left   US Breast Left Limited   There is a 9 mm irregularly shaped, hypoechoic mass with spiculated margins with shadowing seen in the left breast at 3 o'clock in the anterior depth. The mass correlates with the palpable mass noted during physical examination. THIS  IS NEAR THE CUTANEOUS SURFACE AND EASILY PALPABLE. BIOPSY IS SUGGESTED. THE PATIENT AND HER SON WERE GIVEN THE INFORMATION   Right   Mammo Digital Diagnostic Bilat w/ Toño   There are 14 mm x 14 mm x 5 mm amorphous calcifications in a grouped distribution seen in the central region of the right breast in the middle depth. This may relate to DCIS. Biopsy may be helpful for confirmation   Impression:   Left   Mass: Left breast 9 mm mass at the anterior 3 o'clock position. Assessment: 5 - Highly suggestive of malignancy. Biopsy is recommended.   Right   Calcifications: Right breast 14 mm x 14 mm x 5 mm calcifications at the central middle position. Assessment: 4 - Suspicious finding. Biopsy is recommended.   BI-RADS Category:   Overall: 5 - Highly Suggestive of Malignancy         FINAL PATHOLOGIC DIAGNOSIS  1. Right breast, central area with calcifications, stereotactic biopsy:  Benign breast tissue with sclerosing intraductal papilloma, columnar cell changes and focal apocrine metaplasia.  Abundant microcalcifications are identified in association with papilloma.  Negative for atypia or malignancy.  [id number]  Report continued on next page Page 1 of 3  Patient Name JUANCHO VILLAR Accession # [id number]  (89Y F)  Location  Date of Birth  [id number]  8/26/1927  Billing #  Collection Date  Received  Reported  Medical Record #  09/17/2019 09/18/2019 09/20/2019  [facility] Ultrasound  [id number]  2. Right breast, central area without calcifications, stereotactic biopsy:  Benign breast tissue with cystic ducts and columnar cell changes.  Negative for atypia or malignancy.  3. Left breast, 3 o'clock 2 cm from nipple, vacuum assisted biopsy:  Invasive ductal carcinoma, Lucio histologic grade 2 (tubular formation: 3, nuclear pleomorphism: 2, mitotic       Assessment:   1.   left breast invasive ductal carcinoma.  Right breast papilloma.     Plan:      1.   Patient has agreed to proceed with left partial mastectomy.  She  is leaning away from any additional intervention however.  2. Risks and benefits of the planned procedure were discussed at length with the patient.  Risks and benefits of not proceeding with the procedure were discussed as well. All questions were answered. The patient expressed clear understanding and would like to proceed with the procedure as discussed.

## 2019-09-30 NOTE — PROGRESS NOTES
Patient ID: Sheree Pugh is a 89 y.o. female.     Chief Complaint: Consult (abnormal mammo )        HPI 89-year-old female referred for evaluation of an abnormal mammogram and ultrasound of the bilateral breast.  She can feel a lesion on the left side. She had a right breast biopsy in her 20s.  Family history is significant for a sister who  of breast cancer.  Ultrasound reveals a lesion on the left side and microcalcifications on the right side.  Biopsy was recommended for both.  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 The patient was considering doing nothing but after a lengthy discussion with her and her family she has agreed to proceed with a left partial mastectomy.  Whether not she will receive radiation or chemotherapy will depend on those evaluations given her age.  No complications from the biopsy.          Past Medical History:   Diagnosis Date    Anticoagulant long-term use       Plavix & Aspirin    ARMD (age related macular degeneration)       os    Arthritis      Cataract       os    Coronary artery disease      Disorder of kidney and ureter      DE LOS SANTOS (dyspnea on exertion)      Elevated cholesterol      Heart disease      Hypertension      Insomnia      Macular degeneration       OS    Obstetrical blood-clot embolism, postpartum      PVD (peripheral vascular disease)      Retinal detachment       OS    Stented coronary artery 2019 1. Moderate disease LAD/CX. 2. Diffuse significant ISR of RCA successfully treated 2.5 and 3.0 POBA only. 3. Patent LM and right renal stent.    Urinary incontinence              Past Surgical History:   Procedure Laterality Date    BREAST BIOPSY Right       benign @ AGE 20YRS.    CATARACT EXTRACTION         od d     CATHETERIZATION, HEART, LEFT Left 2014     Performed by [facility] at [facility] CATH LAB    CHOLECYSTECTOMY        CORONARY STENT PLACEMENT    2010,2011     4 stents in 2010, 2 in 2011    HYSTERECTOMY        INSERTION, CATHETER, RIGHT HEART Right 4/17/2014     Performed by Terell Jay MD at [facility] CATH LAB    MOUTH SURGERY         upper & lower denture    PHACOEMULSIFICATION, CATARACT Right 6/12/2013     Performed by Jennifer Lance MD at [facility] OR    VAGINAL DELIVERY         times 6            Current Outpatient Medications:     ACETAMINOPHEN (TYLENOL ARTHRITIS ORAL), Take 2 tablets by mouth daily as needed. , Disp: , Rfl:     ANTIOX #11/OM3/DHA/EPA/LUT/RAND (OCUVITE ADULT 50+ ORAL), Take by mouth once daily.  , Disp: , Rfl:     aspirin (ECOTRIN) 81 MG EC tablet, Take by mouth once daily. , Disp: , Rfl:     atorvastatin (LIPITOR) 20 MG tablet, Take 1 tablet (20 mg total) by mouth once daily., Disp: 90 tablet, Rfl: 4    biotin 5,000 mcg TbDL, Take 5,000 mcg by mouth once daily. , Disp: , Rfl:     budesonide-formoterol 160-4.5 mcg (SYMBICORT) 160-4.5 mcg/actuation HFAA, INHALE 2 PUFFS INTO THE LUNGS 2 (TWO) TIMES DAILY. CONTROLLER, Disp: 11 g, Rfl: 11    calcium carbonate-vit D3-min (CALTRATE 600+D PLUS MINERALS) 600 mg calcium- 400 unit Tab, Take 1 tablet by mouth once daily., Disp: , Rfl:     cholecalciferol, vitamin D3, (VITAMIN D3) 400 unit Chew, Take 800 Units by mouth once daily., Disp: , Rfl:     clopidogrel (PLAVIX) 75 mg tablet, Take 1 tablet (75 mg total) by mouth once daily., Disp: 90 tablet, Rfl: 4    cyanocobalamin 1,000 mcg/mL injection, INJECT 1CC (1ML) INTRAMUSCULARLY EVERY THREE WEEKS, Disp: 10 mL, Rfl: 3    diltiaZEM (CARDIZEM CD) 180 MG 24 hr capsule, Take 1 capsule (180 mg total) by mouth every morning., Disp: 90 capsule, Rfl: 4    furosemide (LASIX) 20 MG tablet, Take 1 tablet (20 mg total) by mouth daily as needed., Disp: 90 tablet, Rfl: 4    GLUCOSAMINE HCL/CHONDR FLETCHER A NA (OSTEO BI-FLEX ORAL), Take 2 tablets by mouth. , Disp: , Rfl:     isosorbide mononitrate (IMDUR) 30 MG 24 hr tablet, Take 1 tablet (30 mg  "total) by mouth every evening., Disp: 90 tablet, Rfl: 4    L.acidophil,parac-S.therm-Bif. (RISAQUAD) Cap capsule, Take 1 capsule by mouth once daily., Disp: , Rfl:     MULTIVITAMIN/IRON/FOLIC ACID (CENTRUM ULTRA WOMEN'S ORAL), Take 1 tablet by mouth once daily.  , Disp: , Rfl:     nortriptyline (PAMELOR) 10 MG capsule, Take 1 capsule (10 mg total) by mouth every evening., Disp: 90 capsule, Rfl: 3    potassium chloride SA (K-DUR,KLOR-CON) 20 MEQ tablet, Take 1 tablet (20 mEq total) by mouth daily as needed., Disp: 90 tablet, Rfl: 4    salmon oil-omega-3 fatty acids (SALMON OIL-1000) 1,000-200 mg Cap, Take 2 capsules by mouth once daily. , Disp: , Rfl:     syringe with needle (BD LUER-FRANCI SYRINGE) 3 mL 25 gauge x 1" Syrg, USE TO INJECT B-12 AS DIRECTED, Disp: 10 Syringe, Rfl: 11    tiotropium (SPIRIVA) 18 mcg inhalation capsule, Inhale 1 capsule (18 mcg total) into the lungs once daily using handihaler., Disp: 90 capsule, Rfl: 0    traZODone (DESYREL) 50 MG tablet, Take 1 tablet (50 mg total) by mouth nightly as needed for Insomnia., Disp: 90 tablet, Rfl: 3    VENTOLIN HFA 90 mcg/actuation inhaler, TWO PUFFS INTO THE LUNGS EVERY 6 HOURS AS NEEDED FOR WHEEZING OR SHORTNESS OF BREATH, Disp: 18 g, Rfl: 3           Review of patient's allergies indicates:   Allergen Reactions    Codeine Nausea And Vomiting    Gabapentin      Hydrocodone Nausea And Vomiting       Other reaction(s): Vomiting               Family History   Problem Relation Age of Onset    Alzheimer's disease Father      Cancer Sister      Cancer Sister           1 sister  of breast cancer    Breast cancer Sister 36    Stroke Mother      Heart disease Mother      Cancer Daughter           breast    Breast cancer Daughter 55    Breast cancer Daughter 60    Amblyopia Neg Hx      Blindness Neg Hx      Cataracts Neg Hx      Diabetes Neg Hx      Glaucoma Neg Hx      Hypertension Neg Hx      Macular degeneration Neg Hx      Retinal " detachment Neg Hx      Strabismus Neg Hx      Thyroid disease Neg Hx        Social History               Socioeconomic History    Marital status:        Spouse name: Not on file    Number of children: Not on file    Years of education: Not on file    Highest education level: Not on file   Occupational History    Not on file   Social Needs    Financial resource strain: Not on file    Food insecurity:       Worry: Not on file       Inability: Not on file    Transportation needs:       Medical: Not on file       Non-medical: Not on file   Tobacco Use    Smoking status: Former Smoker       Last attempt to quit: 1/3/2000       Years since quittin.7    Smokeless tobacco: Never Used   Substance and Sexual Activity    Alcohol use: No    Drug use: No    Sexual activity: Not on file   Lifestyle    Physical activity:       Days per week: Not on file       Minutes per session: Not on file    Stress: Not on file   Relationships    Social connections:       Talks on phone: Not on file       Gets together: Not on file       Attends [employer] service: Not on file       Active member of club or organization: Not on file       Attends meetings of clubs or organizations: Not on file       Relationship status: Not on file   Other Topics Concern    Not on file   Social History Narrative    Not on file            Review of Systems   Constitutional: Negative for activity change and chills.   HENT: Negative.    Respiratory: Negative for cough, shortness of breath and wheezing.    Cardiovascular: Negative for chest pain and palpitations.   Gastrointestinal: Negative for abdominal pain, blood in stool, nausea and vomiting.   Endocrine: Negative.    Genitourinary: Negative for dysuria, frequency and hematuria.   Musculoskeletal: Negative for arthralgias, back pain and neck pain.   Skin: Negative for rash and wound.   Allergic/Immunologic: Negative.    Neurological: Negative for dizziness, weakness and  headaches.   Hematological: Negative for adenopathy.   Psychiatric/Behavioral: Negative for agitation and dysphoric mood. The patient is not nervous/anxious.       Objective:      Physical Exam   Constitutional: She is oriented to person, place, and time. She appears well-developed and well-nourished. No distress.   HENT:   Head: Normocephalic and atraumatic.   Eyes: Pupils are equal, round, and reactive to light. Conjunctivae and EOM are normal.   Neck: Normal range of motion. Neck supple.   Cardiovascular: Normal rate, regular rhythm, normal heart sounds and intact distal pulses.   No murmur heard.  Pulmonary/Chest: No stridor. No respiratory distress. She has no wheezes. She has no rales.   Left Breast Exam:  Palpable is superficial mass at 3:00 a.m. in the left breast.  This is nontender.  There are no overlying skin changes.  There is no contour change of the breast.  There is no nipple discharge.  There is no significant breast tenderness.  There is no palpable axillary or supraclavicular adenopathy.    There is no abnormality detected on physical exam of the contralateral breast.   Abdominal: She exhibits no distension and no mass. There is no tenderness. There is no rebound and no guarding.   Musculoskeletal: Normal range of motion. She exhibits no edema.   Lymphadenopathy:     She has no cervical adenopathy.   Neurological: She is alert and oriented to person, place, and time. No cranial nerve deficit. She exhibits normal muscle tone.   Skin: Skin is warm and dry. No rash noted. No erythema.   Psychiatric: She has a normal mood and affect. Her behavior is normal. Judgment normal.      The breasts have scattered areas of fibroglandular density.   Left   US Breast Left Limited   There is a 9 mm irregularly shaped, hypoechoic mass with spiculated margins with shadowing seen in the left breast at 3 o'clock in the anterior depth. The mass correlates with the palpable mass noted during physical examination. THIS  IS NEAR THE CUTANEOUS SURFACE AND EASILY PALPABLE. BIOPSY IS SUGGESTED. THE PATIENT AND HER SON WERE GIVEN THE INFORMATION   Right   Mammo Digital Diagnostic Bilat w/ Toño   There are 14 mm x 14 mm x 5 mm amorphous calcifications in a grouped distribution seen in the central region of the right breast in the middle depth. This may relate to DCIS. Biopsy may be helpful for confirmation   Impression:   Left   Mass: Left breast 9 mm mass at the anterior 3 o'clock position. Assessment: 5 - Highly suggestive of malignancy. Biopsy is recommended.   Right   Calcifications: Right breast 14 mm x 14 mm x 5 mm calcifications at the central middle position. Assessment: 4 - Suspicious finding. Biopsy is recommended.   BI-RADS Category:   Overall: 5 - Highly Suggestive of Malignancy         FINAL PATHOLOGIC DIAGNOSIS  1. Right breast, central area with calcifications, stereotactic biopsy:  Benign breast tissue with sclerosing intraductal papilloma, columnar cell changes and focal apocrine metaplasia.  Abundant microcalcifications are identified in association with papilloma.  Negative for atypia or malignancy.  [id number]  Report continued on next page Page 1 of 3  Patient Name JUANCHO VILLAR Accession # [id number]  (89Y F)  Location  Date of Birth  [id number]  8/26/1927  Billing #  Collection Date  Received  Reported  Medical Record #  09/17/2019 09/18/2019 09/20/2019  [facility]trasound  [id number]  2. Right breast, central area without calcifications, stereotactic biopsy:  Benign breast tissue with cystic ducts and columnar cell changes.  Negative for atypia or malignancy.  3. Left breast, 3 o'clock 2 cm from nipple, vacuum assisted biopsy:  Invasive ductal carcinoma, Lucio histologic grade 2 (tubular formation: 3, nuclear pleomorphism: 2, mitotic       Assessment:   1.   left breast invasive ductal carcinoma.  Right breast papilloma.     Plan:      1.   Patient has agreed to proceed with left partial mastectomy.  She  is leaning away from any additional intervention however.  2. Risks and benefits of the planned procedure were discussed at length with the patient.  Risks and benefits of not proceeding with the procedure were discussed as well. All questions were answered. The patient expressed clear understanding and would like to proceed with the procedure as discussed.

## 2019-10-04 ENCOUNTER — ANESTHESIA EVENT (OUTPATIENT)
Dept: SURGERY | Facility: HOSPITAL | Age: 84
End: 2019-10-04
Payer: MEDICARE

## 2019-10-07 ENCOUNTER — HOSPITAL ENCOUNTER (OUTPATIENT)
Dept: RADIOLOGY | Facility: HOSPITAL | Age: 84
Discharge: HOME | End: 2019-10-07
Attending: SURGERY | Admitting: SURGERY
Payer: MEDICARE

## 2019-10-07 ENCOUNTER — HOSPITAL ENCOUNTER (OUTPATIENT)
Facility: HOSPITAL | Age: 84
Discharge: HOME | End: 2019-10-07
Attending: SURGERY | Admitting: SURGERY
Payer: MEDICARE

## 2019-10-07 ENCOUNTER — ANESTHESIA (OUTPATIENT)
Dept: SURGERY | Facility: HOSPITAL | Age: 84
End: 2019-10-07
Payer: MEDICARE

## 2019-10-07 DIAGNOSIS — D36.9 INTRADUCTAL PAPILLOMA: ICD-10-CM

## 2019-10-07 DIAGNOSIS — C50.912 INVASIVE DUCTAL CARCINOMA OF BREAST, FEMALE, LEFT: ICD-10-CM

## 2019-10-07 DIAGNOSIS — C50.912 BREAST CANCER, LEFT BREAST: Primary | ICD-10-CM

## 2019-10-07 DIAGNOSIS — C50.912 MALIGNANT NEOPLASM OF LEFT FEMALE BREAST, UNSPECIFIED ESTROGEN RECEPTOR STATUS, UNSPECIFIED SITE OF BREAST: ICD-10-CM

## 2019-10-07 PROCEDURE — 19301 PARTIAL MASTECTOMY: CPT | Mod: HCNC,LT,, | Performed by: SURGERY

## 2019-10-07 PROCEDURE — C1729 CATH, DRAINAGE: HCPCS | Mod: HCNC,PO | Performed by: SURGERY

## 2019-10-07 PROCEDURE — 36000707: Mod: HCNC,PO | Performed by: SURGERY

## 2019-10-07 PROCEDURE — 76098 X-RAY EXAM SURGICAL SPECIMEN: CPT | Mod: 26,HCNC,, | Performed by: RADIOLOGY

## 2019-10-07 PROCEDURE — 37000009 HC ANESTHESIA EA ADD 15 MINS: Mod: HCNC,PO | Performed by: SURGERY

## 2019-10-07 PROCEDURE — 88342 IMHCHEM/IMCYTCHM 1ST ANTB: CPT | Mod: HCNC,59 | Performed by: PATHOLOGY

## 2019-10-07 PROCEDURE — 76098 MAMMO BREAST SPECIMEN: ICD-10-PCS | Mod: 26,HCNC,, | Performed by: RADIOLOGY

## 2019-10-07 PROCEDURE — 71000039 HC RECOVERY, EACH ADD'L HOUR: Mod: HCNC,PO | Performed by: SURGERY

## 2019-10-07 PROCEDURE — 71000015 HC POSTOP RECOV 1ST HR: Mod: HCNC,PO | Performed by: SURGERY

## 2019-10-07 PROCEDURE — 63600175 PHARM REV CODE 636 W HCPCS: Mod: HCNC,PO | Performed by: ANESTHESIOLOGY

## 2019-10-07 PROCEDURE — 19285 PERQ DEV BREAST 1ST US IMAG: CPT | Mod: HCNC,LT,, | Performed by: RADIOLOGY

## 2019-10-07 PROCEDURE — 19285 PERQ DEV BREAST 1ST US IMAG: CPT | Mod: TC,HCNC,PO,59

## 2019-10-07 PROCEDURE — 71000033 HC RECOVERY, INTIAL HOUR: Mod: HCNC,PO | Performed by: SURGERY

## 2019-10-07 PROCEDURE — 63600175 PHARM REV CODE 636 W HCPCS: Mod: HCNC,PO | Performed by: SURGERY

## 2019-10-07 PROCEDURE — 38900 PR INTRAOPERATIVE SENTINEL LYMPH NODE ID W DYE INJECTION: ICD-10-PCS | Mod: HCNC,LT,, | Performed by: SURGERY

## 2019-10-07 PROCEDURE — 25000003 PHARM REV CODE 250: Mod: HCNC,PO | Performed by: SURGERY

## 2019-10-07 PROCEDURE — D9220A PRA ANESTHESIA: Mod: HCNC,ANES,, | Performed by: ANESTHESIOLOGY

## 2019-10-07 PROCEDURE — 63600175 PHARM REV CODE 636 W HCPCS: Mod: HCNC,PO | Performed by: NURSE ANESTHETIST, CERTIFIED REGISTERED

## 2019-10-07 PROCEDURE — 38525 BIOPSY/REMOVAL LYMPH NODES: CPT | Mod: 51,HCNC,LT, | Performed by: SURGERY

## 2019-10-07 PROCEDURE — 76098 X-RAY EXAM SURGICAL SPECIMEN: CPT | Mod: TC,HCNC,PO

## 2019-10-07 PROCEDURE — A9541 TC99M SULFUR COLLOID: HCPCS | Mod: HCNC,PO

## 2019-10-07 PROCEDURE — 88305 TISSUE SPECIMEN TO PATHOLOGY - SURGERY: ICD-10-PCS | Mod: 26,HCNC,, | Performed by: PATHOLOGY

## 2019-10-07 PROCEDURE — 88342 IMHCHEM/IMCYTCHM 1ST ANTB: CPT | Mod: 26,HCNC,, | Performed by: PATHOLOGY

## 2019-10-07 PROCEDURE — 88305 TISSUE EXAM BY PATHOLOGIST: CPT | Mod: HCNC | Performed by: PATHOLOGY

## 2019-10-07 PROCEDURE — 19285 US NEEDLE LOC WITH ULTRASOUND 1ST SITE: ICD-10-PCS | Mod: HCNC,LT,, | Performed by: RADIOLOGY

## 2019-10-07 PROCEDURE — D9220A PRA ANESTHESIA: ICD-10-PCS | Mod: HCNC,ANES,, | Performed by: ANESTHESIOLOGY

## 2019-10-07 PROCEDURE — 88342 TISSUE SPECIMEN TO PATHOLOGY - SURGERY: ICD-10-PCS | Mod: 26,HCNC,, | Performed by: PATHOLOGY

## 2019-10-07 PROCEDURE — 38792 RA TRACER ID OF SENTINL NODE: CPT | Mod: TC,HCNC,PO

## 2019-10-07 PROCEDURE — 37000008 HC ANESTHESIA 1ST 15 MINUTES: Mod: HCNC,PO | Performed by: SURGERY

## 2019-10-07 PROCEDURE — 25000003 PHARM REV CODE 250: Mod: HCNC,PO | Performed by: NURSE ANESTHETIST, CERTIFIED REGISTERED

## 2019-10-07 PROCEDURE — 88307 TISSUE EXAM BY PATHOLOGIST: CPT | Mod: 26,HCNC,, | Performed by: PATHOLOGY

## 2019-10-07 PROCEDURE — 38792 RA TRACER ID OF SENTINL NODE: CPT | Mod: HCNC,LT,, | Performed by: RADIOLOGY

## 2019-10-07 PROCEDURE — 88307 TISSUE SPECIMEN TO PATHOLOGY - SURGERY: ICD-10-PCS | Mod: 26,HCNC,, | Performed by: PATHOLOGY

## 2019-10-07 PROCEDURE — 38792 NM SENTINEL LYMPH NODE INJECTION ONLY_RAD PERFORMED: ICD-10-PCS | Mod: HCNC,LT,, | Performed by: RADIOLOGY

## 2019-10-07 PROCEDURE — 19301 PR MASTECTOMY, PARTIAL: ICD-10-PCS | Mod: HCNC,LT,, | Performed by: SURGERY

## 2019-10-07 PROCEDURE — 88305 TISSUE EXAM BY PATHOLOGIST: CPT | Mod: 26,HCNC,, | Performed by: PATHOLOGY

## 2019-10-07 PROCEDURE — 38525 PR BIOPSY/REM LYMPH NODES, AXILLARY: ICD-10-PCS | Mod: 51,HCNC,LT, | Performed by: SURGERY

## 2019-10-07 PROCEDURE — 38900 IO MAP OF SENT LYMPH NODE: CPT | Mod: HCNC,LT,, | Performed by: SURGERY

## 2019-10-07 PROCEDURE — 36000706: Mod: HCNC,PO | Performed by: SURGERY

## 2019-10-07 PROCEDURE — 88341 IMHCHEM/IMCYTCHM EA ADD ANTB: CPT | Mod: 26,HCNC,, | Performed by: PATHOLOGY

## 2019-10-07 PROCEDURE — 88341 PR IHC OR ICC EACH ADD'L SINGLE ANTIBODY  STAINPR: ICD-10-PCS | Mod: 26,HCNC,, | Performed by: PATHOLOGY

## 2019-10-07 RX ORDER — HYDROCODONE BITARTRATE AND ACETAMINOPHEN 5; 325 MG/1; MG/1
1 TABLET ORAL EVERY 6 HOURS PRN
Qty: 10 TABLET | Refills: 0 | Status: SHIPPED | OUTPATIENT
Start: 2019-10-07 | End: 2019-12-23

## 2019-10-07 RX ORDER — OXYCODONE HYDROCHLORIDE 5 MG/1
5 TABLET ORAL ONCE
Status: COMPLETED | OUTPATIENT
Start: 2019-10-07 | End: 2019-10-07

## 2019-10-07 RX ORDER — FENTANYL CITRATE 50 UG/ML
INJECTION, SOLUTION INTRAMUSCULAR; INTRAVENOUS
Status: DISCONTINUED | OUTPATIENT
Start: 2019-10-07 | End: 2019-10-07

## 2019-10-07 RX ORDER — PROPOFOL 10 MG/ML
VIAL (ML) INTRAVENOUS
Status: DISCONTINUED | OUTPATIENT
Start: 2019-10-07 | End: 2019-10-07

## 2019-10-07 RX ORDER — LIDOCAINE HYDROCHLORIDE 10 MG/ML
1 INJECTION, SOLUTION EPIDURAL; INFILTRATION; INTRACAUDAL; PERINEURAL ONCE
Status: DISCONTINUED | OUTPATIENT
Start: 2019-10-07 | End: 2019-10-07 | Stop reason: HOSPADM

## 2019-10-07 RX ORDER — LIDOCAINE HYDROCHLORIDE 10 MG/ML
1 INJECTION, SOLUTION EPIDURAL; INFILTRATION; INTRACAUDAL; PERINEURAL ONCE
Status: COMPLETED | OUTPATIENT
Start: 2019-10-07 | End: 2019-10-07

## 2019-10-07 RX ORDER — SODIUM CHLORIDE 9 MG/ML
INJECTION, SOLUTION INTRAVENOUS CONTINUOUS
Status: DISCONTINUED | OUTPATIENT
Start: 2019-10-07 | End: 2019-10-07 | Stop reason: HOSPADM

## 2019-10-07 RX ORDER — ONDANSETRON HYDROCHLORIDE 8 MG/1
8 TABLET, FILM COATED ORAL EVERY 8 HOURS PRN
Qty: 10 TABLET | Refills: 0 | Status: SHIPPED | OUTPATIENT
Start: 2019-10-07 | End: 2019-12-23

## 2019-10-07 RX ORDER — LIDOCAINE HYDROCHLORIDE 10 MG/ML
5 INJECTION INFILTRATION; PERINEURAL ONCE
Status: COMPLETED | OUTPATIENT
Start: 2019-10-07 | End: 2019-10-07

## 2019-10-07 RX ORDER — HYDRALAZINE HYDROCHLORIDE 20 MG/ML
5 INJECTION INTRAMUSCULAR; INTRAVENOUS ONCE
Status: COMPLETED | OUTPATIENT
Start: 2019-10-07 | End: 2019-10-07

## 2019-10-07 RX ORDER — ACETAMINOPHEN 10 MG/ML
INJECTION, SOLUTION INTRAVENOUS
Status: DISCONTINUED | OUTPATIENT
Start: 2019-10-07 | End: 2019-10-07

## 2019-10-07 RX ORDER — LIDOCAINE HYDROCHLORIDE 10 MG/ML
INJECTION, SOLUTION INTRAVENOUS
Status: DISCONTINUED | OUTPATIENT
Start: 2019-10-07 | End: 2019-10-07

## 2019-10-07 RX ORDER — CEFAZOLIN SODIUM 2 G/50ML
2 SOLUTION INTRAVENOUS
Status: COMPLETED | OUTPATIENT
Start: 2019-10-07 | End: 2019-10-07

## 2019-10-07 RX ORDER — BUPIVACAINE HYDROCHLORIDE AND EPINEPHRINE 2.5; 5 MG/ML; UG/ML
INJECTION, SOLUTION EPIDURAL; INFILTRATION; INTRACAUDAL; PERINEURAL
Status: DISCONTINUED | OUTPATIENT
Start: 2019-10-07 | End: 2019-10-07 | Stop reason: HOSPADM

## 2019-10-07 RX ORDER — METHYLENE BLUE 10 MG/ML
INJECTION INTRAVENOUS
Status: DISCONTINUED | OUTPATIENT
Start: 2019-10-07 | End: 2019-10-07 | Stop reason: HOSPADM

## 2019-10-07 RX ORDER — SODIUM CHLORIDE, SODIUM LACTATE, POTASSIUM CHLORIDE, CALCIUM CHLORIDE 600; 310; 30; 20 MG/100ML; MG/100ML; MG/100ML; MG/100ML
INJECTION, SOLUTION INTRAVENOUS CONTINUOUS
Status: DISCONTINUED | OUTPATIENT
Start: 2019-10-07 | End: 2019-10-07 | Stop reason: HOSPADM

## 2019-10-07 RX ORDER — FENTANYL CITRATE 50 UG/ML
25 INJECTION, SOLUTION INTRAMUSCULAR; INTRAVENOUS EVERY 5 MIN PRN
Status: COMPLETED | OUTPATIENT
Start: 2019-10-07 | End: 2019-10-07

## 2019-10-07 RX ADMIN — HYDRALAZINE HYDROCHLORIDE 5 MG: 20 INJECTION INTRAMUSCULAR; INTRAVENOUS at 02:10

## 2019-10-07 RX ADMIN — LIDOCAINE HYDROCHLORIDE 50 MG: 10 INJECTION, SOLUTION INTRAVENOUS at 01:10

## 2019-10-07 RX ADMIN — FENTANYL CITRATE 25 MCG: 50 INJECTION INTRAMUSCULAR; INTRAVENOUS at 03:10

## 2019-10-07 RX ADMIN — CEFAZOLIN SODIUM 2 G: 2 SOLUTION INTRAVENOUS at 12:10

## 2019-10-07 RX ADMIN — SODIUM CHLORIDE, SODIUM LACTATE, POTASSIUM CHLORIDE, AND CALCIUM CHLORIDE: .6; .31; .03; .02 INJECTION, SOLUTION INTRAVENOUS at 12:10

## 2019-10-07 RX ADMIN — FENTANYL CITRATE 25 MCG: 50 INJECTION, SOLUTION INTRAMUSCULAR; INTRAVENOUS at 02:10

## 2019-10-07 RX ADMIN — LIDOCAINE HYDROCHLORIDE: 10 INJECTION, SOLUTION EPIDURAL; INFILTRATION; INTRACAUDAL; PERINEURAL at 12:10

## 2019-10-07 RX ADMIN — FENTANYL CITRATE 25 MCG: 50 INJECTION INTRAMUSCULAR; INTRAVENOUS at 02:10

## 2019-10-07 RX ADMIN — LIDOCAINE HYDROCHLORIDE 5 ML: 10 INJECTION, SOLUTION EPIDURAL; INFILTRATION; INTRACAUDAL; PERINEURAL at 11:10

## 2019-10-07 RX ADMIN — FENTANYL CITRATE 50 MCG: 50 INJECTION, SOLUTION INTRAMUSCULAR; INTRAVENOUS at 01:10

## 2019-10-07 RX ADMIN — FENTANYL CITRATE 25 MCG: 50 INJECTION, SOLUTION INTRAMUSCULAR; INTRAVENOUS at 01:10

## 2019-10-07 RX ADMIN — PROPOFOL 100 MG: 10 INJECTION, EMULSION INTRAVENOUS at 01:10

## 2019-10-07 RX ADMIN — OXYCODONE HYDROCHLORIDE 5 MG: 5 TABLET ORAL at 04:10

## 2019-10-07 RX ADMIN — ACETAMINOPHEN 1000 MG: 10 INJECTION, SOLUTION INTRAVENOUS at 01:10

## 2019-10-07 NOTE — DISCHARGE SUMMARY
Discharge Summary  General Surgery      Admit Date: 10/7/2019    Discharge Date :10/7/2019    Attending Physician: Ronan Dennis     Discharge Physician: Ronan Dennis    Discharged Condition: good    Discharge Diagnosis: Malignant neoplasm of left female breast, unspecified estrogen receptor status, unspecified site of breast [C50.912]    Treatments/Procedures: Procedure(s) (LRB):  LYMPHADENECTOMY, AXILLARY lymph node dissection (Left)  MASTECTOMY, PARTIAL (Left)  INJECTION, FOR SENTINEL NODE IDENTIFICATION (Left)    Hospital Course: Uneventful; Discharged home from [facility]    Significant Diagnostic Studies: none    Disposition: Home or Self Care    Diet: Regular    Follow up: Office 10-14 days    Activity: No heavy lifting till seen in office.    Patient Instructions:   Current Discharge Medication List      START taking these medications    Details   HYDROcodone-acetaminophen (NORCO) 5-325 mg per tablet Take 1 tablet by mouth every 6 (six) hours as needed for Pain.  Qty: 10 tablet, Refills: 0    Comments: Quantity prescribed more than 7 day supply? No      ondansetron (ZOFRAN) 8 MG tablet Take 1 tablet (8 mg total) by mouth every 8 (eight) hours as needed for Nausea.  Qty: 10 tablet, Refills: 0         CONTINUE these medications which have NOT CHANGED    Details   ACETAMINOPHEN (TYLENOL ARTHRITIS ORAL) Take 2 tablets by mouth daily as needed.       ANTIOX #11/OM3/DHA/EPA/LUT/RAND (OCUVITE ADULT 50+ ORAL) Take by mouth once daily.        aspirin (ECOTRIN) 81 MG EC tablet Take by mouth once daily.       atorvastatin (LIPITOR) 20 MG tablet Take 1 tablet (20 mg total) by mouth once daily.  Qty: 90 tablet, Refills: 4    Associated Diagnoses: History of PTCA; Stenosis of right carotid artery; Peripheral vascular disease; Dyslipidemia; Chronic kidney disease, stage III (moderate); Coronary artery disease involving native coronary artery of native heart without angina pectoris; Nonrheumatic aortic valve stenosis       biotin 5,000 mcg TbDL Take 5,000 mcg by mouth once daily.       budesonide-formoterol 160-4.5 mcg (SYMBICORT) 160-4.5 mcg/actuation HFAA INHALE 2 PUFFS INTO THE LUNGS 2 (TWO) TIMES DAILY. CONTROLLER  Qty: 11 g, Refills: 11    Associated Diagnoses: Chronic obstructive pulmonary disease, unspecified COPD type      calcium carbonate-vit D3-min (CALTRATE 600+D PLUS MINERALS) 600 mg calcium- 400 unit Tab Take 1 tablet by mouth once daily.      cholecalciferol, vitamin D3, (VITAMIN D3) 400 unit Chew Take 800 Units by mouth once daily.      clopidogrel (PLAVIX) 75 mg tablet Take 1 tablet (75 mg total) by mouth once daily.  Qty: 90 tablet, Refills: 4    Comments: Office visit past due.  Office visit required for additional refills  Associated Diagnoses: History of PTCA; Stenosis of right carotid artery; Peripheral vascular disease; Coronary artery disease involving native coronary artery of native heart without angina pectoris; Nonrheumatic aortic valve stenosis      cyanocobalamin 1,000 mcg/mL injection INJECT 1CC (1ML) INTRAMUSCULARLY EVERY THREE WEEKS  Qty: 10 mL, Refills: 3      diltiaZEM (CARDIZEM CD) 180 MG 24 hr capsule Take 1 capsule (180 mg total) by mouth every morning.  Qty: 90 capsule, Refills: 4      furosemide (LASIX) 20 MG tablet Take 1 tablet (20 mg total) by mouth daily as needed.  Qty: 90 tablet, Refills: 4    Associated Diagnoses: History of PTCA; Stenosis of right carotid artery; Peripheral vascular disease; Dyslipidemia; Chronic kidney disease, stage III (moderate); Coronary artery disease involving native coronary artery of native heart without angina pectoris; Nonrheumatic aortic valve stenosis      GLUCOSAMINE HCL/CHONDR FLETCHER A NA (OSTEO BI-FLEX ORAL) Take 2 tablets by mouth.       isosorbide mononitrate (IMDUR) 30 MG 24 hr tablet Take 1 tablet (30 mg total) by mouth every evening.  Qty: 90 tablet, Refills: 4      L.acidophil,parac-S.therm-Bif. (RISAQUAD) Cap capsule Take 1 capsule by mouth once  "daily.      MULTIVITAMIN/IRON/FOLIC ACID (CENTRUM ULTRA WOMEN'S ORAL) Take 1 tablet by mouth once daily.        nortriptyline (PAMELOR) 10 MG capsule Take 1 capsule (10 mg total) by mouth every evening.  Qty: 90 capsule, Refills: 3    Associated Diagnoses: Sciatica of right side      potassium chloride SA (K-DUR,KLOR-CON) 20 MEQ tablet Take 1 tablet (20 mEq total) by mouth daily as needed.  Qty: 90 tablet, Refills: 4    Associated Diagnoses: History of PTCA; Stenosis of right carotid artery; Peripheral vascular disease; Dyslipidemia; Chronic kidney disease, stage III (moderate); Coronary artery disease involving native coronary artery of native heart without angina pectoris; Nonrheumatic aortic valve stenosis      salmon oil-omega-3 fatty acids (SALMON OIL-1000) 1,000-200 mg Cap Take 2 capsules by mouth once daily.       tiotropium (SPIRIVA) 18 mcg inhalation capsule Inhale 1 capsule (18 mcg total) into the lungs once daily using handihaler.  Qty: 90 capsule, Refills: 0    Associated Diagnoses: Chronic bronchitis, unspecified chronic bronchitis type      traZODone (DESYREL) 50 MG tablet Take 1 tablet (50 mg total) by mouth nightly as needed for Insomnia.  Qty: 90 tablet, Refills: 3      syringe with needle (BD LUER-FRANCI SYRINGE) 3 mL 25 gauge x 1" Syrg USE TO INJECT B-12 AS DIRECTED  Qty: 10 Syringe, Refills: 11      VENTOLIN HFA 90 mcg/actuation inhaler TWO PUFFS INTO THE LUNGS EVERY 6 HOURS AS NEEDED FOR WHEEZING OR SHORTNESS OF BREATH  Qty: 18 g, Refills: 3             Discharge Procedure Orders   Diet general     "

## 2019-10-07 NOTE — ANESTHESIA POSTPROCEDURE EVALUATION
Anesthesia Post Evaluation    Patient: Sheree Pugh    Procedure(s) Performed: Procedure(s) (LRB):  LYMPHADENECTOMY, AXILLARY lymph node dissection (Left)  MASTECTOMY, PARTIAL (Left)  INJECTION, FOR SENTINEL NODE IDENTIFICATION (Left)    Final Anesthesia Type: general  Patient location during evaluation: PACU  Patient participation: Yes- Able to Participate  Level of consciousness: sedated and awake  Post-procedure vital signs: reviewed and stable  Pain management: adequate  Airway patency: patent  PONV status at discharge: No PONV  Anesthetic complications: no      Cardiovascular status: hypertensive and blood pressure returned to baseline  Respiratory status: spontaneous ventilation  Hydration status: euvolemic  Follow-up not needed.          Vitals Value Taken Time   /84 10/7/2019  2:45 PM   Temp 36.2 °C (97.2 °F) 10/7/2019 10:46 AM   Pulse 70 10/7/2019 10:46 AM   Resp 18 10/7/2019 10:46 AM   SpO2 95 % 10/7/2019 10:46 AM         No case tracking events are documented in the log.      Pain/Olive Score: No data recorded

## 2019-10-07 NOTE — DISCHARGE INSTRUCTIONS
Discharge Instructions: After Your Surgery  Youve just had surgery. During surgery, you were given medicine called anesthesia to keep you relaxed and free of pain. After surgery, you may have some pain or nausea. This is common. Here are some tips for feeling better and getting well after surgery.     Stay on schedule with your medicine.   Going home  Your healthcare provider will show you how to take care of yourself when you go home. He or she will also answer your questions. Have an adult family member or friend drive you home. For the first 24 hours after your surgery:  · Do not drive or use heavy equipment.  · Do not make important decisions or sign legal papers.  · Do not drink alcohol.  · Have someone stay with you, if needed. He or she can watch for problems and help keep you safe.  Be sure to go to all follow-up visits with your healthcare provider. And rest after your surgery for as long as your healthcare provider tells you to.  Coping with pain  If you have pain after surgery, pain medicine will help you feel better. Take it as told, before pain becomes severe. Also, ask your healthcare provider or pharmacist about other ways to control pain. This might be with heat, ice, or relaxation. And follow any other instructions your surgeon or nurse gives you.  Tips for taking pain medicine  To get the best relief possible, remember these points:  · Pain medicines can upset your stomach. Taking them with a little food may help.  · Most pain relievers taken by mouth need at least 20 to 30 minutes to start to work.  · Taking medicine on a schedule can help you remember to take it. Try to time your medicine so that you can take it before starting an activity. This might be before you get dressed, go for a walk, or sit down for dinner.  · Constipation is a common side effect of pain medicines. Call your healthcare provider before taking any medicines such as laxatives or stool softeners to help ease constipation.  Also ask if you should skip any foods. Drinking lots of fluids and eating foods such as fruits and vegetables that are high in fiber can also help. Remember, do not take laxatives unless your surgeon has prescribed them.  · Drinking alcohol and taking pain medicine can cause dizziness and slow your breathing. It can even be deadly. Do not drink alcohol while taking pain medicine.  · Pain medicine can make you react more slowly to things. Do not drive or run machinery while taking pain medicine.  Your healthcare provider may tell you to take acetaminophen to help ease your pain. Ask him or her how much you are supposed to take each day. Acetaminophen or other pain relievers may interact with your prescription medicines or other over-the-counter (OTC) medicines. Some prescription medicines have acetaminophen and other ingredients. Using both prescription and OTC acetaminophen for pain can cause you to overdose. Read the labels on your OTC medicines with care. This will help you to clearly know the list of ingredients, how much to take, and any warnings. It may also help you not take too much acetaminophen. If you have questions or do not understand the information, ask your pharmacist or healthcare provider to explain it to you before you take the OTC medicine.  Managing nausea  Some people have an upset stomach after surgery. This is often because of anesthesia, pain, or pain medicine, or the stress of surgery. These tips will help you handle nausea and eat healthy foods as you get better. If you were on a special food plan before surgery, ask your healthcare provider if you should follow it while you get better. These tips may help:  · Do not push yourself to eat. Your body will tell you when to eat and how much.  · Start off with clear liquids and soup. They are easier to digest.  · Next try semi-solid foods, such as mashed potatoes, applesauce, and gelatin, as you feel ready.  · Slowly move to solid foods. Dont  eat fatty, rich, or spicy foods at first.  · Do not force yourself to have 3 large meals a day. Instead eat smaller amounts more often.  · Take pain medicines with a small amount of solid food, such as crackers or toast, to avoid nausea.     Call your surgeon if  · You still have pain an hour after taking medicine. The medicine may not be strong enough.  · You feel too sleepy, dizzy, or groggy. The medicine may be too strong.  · You have side effects like nausea, vomiting, or skin changes, such as rash, itching, or hives.       If you have obstructive sleep apnea  You were given anesthesia medicine during surgery to keep you comfortable and free of pain. After surgery, you may have more apnea spells because of this medicine and other medicines you were given. The spells may last longer than usual.   At home:  · Keep using the continuous positive airway pressure (CPAP) device when you sleep. Unless your healthcare provider tells you not to, use it when you sleep, day or night. CPAP is a common device used to treat obstructive sleep apnea.  · Talk with your provider before taking any pain medicine, muscle relaxants, or sedatives. Your provider will tell you about the possible dangers of taking these medicines.  Date Last Reviewed: 12/1/2016 © 2000-2017 [employer] [address] [place] [address], [place] [address]. All rights reserved. This information is not intended as a substitute for professional medical care. Always follow your healthcare professional's instructions.    BREAST BIOPSY    DO'S        Wear bra day and night for one week.        May shower in 48 hrs.        May remove dressing in 48 hours. Leave steri strips or skin glu in place. Pat dry if                  If gets wet.        Minimal activity for next 2 days.        Advance diet as tolerated.        Apply ice to area for 20 min at a time for comfort.        Resume all home medications.    DON'T        DO NOT SOAK WOUND IN TUB.        No  driving for 24 hours or while taking narcotic pain meds.        DO NOT TAKE ADDITIONAL TYLENOL/ACETAMINOPHEN WHILE TAKING                  NARCOTICS.    CALL PHYSICIAN:         Bleeding or signs of infection (redness, swelling, warm to touch or pus)         Fever greater than 101.         Persistent pain not relieved with pain meds.  Return appointment as instructed.    EMERGENCIES: Contact doctor at [phone]

## 2019-10-07 NOTE — OP NOTE
Patient: Sheree Pugh     Date of Procedure: 10/7/2019    Procedure: 1. Left partial mastectomy with wire localization.  2. [place] node injection.  3. Left axillary node dissection    Surgeon: Ronan Vallejo MD    Assistant: None    Pre-op Diagnosis: Malignant neoplasm of left female breast, unspecified estrogen receptor status, unspecified site of breast [C50.912]     Post-op Diagnosis: Malignant neoplasm of left female breast, unspecified estrogen receptor status, unspecified site of breast [C50.912]    Findings: Breast Cancer.  Axillary contents.    Specimen: Partial mastectomy.  Axillary sentinel node.    EBL: 25 cc    Complications: None      Procedure in detail:    After appropriate consent was obtained, consent forms signed and questions answered, the breast was marked and the patient was taken to the operating room.  She was positioned and general anesthesia was induced.  3 cc of Methylene Blue was injected into the subareolar tissue of the left breast and the breast was massaged in the usual fashion. The x-ray images were reviewed.  The chest was then prepped and draped in the usual sterile fashion.  Prior to the procedure the patient had had wire localization performed by radiology.  Radiolabeled colloid was injected by radiology at that time for identification of the axillary sentinel node.    The neoprobe was used to attempt to identify the site of signal in the axilla.  A skin incision was made overlying this site and dissection was performed into the axillary tissue. Was not able to identify definitively and axillary sentinel lymph node.  Axillary contents were then dissected free in a fairly limited fashion.  Adequate hemostasis was achieved.  Quarter percent Marcaine with epinephrine was injected for local anesthesia. The 10 [place] round drain was brought out through a separate stab incision secured to the skin with 2 nylon suture.  The deep tissue was reapproximated with 3-0 Vicryl suture and  the skin was closed with a 4-0 Monocryl subcuticular stitch.   The x-ray images were again reviewed identifying the wire and the lesion.  Skin incision was made and dissection was performed into the breast tissue with electrocautery.  A partial mastectomy specimen was then removed which encased the wire.  This included an ellipse of skin as the tumor was quite close to the skin and was palpable.  The specimen was then oriented for the pathologist with a long stitch on the lateral margin, a short stitch on the superior margin, and a double stitch on the deep margin.  Additional anterior superior margin was excised.  The cavity was examined and adequate hemostasis was achieved.  Quarter percent Marcaine with epinephrine was injected for local anesthesia.  The deep tissue was reapproximated with 3-0 Vicryl suture and the skin was then closed with a 4-0 Monocryl subcuticular stitch.   Specimen radiograph confirmed the lesion to be within the partial mastectomy specimen.  Steri-Strips and dressings were then applied.  The patient was then awakened, extubated and transferred to the recovery room in stable condition.  She tolerated the procedure without complication.

## 2019-10-07 NOTE — ANESTHESIA PREPROCEDURE EVALUATION
10/07/2019  Sheree Pugh is a 89 y.o., female.    Anesthesia Evaluation    I have reviewed the Patient Summary Reports.    I have reviewed the Nursing Notes.   I have reviewed the Medications.     Review of Systems  Cardiovascular:   Hypertension CAD   DE [place] 2019 JASON EF 55%, mod AR, mild MR, grade 1 DD, negative myoperfusion scan 2017   Pulmonary:   COPD    Musculoskeletal:   Arthritis     Neurological:  Neurology Normal Denies Neuromuscular Disease.     Endocrine:  Endocrine Normal        Physical Exam  General:  Well nourished    Airway/Jaw/Neck:  Airway Findings: Mouth Opening: Normal Tongue: Normal  Pre-Existing Airway Tube(s): Oral Endotracheal tube  General Airway Assessment: Adult  Mallampati: III  Improves to II with phonation.  Jaw/Neck Findings:  Neck ROM: Normal ROM      Dental:  Dental Findings: Upper Dentures, Lower partial dentures        Mental Status:  Mental Status Findings:  Cooperative         Anesthesia Plan  Type of Anesthesia, risks & benefits discussed:  Anesthesia Type:  general  Patient's Preference:   Intra-op Monitoring Plan: standard ASA monitors  Intra-op Monitoring Plan Comments:   Post Op Pain Control Plan:   Post Op Pain Control Plan Comments:   Induction:   IV and Inhalation  Beta Blocker:  Patient is not currently on a Beta-Blocker (No further documentation required).       Informed Consent: Patient understands risks and agrees with Anesthesia plan.  Questions answered. Anesthesia consent signed with patient.  ASA Score: 3     Day of Surgery Review of History & Physical:            Ready For Surgery From Anesthesia Perspective.

## 2019-10-07 NOTE — INTERVAL H&P NOTE
The patient has been examined and the H&P has been reviewed:    I concur with the findings and no changes have occurred since H&P was written.    Anesthesia/Surgery risks, benefits and alternative options discussed and understood by patient/family.          Active Hospital Problems    Diagnosis  POA    Breast cancer, left breast [C50.912]  Yes      Resolved Hospital Problems   No resolved problems to display.

## 2019-10-07 NOTE — ADDENDUM NOTE
Addendum  created 10/07/19 3530 by Sawyer Flores MD    Review and Sign - Ready for Procedure, Review and Sign - Undo

## 2019-10-07 NOTE — TRANSFER OF CARE
"Anesthesia Transfer of Care Note    Patient: Sheree Pugh    Procedure(s) Performed: Procedure(s) (LRB):  LYMPHADENECTOMY, AXILLARY lymph node dissection (Left)  MASTECTOMY, PARTIAL (Left)  INJECTION, FOR SENTINEL NODE IDENTIFICATION (Left)    Patient location: PACU    Anesthesia Type: general    Transport from OR: Transported from OR on room air with adequate spontaneous ventilation    Post pain: adequate analgesia    Post assessment: no apparent anesthetic complications and tolerated procedure well    Post vital signs: stable    Level of consciousness: awake and alert    Nausea/Vomiting: no nausea/vomiting    Complications: none    Transfer of care protocol was followed      Last vitals:   Visit Vitals  BP (!) 179/72 (BP Location: Right arm, Patient Position: Lying)   Pulse 70   Temp 36.2 °C (97.2 °F) (Skin)   Resp 18   Ht 5' 5" (1.651 m)   Wt 78.2 kg (172 lb 6.4 oz)   SpO2 95%   Breastfeeding? No   BMI 28.69 kg/m²     "

## 2019-10-08 VITALS
WEIGHT: 172.38 LBS | OXYGEN SATURATION: 95 % | HEIGHT: 65 IN | SYSTOLIC BLOOD PRESSURE: 184 MMHG | RESPIRATION RATE: 16 BRPM | TEMPERATURE: 97 F | BODY MASS INDEX: 28.72 KG/M2 | DIASTOLIC BLOOD PRESSURE: 71 MMHG | HEART RATE: 68 BPM

## 2019-10-08 DIAGNOSIS — M54.31 SCIATICA OF RIGHT SIDE: ICD-10-CM

## 2019-10-08 RX ORDER — NORTRIPTYLINE HYDROCHLORIDE 10 MG/1
10 CAPSULE ORAL NIGHTLY
Qty: 90 CAPSULE | Refills: 3 | Status: CANCELLED | OUTPATIENT
Start: 2019-10-08 | End: 2020-10-07

## 2019-10-09 DIAGNOSIS — J42 CHRONIC BRONCHITIS, UNSPECIFIED CHRONIC BRONCHITIS TYPE: ICD-10-CM

## 2019-10-09 RX ORDER — TIOTROPIUM BROMIDE 18 UG/1
1 CAPSULE ORAL; RESPIRATORY (INHALATION) DAILY
Qty: 90 CAPSULE | Refills: 2 | Status: SHIPPED | OUTPATIENT
Start: 2019-10-09 | End: 2020-07-08 | Stop reason: SDUPTHER

## 2019-10-09 RX ORDER — NORTRIPTYLINE HYDROCHLORIDE 10 MG/1
10 CAPSULE ORAL NIGHTLY
Qty: 90 CAPSULE | Refills: 2 | Status: SHIPPED | OUTPATIENT
Start: 2019-10-09 | End: 2020-05-28

## 2019-10-14 ENCOUNTER — OFFICE VISIT (OUTPATIENT)
Dept: SURGERY | Facility: CLINIC | Age: 84
End: 2019-10-14
Payer: MEDICARE

## 2019-10-14 VITALS
TEMPERATURE: 98 F | HEART RATE: 70 BPM | WEIGHT: 171.5 LBS | RESPIRATION RATE: 17 BRPM | SYSTOLIC BLOOD PRESSURE: 147 MMHG | HEIGHT: 65 IN | DIASTOLIC BLOOD PRESSURE: 67 MMHG | BODY MASS INDEX: 28.57 KG/M2

## 2019-10-14 DIAGNOSIS — Z98.890 POST-OPERATIVE STATE: Primary | ICD-10-CM

## 2019-10-14 PROCEDURE — 99999 PR PBB SHADOW E&M-EST. PATIENT-LVL III: CPT | Mod: PBBFAC,HCNC,, | Performed by: SURGERY

## 2019-10-14 PROCEDURE — 99024 PR POST-OP FOLLOW-UP VISIT: ICD-10-PCS | Mod: HCNC,S$GLB,, | Performed by: SURGERY

## 2019-10-14 PROCEDURE — 99999 PR PBB SHADOW E&M-EST. PATIENT-LVL III: ICD-10-PCS | Mod: PBBFAC,HCNC,, | Performed by: SURGERY

## 2019-10-14 PROCEDURE — 99024 POSTOP FOLLOW-UP VISIT: CPT | Mod: HCNC,S$GLB,, | Performed by: SURGERY

## 2019-10-14 NOTE — PROGRESS NOTES
POST-OP NOTE    PROCEDURE: S/P left partial mastectomy with axillary dissection    COMPLAINTS: None.    EXAM: Incision well approximated. No drainage. No infection. Expected tenderness.      IMPRESSION: Path pending.    PLAN: FU next week.  Drain removed.

## 2019-10-18 ENCOUNTER — TELEPHONE (OUTPATIENT)
Dept: SURGERY | Facility: CLINIC | Age: 84
End: 2019-10-18

## 2019-10-18 NOTE — TELEPHONE ENCOUNTER
----- Message from Anais Prakash sent at 10/17/2019  5:57 PM CDT -----  Contact: [phone]-pt  Pt is requesting a call back,pt states when she was in the office,the nurse told  her the appointment for Monday was 10:15 and she received a call saying that it's 9:15,pt states she cannot make 9:15 but can come for 10:15,because she is getting dropped off.Please call and advise      Thank you

## 2019-10-21 ENCOUNTER — OFFICE VISIT (OUTPATIENT)
Dept: SURGERY | Facility: CLINIC | Age: 84
End: 2019-10-21
Payer: MEDICARE

## 2019-10-21 VITALS — TEMPERATURE: 98 F

## 2019-10-21 DIAGNOSIS — Z98.890 POST-OPERATIVE STATE: ICD-10-CM

## 2019-10-21 DIAGNOSIS — C50.912 MALIGNANT NEOPLASM OF LEFT FEMALE BREAST, UNSPECIFIED ESTROGEN RECEPTOR STATUS, UNSPECIFIED SITE OF BREAST: Primary | ICD-10-CM

## 2019-10-21 PROCEDURE — 99024 POSTOP FOLLOW-UP VISIT: CPT | Mod: HCNC,S$GLB,, | Performed by: SURGERY

## 2019-10-21 PROCEDURE — 99999 PR PBB SHADOW E&M-EST. PATIENT-LVL III: ICD-10-PCS | Mod: PBBFAC,HCNC,, | Performed by: SURGERY

## 2019-10-21 PROCEDURE — 99024 PR POST-OP FOLLOW-UP VISIT: ICD-10-PCS | Mod: HCNC,S$GLB,, | Performed by: SURGERY

## 2019-10-21 PROCEDURE — 99999 PR PBB SHADOW E&M-EST. PATIENT-LVL III: CPT | Mod: PBBFAC,HCNC,, | Performed by: SURGERY

## 2019-10-21 NOTE — PROGRESS NOTES
POST-OP NOTE    PROCEDURE: Left partial mastectomy with wire localization and axillary dissection    COMPLAINTS: None.    EXAM: Incision well approximated. No drainage. No infection. Some swelling at axillary      IMPRESSION:    FINAL PATHOLOGIC DIAGNOSIS  1. Left axillary contents, biopsy:  - Three benign lymph nodes (0/3).  - Immunostain for AE1/AE3 (-) and positive controls examined.  2. Left partial mastectomy:  - Invasive ductal carcinoma, pT1c, pN0.  - [place] histologic grade 2  (tubular formation: 3, nuclear pleomorphism: 2, mitotic activity: 2).  - Fibrocystic changes.  - Microcalcification associated with carcinoma and fibrocystic changes.  - Previous biopsy site seen.  - No skin involvement seen.  - See Surgical Pathology Cancer Case Summary below.  3. Anterior superior margin, excision:  - Breast tissue with fibrocystic changes and cautery artifact.    Glandular (Acinar)/Tubular Differentiation: Score 3  Nuclear Pleomorphism: Score 2  Mitotic Rate: Score 2  Overall Grade: Grade 2  Tumor Focality: Single  Ductal Carcinoma In Situ (DCIS) Not identified  Invasive Carcinoma Margins: Uninvolved by invasive carcinoma Distance from closest margin (millimeters): 2 mm  from superior margin  Regional Lymph Nodes Unnvolved by tumor cells  Nodes Examined: 3  Number of [place] Nodes Examined: 0  Treatment Effect: No known presurgical therapy  Pathologic Stage Classification (pTNM, AJCC 8th Edition)  Primary Tumor (pT): pT1c:Tumor >10 mm but <=20 mm in greatest dimension  Regional Lymph Nodes (pN) pN0  Breast Biomarker Testing Performed on Previous Biopsy: [id number]      PLAN: FU as needed.  Refer to Oncology and Radiation Oncology.

## 2019-10-30 ENCOUNTER — TELEPHONE (OUTPATIENT)
Dept: HEMATOLOGY/ONCOLOGY | Facility: CLINIC | Age: 84
End: 2019-10-30

## 2019-10-30 NOTE — TELEPHONE ENCOUNTER
Appt scheduled as per granddaughter.    ----- Message from Jeanne Riley LPN sent at 10/30/2019 11:59 AM CDT -----      ----- Message -----  From: Ying Joshi  Sent: 10/30/2019  11:44 AM CDT  To: [facility] Staff    Type: New Patient/consult Appt    Who Called:  Chuck Guevara  Symptoms (please be specific):  Left breast cancer  How long has patient had these symptoms:  ivon  Pharmacy name and phone #:  [facility]  Best Call Back Number: [phone]  Additional Information: Chuck Mayfield is calling/Dr Ronan Vallejo is referring patient to Dr Dan for left sided breast cancer/referral is in record/please call chuck to schedule

## 2019-10-31 ENCOUNTER — INITIAL CONSULT (OUTPATIENT)
Dept: HEMATOLOGY/ONCOLOGY | Facility: CLINIC | Age: 84
End: 2019-10-31
Payer: MEDICARE

## 2019-10-31 VITALS
BODY MASS INDEX: 28.14 KG/M2 | RESPIRATION RATE: 18 BRPM | SYSTOLIC BLOOD PRESSURE: 167 MMHG | WEIGHT: 168.88 LBS | HEIGHT: 65 IN | TEMPERATURE: 98 F | DIASTOLIC BLOOD PRESSURE: 60 MMHG | HEART RATE: 74 BPM | OXYGEN SATURATION: 95 %

## 2019-10-31 DIAGNOSIS — C50.012 MALIGNANT NEOPLASM OF NIPPLE OF LEFT BREAST IN FEMALE, UNSPECIFIED ESTROGEN RECEPTOR STATUS: Primary | ICD-10-CM

## 2019-10-31 DIAGNOSIS — C50.012 MALIGNANT NEOPLASM OF AREOLA OF LEFT BREAST IN FEMALE, UNSPECIFIED ESTROGEN RECEPTOR STATUS: Primary | ICD-10-CM

## 2019-10-31 PROCEDURE — 99999 PR PBB SHADOW E&M-EST. PATIENT-LVL III: ICD-10-PCS | Mod: PBBFAC,HCNC,, | Performed by: INTERNAL MEDICINE

## 2019-10-31 PROCEDURE — 99205 OFFICE O/P NEW HI 60 MIN: CPT | Mod: HCNC,S$GLB,, | Performed by: INTERNAL MEDICINE

## 2019-10-31 PROCEDURE — 3288F PR FALLS RISK ASSESSMENT DOCUMENTED: ICD-10-PCS | Mod: HCNC,CPTII,S$GLB, | Performed by: INTERNAL MEDICINE

## 2019-10-31 PROCEDURE — 1100F PTFALLS ASSESS-DOCD GE2>/YR: CPT | Mod: HCNC,CPTII,S$GLB, | Performed by: INTERNAL MEDICINE

## 2019-10-31 PROCEDURE — 3288F FALL RISK ASSESSMENT DOCD: CPT | Mod: HCNC,CPTII,S$GLB, | Performed by: INTERNAL MEDICINE

## 2019-10-31 PROCEDURE — 99999 PR PBB SHADOW E&M-EST. PATIENT-LVL III: CPT | Mod: PBBFAC,HCNC,, | Performed by: INTERNAL MEDICINE

## 2019-10-31 PROCEDURE — 99205 PR OFFICE/OUTPT VISIT, NEW, LEVL V, 60-74 MIN: ICD-10-PCS | Mod: HCNC,S$GLB,, | Performed by: INTERNAL MEDICINE

## 2019-10-31 PROCEDURE — 1100F PR PT FALLS ASSESS DOC 2+ FALLS/FALL W/INJURY/YR: ICD-10-PCS | Mod: HCNC,CPTII,S$GLB, | Performed by: INTERNAL MEDICINE

## 2019-10-31 RX ORDER — ALBUTEROL SULFATE 90 UG/1
2 AEROSOL, METERED RESPIRATORY (INHALATION) EVERY 6 HOURS PRN
Qty: 18 G | Refills: 2 | Status: SHIPPED | OUTPATIENT
Start: 2019-10-31 | End: 2021-06-15 | Stop reason: SDUPTHER

## 2019-10-31 NOTE — LETTER
October 31, 2019      Ronan Dennis MD  [address]  [address]  [facility] [address]           [facility] [address]  [facility] [address]  Phone: [phone]  Fax: [phone]          Patient: Sheree Pugh   MR Number: [id number]   YOB: 1927   Date of Visit: 10/31/2019       Dear Dr. Ronan Dennis:    Thank you for referring Sheree Pugh to me for evaluation. Attached you will find relevant portions of my assessment and plan of care.    If you have questions, please do not hesitate to call me. I look forward to following Sheree Pugh along with you.    Sincerely,    Joseph Dan MD    Enclosure  CC:  No Recipients    If you would like to receive this communication electronically, please contact [email or web address] or [phone] to request more information on [employer] Link access.    For providers and/or their staff who would like to refer a patient to [facility], please contact us through our one-stop-shop provider referral line, [facility], at [phone].    If you feel you have received this communication in error or would no longer like to receive these types of communications, please e-mail [email or web address]

## 2019-10-31 NOTE — LETTER
October 31, 2019        Ronan Dennis MD  [address]  [address]  [facility] [address]             [facility] [place] [address]  [facility] [address]  Phone: [phone]  Fax: [phone]   Patient: Sheree Pugh   MR Number: [id number]   YOB: 1927   Date of Visit: 10/31/2019       Dear Dr. Dennis:    Thank you for referring Sheree Pugh to me for evaluation of stage I left breast cancer. Below are the relevant portions of my assessment and plan of care.  If you have questions, please do not hesitate to call me. I look forward to following Sheree along with you.    Sincerely,      MD IDANIA Coelho MD George F. Isa, MD R. Scott Bermudez, MD Donald A. Kuebel, MD D. Anthony Mazzulla, MD

## 2019-10-31 NOTE — PROGRESS NOTES
Chief complaint:  Left breast carcinoma    History of present illness:  The patient is a 89-year-old white female who presented with left palpable breast mass which was biopsied and found to be a grade 2 infiltrating ductal carcinoma which is ER positive/[place] positive and HER2 Aliza negative by FISH.  Patient has gone on to have lumpectomy and sentinel lymph node sampling.  Her tumor was 15 mm in greatest dimension.  Margins of resection are negative.  Three sampled sentinel lymph nodes were negative for metastatic disease.  Postoperatively, the patient is recovering as expected and is not having difficulties with undue postop pain, decreased range of motion of the left upper extremity, or lymphedema.  She presents in consultation from Dr. Dennis regarding adjuvant therapy recommendations.  No other pertinent findings on a 14 point review of systems.    Past medical history:  1.  Insomnia  2.  High risk for falls  3.  Osteopenia  4.  Degenerative joint disease  5.  Diverticulosis  6.  History of B12 deficiency  7.  Urinary incontinence  8.  Lichen sclerosis atrophicus of the vulva  9.  Stage 3 chronic kidney disease  10.  Renal artery stenosis  11.  Hypertension  12.  Hyperlipidemia  13.  Coronary artery disease status post implantation of intra coronary stent  14.  Right carotid stenosis  15.  Chronic obstructive pulmonary disease  16.  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  Peripheral neuropathy  18.  Gout  19.  History colon polyps  20.  Status post hysterectomy  21.  Status post cholecystectomy  22.  Status post hemicolectomy    Allergies:  1.  Codeine  2.  Gabapentin  3.  Hydrocodone    Medications:  Documented in the electronic record and reviewed    Family/social history:  Patient is a former smoker who has not consume tobacco products in the last 18 years.  No alcohol consumption.  Patient has 2 sisters who suffered from breast carcinoma.  One had age of onset in her 30s.  Patient also has 2  daughters who are breast cancer survivors.    Physical examination:  Well-developed, elderly, frail-appearing white female, in no acute distress, who has a weight of 168.5 lb  VITAL SIGNS: Documented  and reviewed this visit.  HEENT: Normocephalic, atraumatic. Oral mucosa pink and moist. Lips without   lesions. Tongue midline. Oropharynx clear. Nonicteric sclerae.   NECK: Supple, no adenopathy. No carotid bruits, thyromegaly or thyroid nodule.   HEART: Regular rate and rhythm without murmur, gallop or rub.   LUNGS: Clear to auscultation bilaterally. Normal respiratory effort.   ABDOMEN: Soft, nontender, nondistended with positive normoactive bowel sounds,   no hepatosplenomegaly.   EXTREMITIES: No cyanosis, clubbing or edema. Distal pulses are intact.   AXILLAE AND GROIN: No palpable pathologic lymphadenopathy is appreciated.   SKIN: Intact/turgor normal   NEUROLOGIC: Cranial nerves II-XII grossly intact. Motor: Good muscle bulk and   tone. Strength/sensory 5/5 throughout. Gait stable.   BREASTS:  Patient's right breast is free from mass, tenderness, nipple discharge.  Patient's left breast has a well-healed lumpectomy scar and is free from signs of infection.    Impression:  1.  Stage I (T1c N0 M0) infiltrating ductal carcinoma left breast which is ER positive/OK positive/HER2 Aliza negative.    Plan:  1.  Obtain baseline lab to include CBC, CMP, LDH, magnesium.  2.  In light of patient's striking family history for breast carcinoma, we will obtain genetic testing for BRCA1 and 2.  3.  Referred to Dr. Stiles in Radiation Oncology regarding an opinion on necessity of post lumpectomy radiation in light of her advanced age and small tumor size.  4.  Start adjuvant tamoxifen 20 mg p.o. daily with a planned duration of 5 years.  5.  Return to clinic in 3 months without interval lab.  6.  40 min of contact time spent counseling regarding diagnosis and plan of care.  Patient and her son voice understanding and wish to  proceed as above.    This note was created using voice recognition software and may contain grammatical errors.

## 2019-11-06 DIAGNOSIS — C50.012 MALIGNANT NEOPLASM OF NIPPLE OF LEFT BREAST IN FEMALE, UNSPECIFIED ESTROGEN RECEPTOR STATUS: Primary | ICD-10-CM

## 2019-11-06 RX ORDER — TAMOXIFEN CITRATE 20 MG/1
20 TABLET ORAL DAILY
Qty: 90 TABLET | Refills: 3 | Status: SHIPPED | OUTPATIENT
Start: 2019-11-06 | End: 2020-10-23 | Stop reason: SDUPTHER

## 2019-11-08 ENCOUNTER — OFFICE VISIT (OUTPATIENT)
Dept: FAMILY MEDICINE | Facility: CLINIC | Age: 84
End: 2019-11-08
Payer: MEDICARE

## 2019-11-08 VITALS
HEIGHT: 64 IN | SYSTOLIC BLOOD PRESSURE: 138 MMHG | HEART RATE: 72 BPM | OXYGEN SATURATION: 95 % | WEIGHT: 169.75 LBS | BODY MASS INDEX: 28.98 KG/M2 | DIASTOLIC BLOOD PRESSURE: 60 MMHG

## 2019-11-08 DIAGNOSIS — C50.012 MALIGNANT NEOPLASM OF NIPPLE OF LEFT BREAST IN FEMALE, UNSPECIFIED ESTROGEN RECEPTOR STATUS: Primary | ICD-10-CM

## 2019-11-08 PROCEDURE — 99214 PR OFFICE/OUTPT VISIT, EST, LEVL IV, 30-39 MIN: ICD-10-PCS | Mod: HCNC,25,S$GLB, | Performed by: FAMILY MEDICINE

## 2019-11-08 PROCEDURE — G0008 ADMIN INFLUENZA VIRUS VAC: HCPCS | Mod: HCNC,S$GLB,, | Performed by: FAMILY MEDICINE

## 2019-11-08 PROCEDURE — 99499 UNLISTED E&M SERVICE: CPT | Mod: HCNC,S$GLB,, | Performed by: FAMILY MEDICINE

## 2019-11-08 PROCEDURE — 99214 OFFICE O/P EST MOD 30 MIN: CPT | Mod: HCNC,25,S$GLB, | Performed by: FAMILY MEDICINE

## 2019-11-08 PROCEDURE — G0008 FLU VACCINE - HIGH DOSE (65+) PRESERVATIVE FREE IM: ICD-10-PCS | Mod: HCNC,S$GLB,, | Performed by: FAMILY MEDICINE

## 2019-11-08 PROCEDURE — 90662 FLU VACCINE - HIGH DOSE (65+) PRESERVATIVE FREE IM: ICD-10-PCS | Mod: HCNC,S$GLB,, | Performed by: FAMILY MEDICINE

## 2019-11-08 PROCEDURE — 1101F PR PT FALLS ASSESS DOC 0-1 FALLS W/OUT INJ PAST YR: ICD-10-PCS | Mod: HCNC,CPTII,S$GLB, | Performed by: FAMILY MEDICINE

## 2019-11-08 PROCEDURE — 1101F PT FALLS ASSESS-DOCD LE1/YR: CPT | Mod: HCNC,CPTII,S$GLB, | Performed by: FAMILY MEDICINE

## 2019-11-08 PROCEDURE — 90662 IIV NO PRSV INCREASED AG IM: CPT | Mod: HCNC,S$GLB,, | Performed by: FAMILY MEDICINE

## 2019-11-08 PROCEDURE — 99999 PR PBB SHADOW E&M-EST. PATIENT-LVL III: ICD-10-PCS | Mod: PBBFAC,HCNC,, | Performed by: FAMILY MEDICINE

## 2019-11-08 PROCEDURE — 99999 PR PBB SHADOW E&M-EST. PATIENT-LVL III: CPT | Mod: PBBFAC,HCNC,, | Performed by: FAMILY MEDICINE

## 2019-11-08 PROCEDURE — 99499 RISK ADDL DX/OHS AUDIT: ICD-10-PCS | Mod: HCNC,S$GLB,, | Performed by: FAMILY MEDICINE

## 2019-11-08 NOTE — PROGRESS NOTES
Subjective:       Patient ID: Sheree Pugh is a 89 y.o. female    Chief Complaint: Follow-up (6mth fu )    HPI  Here today for interval evaluation  She has been diagnosed with a new breast cancer on the left side.    Treated with lumpectomy and Tamoxifen due to estrogen receptor positive.    She has declined XRT    Review of Systems     Objective:   Physical Exam   Constitutional: She appears well-developed and well-nourished.   HENT:   Head: Normocephalic and atraumatic.   Eyes: Pupils are equal, round, and reactive to light. Conjunctivae and EOM are normal. No scleral icterus.   Neck: Normal range of motion. Neck supple. No thyromegaly present.   Cardiovascular: Normal rate, regular rhythm and normal heart sounds. Exam reveals no gallop and no friction rub.   No murmur heard.  Pulmonary/Chest: Effort normal and breath sounds normal. No respiratory distress. She has no wheezes. She has no rales.   Lymphadenopathy:     She has no cervical adenopathy.   Vitals reviewed.       Assessment:       1. Malignant neoplasm of nipple of left breast in female, unspecified estrogen receptor status          Plan:       Malignant neoplasm of nipple of left breast in female, unspecified estrogen receptor status  - Continue current therapy  - Patient wishes conservative care.

## 2019-11-14 ENCOUNTER — TELEPHONE (OUTPATIENT)
Dept: FAMILY MEDICINE | Facility: CLINIC | Age: 84
End: 2019-11-14

## 2019-11-14 NOTE — TELEPHONE ENCOUNTER
----- Message from Jeanne Riley LPN sent at 10/31/2019 12:10 PM CDT -----  Regarding: labs  Would you be so kind as to include cbc, cmp and ldh in your next lab orders on this patient per request of Dr. Dan?  Patient did not want drawn on our 10/31/19 visit, but requested to be included in your lab draw.    Regards,  Jeanne BARTON LPN

## 2019-12-03 DIAGNOSIS — J44.9 CHRONIC OBSTRUCTIVE PULMONARY DISEASE, UNSPECIFIED COPD TYPE: ICD-10-CM

## 2019-12-03 RX ORDER — BUDESONIDE AND FORMOTEROL FUMARATE DIHYDRATE 160; 4.5 UG/1; UG/1
AEROSOL RESPIRATORY (INHALATION)
Qty: 11 G | Refills: 11 | Status: CANCELLED | OUTPATIENT
Start: 2019-12-03

## 2019-12-03 NOTE — TELEPHONE ENCOUNTER
Message sent to [facility] in error. Ms routed to Dr. Shahid's staff.

## 2019-12-05 DIAGNOSIS — J44.9 CHRONIC OBSTRUCTIVE PULMONARY DISEASE, UNSPECIFIED COPD TYPE: ICD-10-CM

## 2019-12-05 RX ORDER — BUDESONIDE AND FORMOTEROL FUMARATE DIHYDRATE 160; 4.5 UG/1; UG/1
AEROSOL RESPIRATORY (INHALATION)
Qty: 11 G | Refills: 11 | Status: CANCELLED | OUTPATIENT
Start: 2019-12-03

## 2019-12-05 NOTE — TELEPHONE ENCOUNTER
Lauren VICK from pharm is requesting a signature on this Rx to mail out to pt ASAP, she is 100%out of medication, please advised!

## 2019-12-06 DIAGNOSIS — J44.9 CHRONIC OBSTRUCTIVE PULMONARY DISEASE, UNSPECIFIED COPD TYPE: ICD-10-CM

## 2019-12-06 RX ORDER — BUDESONIDE AND FORMOTEROL FUMARATE DIHYDRATE 160; 4.5 UG/1; UG/1
AEROSOL RESPIRATORY (INHALATION)
Qty: 11 G | Refills: 11 | Status: CANCELLED | OUTPATIENT
Start: 2019-12-03

## 2019-12-09 ENCOUNTER — TELEPHONE (OUTPATIENT)
Dept: PHARMACY | Facility: CLINIC | Age: 84
End: 2019-12-09

## 2019-12-09 DIAGNOSIS — J44.9 CHRONIC OBSTRUCTIVE PULMONARY DISEASE, UNSPECIFIED COPD TYPE: ICD-10-CM

## 2019-12-09 RX ORDER — BUDESONIDE AND FORMOTEROL FUMARATE DIHYDRATE 160; 4.5 UG/1; UG/1
2 AEROSOL RESPIRATORY (INHALATION) 2 TIMES DAILY
Qty: 30.6 G | Refills: 3 | Status: SHIPPED | OUTPATIENT
Start: 2019-12-09 | End: 2020-11-24 | Stop reason: SDUPTHER

## 2019-12-09 NOTE — PROGRESS NOTES
Refill Authorization Note     is requesting a refill authorization.    Brief assessment and rationale for refill: APPROVE: prr                                         Comments:     Requested Prescriptions   Signed Prescriptions Disp Refills    budesonide-formoterol 160-4.5 mcg (SYMBICORT) 160-4.5 mcg/actuation HFAA 3 Inhaler 3     Sig: INHALE 2 PUFFS INTO THE LUNGS 2 (TWO) TIMES DAILY. CONTROLLER       Pulmonology:  Combination Products Passed - 12/9/2019 11:23 AM        Passed - Patient is at least 18 years old        Passed - There is a short-acting beta agonist medication on the active medication list        Passed - Last BP in normal range within 360 days     BP Readings from Last 3 Encounters:   11/08/19 138/60   10/31/19 (!) 167/60   10/14/19 (!) 147/67              Passed - Last Heart Rate in normal range within 360 days     Pulse Readings from Last 3 Encounters:   11/08/19 72   10/31/19 74   10/14/19 70             Passed - Office visit in past 12 months or future 90 days     Recent Outpatient Visits            1 month ago Malignant neoplasm of nipple of left breast in female, unspecified estrogen receptor status    [facility] Gus Shahid MD    1 month ago Malignant neoplasm of left female breast, unspecified estrogen receptor status, unspecified site of breast    [facility] Ronan Dennis MD    1 month ago Post-operative state    [facility] Ronan Dennis MD    2 months ago Malignant neoplasm of left female breast, unspecified estrogen receptor status, unspecified site of breast    [facility] Ronan Dennis MD    3 months ago Breast mass in female    [facility] Ronan Dennis MD          Future Appointments              In 1 month Greg A. Bizette, MD [facility]    In 2 months Gus Shahid MD [facility]    In 2  months Terell Jay MD [facility] [place]

## 2019-12-23 ENCOUNTER — OFFICE VISIT (OUTPATIENT)
Dept: FAMILY MEDICINE | Facility: CLINIC | Age: 84
End: 2019-12-23
Payer: MEDICARE

## 2019-12-23 ENCOUNTER — TELEPHONE (OUTPATIENT)
Dept: FAMILY MEDICINE | Facility: CLINIC | Age: 84
End: 2019-12-23

## 2019-12-23 VITALS
OXYGEN SATURATION: 98 % | TEMPERATURE: 98 F | SYSTOLIC BLOOD PRESSURE: 150 MMHG | HEIGHT: 64 IN | BODY MASS INDEX: 28.98 KG/M2 | HEART RATE: 81 BPM | WEIGHT: 169.75 LBS | DIASTOLIC BLOOD PRESSURE: 80 MMHG

## 2019-12-23 DIAGNOSIS — R60.0 PEDAL EDEMA: ICD-10-CM

## 2019-12-23 DIAGNOSIS — L03.116 CELLULITIS OF LEFT ANTERIOR LOWER LEG: Primary | ICD-10-CM

## 2019-12-23 PROCEDURE — 99999 PR PBB SHADOW E&M-EST. PATIENT-LVL V: CPT | Mod: PBBFAC,HCNC,, | Performed by: NURSE PRACTITIONER

## 2019-12-23 PROCEDURE — 1126F AMNT PAIN NOTED NONE PRSNT: CPT | Mod: HCNC,S$GLB,, | Performed by: NURSE PRACTITIONER

## 2019-12-23 PROCEDURE — 99999 PR PBB SHADOW E&M-EST. PATIENT-LVL V: ICD-10-PCS | Mod: PBBFAC,HCNC,, | Performed by: NURSE PRACTITIONER

## 2019-12-23 PROCEDURE — 1101F PT FALLS ASSESS-DOCD LE1/YR: CPT | Mod: HCNC,CPTII,S$GLB, | Performed by: NURSE PRACTITIONER

## 2019-12-23 PROCEDURE — 1101F PR PT FALLS ASSESS DOC 0-1 FALLS W/OUT INJ PAST YR: ICD-10-PCS | Mod: HCNC,CPTII,S$GLB, | Performed by: NURSE PRACTITIONER

## 2019-12-23 PROCEDURE — 1159F MED LIST DOCD IN RCRD: CPT | Mod: HCNC,S$GLB,, | Performed by: NURSE PRACTITIONER

## 2019-12-23 PROCEDURE — 99214 PR OFFICE/OUTPT VISIT, EST, LEVL IV, 30-39 MIN: ICD-10-PCS | Mod: HCNC,S$GLB,, | Performed by: NURSE PRACTITIONER

## 2019-12-23 PROCEDURE — 1126F PR PAIN SEVERITY QUANTIFIED, NO PAIN PRESENT: ICD-10-PCS | Mod: HCNC,S$GLB,, | Performed by: NURSE PRACTITIONER

## 2019-12-23 PROCEDURE — 1159F PR MEDICATION LIST DOCUMENTED IN MEDICAL RECORD: ICD-10-PCS | Mod: HCNC,S$GLB,, | Performed by: NURSE PRACTITIONER

## 2019-12-23 PROCEDURE — 99214 OFFICE O/P EST MOD 30 MIN: CPT | Mod: HCNC,S$GLB,, | Performed by: NURSE PRACTITIONER

## 2019-12-23 RX ORDER — CEPHALEXIN 500 MG/1
500 CAPSULE ORAL EVERY 12 HOURS
Qty: 14 CAPSULE | Refills: 0 | Status: SHIPPED | OUTPATIENT
Start: 2019-12-23 | End: 2019-12-30

## 2019-12-23 NOTE — PROGRESS NOTES
Sheree Pugh is a 89 y.o. female patient of Gus Shahid MD who presents to the clinic today for   Chief Complaint   Patient presents with    Leg Swelling   .    HPI    Pt, who is known to me, reports a new problem to me:  Swelling of both LEs.     These symptoms began a few days ago and status is worsening.     Pt denies the following symptoms:  Fever, feeling ill.    Aggravating factors include inability to elevate her legs properly, sleeping in a recliner with her legs partially elevated.    Relieving factors include getting her legs elevated properly .    OTC Medications tried are not applicable.    Prescription medications taken for symptoms are not applicable.  She did have this once in the past, and she was treated with an antibiotic, Bactrim, to the urgent care..    Pertinent medical history:  In January 2019 she was treated with Bactrim for cellulitis of her lower extremities.  That resolved.  At that time she also had significant bilateral pedal edema.    ROS    Constitutional:  No fever, no unusual fatigue.    Skin:  See chief complaint/HPI.    HEENT:  No complaints.    Heart/Lungs:  No complaints    GI/:  No sxs.    MS:  No new problems in bones, joints or muscles.      Past Medical History:   Diagnosis Date    Anticoagulant long-term use     Plavix & Aspirin    ARMD (age related macular degeneration)     os    Arthritis     Cataract     os    COPD (chronic obstructive pulmonary disease)     Coronary artery disease     Disorder of kidney and ureter     DE LOS SANTOS (dyspnea on exertion)     Elevated cholesterol     Heart disease     Hypertension     Insomnia     Macular degeneration     OS    Obstetrical blood-clot embolism, postpartum     PVD (peripheral vascular disease)     Retinal detachment     OS    Stented coronary artery 2/25/2019 4/14 1. Moderate disease LAD/CX. 2. Diffuse significant ISR of RCA successfully treated 2.5 and 3.0 POBA only. 3. Patent LM and right renal  "stent.    Urinary incontinence        Current Outpatient Medications:     albuterol (PROAIR HFA) 90 mcg/actuation inhaler, Inhale 2 puffs into the lungs every 6 (six) hours as needed for Wheezing or Shortness of Breath., Disp: 18 g, Rfl: 2    aspirin (ECOTRIN) 81 MG EC tablet, Take by mouth once daily. , Disp: , Rfl:     atorvastatin (LIPITOR) 20 MG tablet, Take 1 tablet (20 mg total) by mouth once daily., Disp: 90 tablet, Rfl: 4    budesonide-formoterol 160-4.5 mcg (SYMBICORT) 160-4.5 mcg/actuation HFAA, Inhale 2 puffs into the lungs 2 (two) times daily. Controller., Disp: 30.6 g, Rfl: 3    clopidogrel (PLAVIX) 75 mg tablet, Take 1 tablet (75 mg total) by mouth once daily., Disp: 90 tablet, Rfl: 4    cyanocobalamin 1,000 mcg/mL injection, INJECT 1CC (1ML) INTRAMUSCULARLY EVERY THREE WEEKS, Disp: 10 mL, Rfl: 3    diltiaZEM (CARDIZEM CD) 180 MG 24 hr capsule, Take 1 capsule (180 mg total) by mouth every morning., Disp: 90 capsule, Rfl: 4    furosemide (LASIX) 20 MG tablet, Take 1 tablet (20 mg total) by mouth daily as needed., Disp: 90 tablet, Rfl: 4    nortriptyline (PAMELOR) 10 MG capsule, Take 1 capsule (10 mg total) by mouth every evening., Disp: 90 capsule, Rfl: 2    potassium chloride SA (K-DUR,KLOR-CON) 20 MEQ tablet, Take 1 tablet (20 mEq total) by mouth daily as needed., Disp: 90 tablet, Rfl: 4    syringe with needle (BD LUER-FRANCI SYRINGE) 3 mL 25 gauge x 1" Syrg, USE TO INJECT B-12 AS DIRECTED, Disp: 10 Syringe, Rfl: 11    tamoxifen (NOLVADEX) 20 MG Tab, Take 1 tablet (20 mg total) by mouth once daily., Disp: 90 tablet, Rfl: 3    tiotropium (SPIRIVA) 18 mcg inhalation capsule, Inhale 1 capsule (18 mcg total) into the lungs once daily using handihaler., Disp: 90 capsule, Rfl: 2    ACETAMINOPHEN (TYLENOL ARTHRITIS ORAL), Take 2 tablets by mouth daily as needed. , Disp: , Rfl:     isosorbide mononitrate (IMDUR) 30 MG 24 hr tablet, Take 1 tablet (30 mg total) by mouth every evening., Disp: 90 " tablet, Rfl: 4    PHYSICAL EXAM    Alert, coop 89 y.o. female patient in no acute distress, is not ill-appearing.    Vitals:    12/23/19 1610   BP: (!) 150/80   Pulse: 81   Temp: 97.7 °F (36.5 °C)     VS reviewed.  VS systolic BP is elevated.  CC, nursing note, medications & PMH all reviewed today.    Head:  Normocephalic, atraumatic.    EENT:  Ext nose/ears normal.               Eyes with normal lids, not injected.     Resp:  Respirations even, unlabored    Heart:  Regular rate.  BP elevated systolic pressure.              Posterior tibial pulses are palpable bilaterally.    MS:  Ambulates in a wheelchair today..     NEURO:  Alert and oriented x 4.  Responds appropriately during interaction.                  Sensation to light touch intact over lower extremities .    Skin:  Warm, dry, color good.            A/an significant amount of bilateral lower extremity edema is present.  The skin is rough to palpation in multiple areas.            There is a small area in the left lower extremity anterior lower leg medial to midline that has erythema and is warm to touch.             No pustules or vesicles noted.    Psych:  Responds appropriately throughout the visit.               Relaxed.  Well-groomed    Cellulitis of left anterior lower leg  -     cephALEXin (KEFLEX) 500 MG capsule; Take 1 capsule (500 mg total) by mouth every 12 (twelve) hours. for 7 days  Dispense: 14 capsule; Refill: 0    Pedal edema      Pt today presents with report of concern over her increase in bilateral lower extremity edema with redness, similar to the infection she had in January of this year.  She reports that she has difficulty elevating her lower extremities, and can no longer get into the bed.  She sleeps in a recliner, where she cannot fully elevate her lower extremities.  She does have some lower extremity edema normally, however it is much worse, with the office of the skin.  And lately she has noticed an erythematous area on the left  lower extremity anteriorly.  She really could use some help at home.  She is asking if she could get some home health ordered.  On exam she does have some bilateral lower extremity edema that extends up to her tibial tuberosities bilaterally.  And there is a small area of erythema on the left lower extremity anteriorly, medial to midline..    This is a new problem to me.  No work up is planned.       I did prescribe an antibiotic today, cephalexin.  I have asked her to watch for improvement and to report any worsening of the situation.  I have also told her that I will communicate with Dr. Shahid about her difficulties with elevating her legs to get the edema down.  And will ask him if he will order home health for her.  Pt advised to perform comfort measures recommended on patient instruction sheet .    If not better in 3 days, the patient is advised to call us.  If worse or concerns, the patient is advised to call us.  Explained exam findings, diagnosis and treatment plan to patient and the family member with her today.  Questions answered and patient states understanding.

## 2019-12-23 NOTE — TELEPHONE ENCOUNTER
----- Message from Denise Lemon sent at 12/23/2019  2:32 PM CST -----  Contact: Patient   Type:  Same Day Appointment Request    Caller is requesting a same day appointment.  Caller declined first available appointment listed below.      Name of Caller:Patient   When is the first available appointment? 2/12/2020  Symptoms: blisters on legs  Best Call Back Number:[phone]  Additional Information:Please call and advise.

## 2019-12-23 NOTE — Clinical Note
Dr. Shahid,Mrs. Pugh is having difficulty elevating her legs.  She presents today with significant bilateral lower extremity edema and a small area of erythema that is warm to touch on the left lower extremity.  She lives at work at [employer] and has no assistance.  She is asking if you could write an order for home health.  May be an RN could go to the house and assess the situation to see what services she could benefit from having and if there are any accommodations that could make her life a little easier.  If you have any questions please do not hesitate to ask.Ale JADE

## 2019-12-23 NOTE — PATIENT INSTRUCTIONS
The left lower leg has some redness and both are swollen.  I've sent an antibiotic to the pharmacy.  Get the legs up as much as you can.  I'll ask Dr. Shahid to order home health for you.    If you are not better in 3-5 days, if worse or you have concerns or questions, please do not hesitate to call.  You can reach us at [phone] Monday through Thursday (except holidays) 8:30 a.m. to 5 p.m. or call Dr. Shahid/ROSAMARIA Mcintyre    Note:  I do not work on Fridays.  So if you have concerns or questions, please contact your primary care provider team or [facility] or go to the Urgent Care on Friday for concerns.     Thank you for using [facility]!    REEMA Verma, CNP, TREEP-BC  [facility]    To rate your experience with KALIE Verma, click on the link below:      [email or web address]

## 2020-01-10 ENCOUNTER — TELEPHONE (OUTPATIENT)
Dept: FAMILY MEDICINE | Facility: CLINIC | Age: 85
End: 2020-01-10

## 2020-01-10 ENCOUNTER — OFFICE VISIT (OUTPATIENT)
Dept: URGENT CARE | Facility: CLINIC | Age: 85
End: 2020-01-10
Payer: MEDICARE

## 2020-01-10 VITALS
HEIGHT: 64 IN | TEMPERATURE: 97 F | OXYGEN SATURATION: 97 % | DIASTOLIC BLOOD PRESSURE: 73 MMHG | SYSTOLIC BLOOD PRESSURE: 170 MMHG | BODY MASS INDEX: 28.85 KG/M2 | HEART RATE: 79 BPM | RESPIRATION RATE: 16 BRPM | WEIGHT: 169 LBS

## 2020-01-10 DIAGNOSIS — L03.116 CELLULITIS OF LEFT LEG: Primary | ICD-10-CM

## 2020-01-10 PROCEDURE — 99213 PR OFFICE/OUTPT VISIT, EST, LEVL III, 20-29 MIN: ICD-10-PCS | Mod: S$GLB,,, | Performed by: FAMILY MEDICINE

## 2020-01-10 PROCEDURE — 99213 OFFICE O/P EST LOW 20 MIN: CPT | Mod: S$GLB,,, | Performed by: FAMILY MEDICINE

## 2020-01-10 RX ORDER — CEPHALEXIN 500 MG/1
500 CAPSULE ORAL EVERY 12 HOURS
Qty: 14 CAPSULE | Refills: 0 | Status: SHIPPED | OUTPATIENT
Start: 2020-01-10 | End: 2020-01-13 | Stop reason: ALTCHOICE

## 2020-01-10 NOTE — TELEPHONE ENCOUNTER
----- Message from Milton Rojo sent at 1/10/2020  4:01 PM CST -----  Contact: Friend/Rashida Fowlerores called in and stated she needs to speak to office regarding patients swelling in both lower legs.  Rashida stated patient came in and saw Sarah Diggs about this on 12/23/19 and really swelling is not any better.    Rashida stated she wants a call from Dayanara to discuss what patient is supposed to do about this?  Rashida stated she is not on patients file to be able to speak to anyone but is a retired registered nurse & insist on speaking to someone.    Rashida' call back number is [phone] or patients number [phone]

## 2020-01-10 NOTE — TELEPHONE ENCOUNTER
Pt states that she was in recently for cellulitis and took all the medication that was prescribed. Pt states that her leg is worse, states she has blisters all over, her leg is red and hard and it very painful. Advised pt to go to the ER or urgent care ASAP. Pt states she will go to urgent care.

## 2020-01-10 NOTE — TELEPHONE ENCOUNTER
Callback to patient--advised we cannot speak to her friend without an involvement of care, reminded that we recommend an urgent care visit for her symptoms

## 2020-01-10 NOTE — PROGRESS NOTES
"Subjective:       Patient ID: Sheree Pugh is a 89 y.o. female.    Vitals:  height is 5' 4" (1.626 m) and weight is 76.7 kg (169 lb). Her oral temperature is 96.5 °F (35.8 °C). Her blood pressure is 170/73 (abnormal) and her pulse is 79. Her respiration is 16 and oxygen saturation is 97%.     Chief Complaint: Recurrent Skin Infections    Patient has recurrent cellulitis.  She was sent from her PCP    Rash   This is a recurrent problem. The current episode started yesterday. The problem is unchanged. The affected locations include the left lower leg and right lower leg. The rash is characterized by redness, swelling, scaling, dryness and blistering. It is unknown if there was an exposure to a precipitant. Pertinent negatives include no cough, fever or sore throat.       Constitution: Negative for chills and fever.   HENT: Negative for facial swelling and sore throat.    Neck: Negative for painful lymph nodes.   Eyes: Negative for eye itching and eyelid swelling.   Respiratory: Negative for cough.    Musculoskeletal: Negative for joint pain and joint swelling.   Skin: Positive for color change and rash. Negative for pale, wound, abrasion, laceration, lesion, skin thickening/induration, puncture wound, erythema, bruising, abscess, avulsion and hives.   Allergic/Immunologic: Negative for environmental allergies, immunocompromised state and hives.   Hematologic/Lymphatic: Negative for swollen lymph nodes.       Objective:      Physical Exam   Constitutional: She is oriented to person, place, and time. She appears well-developed and well-nourished. She is cooperative.  Non-toxic appearance. She does not have a sickly appearance. She does not appear ill. No distress.   HENT:   Head: Normocephalic and atraumatic.   Right Ear: Hearing, tympanic membrane, external ear and ear canal normal.   Left Ear: Hearing, tympanic membrane, external ear and ear canal normal.   Nose: Nose normal. No mucosal edema, rhinorrhea or nasal " deformity. No epistaxis. Right sinus exhibits no maxillary sinus tenderness and no frontal sinus tenderness. Left sinus exhibits no maxillary sinus tenderness and no frontal sinus tenderness.   Mouth/Throat: Uvula is midline, oropharynx is clear and moist and mucous membranes are normal. No trismus in the jaw. Normal dentition. No uvula swelling. No oropharyngeal exudate, posterior oropharyngeal edema or posterior oropharyngeal erythema.   Eyes: Conjunctivae and lids are normal. No scleral icterus.   Neck: Trachea normal, full passive range of motion without pain and phonation normal. Neck supple. No neck rigidity. No edema and no erythema present.   Cardiovascular: Normal rate, regular rhythm, normal heart sounds, intact distal pulses and normal pulses.   Pulmonary/Chest: Effort normal and breath sounds normal. No respiratory distress. She has no decreased breath sounds. She has no rhonchi.   Abdominal: Normal appearance.   Musculoskeletal: Normal range of motion. She exhibits no edema or deformity.   Neurological: She is alert and oriented to person, place, and time. She exhibits normal muscle tone. Coordination normal.   Skin: Skin is warm, dry, intact, not diaphoretic and not pale.   Left low leg with slight erythema from ankle mid way up, slight warmth, and 2+ pitting edema. No oozing and no calf ttp erythema  Psychiatric: She has a normal mood and affect. Her speech is normal and behavior is normal. Judgment and thought content normal. Cognition and memory are normal.   Nursing note and vitals reviewed.        Assessment:       1. Cellulitis of left leg        Plan:         Cellulitis of left leg    Other orders  -     cephALEXin (KEFLEX) 500 MG capsule; Take 1 capsule (500 mg total) by mouth every 12 (twelve) hours. for 7 days  Dispense: 14 capsule; Refill: 0    pt received keflex for same a few weeks ago. Will start again and urge fu on Monday w PCP for recheck and compression hose/home health. Elevate  legs

## 2020-01-10 NOTE — TELEPHONE ENCOUNTER
----- Message from Isabel Carly sent at 1/10/2020 12:14 PM CST -----  Contact: pt  Type: Needs Medical Advice    Who Called:      Best Call Back Number:   Additional Information: Requesting a call back from Nurse, regarding trouble with right leg an blisters are getting up higher and pt is in pain her leg is not getting better ,please call ASAP with advice

## 2020-01-13 ENCOUNTER — OFFICE VISIT (OUTPATIENT)
Dept: FAMILY MEDICINE | Facility: CLINIC | Age: 85
End: 2020-01-13
Payer: MEDICARE

## 2020-01-13 ENCOUNTER — TELEPHONE (OUTPATIENT)
Dept: FAMILY MEDICINE | Facility: CLINIC | Age: 85
End: 2020-01-13

## 2020-01-13 ENCOUNTER — TELEPHONE (OUTPATIENT)
Dept: URGENT CARE | Facility: CLINIC | Age: 85
End: 2020-01-13

## 2020-01-13 VITALS
TEMPERATURE: 98 F | HEART RATE: 78 BPM | SYSTOLIC BLOOD PRESSURE: 138 MMHG | BODY MASS INDEX: 29.43 KG/M2 | DIASTOLIC BLOOD PRESSURE: 68 MMHG | HEIGHT: 64 IN | WEIGHT: 172.38 LBS

## 2020-01-13 DIAGNOSIS — L03.116 CELLULITIS OF LEFT ANTERIOR LOWER LEG: Primary | ICD-10-CM

## 2020-01-13 PROCEDURE — 1159F PR MEDICATION LIST DOCUMENTED IN MEDICAL RECORD: ICD-10-PCS | Mod: HCNC,S$GLB,, | Performed by: NURSE PRACTITIONER

## 2020-01-13 PROCEDURE — 1159F MED LIST DOCD IN RCRD: CPT | Mod: HCNC,S$GLB,, | Performed by: NURSE PRACTITIONER

## 2020-01-13 PROCEDURE — 1101F PT FALLS ASSESS-DOCD LE1/YR: CPT | Mod: HCNC,CPTII,S$GLB, | Performed by: NURSE PRACTITIONER

## 2020-01-13 PROCEDURE — 1126F AMNT PAIN NOTED NONE PRSNT: CPT | Mod: HCNC,S$GLB,, | Performed by: NURSE PRACTITIONER

## 2020-01-13 PROCEDURE — 1126F PR PAIN SEVERITY QUANTIFIED, NO PAIN PRESENT: ICD-10-PCS | Mod: HCNC,S$GLB,, | Performed by: NURSE PRACTITIONER

## 2020-01-13 PROCEDURE — 99214 PR OFFICE/OUTPT VISIT, EST, LEVL IV, 30-39 MIN: ICD-10-PCS | Mod: HCNC,S$GLB,, | Performed by: NURSE PRACTITIONER

## 2020-01-13 PROCEDURE — 1101F PR PT FALLS ASSESS DOC 0-1 FALLS W/OUT INJ PAST YR: ICD-10-PCS | Mod: HCNC,CPTII,S$GLB, | Performed by: NURSE PRACTITIONER

## 2020-01-13 PROCEDURE — 99999 PR PBB SHADOW E&M-EST. PATIENT-LVL V: ICD-10-PCS | Mod: PBBFAC,HCNC,, | Performed by: NURSE PRACTITIONER

## 2020-01-13 PROCEDURE — 99214 OFFICE O/P EST MOD 30 MIN: CPT | Mod: HCNC,S$GLB,, | Performed by: NURSE PRACTITIONER

## 2020-01-13 PROCEDURE — 99999 PR PBB SHADOW E&M-EST. PATIENT-LVL V: CPT | Mod: PBBFAC,HCNC,, | Performed by: NURSE PRACTITIONER

## 2020-01-13 RX ORDER — CLINDAMYCIN HYDROCHLORIDE 300 MG/1
300 CAPSULE ORAL 3 TIMES DAILY
Qty: 30 CAPSULE | Refills: 0 | Status: SHIPPED | OUTPATIENT
Start: 2020-01-13 | End: 2020-01-23

## 2020-01-13 NOTE — Clinical Note
Gus Shahid MD,  I saw your patient today in Primary Care  If you have any questions, please do not hesitate to contact me.  Thank you!  KALIE Verma, [facility]

## 2020-01-13 NOTE — PROGRESS NOTES
Sheree Pugh is a 89 y.o. female patient of Gus Shahid MD who presents to the clinic today for   Chief Complaint   Patient presents with    Recurrent Skin Infections   .    HPI    Pt, who is known to me, reports a new problem to me:  Seen in URGENT CARE 3 days ago.  Here to discuss cellulitis, elevation of LEs, and compression hose.   She now knows how to get her recliner in a fully extended position.    These symptoms began again 2 days ago and status is improved but not better.     Talked with Dr. Shahid about compression socks with zippers.  But that didn't get resolved.    Pt denies the following symptoms:  Fever, worsening of sxs    Aggravating factors include inability to get her feet up .    Relieving factors include antibiotics .    OTC Medications tried are none.    Prescription medications taken for symptoms are cephalexin.    Pertinent medical history:  Swelling of her LEs.    ROS    Constitutional:  No fever..    Skin:  See chief complaint/HPI.    HEENT:  No complaints.    Heart/Lungs:  No complaints    GI/:  No sxs.    MS:  No new problems in bones, joints or muscles.      Past Medical History:   Diagnosis Date    Anticoagulant long-term use     Plavix & Aspirin    ARMD (age related macular degeneration)     os    Arthritis     Cataract     os    COPD (chronic obstructive pulmonary disease)     Coronary artery disease     Disorder of kidney and ureter     DE LOS SANTOS (dyspnea on exertion)     Elevated cholesterol     Heart disease     Hypertension     Insomnia     Macular degeneration     OS    Obstetrical blood-clot embolism, postpartum     PVD (peripheral vascular disease)     Retinal detachment     OS    Stented coronary artery 2/25/2019 4/14 1. Moderate disease LAD/CX. 2. Diffuse significant ISR of RCA successfully treated 2.5 and 3.0 POBA only. 3. Patent LM and right renal stent.    Urinary incontinence        Current Outpatient Medications:     aspirin (ECOTRIN) 81 MG  "EC tablet, Take by mouth once daily. , Disp: , Rfl:     atorvastatin (LIPITOR) 20 MG tablet, Take 1 tablet (20 mg total) by mouth once daily., Disp: 90 tablet, Rfl: 4    cephALEXin (KEFLEX) 500 MG capsule, Take 1 capsule (500 mg total) by mouth every 12 (twelve) hours. for 7 days, Disp: 14 capsule, Rfl: 0    clopidogrel (PLAVIX) 75 mg tablet, Take 1 tablet (75 mg total) by mouth once daily., Disp: 90 tablet, Rfl: 4    cyanocobalamin 1,000 mcg/mL injection, INJECT 1CC (1ML) INTRAMUSCULARLY EVERY THREE WEEKS, Disp: 10 mL, Rfl: 3    diltiaZEM (CARDIZEM CD) 180 MG 24 hr capsule, Take 1 capsule (180 mg total) by mouth every morning., Disp: 90 capsule, Rfl: 4    furosemide (LASIX) 20 MG tablet, Take 1 tablet (20 mg total) by mouth daily as needed., Disp: 90 tablet, Rfl: 4    isosorbide mononitrate (IMDUR) 30 MG 24 hr tablet, Take 1 tablet (30 mg total) by mouth every evening., Disp: 90 tablet, Rfl: 4    nortriptyline (PAMELOR) 10 MG capsule, Take 1 capsule (10 mg total) by mouth every evening., Disp: 90 capsule, Rfl: 2    potassium chloride SA (K-DUR,KLOR-CON) 20 MEQ tablet, Take 1 tablet (20 mEq total) by mouth daily as needed., Disp: 90 tablet, Rfl: 4    syringe with needle (BD LUER-FRANCI SYRINGE) 3 mL 25 gauge x 1" Syrg, USE TO INJECT B-12 AS DIRECTED, Disp: 10 Syringe, Rfl: 11    tamoxifen (NOLVADEX) 20 MG Tab, Take 1 tablet (20 mg total) by mouth once daily., Disp: 90 tablet, Rfl: 3    ACETAMINOPHEN (TYLENOL ARTHRITIS ORAL), Take 2 tablets by mouth daily as needed. , Disp: , Rfl:     albuterol (PROAIR HFA) 90 mcg/actuation inhaler, Inhale 2 puffs into the lungs every 6 (six) hours as needed for Wheezing or Shortness of Breath. (Patient not taking: Reported on 1/13/2020), Disp: 18 g, Rfl: 2    budesonide-formoterol 160-4.5 mcg (SYMBICORT) 160-4.5 mcg/actuation HFAA, Inhale 2 puffs into the lungs 2 (two) times daily. Controller. (Patient not taking: Reported on 1/13/2020), Disp: 30.6 g, Rfl: 3    " tiotropium (SPIRIVA) 18 mcg inhalation capsule, Inhale 1 capsule (18 mcg total) into the lungs once daily using handihaler. (Patient not taking: Reported on 1/13/2020), Disp: 90 capsule, Rfl: 2    Pt is not a smoker.    PHYSICAL EXAM    Alert, coop 89 y.o. female patient in no acute distress, is not ill-appearing.    Vitals:    01/13/20 1355   BP: 138/68   Pulse: 78   Temp: 98.1 °F (36.7 °C)     VS reviewed.  VS stable.  CC, nursing note, medications & PMH all reviewed today.    Head:  Normocephalic, atraumatic.    EENT:  Ext nose/ears normal.               Eyes with normal lids, not injected.     Resp:  Respirations even, unlabored    Heart:  Regular rate.  BP in the normal range.  CV:  Cap RF brisk.  Lower LLE > RLE with edema, left being firm to palp.    MS:  Ambulates independently, steady gait.      NEURO:  Alert and oriented x 4.  Responds appropriately during interaction.                   Skin:  Warm, dry, color good.            A/an + erythematous, with several small, erythematous, round papular lesions is located in the lower LLE.           No pustules or vesicles noted.              Psych:  Responds appropriately throughout the visit.               Relaxed.  Well-groomed    Cellulitis of left anterior lower leg  -     clindamycin (CLEOCIN) 300 MG capsule; Take 1 capsule (300 mg total) by mouth 3 (three) times daily. for 10 days  Dispense: 30 capsule; Refill: 0      Pt today presents with an erythematous area on an edematous lower LLE with several erythematous papules consistent with cellulitis, venous insufficiency.    This is a new problem to me.  No work up is planned.        Pt advised to perform comfort measures recommended on patient instruction sheet .    If not better in 2 days, the patient is advised to call here, the PCP office, [facility] or go to URGENT CARE/ER.  If worse or concerns, the patient is advised to call here, the PCP office, [facility] or go to URGENT CARE/ER.  Explained  exam findings, diagnosis and treatment plan to patient and her son.  Questions answered and patient states understanding.

## 2020-01-13 NOTE — TELEPHONE ENCOUNTER
Patient is feeling better since office visit 01/10/2020. She has appointment with PCP this evening.

## 2020-01-13 NOTE — PATIENT INSTRUCTIONS
STOP the cephalexin.  START the clindamycin 1 tablet 3x daily for 10 days.    Elevate the legs as much as possible.    I'll ask Dr. Shahid to put in a home health referral to see what modifications can be made to reduce recurrence.    If you are not better in 48 hrs, if worse or you have concerns or questions, please do not hesitate to call.  You can reach us at [phone] Monday through Thursday (except holidays) 8:30 a.m. to 5 p.m. or call Dr. Shahid/ROSAMARIA Mcintyre    Note:  I do not work on Fridays.  So if you have concerns or questions, please contact your primary care provider team or [facility] or go to the Urgent Care on Friday for concerns.     Thank you for using [facility]!    REEMA Verma, CNP, KALIE-BC  [facility]    To rate your experience with KALIE Verma, click on the link below:      [email or web address]

## 2020-01-14 ENCOUNTER — TELEPHONE (OUTPATIENT)
Dept: FAMILY MEDICINE | Facility: CLINIC | Age: 85
End: 2020-01-14

## 2020-01-15 ENCOUNTER — TELEPHONE (OUTPATIENT)
Dept: FAMILY MEDICINE | Facility: CLINIC | Age: 85
End: 2020-01-15

## 2020-01-15 PROCEDURE — G0180 MD CERTIFICATION HHA PATIENT: HCPCS | Mod: ,,, | Performed by: FAMILY MEDICINE

## 2020-01-15 PROCEDURE — G0180 PR HOME HEALTH MD CERTIFICATION: ICD-10-PCS | Mod: ,,, | Performed by: FAMILY MEDICINE

## 2020-01-15 NOTE — TELEPHONE ENCOUNTER
----- Message from Milton Rojo sent at 1/14/2020  1:07 PM CST -----  Contact: same  Patient called in and stated she received a message that she needed to reschedule her appt on 2/14/2020 but really can wait until March (next available) to be seen.  Patient would like a call back before rescheduling at [phone]

## 2020-01-16 ENCOUNTER — TELEPHONE (OUTPATIENT)
Dept: FAMILY MEDICINE | Facility: CLINIC | Age: 85
End: 2020-01-16

## 2020-01-16 NOTE — TELEPHONE ENCOUNTER
----- Message from Blanche Maloney sent at 1/16/2020 10:47 AM CST -----  Type: Needs Medical Advice    Who Called: Ilene HYLTON     Best Call Back Number: [phone]  Additional Information: patient was admitted to  yesterday   Patients anticoagulation meds may have some interactions wanted your office to be aware

## 2020-01-16 NOTE — TELEPHONE ENCOUNTER
Ilene from [facility] states that the pt was admitted with cellulitis to [facility]. They had a note for compression stockings, but the pt said that you were working on this for her. Ilene also states that the pt has blisters to her BLE, and wants to know if you can squeeze the pt in sooner that 2/14. I offered an appt with the NP, but she states that the pt already saw Sarah Diggs on 1/13. She feels that she needs to see you now. She also reported a med interaction between plavix and aspirin. Please advise.

## 2020-01-20 NOTE — TELEPHONE ENCOUNTER
Called patient and she was happy with her appointment on 2/14 at 10:15. Stated she would speak to Dr. Rodríguez about who he recommends for a new provider for her.

## 2020-01-21 ENCOUNTER — EXTERNAL HOME HEALTH (OUTPATIENT)
Dept: HOME HEALTH SERVICES | Facility: HOSPITAL | Age: 85
End: 2020-01-21
Payer: MEDICARE

## 2020-01-31 ENCOUNTER — OFFICE VISIT (OUTPATIENT)
Dept: HEMATOLOGY/ONCOLOGY | Facility: CLINIC | Age: 85
End: 2020-01-31
Payer: MEDICARE

## 2020-01-31 VITALS
BODY MASS INDEX: 29.72 KG/M2 | WEIGHT: 174.06 LBS | SYSTOLIC BLOOD PRESSURE: 148 MMHG | HEIGHT: 64 IN | TEMPERATURE: 98 F | OXYGEN SATURATION: 96 % | DIASTOLIC BLOOD PRESSURE: 59 MMHG | RESPIRATION RATE: 18 BRPM | HEART RATE: 64 BPM

## 2020-01-31 DIAGNOSIS — I25.10 CORONARY ARTERY DISEASE INVOLVING NATIVE CORONARY ARTERY OF NATIVE HEART WITHOUT ANGINA PECTORIS: Chronic | ICD-10-CM

## 2020-01-31 DIAGNOSIS — L03.116 CELLULITIS OF LEFT LOWER EXTREMITY: ICD-10-CM

## 2020-01-31 DIAGNOSIS — E78.5 DYSLIPIDEMIA: Chronic | ICD-10-CM

## 2020-01-31 DIAGNOSIS — C50.012 MALIGNANT NEOPLASM OF NIPPLE OF LEFT BREAST IN FEMALE, UNSPECIFIED ESTROGEN RECEPTOR STATUS: Primary | ICD-10-CM

## 2020-01-31 DIAGNOSIS — I10 ESSENTIAL HYPERTENSION: Chronic | ICD-10-CM

## 2020-01-31 PROCEDURE — 99999 PR PBB SHADOW E&M-EST. PATIENT-LVL IV: CPT | Mod: PBBFAC,HCNC,, | Performed by: INTERNAL MEDICINE

## 2020-01-31 PROCEDURE — 1159F MED LIST DOCD IN RCRD: CPT | Mod: HCNC,S$GLB,, | Performed by: INTERNAL MEDICINE

## 2020-01-31 PROCEDURE — 99999 PR PBB SHADOW E&M-EST. PATIENT-LVL IV: ICD-10-PCS | Mod: PBBFAC,HCNC,, | Performed by: INTERNAL MEDICINE

## 2020-01-31 PROCEDURE — 1159F PR MEDICATION LIST DOCUMENTED IN MEDICAL RECORD: ICD-10-PCS | Mod: HCNC,S$GLB,, | Performed by: INTERNAL MEDICINE

## 2020-01-31 PROCEDURE — 99214 OFFICE O/P EST MOD 30 MIN: CPT | Mod: HCNC,S$GLB,, | Performed by: INTERNAL MEDICINE

## 2020-01-31 PROCEDURE — 99499 RISK ADDL DX/OHS AUDIT: ICD-10-PCS | Mod: HCNC,S$GLB,, | Performed by: INTERNAL MEDICINE

## 2020-01-31 PROCEDURE — 1125F AMNT PAIN NOTED PAIN PRSNT: CPT | Mod: HCNC,S$GLB,, | Performed by: INTERNAL MEDICINE

## 2020-01-31 PROCEDURE — 99214 PR OFFICE/OUTPT VISIT, EST, LEVL IV, 30-39 MIN: ICD-10-PCS | Mod: HCNC,S$GLB,, | Performed by: INTERNAL MEDICINE

## 2020-01-31 PROCEDURE — 99499 UNLISTED E&M SERVICE: CPT | Mod: HCNC,S$GLB,, | Performed by: INTERNAL MEDICINE

## 2020-01-31 PROCEDURE — 1101F PT FALLS ASSESS-DOCD LE1/YR: CPT | Mod: HCNC,CPTII,S$GLB, | Performed by: INTERNAL MEDICINE

## 2020-01-31 PROCEDURE — 1101F PR PT FALLS ASSESS DOC 0-1 FALLS W/OUT INJ PAST YR: ICD-10-PCS | Mod: HCNC,CPTII,S$GLB, | Performed by: INTERNAL MEDICINE

## 2020-01-31 PROCEDURE — 1125F PR PAIN SEVERITY QUANTIFIED, PAIN PRESENT: ICD-10-PCS | Mod: HCNC,S$GLB,, | Performed by: INTERNAL MEDICINE

## 2020-01-31 RX ORDER — CEFADROXIL 500 MG/1
500 CAPSULE ORAL EVERY 12 HOURS
Qty: 14 CAPSULE | Refills: 0 | Status: SHIPPED | OUTPATIENT
Start: 2020-01-31 | End: 2020-05-28 | Stop reason: SDUPTHER

## 2020-01-31 NOTE — PROGRESS NOTES
History of present illness:  The patient is a 89-year-old white female who presented with left palpable breast mass which was biopsied and found to be a grade 2 infiltrating ductal carcinoma which is ER positive/IA positive and HER2 Aliza negative by FISH.  Patient has gone on to have lumpectomy and sentinel lymph node sampling.  Her tumor was 15 mm in greatest dimension.  Margins of resection are negative.  Three sampled sentinel lymph nodes were negative for metastatic disease.  Postoperatively, the patient is recovering as expected and is not having difficulties with undue postop pain, decreased range of motion of the left upper extremity, or lymphedema.     Following her initial evaluation, patient refused baseline lab, genetic testing, and adjuvant radiation therapy.  She did initiate therapy with tamoxifen as prescribed and appears to be tolerating this medication well. She has had recent difficulties with cellulitis involving the left lower extremity.  She has been treated with antibiotics x2.  Symptoms persist.  No other complaints pertinent findings on a 14 point review of systems.    Physical examination:  Well-developed, elderly, frail-appearing white female, in no acute distress, who has a weight of 174 lb (increased by 5.5 lb).  VITAL SIGNS: Documented  and reviewed this visit.  HEENT: Normocephalic, atraumatic. Oral mucosa pink and moist. Lips without   lesions. Tongue midline. Oropharynx clear. Nonicteric sclerae.   NECK: Supple, no adenopathy. No carotid bruits, thyromegaly or thyroid nodule.   HEART: Regular rate and rhythm without murmur, gallop or rub.   LUNGS: Clear to auscultation bilaterally. Normal respiratory effort.   ABDOMEN: Soft, nontender, nondistended with positive normoactive bowel sounds,   no hepatosplenomegaly.   EXTREMITIES: No cyanosis, clubbing or edema. Distal pulses are intact.   AXILLAE AND GROIN: No palpable pathologic lymphadenopathy is appreciated.   SKIN: Intact/turgor  normal.  Residual left lower extremity cellulitis.    NEUROLOGIC: Cranial nerves II-XII grossly intact. Motor: Good muscle bulk and   tone. Strength/sensory 5/5 throughout. Gait stable.   BREASTS:  Patient's right breast is free from mass, tenderness, nipple discharge.  Patient's left breast has a well-healed lumpectomy scar and is free from signs of infection.    Impression:  1.  Stage I (T1c N0 M0) infiltrating ductal carcinoma left breast which is ER positive/FL positive/HER2 Aliza negative.  2.  Residual left lower extremity cellulitis.    Plan:  1.  Obtain baseline lab to include CBC, CMP, LDH, magnesium.  I will review these results by phone.  2.  Plan to re-evaluate the patient 3 months from now without interval study.  3.  As patient has failed to resolve cellulitis with Keflex and Cleocin, we will embark upon a course of Duricef 500 mg twice daily for 7 days in hopes of eradicating this infection.    This note was created using voice recognition software and may contain grammatical errors.

## 2020-02-01 ENCOUNTER — LAB VISIT (OUTPATIENT)
Dept: LAB | Facility: HOSPITAL | Age: 85
End: 2020-02-01
Attending: INTERNAL MEDICINE
Payer: MEDICARE

## 2020-02-01 DIAGNOSIS — E78.5 DYSLIPIDEMIA: Chronic | ICD-10-CM

## 2020-02-01 DIAGNOSIS — I10 ESSENTIAL HYPERTENSION: Chronic | ICD-10-CM

## 2020-02-01 DIAGNOSIS — C50.012 MALIGNANT NEOPLASM OF NIPPLE OF LEFT BREAST IN FEMALE, UNSPECIFIED ESTROGEN RECEPTOR STATUS: ICD-10-CM

## 2020-02-01 DIAGNOSIS — I25.10 CORONARY ARTERY DISEASE INVOLVING NATIVE CORONARY ARTERY OF NATIVE HEART WITHOUT ANGINA PECTORIS: Chronic | ICD-10-CM

## 2020-02-01 LAB
25(OH)D3+25(OH)D2 SERPL-MCNC: 53 NG/ML (ref 30–96)
ALBUMIN SERPL BCP-MCNC: 3.3 G/DL (ref 3.5–5.2)
ALP SERPL-CCNC: 86 U/L (ref 55–135)
ALT SERPL W/O P-5'-P-CCNC: 21 U/L (ref 10–44)
ANION GAP SERPL CALC-SCNC: 10 MMOL/L (ref 8–16)
AST SERPL-CCNC: 22 U/L (ref 10–40)
BASOPHILS # BLD AUTO: 0.06 K/UL (ref 0–0.2)
BASOPHILS NFR BLD: 1 % (ref 0–1.9)
BILIRUB SERPL-MCNC: 0.3 MG/DL (ref 0.1–1)
BUN SERPL-MCNC: 23 MG/DL (ref 10–30)
CALCIUM SERPL-MCNC: 8.9 MG/DL (ref 8.7–10.5)
CHLORIDE SERPL-SCNC: 108 MMOL/L (ref 95–110)
CHOLEST SERPL-MCNC: 156 MG/DL (ref 120–199)
CHOLEST/HDLC SERPL: 2.4 {RATIO} (ref 2–5)
CO2 SERPL-SCNC: 24 MMOL/L (ref 23–29)
CREAT SERPL-MCNC: 1.1 MG/DL (ref 0.5–1.4)
DIFFERENTIAL METHOD: ABNORMAL
EOSINOPHIL # BLD AUTO: 0.2 K/UL (ref 0–0.5)
EOSINOPHIL NFR BLD: 3.4 % (ref 0–8)
ERYTHROCYTE [DISTWIDTH] IN BLOOD BY AUTOMATED COUNT: 15.2 % (ref 11.5–14.5)
EST. GFR  (AFRICAN AMERICAN): 50 ML/MIN/1.73 M^2
EST. GFR  (NON AFRICAN AMERICAN): 44 ML/MIN/1.73 M^2
ESTIMATED AVG GLUCOSE: 117 MG/DL (ref 68–131)
GLUCOSE SERPL-MCNC: 95 MG/DL (ref 70–110)
HBA1C MFR BLD HPLC: 5.7 % (ref 4–5.6)
HCT VFR BLD AUTO: 37.1 % (ref 37–48.5)
HDLC SERPL-MCNC: 66 MG/DL (ref 40–75)
HDLC SERPL: 42.3 % (ref 20–50)
HGB BLD-MCNC: 12 G/DL (ref 12–16)
LDH SERPL L TO P-CCNC: 198 U/L (ref 110–260)
LDLC SERPL CALC-MCNC: 77.4 MG/DL (ref 63–159)
LYMPHOCYTES # BLD AUTO: 1.7 K/UL (ref 1–4.8)
LYMPHOCYTES NFR BLD: 29.4 % (ref 18–48)
MCH RBC QN AUTO: 30.7 PG (ref 27–31)
MCHC RBC AUTO-ENTMCNC: 32.3 G/DL (ref 32–36)
MCV RBC AUTO: 95 FL (ref 82–98)
MONOCYTES # BLD AUTO: 0.9 K/UL (ref 0.3–1)
MONOCYTES NFR BLD: 16 % (ref 4–15)
NEUTROPHILS # BLD AUTO: 3 K/UL (ref 1.8–7.7)
NEUTROPHILS NFR BLD: 50.2 % (ref 38–73)
NONHDLC SERPL-MCNC: 90 MG/DL
PLATELET # BLD AUTO: 243 K/UL (ref 150–350)
PMV BLD AUTO: 10.2 FL (ref 9.2–12.9)
POTASSIUM SERPL-SCNC: 4.3 MMOL/L (ref 3.5–5.1)
PROT SERPL-MCNC: 6.5 G/DL (ref 6–8.4)
RBC # BLD AUTO: 3.91 M/UL (ref 4–5.4)
SODIUM SERPL-SCNC: 142 MMOL/L (ref 136–145)
TRIGL SERPL-MCNC: 63 MG/DL (ref 30–150)
WBC # BLD AUTO: 5.88 K/UL (ref 3.9–12.7)

## 2020-02-01 PROCEDURE — 36415 COLL VENOUS BLD VENIPUNCTURE: CPT | Mod: HCNC,PO

## 2020-02-01 PROCEDURE — 80053 COMPREHEN METABOLIC PANEL: CPT | Mod: HCNC,PO

## 2020-02-01 PROCEDURE — 82306 VITAMIN D 25 HYDROXY: CPT | Mod: HCNC

## 2020-02-01 PROCEDURE — 85025 COMPLETE CBC W/AUTO DIFF WBC: CPT | Mod: HCNC,PO

## 2020-02-01 PROCEDURE — 83615 LACTATE (LD) (LDH) ENZYME: CPT | Mod: HCNC,PO

## 2020-02-01 PROCEDURE — 83036 HEMOGLOBIN GLYCOSYLATED A1C: CPT | Mod: HCNC

## 2020-02-01 PROCEDURE — 80061 LIPID PANEL: CPT | Mod: HCNC

## 2020-02-14 ENCOUNTER — OFFICE VISIT (OUTPATIENT)
Dept: FAMILY MEDICINE | Facility: CLINIC | Age: 85
End: 2020-02-14
Payer: MEDICARE

## 2020-02-14 VITALS
OXYGEN SATURATION: 96 % | WEIGHT: 174.63 LBS | DIASTOLIC BLOOD PRESSURE: 60 MMHG | SYSTOLIC BLOOD PRESSURE: 134 MMHG | HEART RATE: 65 BPM | HEIGHT: 64 IN | BODY MASS INDEX: 29.81 KG/M2

## 2020-02-14 DIAGNOSIS — C50.012 MALIGNANT NEOPLASM OF NIPPLE OF LEFT BREAST IN FEMALE, UNSPECIFIED ESTROGEN RECEPTOR STATUS: Primary | ICD-10-CM

## 2020-02-14 DIAGNOSIS — R60.0 PEDAL EDEMA: ICD-10-CM

## 2020-02-14 PROCEDURE — 99213 PR OFFICE/OUTPT VISIT, EST, LEVL III, 20-29 MIN: ICD-10-PCS | Mod: HCNC,S$GLB,, | Performed by: FAMILY MEDICINE

## 2020-02-14 PROCEDURE — 1126F PR PAIN SEVERITY QUANTIFIED, NO PAIN PRESENT: ICD-10-PCS | Mod: HCNC,S$GLB,, | Performed by: FAMILY MEDICINE

## 2020-02-14 PROCEDURE — 1159F PR MEDICATION LIST DOCUMENTED IN MEDICAL RECORD: ICD-10-PCS | Mod: HCNC,S$GLB,, | Performed by: FAMILY MEDICINE

## 2020-02-14 PROCEDURE — 99999 PR PBB SHADOW E&M-EST. PATIENT-LVL III: CPT | Mod: PBBFAC,HCNC,, | Performed by: FAMILY MEDICINE

## 2020-02-14 PROCEDURE — 1159F MED LIST DOCD IN RCRD: CPT | Mod: HCNC,S$GLB,, | Performed by: FAMILY MEDICINE

## 2020-02-14 PROCEDURE — 1101F PT FALLS ASSESS-DOCD LE1/YR: CPT | Mod: HCNC,CPTII,S$GLB, | Performed by: FAMILY MEDICINE

## 2020-02-14 PROCEDURE — 99213 OFFICE O/P EST LOW 20 MIN: CPT | Mod: HCNC,S$GLB,, | Performed by: FAMILY MEDICINE

## 2020-02-14 PROCEDURE — 1101F PR PT FALLS ASSESS DOC 0-1 FALLS W/OUT INJ PAST YR: ICD-10-PCS | Mod: HCNC,CPTII,S$GLB, | Performed by: FAMILY MEDICINE

## 2020-02-14 PROCEDURE — 99999 PR PBB SHADOW E&M-EST. PATIENT-LVL III: ICD-10-PCS | Mod: PBBFAC,HCNC,, | Performed by: FAMILY MEDICINE

## 2020-02-14 PROCEDURE — 1126F AMNT PAIN NOTED NONE PRSNT: CPT | Mod: HCNC,S$GLB,, | Performed by: FAMILY MEDICINE

## 2020-02-14 NOTE — PROGRESS NOTES
Subjective:       Patient ID: Sheree Pugh is a 89 y.o. female    Chief Complaint: Follow-up    HPI  Here today for interval evaluation  She has continued intermittent edema.  Improved with elevation.    Review of Systems     Objective:   Physical Exam   Constitutional: She is oriented to person, place, and time. She appears well-developed and well-nourished.   Musculoskeletal: She exhibits edema (1+).   Neurological: She is alert and oriented to person, place, and time.   Vitals reviewed.    Assessment:       1. Malignant neoplasm of nipple of left breast in female, unspecified estrogen receptor status     2. Pedal edema  COMPRESSION STOCKINGS        Plan:       Malignant neoplasm of nipple of left breast in female, unspecified estrogen receptor status  - Continue current therapy  - Continue Oncology  - Follow up in about 6 months (around 8/14/2020).    Pedal edema  -     COMPRESSION STOCKINGS  - Elevation

## 2020-03-31 ENCOUNTER — TELEPHONE (OUTPATIENT)
Dept: FAMILY MEDICINE | Facility: CLINIC | Age: 85
End: 2020-03-31

## 2020-03-31 NOTE — TELEPHONE ENCOUNTER
----- Message from Nury Gillis sent at 3/31/2020  4:42 PM CDT -----  She lives at [facility], she is not allowed to go to appts unless it is medically necessary.  She said she should be getting her B12 shot on Friday.  Her niece usually goes there to administer it  She needs a letter stating it is medically necessary.  Please call her at [phone]. Thank you!

## 2020-04-01 NOTE — TELEPHONE ENCOUNTER
Spoke with pt, explained there per Dr Shahid, its ok to skip B12 for this month, pt verbalized understanding, no further action at this time

## 2020-04-13 ENCOUNTER — TELEPHONE (OUTPATIENT)
Dept: CARDIOLOGY | Facility: CLINIC | Age: 85
End: 2020-04-13

## 2020-04-13 NOTE — TELEPHONE ENCOUNTER
----- Message from Dean Haney sent at 4/13/2020  1:09 PM CDT -----  Contact: pt  Type: Needs Medical Advice    Who Called:  pt    Best Call Back Number: [phone]  Additional Information: pt needs to discuss her upcoming appointment. Needs to know if she needs to come in or if she needs to r/s. Please call to advise.

## 2020-04-27 RX ORDER — CYANOCOBALAMIN 1000 UG/ML
INJECTION, SOLUTION INTRAMUSCULAR; SUBCUTANEOUS
Qty: 10 ML | Refills: 3 | Status: SHIPPED | OUTPATIENT
Start: 2020-04-27 | End: 2021-06-04 | Stop reason: SDUPTHER

## 2020-04-28 ENCOUNTER — TELEPHONE (OUTPATIENT)
Dept: HEMATOLOGY/ONCOLOGY | Facility: CLINIC | Age: 85
End: 2020-04-28

## 2020-04-28 NOTE — TELEPHONE ENCOUNTER
----- Message from Tarci De La Paz sent at 4/28/2020  8:40 AM CDT -----  Contact: Pt  Type: Needs Medical Advice    Who Called:  Pt  Best Call Back Number: [phone]  Additional Information: Requesting a call back regarding rescheduling her appointment   Please Advise ---Thank you

## 2020-04-28 NOTE — TELEPHONE ENCOUNTER
Returned call to patient,facility where patient resides is on lock own d/t Covid 19, rescheduled May 1 f/u to June 5 at 1 pm, patient voiced understanding and appreciation

## 2020-05-27 ENCOUNTER — PATIENT OUTREACH (OUTPATIENT)
Dept: ADMINISTRATIVE | Facility: OTHER | Age: 85
End: 2020-05-27

## 2020-05-28 ENCOUNTER — OFFICE VISIT (OUTPATIENT)
Dept: CARDIOLOGY | Facility: CLINIC | Age: 85
End: 2020-05-28
Payer: MEDICARE

## 2020-05-28 ENCOUNTER — OFFICE VISIT (OUTPATIENT)
Dept: FAMILY MEDICINE | Facility: CLINIC | Age: 85
End: 2020-05-28
Payer: MEDICARE

## 2020-05-28 VITALS
WEIGHT: 179.25 LBS | SYSTOLIC BLOOD PRESSURE: 134 MMHG | DIASTOLIC BLOOD PRESSURE: 63 MMHG | HEART RATE: 67 BPM | HEIGHT: 64 IN | BODY MASS INDEX: 30.6 KG/M2

## 2020-05-28 VITALS
OXYGEN SATURATION: 95 % | HEART RATE: 74 BPM | WEIGHT: 183.88 LBS | DIASTOLIC BLOOD PRESSURE: 72 MMHG | BODY MASS INDEX: 31.56 KG/M2 | SYSTOLIC BLOOD PRESSURE: 128 MMHG

## 2020-05-28 DIAGNOSIS — I73.9 PERIPHERAL VASCULAR DISEASE: ICD-10-CM

## 2020-05-28 DIAGNOSIS — E78.5 DYSLIPIDEMIA: Chronic | ICD-10-CM

## 2020-05-28 DIAGNOSIS — N39.41 URGE INCONTINENCE OF URINE: ICD-10-CM

## 2020-05-28 DIAGNOSIS — G47.01 INSOMNIA DUE TO MEDICAL CONDITION: ICD-10-CM

## 2020-05-28 DIAGNOSIS — J44.9 CHRONIC OBSTRUCTIVE PULMONARY DISEASE, UNSPECIFIED COPD TYPE: ICD-10-CM

## 2020-05-28 DIAGNOSIS — H35.3220 EXUDATIVE AGE-RELATED MACULAR DEGENERATION OF LEFT EYE, UNSPECIFIED STAGE: ICD-10-CM

## 2020-05-28 DIAGNOSIS — I25.10 CORONARY ARTERY DISEASE INVOLVING NATIVE CORONARY ARTERY OF NATIVE HEART WITHOUT ANGINA PECTORIS: Primary | Chronic | ICD-10-CM

## 2020-05-28 DIAGNOSIS — I70.1 RAS (RENAL ARTERY STENOSIS): ICD-10-CM

## 2020-05-28 DIAGNOSIS — G62.9 PERIPHERAL POLYNEUROPATHY: ICD-10-CM

## 2020-05-28 DIAGNOSIS — N18.30 CHRONIC KIDNEY DISEASE, STAGE III (MODERATE): ICD-10-CM

## 2020-05-28 DIAGNOSIS — I10 ESSENTIAL HYPERTENSION: Chronic | ICD-10-CM

## 2020-05-28 DIAGNOSIS — I10 ESSENTIAL HYPERTENSION: ICD-10-CM

## 2020-05-28 DIAGNOSIS — M46.96 UNSPECIFIED INFLAMMATORY SPONDYLOPATHY, LUMBAR REGION: ICD-10-CM

## 2020-05-28 DIAGNOSIS — L03.116 CELLULITIS OF LEFT LOWER EXTREMITY: Primary | ICD-10-CM

## 2020-05-28 DIAGNOSIS — R60.0 BILATERAL LOWER EXTREMITY EDEMA: ICD-10-CM

## 2020-05-28 PROCEDURE — 1126F AMNT PAIN NOTED NONE PRSNT: CPT | Mod: HCNC,S$GLB,, | Performed by: PHYSICIAN ASSISTANT

## 2020-05-28 PROCEDURE — 1101F PT FALLS ASSESS-DOCD LE1/YR: CPT | Mod: HCNC,CPTII,S$GLB, | Performed by: PHYSICIAN ASSISTANT

## 2020-05-28 PROCEDURE — 99999 PR PBB SHADOW E&M-EST. PATIENT-LVL III: CPT | Mod: PBBFAC,HCNC,, | Performed by: INTERNAL MEDICINE

## 2020-05-28 PROCEDURE — 99499 UNLISTED E&M SERVICE: CPT | Mod: HCNC,S$GLB,, | Performed by: INTERNAL MEDICINE

## 2020-05-28 PROCEDURE — 1101F PT FALLS ASSESS-DOCD LE1/YR: CPT | Mod: HCNC,CPTII,S$GLB, | Performed by: INTERNAL MEDICINE

## 2020-05-28 PROCEDURE — 99214 OFFICE O/P EST MOD 30 MIN: CPT | Mod: HCNC,S$GLB,, | Performed by: PHYSICIAN ASSISTANT

## 2020-05-28 PROCEDURE — 1159F MED LIST DOCD IN RCRD: CPT | Mod: HCNC,S$GLB,, | Performed by: PHYSICIAN ASSISTANT

## 2020-05-28 PROCEDURE — 99999 PR PBB SHADOW E&M-EST. PATIENT-LVL IV: ICD-10-PCS | Mod: PBBFAC,HCNC,, | Performed by: PHYSICIAN ASSISTANT

## 2020-05-28 PROCEDURE — 1125F PR PAIN SEVERITY QUANTIFIED, PAIN PRESENT: ICD-10-PCS | Mod: HCNC,S$GLB,, | Performed by: INTERNAL MEDICINE

## 2020-05-28 PROCEDURE — 1159F MED LIST DOCD IN RCRD: CPT | Mod: HCNC,S$GLB,, | Performed by: INTERNAL MEDICINE

## 2020-05-28 PROCEDURE — 99999 PR PBB SHADOW E&M-EST. PATIENT-LVL III: ICD-10-PCS | Mod: PBBFAC,HCNC,, | Performed by: INTERNAL MEDICINE

## 2020-05-28 PROCEDURE — 99214 PR OFFICE/OUTPT VISIT, EST, LEVL IV, 30-39 MIN: ICD-10-PCS | Mod: HCNC,S$GLB,, | Performed by: INTERNAL MEDICINE

## 2020-05-28 PROCEDURE — 1126F PR PAIN SEVERITY QUANTIFIED, NO PAIN PRESENT: ICD-10-PCS | Mod: HCNC,S$GLB,, | Performed by: PHYSICIAN ASSISTANT

## 2020-05-28 PROCEDURE — 99499 RISK ADDL DX/OHS AUDIT: ICD-10-PCS | Mod: HCNC,S$GLB,, | Performed by: INTERNAL MEDICINE

## 2020-05-28 PROCEDURE — 1101F PR PT FALLS ASSESS DOC 0-1 FALLS W/OUT INJ PAST YR: ICD-10-PCS | Mod: HCNC,CPTII,S$GLB, | Performed by: INTERNAL MEDICINE

## 2020-05-28 PROCEDURE — 99214 PR OFFICE/OUTPT VISIT, EST, LEVL IV, 30-39 MIN: ICD-10-PCS | Mod: HCNC,S$GLB,, | Performed by: PHYSICIAN ASSISTANT

## 2020-05-28 PROCEDURE — 1159F PR MEDICATION LIST DOCUMENTED IN MEDICAL RECORD: ICD-10-PCS | Mod: HCNC,S$GLB,, | Performed by: INTERNAL MEDICINE

## 2020-05-28 PROCEDURE — 99999 PR PBB SHADOW E&M-EST. PATIENT-LVL IV: CPT | Mod: PBBFAC,HCNC,, | Performed by: PHYSICIAN ASSISTANT

## 2020-05-28 PROCEDURE — 1159F PR MEDICATION LIST DOCUMENTED IN MEDICAL RECORD: ICD-10-PCS | Mod: HCNC,S$GLB,, | Performed by: PHYSICIAN ASSISTANT

## 2020-05-28 PROCEDURE — 99214 OFFICE O/P EST MOD 30 MIN: CPT | Mod: HCNC,S$GLB,, | Performed by: INTERNAL MEDICINE

## 2020-05-28 PROCEDURE — 1101F PR PT FALLS ASSESS DOC 0-1 FALLS W/OUT INJ PAST YR: ICD-10-PCS | Mod: HCNC,CPTII,S$GLB, | Performed by: PHYSICIAN ASSISTANT

## 2020-05-28 PROCEDURE — 1125F AMNT PAIN NOTED PAIN PRSNT: CPT | Mod: HCNC,S$GLB,, | Performed by: INTERNAL MEDICINE

## 2020-05-28 RX ORDER — OXYBUTYNIN CHLORIDE 10 MG/1
10 TABLET, EXTENDED RELEASE ORAL DAILY
Qty: 30 TABLET | Refills: 11 | Status: SHIPPED | OUTPATIENT
Start: 2020-05-28 | End: 2021-05-02 | Stop reason: SDUPTHER

## 2020-05-28 RX ORDER — NORTRIPTYLINE HYDROCHLORIDE 10 MG/1
10 CAPSULE ORAL NIGHTLY
Qty: 30 CAPSULE | Refills: 11 | Status: SHIPPED | OUTPATIENT
Start: 2020-05-28 | End: 2021-05-27 | Stop reason: SDUPTHER

## 2020-05-28 RX ORDER — CEFADROXIL 500 MG/1
500 CAPSULE ORAL EVERY 12 HOURS
Qty: 14 CAPSULE | Refills: 0 | Status: SHIPPED | OUTPATIENT
Start: 2020-05-28 | End: 2020-08-25 | Stop reason: SDUPTHER

## 2020-05-28 NOTE — PROGRESS NOTES
Patient ID: Sheree Pugh     Chief Complaint:   Chief Complaint   Patient presents with    Establish Care     Dr. Shahid pt    Edema     possible cellulitis        HPI: New Patient to me and transferring from Dr. Shahid. Doing ok overall, but does have chronic bilateral lower extremity edema w/ recurrent cellulitis. She failed Keflex and Clindamycin in January, so will try Cefadroxil again. She does urinate a lot w/ lasix daily and Complains of urge incontinence, so will add OTC Compression Socks and add Oxybutinin 10 mg / day. Last Echo 2019= diastolic dysfunction and Moderate Aortic Insufficiency. Complains of hearing loss and I will let her decide if she wants to purchase hearing aids. Complains of insomnia and sciatica despite Nortriptyline, but Gabapentin gave a reaction and Cymbalta interacts w/ Tamoxifen. Labs from February reviewed: all good, but could eat more meat.     Review of Systems   Constitutional: Negative.    HENT: Negative.    Eyes: Negative.    Respiratory: Negative.    Cardiovascular: Negative.    Gastrointestinal: Negative.    Endocrine: Negative.    Genitourinary: Negative.    Musculoskeletal: Positive for back pain.   Skin: Negative.    Allergic/Immunologic: Negative.    Neurological: Negative.    Hematological: Negative.    Psychiatric/Behavioral: Negative.           Objective:      Physical Exam   Physical Exam   Constitutional: She is oriented to person, place, and time. She appears well-developed and well-nourished.   HENT:   Head: Normocephalic and atraumatic.   Nose: Nose normal.   Mouth/Throat: Oropharynx is clear and moist.   Eyes: Pupils are equal, round, and reactive to light. Conjunctivae and EOM are normal.   Neck: Normal range of motion. Neck supple.   Cardiovascular: Normal rate, regular rhythm and intact distal pulses.   Murmur heard.  2/6 systolic ejection murmur and diastolic murmur   Pulmonary/Chest: Effort normal and breath sounds normal.   Abdominal: Soft. Bowel  "sounds are normal.   Musculoskeletal:   Ambulates in wheelchiar     2+ bilateral lower extremity edema w/ some areas of cellulitis    Neurological: She is alert and oriented to person, place, and time.   Skin: Skin is warm and dry. Capillary refill takes less than 2 seconds.   Psychiatric: She has a normal mood and affect. Her behavior is normal. Judgment and thought content normal.   Nursing note and vitals reviewed.          Current Outpatient Medications:     ACETAMINOPHEN (TYLENOL ARTHRITIS ORAL), Take 2 tablets by mouth daily as needed. , Disp: , Rfl:     albuterol (PROAIR HFA) 90 mcg/actuation inhaler, Inhale 2 puffs into the lungs every 6 (six) hours as needed for Wheezing or Shortness of Breath., Disp: 18 g, Rfl: 2    atorvastatin (LIPITOR) 20 MG tablet, Take 1 tablet (20 mg total) by mouth once daily., Disp: 90 tablet, Rfl: 4    budesonide-formoterol 160-4.5 mcg (SYMBICORT) 160-4.5 mcg/actuation HFAA, Inhale 2 puffs into the lungs 2 (two) times daily. Controller., Disp: 30.6 g, Rfl: 3    clopidogreL (PLAVIX) 75 mg tablet, Take 1 tablet (75 mg total) by mouth once daily., Disp: 90 tablet, Rfl: 4    cyanocobalamin 1,000 mcg/mL injection, INJECT 1CC (1ML) INTRAMUSCULARLY EVERY THREE WEEKS, Disp: 10 mL, Rfl: 3    diltiaZEM (CARDIZEM CD) 180 MG 24 hr capsule, Take 1 capsule (180 mg total) by mouth every morning., Disp: 90 capsule, Rfl: 4    furosemide (LASIX) 20 MG tablet, Take 1 tablet (20 mg total) by mouth daily as needed., Disp: 90 tablet, Rfl: 4    isosorbide mononitrate (IMDUR) 30 MG 24 hr tablet, Take 1 tablet (30 mg total) by mouth every evening., Disp: 90 tablet, Rfl: 4    potassium chloride SA (K-DUR,KLOR-CON) 20 MEQ tablet, Take 1 tablet (20 mEq total) by mouth daily as needed., Disp: 90 tablet, Rfl: 4    syringe with needle (BD LUER-FRANCI SYRINGE) 3 mL 25 gauge x 1" Syrg, USE TO INJECT B-12 AS DIRECTED, Disp: 10 Syringe, Rfl: 11    tamoxifen (NOLVADEX) 20 MG Tab, Take 1 tablet (20 mg total) " by mouth once daily., Disp: 90 tablet, Rfl: 3    tiotropium (SPIRIVA) 18 mcg inhalation capsule, Inhale 1 capsule (18 mcg total) into the lungs once daily using handihaler., Disp: 90 capsule, Rfl: 2    cefadroxil (DURICEF) 500 MG Cap, Take 1 capsule (500 mg total) by mouth every 12 (twelve) hours., Disp: 14 capsule, Rfl: 0    nortriptyline (PAMELOR) 10 MG capsule, Take 1 capsule (10 mg total) by mouth every evening., Disp: 30 capsule, Rfl: 11    oxybutynin (DITROPAN-XL) 10 MG 24 hr tablet, Take 1 tablet (10 mg total) by mouth once daily., Disp: 30 tablet, Rfl: 11         Vitals:   Vitals:    05/28/20 1357   BP: 128/72   Patient Position: Sitting   Pulse: 74   SpO2: 95%   Weight: 83.4 kg (183 lb 13.8 oz)      Assessment:       Patient Active Problem List    Diagnosis Date Noted    Unspecified inflammatory spondylopathy, lumbar region 05/28/2020    Cellulitis of left lower extremity 05/28/2020    Bilateral lower extremity edema 05/28/2020    Breast cancer, left breast 10/07/2019    ANTWAN (renal artery stenosis) 02/25/2019    Stented coronary artery 02/25/2019    Diverticulitis 09/01/2018    Aortic atherosclerosis 08/16/2017    Tortuous aorta 08/16/2017    DDD (degenerative disc disease), lumbar 08/31/2016    Chronic kidney disease, stage III (moderate) 06/23/2016    Urinary incontinence 06/23/2016    Chronic obstructive pulmonary disease 06/23/2016    Stenosis of right carotid artery 06/23/2016    Coronary artery disease involving native coronary artery of native heart without angina pectoris 06/23/2016    Aortic valve stenosis 06/23/2016    Essential hypertension 06/23/2016    Nuclear sclerosis of left eye 06/23/2016    Pseudophakia of right eye 06/23/2016    Anemia due to vitamin B12 deficiency 06/23/2016    Peripheral polyneuropathy 06/23/2016    Insomnia 06/23/2016    Osteopenia 06/23/2016    Arthritis 06/23/2016    Lichen sclerosus et atrophicus of the vulva 03/29/2016    Peripheral  vascular disease 04/17/2013    Exudative age-related macular degeneration of left eye 08/13/2012    Posterior vitreous detachment of both eyes 08/13/2012    At risk for falls 05/25/2012    Legal blindness - Left Eye 01/13/2012    Dyslipidemia 01/03/2012          Plan:       Sheree Pugh  was seen today for follow-up and may need lab work.    Diagnoses and all orders for this visit:    Sheree was seen today for establish care and edema.    Diagnoses and all orders for this visit:    Cellulitis of left lower extremity  -     cefadroxil (DURICEF) 500 MG Cap; Take 1 capsule (500 mg total) by mouth every 12 (twelve) hours.    Peripheral polyneuropathy  -     nortriptyline (PAMELOR) 10 MG capsule; Take 1 capsule (10 mg total) by mouth every evening.    Insomnia due to medical condition  -     nortriptyline (PAMELOR) 10 MG capsule; Take 1 capsule (10 mg total) by mouth every evening.    Urge incontinence of urine  -     oxybutynin (DITROPAN-XL) 10 MG 24 hr tablet; Take 1 tablet (10 mg total) by mouth once daily.    Unspecified inflammatory spondylopathy, lumbar region    Peripheral vascular disease    Chronic obstructive pulmonary disease, unspecified COPD type  Stable     Exudative age-related macular degeneration of left eye, unspecified stage    Bilateral lower extremity edema  Take Lasix daily and add OTC Compression socks     Essential hypertension  Controlled

## 2020-05-28 NOTE — PROGRESS NOTES
"Subjective:    Patient ID:  Sheree Pugh is a 89 y.o. female who presents for follow-up of Coronary Artery Disease and Carotid Artery Disease      HPI  Ms. Pugh is a delightful lady who follows with Dr. Jay. She has a hx of ANTWAN and CAD s/p PCI. She presents today for routine follow up. She denies cardiovascular complaints and states she feels "great". No CP, PND, or orthopnea. She has chronic DE [place] that is stable. She saw Dr. Walker today and was prescribed Duricef for LE cellulitis.     Review of Systems   Constitution: Negative for chills, diaphoresis, fever, weight gain and weight loss.   HENT: Negative for sore throat.    Eyes: Negative for blurred vision, vision loss in left eye, vision loss in right eye and visual disturbance.   Cardiovascular: Negative for chest pain, claudication, dyspnea on exertion, leg swelling, near-syncope, orthopnea, palpitations, paroxysmal nocturnal dyspnea and syncope.   Respiratory: Negative for cough, hemoptysis, shortness of breath, sputum production and wheezing.    Endocrine: Negative for cold intolerance and heat intolerance.   Hematologic/Lymphatic: Negative for adenopathy. Does not bruise/bleed easily.   Skin: Negative for rash.   Musculoskeletal: Negative for falls, muscle weakness and myalgias.   Gastrointestinal: Negative for abdominal pain, change in bowel habit, constipation, diarrhea, melena and nausea.   Genitourinary: Negative for bladder incontinence.   Neurological: Negative for dizziness, focal weakness, headaches, light-headedness, numbness and weakness.   Psychiatric/Behavioral: Negative for altered mental status.         Vitals:    05/28/20 1501   BP: 134/63   BP Location: Left arm   Patient Position: Sitting   BP Method: Medium (Automatic)   Pulse: 67   Weight: 81.3 kg (179 lb 3.7 oz)   Height: 5' 4" (1.626 m)   Body mass index is 30.77 kg/m².    Objective:    Physical Exam   Constitutional: She is oriented to person, place, and time. She appears " well-developed and well-nourished. No distress.   HENT:   Head: Normocephalic and atraumatic.   Mouth/Throat: Oropharynx is clear and moist.   Eyes: Pupils are equal, round, and reactive to light. Conjunctivae and EOM are normal. No scleral icterus.   Neck: Neck supple. No JVD present. No tracheal deviation present.   Cardiovascular: Normal rate and regular rhythm. Exam reveals no gallop and no friction rub.   No murmur heard.  Pulmonary/Chest: Effort normal and breath sounds normal. No respiratory distress. She has no wheezes. She has no rales. She exhibits no tenderness.   Abdominal: Soft. Bowel sounds are normal. She exhibits no distension. There is no hepatosplenomegaly. There is no tenderness.   Musculoskeletal: She exhibits no edema or tenderness.   Neurological: She is alert and oriented to person, place, and time.   Skin: Skin is warm and dry. No rash noted. No erythema.   Psychiatric: She has a normal mood and affect. Her behavior is normal.         Assessment:           ICD-10-CM ICD-9-CM   1. Stenosis of right carotid artery I65.21 433.10   2. ANTWAN (renal artery stenosis) I70.1 440.1   3. Peripheral vascular disease I73.9 443.9   4. Essential hypertension I10 401.9   5. Coronary artery disease involving native coronary artery of native heart without angina pectoris I25.10 414.01   6. Stented coronary artery Z95.5 V45.82           Problem List Items Addressed This Visit           Stented coronary artery      Overview        4/14  1. Moderate disease LAD/CX.  2. Diffuse significant ISR of RCA successfully treated 2.5 and 3.0 POBA only.  3. Patent LM and right renal stent.            Stenosis of right carotid artery - Primary (Chronic)      Overview        4/14   1. Moderate disease LAD/CX.  2. Diffuse significant ISR of RCA successfully treated 2.5 and 3.0 POBA only.  3. Patent LM and right renal stent     9/10 LM- promus 4x8, LAD [id number], RCA promus 2.5x23 & 2.75x8                 ANTWAN (renal artery  stenosis)      Overview        4/2014   Patent right renal stent.            Peripheral vascular disease      Essential hypertension (Chronic)      Coronary artery disease involving native coronary artery of native heart without angina pectoris (Chronic)               Plan:       Continue current cardiac medications.   Increase activity as tolerated.   F/U with Dr. Jay as scheduled.

## 2020-06-04 ENCOUNTER — TELEPHONE (OUTPATIENT)
Dept: FAMILY MEDICINE | Facility: CLINIC | Age: 85
End: 2020-06-04

## 2020-06-04 NOTE — LETTER
June 9, 2020        No Recipients             [facility]  [address]  [place] [address]  Phone: [phone]  Fax: [phone]   Patient: Sheree Pugh   MR Number: [id number]   YOB: 1927   Date of Visit: 6/4/2020     To whom it may concern,     Ms. Sheree Pugh is a patient of mine. Please allow her niece (who is also an RN) to enter your facility every 3 weeks for the purpose of giving MS. Bentley intramuscular Vitamin B12. Ms. Bentley cannot safely administer it herself and it is medically necessary for her to get it.      Sincerely,      Piotr Walker MD            CC  No Recipients    Enclosure

## 2020-06-04 NOTE — TELEPHONE ENCOUNTER
----- Message from Milton Rojo sent at 6/4/2020 12:35 PM CDT -----  Contact: same  Patient called in stated she needs a note from Dr. Walker stating it is necessary/OK for RN to come into patients apartment to give patient her B-12 shot every 12 weeks.    Patient would like it mailed to her at:    [address] [place] [id number]    Patient call back number is [phone]

## 2020-06-05 ENCOUNTER — OFFICE VISIT (OUTPATIENT)
Dept: HEMATOLOGY/ONCOLOGY | Facility: CLINIC | Age: 85
End: 2020-06-05
Payer: MEDICARE

## 2020-06-05 VITALS
TEMPERATURE: 98 F | BODY MASS INDEX: 29.9 KG/M2 | SYSTOLIC BLOOD PRESSURE: 153 MMHG | OXYGEN SATURATION: 97 % | DIASTOLIC BLOOD PRESSURE: 58 MMHG | HEART RATE: 67 BPM | RESPIRATION RATE: 16 BRPM | WEIGHT: 179.44 LBS | HEIGHT: 65 IN

## 2020-06-05 DIAGNOSIS — C50.012 MALIGNANT NEOPLASM OF NIPPLE OF LEFT BREAST IN FEMALE, UNSPECIFIED ESTROGEN RECEPTOR STATUS: Primary | ICD-10-CM

## 2020-06-05 PROCEDURE — 99213 PR OFFICE/OUTPT VISIT, EST, LEVL III, 20-29 MIN: ICD-10-PCS | Mod: HCNC,S$GLB,, | Performed by: INTERNAL MEDICINE

## 2020-06-05 PROCEDURE — 99999 PR PBB SHADOW E&M-EST. PATIENT-LVL IV: CPT | Mod: PBBFAC,HCNC,, | Performed by: INTERNAL MEDICINE

## 2020-06-05 PROCEDURE — 1101F PT FALLS ASSESS-DOCD LE1/YR: CPT | Mod: HCNC,CPTII,S$GLB, | Performed by: INTERNAL MEDICINE

## 2020-06-05 PROCEDURE — 99213 OFFICE O/P EST LOW 20 MIN: CPT | Mod: HCNC,S$GLB,, | Performed by: INTERNAL MEDICINE

## 2020-06-05 PROCEDURE — 99999 PR PBB SHADOW E&M-EST. PATIENT-LVL IV: ICD-10-PCS | Mod: PBBFAC,HCNC,, | Performed by: INTERNAL MEDICINE

## 2020-06-05 PROCEDURE — 1126F AMNT PAIN NOTED NONE PRSNT: CPT | Mod: HCNC,S$GLB,, | Performed by: INTERNAL MEDICINE

## 2020-06-05 PROCEDURE — 1159F PR MEDICATION LIST DOCUMENTED IN MEDICAL RECORD: ICD-10-PCS | Mod: HCNC,S$GLB,, | Performed by: INTERNAL MEDICINE

## 2020-06-05 PROCEDURE — 1159F MED LIST DOCD IN RCRD: CPT | Mod: HCNC,S$GLB,, | Performed by: INTERNAL MEDICINE

## 2020-06-05 PROCEDURE — 1101F PR PT FALLS ASSESS DOC 0-1 FALLS W/OUT INJ PAST YR: ICD-10-PCS | Mod: HCNC,CPTII,S$GLB, | Performed by: INTERNAL MEDICINE

## 2020-06-05 PROCEDURE — 1126F PR PAIN SEVERITY QUANTIFIED, NO PAIN PRESENT: ICD-10-PCS | Mod: HCNC,S$GLB,, | Performed by: INTERNAL MEDICINE

## 2020-06-05 NOTE — PROGRESS NOTES
History of present illness:  The patient is a 89-year-old white female who presented with left palpable breast mass which was biopsied and found to be a grade 2 infiltrating ductal carcinoma which is ER positive/UT positive and HER2 Aliza negative by FISH.  Patient has gone on to have lumpectomy and sentinel lymph node sampling.  Her tumor was 15 mm in greatest dimension.  Margins of resection are negative.  Three sampled sentinel lymph nodes were negative for metastatic disease.  Postoperatively, the patient is recovering as expected and is not having difficulties with undue postop pain, decreased range of motion of the left upper extremity, or lymphedema.     Following her initial evaluation, patient refused baseline lab, genetic testing, and adjuvant radiation therapy.  She did initiate therapy with tamoxifen as prescribed and appears to be tolerating this medication well. She has had recent difficulties with cellulitis involving the left lower extremity.  She has been treated with antibiotics x2 without resolution.  Patient was subsequently treated with Duricef and had improvement.    She returns to clinic is overdue for 6 month post therapy re-evaluation due to difficulties with COVID 19 pandemic and remains compliant with tamoxifen.  Patient is nearer 9 months post diagnosis.  No other complaints pertinent findings on a 10 point review of systems.    Physical examination:  Well-developed, elderly, frail-appearing white female, in no acute distress, who has a weight of 179.5 lb (increased by 5.5 lb).  VITAL SIGNS: Documented  and reviewed this visit.  HEENT: Normocephalic, atraumatic. Oral mucosa pink and moist. Lips without   lesions. Tongue midline. Oropharynx clear. Nonicteric sclerae.   NECK: Supple, no adenopathy. No carotid bruits, thyromegaly or thyroid nodule.   HEART: Regular rate and rhythm without murmur, gallop or rub.   LUNGS: Clear to auscultation bilaterally. Normal respiratory effort.   ABDOMEN:  Soft, nontender, nondistended with positive normoactive bowel sounds,   no hepatosplenomegaly.   EXTREMITIES: No cyanosis, clubbing or edema. Distal pulses are intact.   AXILLAE AND GROIN: No palpable pathologic lymphadenopathy is appreciated.   SKIN: Intact/turgor normal.  Residual left lower extremity cellulitis.    NEUROLOGIC: Cranial nerves II-XII grossly intact. Motor: Good muscle bulk and   tone. Strength/sensory 5/5 throughout. Gait stable.   BREASTS:  Patient's right breast is free from mass, tenderness, nipple discharge.  Patient's left breast has a well-healed lumpectomy scar and is free from signs of infection.    Impression:  1.  Stage I (T1c N0 M0) infiltrating ductal carcinoma left breast which is ER positive/CA positive/HER2 Aliza negative.    Plan:  1.  Continue tamoxifen 20 mg daily.  2.  Return to clinic in 4 months, at which time patient will be at her 12 month anniversary and due for interval CBC, CMP, LDH, chest x-ray, bone density, and mammography.    This note was created using voice recognition software and may contain grammatical errors.

## 2020-06-08 ENCOUNTER — PATIENT OUTREACH (OUTPATIENT)
Dept: ADMINISTRATIVE | Facility: OTHER | Age: 85
End: 2020-06-08

## 2020-06-08 NOTE — TELEPHONE ENCOUNTER
Spoke with patient she said she has been getting her B12 ever 3 weeks. She would like to keep having it at that time.  She stated her independent living center needs a letter stating she needs her RN niece to give her her injections or they will not allow the niece to come into the building. She wants to pick it up on Wednesday.

## 2020-06-09 DIAGNOSIS — I73.9 PERIPHERAL VASCULAR DISEASE: ICD-10-CM

## 2020-06-09 DIAGNOSIS — N18.30 CHRONIC KIDNEY DISEASE, STAGE III (MODERATE): ICD-10-CM

## 2020-06-09 DIAGNOSIS — E78.5 DYSLIPIDEMIA: Chronic | ICD-10-CM

## 2020-06-09 DIAGNOSIS — I65.21 STENOSIS OF RIGHT CAROTID ARTERY: Chronic | ICD-10-CM

## 2020-06-09 DIAGNOSIS — I35.0 NONRHEUMATIC AORTIC VALVE STENOSIS: Chronic | ICD-10-CM

## 2020-06-09 DIAGNOSIS — Z98.61 HISTORY OF PTCA: Chronic | ICD-10-CM

## 2020-06-09 DIAGNOSIS — I25.10 CORONARY ARTERY DISEASE INVOLVING NATIVE CORONARY ARTERY OF NATIVE HEART WITHOUT ANGINA PECTORIS: Chronic | ICD-10-CM

## 2020-06-09 RX ORDER — CLOPIDOGREL BISULFATE 75 MG/1
75 TABLET ORAL DAILY
Qty: 90 TABLET | Refills: 4 | Status: CANCELLED | OUTPATIENT
Start: 2020-06-09

## 2020-06-09 RX ORDER — POTASSIUM CHLORIDE 20 MEQ/1
20 TABLET, EXTENDED RELEASE ORAL DAILY PRN
Qty: 90 TABLET | Refills: 4 | Status: CANCELLED | OUTPATIENT
Start: 2020-06-09

## 2020-06-09 RX ORDER — FUROSEMIDE 20 MG/1
20 TABLET ORAL DAILY PRN
Qty: 90 TABLET | Refills: 4 | Status: CANCELLED | OUTPATIENT
Start: 2020-06-09

## 2020-06-10 ENCOUNTER — OFFICE VISIT (OUTPATIENT)
Dept: OPHTHALMOLOGY | Facility: CLINIC | Age: 85
End: 2020-06-10
Payer: MEDICARE

## 2020-06-10 DIAGNOSIS — E78.5 DYSLIPIDEMIA: Chronic | ICD-10-CM

## 2020-06-10 DIAGNOSIS — I73.9 PERIPHERAL VASCULAR DISEASE: ICD-10-CM

## 2020-06-10 DIAGNOSIS — H35.3112 INTERMEDIATE STAGE NONEXUDATIVE AGE-RELATED MACULAR DEGENERATION OF RIGHT EYE: Primary | ICD-10-CM

## 2020-06-10 DIAGNOSIS — I35.0 NONRHEUMATIC AORTIC VALVE STENOSIS: Chronic | ICD-10-CM

## 2020-06-10 DIAGNOSIS — N18.30 CHRONIC KIDNEY DISEASE, STAGE III (MODERATE): ICD-10-CM

## 2020-06-10 DIAGNOSIS — I65.21 STENOSIS OF RIGHT CAROTID ARTERY: Chronic | ICD-10-CM

## 2020-06-10 DIAGNOSIS — Z96.1 PSEUDOPHAKIA OF RIGHT EYE: ICD-10-CM

## 2020-06-10 DIAGNOSIS — I25.10 CORONARY ARTERY DISEASE INVOLVING NATIVE CORONARY ARTERY OF NATIVE HEART WITHOUT ANGINA PECTORIS: Chronic | ICD-10-CM

## 2020-06-10 DIAGNOSIS — H35.3222 EXUDATIVE AGE-RELATED MACULAR DEGENERATION OF LEFT EYE WITH INACTIVE CHOROIDAL NEOVASCULARIZATION: ICD-10-CM

## 2020-06-10 DIAGNOSIS — H43.813 POSTERIOR VITREOUS DETACHMENT OF BOTH EYES: ICD-10-CM

## 2020-06-10 DIAGNOSIS — H25.12 NUCLEAR SCLEROSIS OF LEFT EYE: ICD-10-CM

## 2020-06-10 DIAGNOSIS — Z98.61 HISTORY OF PTCA: Chronic | ICD-10-CM

## 2020-06-10 PROCEDURE — 92004 PR EYE EXAM, NEW PATIENT,COMPREHESV: ICD-10-PCS | Mod: HCNC,S$GLB,, | Performed by: OPHTHALMOLOGY

## 2020-06-10 PROCEDURE — 99999 PR PBB SHADOW E&M-EST. PATIENT-LVL III: ICD-10-PCS | Mod: PBBFAC,HCNC,, | Performed by: OPHTHALMOLOGY

## 2020-06-10 PROCEDURE — 99999 PR PBB SHADOW E&M-EST. PATIENT-LVL III: CPT | Mod: PBBFAC,HCNC,, | Performed by: OPHTHALMOLOGY

## 2020-06-10 PROCEDURE — 92015 DETERMINE REFRACTIVE STATE: CPT | Mod: HCNC,S$GLB,, | Performed by: OPHTHALMOLOGY

## 2020-06-10 PROCEDURE — 92015 PR REFRACTION: ICD-10-PCS | Mod: HCNC,S$GLB,, | Performed by: OPHTHALMOLOGY

## 2020-06-10 PROCEDURE — 92004 COMPRE OPH EXAM NEW PT 1/>: CPT | Mod: HCNC,S$GLB,, | Performed by: OPHTHALMOLOGY

## 2020-06-10 RX ORDER — CLOPIDOGREL BISULFATE 75 MG/1
75 TABLET ORAL DAILY
Qty: 90 TABLET | Refills: 4 | Status: SHIPPED | OUTPATIENT
Start: 2020-06-10 | End: 2021-06-17 | Stop reason: SDUPTHER

## 2020-06-10 RX ORDER — POTASSIUM CHLORIDE 20 MEQ/1
20 TABLET, EXTENDED RELEASE ORAL DAILY PRN
Qty: 90 TABLET | Refills: 4 | Status: SHIPPED | OUTPATIENT
Start: 2020-06-10 | End: 2021-07-15 | Stop reason: SDUPTHER

## 2020-06-10 RX ORDER — FUROSEMIDE 20 MG/1
20 TABLET ORAL DAILY PRN
Qty: 90 TABLET | Refills: 4 | Status: SHIPPED | OUTPATIENT
Start: 2020-06-10 | End: 2021-07-15 | Stop reason: SDUPTHER

## 2020-06-10 NOTE — PATIENT INSTRUCTIONS
Adapting to Age-Related Macular Degeneration    If you have vision loss from macular degeneration, you can continue many of the activities you do now. Vision aids can help you with tasks that require detailed vision. Keep monitoring your vision and call your doctor if you notice any changes.  Use vision aids  Vision aids can help you continue to read, take care of yourself, and enjoy the world around you. Here are some types of vision aids:  · Magnifiers and closed-circuit television devices for reading text  · Check-writing guides and large print checks  · Large-faced watches and large button phones  · Books with large type and books on tape  · Talking clocks and other talking devices  What you can do  Try the following to lower your risk of further vision loss:  · Dont smoke.  · Eat healthy foods, especially green leafy vegetables and fish.  · Take vitamins with lutein.  · Control your blood pressure and cholesterol levels.  · Control your weight.  · Exercise regularly.  Date Last Reviewed: 6/8/2015  © 8205-6673 [employer]. [address] [place] [address], [place] [address]. All rights reserved. This information is not intended as a substitute for professional medical care. Always follow your healthcare professional's instructions.

## 2020-06-10 NOTE — PROGRESS NOTES
HPI     Eye Problem      Additional comments: Blurred vision OD              Comments     DLS: 2/24/14    Pt states has noticed some blurring of va OD over past few months. HM OS.             Last edited by Cheryle Quintana on 6/10/2020  3:49 PM. (History)        ROS     Negative for: Constitutional, Gastrointestinal, Neurological, Skin,   Genitourinary, Musculoskeletal, HENT, Endocrine, Cardiovascular, Eyes,   Respiratory, Psychiatric, Allergic/Imm, Heme/Lymph    Last edited by Omari Ceballos Jr., MD on 6/10/2020  4:47 PM. (History)        Assessment /Plan     For exam results, see Encounter Report.    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  These are available over-the-counter. I recommend I-Caps AREDS2 Formula or Preservision AREDS2 Formula. Should be taken 1 tab po BID  Rx for glasses given to patient  -legally blind left eye stable, continue to monitor; poor prognosis and RBA for cataract surgery risks outweigh the benefits  Follow up in about 6 months (around 12/10/2020) for f/u Dry ARMD right eye.

## 2020-07-08 DIAGNOSIS — J42 CHRONIC BRONCHITIS, UNSPECIFIED CHRONIC BRONCHITIS TYPE: ICD-10-CM

## 2020-07-08 RX ORDER — TIOTROPIUM BROMIDE 18 UG/1
1 CAPSULE ORAL; RESPIRATORY (INHALATION) DAILY
Qty: 90 CAPSULE | Refills: 2 | Status: CANCELLED | OUTPATIENT
Start: 2020-07-08

## 2020-07-10 DIAGNOSIS — J42 CHRONIC BRONCHITIS, UNSPECIFIED CHRONIC BRONCHITIS TYPE: ICD-10-CM

## 2020-07-11 RX ORDER — TIOTROPIUM BROMIDE 18 UG/1
1 CAPSULE ORAL; RESPIRATORY (INHALATION) DAILY
Qty: 90 CAPSULE | Refills: 3 | Status: SHIPPED | OUTPATIENT
Start: 2020-07-11 | End: 2021-07-14 | Stop reason: SDUPTHER

## 2020-07-16 ENCOUNTER — LAB VISIT (OUTPATIENT)
Dept: PRIMARY CARE CLINIC | Facility: OTHER | Age: 85
End: 2020-07-16
Attending: INTERNAL MEDICINE
Payer: MEDICARE

## 2020-07-16 DIAGNOSIS — Z11.59 SPECIAL SCREENING EXAMINATION FOR UNSPECIFIED VIRAL DISEASE: ICD-10-CM

## 2020-07-16 PROCEDURE — U0003 INFECTIOUS AGENT DETECTION BY NUCLEIC ACID (DNA OR RNA); SEVERE ACUTE RESPIRATORY SYNDROME CORONAVIRUS 2 (SARS-COV-2) (CORONAVIRUS DISEASE [COVID-19]), AMPLIFIED PROBE TECHNIQUE, MAKING USE OF HIGH THROUGHPUT TECHNOLOGIES AS DESCRIBED BY CMS-2020-01-R: HCPCS | Mod: HCNC

## 2020-07-20 LAB — SARS-COV-2 RNA RESP QL NAA+PROBE: NEGATIVE

## 2020-08-19 NOTE — PATIENT INSTRUCTIONS
- Please return here or go to the Emergency Department for any concerns or worsening of condition.   - If you were prescribed antibiotics, please take them to completion.  - Please follow up with your primary care provider (PCP) or discussed specialist(s) as needed.             Acute Bronchitis  Your healthcare provider has told you that you have acute bronchitis. Bronchitis is infection or inflammation of the bronchial tubes (airways in the lungs). Normally, air moves easily in and out of the airways. Bronchitis narrows the airways, making it harder for air to flow in and out of the lungs. This causes symptoms such as shortness of breath, coughing up yellow or green mucus, and wheezing. Bronchitis can be acute or chronic. Acute means the condition comes on quickly and goes away in a short time, usually within 3 to 10 days. Chronic means a condition lasts a long time and often comes back.    What causes acute bronchitis?  Acute bronchitis almost always starts as a viral respiratory infection, such as a cold or the flu. Certain factors make it more likely for a cold or flu to turn into bronchitis. These include being very young, being elderly, having a heart or lung problem, or having a weak immune system. Cigarette smoking also makes bronchitis more likely.  When bronchitis develops, the airways become swollen. The airways may also become infected with bacteria. This is known as a secondary infection.  Diagnosing acute bronchitis  Your healthcare provider will examine you and ask about your symptoms and health history. You may also have a sputum culture to test the fluid in your lungs. Chest X-rays may be done to look for infection in the lungs.  Treating acute bronchitis  Bronchitis usually clears up as the cold or flu goes away. You can help feel better faster by doing the following:  · Take medicine as directed. You may be told to take ibuprofen or other over-the-counter medicines. These help relieve inflammation  in your bronchial tubes. Your healthcare provider may prescribe an inhaler to help open up the bronchial tubes. Most of the time, acute bronchitis is caused by a viral infection. Antibiotics are usually not prescribed for viral infections.  · Drink plenty of fluids, such as water, juice, or warm soup. Fluids loosen mucus so that you can cough it up. This helps you breathe more easily. Fluids also prevent dehydration.  · Make sure you get plenty of rest.  · Do not smoke. Do not allow anyone else to smoke in your home.  Recovery and follow-up  Follow up with your doctor as you are told. You will likely feel better in a week or two. But a dry cough can linger beyond that time. Let your doctor know if you still have symptoms (other than a dry cough) after 2 weeks, or if youre prone to getting bronchial infections. Take steps to protect yourself from future infections. These steps include stopping smoking and avoiding tobacco smoke, washing your hands often, and getting a yearly flu shot.  When to call your healthcare provider  Call the healthcare provider if you have any of the following:  · Fever of 100.4°F (38.0°C) or higher, or as advised  · Symptoms that get worse, or new symptoms  · Trouble breathing  · Symptoms that dont start to improve within a week, or within 3 days of taking antibiotics   Date Last Reviewed: 12/1/2016  © 8070-4866 [employer]. [address] [place] [address], [place] [address]. All rights reserved. This information is not intended as a substitute for professional medical care. Always follow your healthcare professional's instructions.         Fall Risk

## 2020-08-25 ENCOUNTER — TELEPHONE (OUTPATIENT)
Dept: FAMILY MEDICINE | Facility: CLINIC | Age: 85
End: 2020-08-25

## 2020-08-25 ENCOUNTER — OFFICE VISIT (OUTPATIENT)
Dept: FAMILY MEDICINE | Facility: CLINIC | Age: 85
End: 2020-08-25
Payer: MEDICARE

## 2020-08-25 VITALS
TEMPERATURE: 98 F | HEART RATE: 66 BPM | HEIGHT: 65 IN | SYSTOLIC BLOOD PRESSURE: 138 MMHG | DIASTOLIC BLOOD PRESSURE: 66 MMHG | WEIGHT: 170 LBS | OXYGEN SATURATION: 98 % | BODY MASS INDEX: 28.32 KG/M2

## 2020-08-25 DIAGNOSIS — I10 ESSENTIAL HYPERTENSION: ICD-10-CM

## 2020-08-25 DIAGNOSIS — H90.0 CONDUCTIVE HEARING LOSS, BILATERAL: ICD-10-CM

## 2020-08-25 DIAGNOSIS — R41.3 MEMORY LOSS: ICD-10-CM

## 2020-08-25 DIAGNOSIS — J44.9 CHRONIC OBSTRUCTIVE PULMONARY DISEASE, UNSPECIFIED COPD TYPE: ICD-10-CM

## 2020-08-25 DIAGNOSIS — L03.115 CELLULITIS OF RIGHT LOWER EXTREMITY: Primary | ICD-10-CM

## 2020-08-25 PROCEDURE — 99214 OFFICE O/P EST MOD 30 MIN: CPT | Mod: HCNC,S$GLB,, | Performed by: INTERNAL MEDICINE

## 2020-08-25 PROCEDURE — 1159F MED LIST DOCD IN RCRD: CPT | Mod: HCNC,S$GLB,, | Performed by: INTERNAL MEDICINE

## 2020-08-25 PROCEDURE — 1101F PR PT FALLS ASSESS DOC 0-1 FALLS W/OUT INJ PAST YR: ICD-10-PCS | Mod: HCNC,CPTII,S$GLB, | Performed by: INTERNAL MEDICINE

## 2020-08-25 PROCEDURE — 99999 PR PBB SHADOW E&M-EST. PATIENT-LVL IV: ICD-10-PCS | Mod: PBBFAC,HCNC,, | Performed by: INTERNAL MEDICINE

## 2020-08-25 PROCEDURE — 1159F PR MEDICATION LIST DOCUMENTED IN MEDICAL RECORD: ICD-10-PCS | Mod: HCNC,S$GLB,, | Performed by: INTERNAL MEDICINE

## 2020-08-25 PROCEDURE — 99999 PR PBB SHADOW E&M-EST. PATIENT-LVL IV: CPT | Mod: PBBFAC,HCNC,, | Performed by: INTERNAL MEDICINE

## 2020-08-25 PROCEDURE — 99499 RISK ADDL DX/OHS AUDIT: ICD-10-PCS | Mod: HCNC,S$GLB,, | Performed by: INTERNAL MEDICINE

## 2020-08-25 PROCEDURE — 99214 PR OFFICE/OUTPT VISIT, EST, LEVL IV, 30-39 MIN: ICD-10-PCS | Mod: HCNC,S$GLB,, | Performed by: INTERNAL MEDICINE

## 2020-08-25 PROCEDURE — 99499 UNLISTED E&M SERVICE: CPT | Mod: HCNC,S$GLB,, | Performed by: INTERNAL MEDICINE

## 2020-08-25 PROCEDURE — 1101F PT FALLS ASSESS-DOCD LE1/YR: CPT | Mod: HCNC,CPTII,S$GLB, | Performed by: INTERNAL MEDICINE

## 2020-08-25 RX ORDER — CEFADROXIL 500 MG/1
500 CAPSULE ORAL EVERY 12 HOURS
Qty: 14 CAPSULE | Refills: 0 | Status: SHIPPED | OUTPATIENT
Start: 2020-08-25 | End: 2020-10-30 | Stop reason: ALTCHOICE

## 2020-08-25 NOTE — TELEPHONE ENCOUNTER
Patient forgot to mention to you that she is having trouble sleeping and would like to take something OTC but once before Dr. Shahid did not want her taking any OTC sleep aids. Please advise what would be best for her.

## 2020-08-25 NOTE — PROGRESS NOTES
"Patient ID: Sheree Pugh     Chief Complaint:   Chief Complaint   Patient presents with    3 month follow up        HPI: Complains of recurrent cellulitis on Right Lower Extremity this time that started a few days ago. I had treated her for Left Lower Extremity cellulitis 3 months ago w/ Cefadroxil and is resolved. Chronic bilateral lower extremity edema is difficult to control as she has trouble putting on compression socks. She is still taking lasix and urinating well. Will reorder Cefadroxil and continue lasix & compression sock therapy. Occasionally gets "fullness" in chest that resolves w/ ProAir, so it's likely due to her lungs. Also has trouble remembering her new meds. It could be the normal part of aging or early stage of dementia. She declined Aricept now. Trying to see Dr. Victoria for macular degeneration in her "good" eye.     Review of Systems   Constitutional: Negative.    HENT: Negative.    Eyes: Negative.    Respiratory: Negative.    Cardiovascular: Negative.    Gastrointestinal: Negative.    Endocrine: Negative.    Genitourinary: Negative.    Musculoskeletal: Negative.    Skin: Positive for rash.   Allergic/Immunologic: Negative.    Neurological: Negative.    Hematological: Negative.    Psychiatric/Behavioral: Negative.           Objective:      Physical Exam   Physical Exam  Vitals signs and nursing note reviewed.   Constitutional:       Appearance: Normal appearance. She is well-developed.   HENT:      Head: Normocephalic and atraumatic.      Nose: Nose normal.   Eyes:      Extraocular Movements: Extraocular movements intact.      Conjunctiva/sclera: Conjunctivae normal.      Pupils: Pupils are equal, round, and reactive to light.   Neck:      Musculoskeletal: Normal range of motion and neck supple.   Cardiovascular:      Rate and Rhythm: Normal rate and regular rhythm.      Heart sounds: Murmur present.   Pulmonary:      Effort: Pulmonary effort is normal.      Comments: Coarse Breath Sounds " bilaterally but good air movement   Abdominal:      General: Bowel sounds are normal.      Palpations: Abdomen is soft.   Musculoskeletal: Normal range of motion.      Right lower leg: Edema present.      Left lower leg: Edema present.      Comments: 1-2+ bilateral lower extremity edema    Skin:     General: Skin is warm and dry.      Capillary Refill: Capillary refill takes less than 2 seconds.      Findings: Erythema present.      Comments: bilateral lower extremity erythema between ankle and shin Right . Left    Neurological:      General: No focal deficit present.      Mental Status: She is alert and oriented to person, place, and time. Mental status is at baseline.   Psychiatric:         Mood and Affect: Mood normal.         Behavior: Behavior normal.         Thought Content: Thought content normal.         Judgment: Judgment normal.       Current Outpatient Medications:     ACETAMINOPHEN (TYLENOL ARTHRITIS ORAL), Take 2 tablets by mouth daily as needed. , Disp: , Rfl:     albuterol (PROAIR HFA) 90 mcg/actuation inhaler, Inhale 2 puffs into the lungs every 6 (six) hours as needed for Wheezing or Shortness of Breath., Disp: 18 g, Rfl: 2    atorvastatin (LIPITOR) 20 MG tablet, Take 1 tablet (20 mg total) by mouth once daily., Disp: 90 tablet, Rfl: 4    budesonide-formoterol 160-4.5 mcg (SYMBICORT) 160-4.5 mcg/actuation HFAA, Inhale 2 puffs into the lungs 2 (two) times daily. Controller., Disp: 30.6 g, Rfl: 3    clopidogreL (PLAVIX) 75 mg tablet, Take 1 tablet (75 mg total) by mouth once daily., Disp: 90 tablet, Rfl: 4    cyanocobalamin 1,000 mcg/mL injection, INJECT 1CC (1ML) INTRAMUSCULARLY EVERY THREE WEEKS, Disp: 10 mL, Rfl: 3    diltiaZEM (CARDIZEM CD) 180 MG 24 hr capsule, Take 1 capsule (180 mg total) by mouth every morning., Disp: 90 capsule, Rfl: 4    furosemide (LASIX) 20 MG tablet, Take 1 tablet (20 mg total) by mouth daily as needed., Disp: 90 tablet, Rfl: 4    isosorbide mononitrate (IMDUR)  "30 MG 24 hr tablet, Take 1 tablet (30 mg total) by mouth every evening., Disp: 90 tablet, Rfl: 4    nortriptyline (PAMELOR) 10 MG capsule, Take 1 capsule (10 mg total) by mouth every evening., Disp: 30 capsule, Rfl: 11    oxybutynin (DITROPAN-XL) 10 MG 24 hr tablet, Take 1 tablet (10 mg total) by mouth once daily., Disp: 30 tablet, Rfl: 11    potassium chloride SA (K-DUR,KLOR-CON) 20 MEQ tablet, Take 1 tablet (20 mEq total) by mouth daily as needed., Disp: 90 tablet, Rfl: 4    syringe with needle (BD LUER-FRANCI SYRINGE) 3 mL 25 gauge x 1" Syrg, USE TO INJECT B-12 AS DIRECTED, Disp: 10 Syringe, Rfl: 11    tamoxifen (NOLVADEX) 20 MG Tab, Take 1 tablet (20 mg total) by mouth once daily., Disp: 90 tablet, Rfl: 3    tiotropium (SPIRIVA) 18 mcg inhalation capsule, Inhale 1 capsule (18 mcg total) into the lungs once daily using handihaler., Disp: 90 capsule, Rfl: 3    cefadroxil (DURICEF) 500 MG Cap, Take 1 capsule (500 mg total) by mouth every 12 (twelve) hours., Disp: 14 capsule, Rfl: 0         Vitals:   Vitals:    08/25/20 1506   BP: 138/66   Pulse: 66   Temp: 97.7 °F (36.5 °C)   TempSrc: Oral   SpO2: 98%   Weight: 77.1 kg (169 lb 15.6 oz)   Height: 5' 5" (1.651 m)      Assessment:       Patient Active Problem List    Diagnosis Date Noted    Cellulitis of right lower extremity 08/25/2020    Conductive hearing loss, bilateral 08/25/2020    Memory loss 08/25/2020    Unspecified inflammatory spondylopathy, lumbar region 05/28/2020    Cellulitis of left lower extremity 05/28/2020    Bilateral lower extremity edema 05/28/2020    Breast cancer, left breast 10/07/2019    ANTWAN (renal artery stenosis) 02/25/2019    Stented coronary artery 02/25/2019    Diverticulitis 09/01/2018    Aortic atherosclerosis 08/16/2017    Tortuous aorta 08/16/2017    DDD (degenerative disc disease), lumbar 08/31/2016    Chronic kidney disease, stage III (moderate) 06/23/2016    Urinary incontinence 06/23/2016    Chronic " obstructive pulmonary disease 06/23/2016    Stenosis of right carotid artery 06/23/2016    Coronary artery disease involving native coronary artery of native heart without angina pectoris 06/23/2016    Aortic valve stenosis 06/23/2016    Essential hypertension 06/23/2016    Nuclear sclerosis of left eye 06/23/2016    Pseudophakia of right eye 06/23/2016    Anemia due to vitamin B12 deficiency 06/23/2016    Peripheral polyneuropathy 06/23/2016    Insomnia 06/23/2016    Osteopenia 06/23/2016    Arthritis 06/23/2016    Lichen sclerosus et atrophicus of the vulva 03/29/2016    Peripheral vascular disease 04/17/2013    Exudative age-related macular degeneration of left eye 08/13/2012    Posterior vitreous detachment of both eyes 08/13/2012    At risk for falls 05/25/2012    Legal blindness - Left Eye 01/13/2012    Dyslipidemia 01/03/2012          Plan:       Sheree Pugh  was seen today for follow-up and may need lab work.    Diagnoses and all orders for this visit:    Sheree was seen today for 3 month follow up.    Diagnoses and all orders for this visit:    Cellulitis of right lower extremity  -     cefadroxil (DURICEF) 500 MG Cap; Take 1 capsule (500 mg total) by mouth every 12 (twelve) hours.  Try to use compression socks regularly     Conductive hearing loss, bilateral  Considering hearing aids     Memory loss  Likely more due to hearing loss    Essential hypertension  Controlled     Chronic obstructive pulmonary disease, unspecified COPD type  Continue inhalers

## 2020-08-27 DIAGNOSIS — Z98.61 HISTORY OF PTCA: Chronic | ICD-10-CM

## 2020-08-27 DIAGNOSIS — I25.10 CORONARY ARTERY DISEASE INVOLVING NATIVE CORONARY ARTERY OF NATIVE HEART WITHOUT ANGINA PECTORIS: Chronic | ICD-10-CM

## 2020-08-27 DIAGNOSIS — E78.5 DYSLIPIDEMIA: Chronic | ICD-10-CM

## 2020-08-27 DIAGNOSIS — I65.21 STENOSIS OF RIGHT CAROTID ARTERY: Chronic | ICD-10-CM

## 2020-08-27 DIAGNOSIS — I35.0 NONRHEUMATIC AORTIC VALVE STENOSIS: Chronic | ICD-10-CM

## 2020-08-27 DIAGNOSIS — N18.30 CHRONIC KIDNEY DISEASE, STAGE III (MODERATE): ICD-10-CM

## 2020-08-27 DIAGNOSIS — I73.9 PERIPHERAL VASCULAR DISEASE: ICD-10-CM

## 2020-08-28 RX ORDER — ATORVASTATIN CALCIUM 20 MG/1
20 TABLET, FILM COATED ORAL DAILY
Qty: 90 TABLET | Refills: 4 | Status: SHIPPED | OUTPATIENT
Start: 2020-08-28 | End: 2021-08-29 | Stop reason: SDUPTHER

## 2020-09-01 ENCOUNTER — TELEPHONE (OUTPATIENT)
Dept: OPHTHALMOLOGY | Facility: CLINIC | Age: 85
End: 2020-09-01

## 2020-09-01 NOTE — TELEPHONE ENCOUNTER
----- Message from Basia Arreaga sent at 9/1/2020 11:57 AM CDT -----  Type: Needs Medical Advice  Who Called:  Patient  Best Call Back Number:   Additional Information: Calling to schedule an appointment for macular degeneration. Patient says she has it in one eye but it is going to the other.

## 2020-09-02 DIAGNOSIS — I10 ESSENTIAL HYPERTENSION: Primary | Chronic | ICD-10-CM

## 2020-09-02 RX ORDER — DILTIAZEM HYDROCHLORIDE 180 MG/1
180 CAPSULE, COATED, EXTENDED RELEASE ORAL EVERY MORNING
Qty: 90 CAPSULE | Refills: 4 | Status: SHIPPED | OUTPATIENT
Start: 2020-09-02 | End: 2021-11-27 | Stop reason: SDUPTHER

## 2020-09-14 ENCOUNTER — PATIENT OUTREACH (OUTPATIENT)
Dept: ADMINISTRATIVE | Facility: OTHER | Age: 85
End: 2020-09-14

## 2020-09-14 NOTE — PROGRESS NOTES
LINKS immunization registry updated  Care Everywhere updated  Health Maintenance updated  Chart reviewed for overdue Proactive [facility] ([facility]) health maintenance testing (CRS, Breast Ca, Diabetic Eye Exam)   Orders entered:N/A

## 2020-09-15 ENCOUNTER — OFFICE VISIT (OUTPATIENT)
Dept: CARDIOLOGY | Facility: CLINIC | Age: 85
End: 2020-09-15
Payer: MEDICARE

## 2020-09-15 VITALS
WEIGHT: 168.19 LBS | HEIGHT: 62 IN | SYSTOLIC BLOOD PRESSURE: 136 MMHG | HEART RATE: 77 BPM | DIASTOLIC BLOOD PRESSURE: 69 MMHG | BODY MASS INDEX: 30.95 KG/M2

## 2020-09-15 DIAGNOSIS — E78.5 DYSLIPIDEMIA: Chronic | ICD-10-CM

## 2020-09-15 DIAGNOSIS — I70.1 RAS (RENAL ARTERY STENOSIS): ICD-10-CM

## 2020-09-15 DIAGNOSIS — I65.21 STENOSIS OF RIGHT CAROTID ARTERY: Primary | Chronic | ICD-10-CM

## 2020-09-15 DIAGNOSIS — I25.10 CORONARY ARTERY DISEASE INVOLVING NATIVE CORONARY ARTERY OF NATIVE HEART WITHOUT ANGINA PECTORIS: Chronic | ICD-10-CM

## 2020-09-15 DIAGNOSIS — I73.9 PERIPHERAL VASCULAR DISEASE: ICD-10-CM

## 2020-09-15 DIAGNOSIS — I35.0 NONRHEUMATIC AORTIC VALVE STENOSIS: Chronic | ICD-10-CM

## 2020-09-15 DIAGNOSIS — I10 ESSENTIAL HYPERTENSION: Chronic | ICD-10-CM

## 2020-09-15 DIAGNOSIS — Z95.5 STENTED CORONARY ARTERY: ICD-10-CM

## 2020-09-15 PROCEDURE — 1159F PR MEDICATION LIST DOCUMENTED IN MEDICAL RECORD: ICD-10-PCS | Mod: HCNC,S$GLB,, | Performed by: INTERNAL MEDICINE

## 2020-09-15 PROCEDURE — 1101F PR PT FALLS ASSESS DOC 0-1 FALLS W/OUT INJ PAST YR: ICD-10-PCS | Mod: HCNC,CPTII,S$GLB, | Performed by: INTERNAL MEDICINE

## 2020-09-15 PROCEDURE — 99999 PR PBB SHADOW E&M-EST. PATIENT-LVL IV: CPT | Mod: PBBFAC,HCNC,, | Performed by: INTERNAL MEDICINE

## 2020-09-15 PROCEDURE — 1126F AMNT PAIN NOTED NONE PRSNT: CPT | Mod: HCNC,S$GLB,, | Performed by: INTERNAL MEDICINE

## 2020-09-15 PROCEDURE — 1101F PT FALLS ASSESS-DOCD LE1/YR: CPT | Mod: HCNC,CPTII,S$GLB, | Performed by: INTERNAL MEDICINE

## 2020-09-15 PROCEDURE — 1159F MED LIST DOCD IN RCRD: CPT | Mod: HCNC,S$GLB,, | Performed by: INTERNAL MEDICINE

## 2020-09-15 PROCEDURE — 1126F PR PAIN SEVERITY QUANTIFIED, NO PAIN PRESENT: ICD-10-PCS | Mod: HCNC,S$GLB,, | Performed by: INTERNAL MEDICINE

## 2020-09-15 PROCEDURE — 99999 PR PBB SHADOW E&M-EST. PATIENT-LVL IV: ICD-10-PCS | Mod: PBBFAC,HCNC,, | Performed by: INTERNAL MEDICINE

## 2020-09-15 PROCEDURE — 99214 OFFICE O/P EST MOD 30 MIN: CPT | Mod: HCNC,S$GLB,, | Performed by: INTERNAL MEDICINE

## 2020-09-15 PROCEDURE — 99214 PR OFFICE/OUTPT VISIT, EST, LEVL IV, 30-39 MIN: ICD-10-PCS | Mod: HCNC,S$GLB,, | Performed by: INTERNAL MEDICINE

## 2020-09-15 NOTE — PROGRESS NOTES
Subjective:    Patient ID:  Sheree Pugh is a 89 y.o. female who presents for follow-up of CAD F/U      HPI  Here for follow up of CAD-PCI (4/14 )/ANTWAN. Does all ADL's w/o angina.     Review of Systems   Constitution: Negative for malaise/fatigue.   Eyes: Negative for blurred vision.   Cardiovascular: Negative for chest pain, claudication, cyanosis, dyspnea on exertion, irregular heartbeat, leg swelling, near-syncope, orthopnea, palpitations, paroxysmal nocturnal dyspnea and syncope.   Respiratory: Negative for cough and shortness of breath.    Hematologic/Lymphatic: Does not bruise/bleed easily.   Musculoskeletal: Negative for back pain, falls, joint pain, muscle cramps, muscle weakness and myalgias.   Gastrointestinal: Negative for abdominal pain, change in bowel habit, nausea and vomiting.   Genitourinary: Negative for urgency.   Neurological: Negative for dizziness, focal weakness and light-headedness.       Past Medical History:   Diagnosis Date    Anticoagulant long-term use     Plavix & Aspirin    ARMD (age related macular degeneration)     os    Arthritis     Cataract     os    COPD (chronic obstructive pulmonary disease)     Coronary artery disease     Disorder of kidney and ureter     DE LOS SANTOS (dyspnea on exertion)     Elevated cholesterol     Heart disease     Hypertension     Insomnia     Macular degeneration     OS    Obstetrical blood-clot embolism, postpartum     PVD (peripheral vascular disease)     Retinal detachment     OS    Stented coronary artery 2/25/2019 4/14 1. Moderate disease LAD/CX. 2. Diffuse significant ISR of RCA successfully treated 2.5 and 3.0 POBA only. 3. Patent LM and right renal stent.    Urinary incontinence      There are no hospital problems to display for this patient.       Objective:     Vitals:    09/15/20 1250   BP: 136/69   Pulse: 77        Physical Exam   Constitutional: She is oriented to person, place, and time. She appears well-developed and  well-nourished.   HENT:   Head: Normocephalic.   Eyes: Conjunctivae are normal.   Neck: Normal range of motion. Neck supple. No JVD present.   Cardiovascular: Normal rate, regular rhythm and intact distal pulses.   Murmur heard.   Harsh midsystolic murmur is present with a grade of 2/6 at the upper right sternal border radiating to the neck.  Pulses:       Carotid pulses are 2+ on the right side and 2+ on the left side.       Radial pulses are 2+ on the right side and 2+ on the left side.        Dorsalis pedis pulses are 2+ on the right side and 2+ on the left side.        Posterior tibial pulses are 2+ on the right side and 2+ on the left side.   Pulmonary/Chest: Effort normal and breath sounds normal.   Abdominal: Soft. Bowel sounds are normal.   Musculoskeletal:         General: No tenderness or edema.   Neurological: She is alert and oriented to person, place, and time. Gait normal.   Skin: Skin is warm, dry and intact. No cyanosis. Nails show no clubbing.   Psychiatric: She has a normal mood and affect. Her speech is normal and behavior is normal. Thought content normal.             ..    Chemistry        Component Value Date/Time     02/01/2020 0851    K 4.3 02/01/2020 0851     02/01/2020 0851    CO2 24 02/01/2020 0851    BUN 23 02/01/2020 0851    CREATININE 1.1 02/01/2020 0851    GLU 95 02/01/2020 0851        Component Value Date/Time    CALCIUM 8.9 02/01/2020 0851    ALKPHOS 86 02/01/2020 0851    AST 22 02/01/2020 0851    ALT 21 02/01/2020 0851    BILITOT 0.3 02/01/2020 0851    ESTGFRAFRICA 50 (A) 02/01/2020 0851    EGFRNONAA 44 (A) 02/01/2020 0851            ..  Lab Results   Component Value Date    CHOL 156 02/01/2020    CHOL 170 08/19/2019    CHOL 190 06/14/2017     Lab Results   Component Value Date    HDL 66 02/01/2020    HDL 51 08/19/2019    HDL 55 06/14/2017     Lab Results   Component Value Date    LDLCALC 77.4 02/01/2020    LDLCALC 93.6 08/19/2019    LDLCALC 110.8 06/14/2017     Lab  Results   Component Value Date    TRIG 63 02/01/2020    TRIG 127 08/19/2019    TRIG 121 06/14/2017     Lab Results   Component Value Date    CHOLHDL 42.3 02/01/2020    CHOLHDL 30.0 08/19/2019    CHOLHDL 28.9 06/14/2017     ..  Lab Results   Component Value Date    WBC 5.88 02/01/2020    HGB 12.0 02/01/2020    HCT 37.1 02/01/2020    MCV 95 02/01/2020     02/01/2020       Test(s) Reviewed  I have reviewed the following in detail:  [] Stress test   [] Angiography   [x] Echocardiogram   [x] Labs   [] Other:       Assessment:         ICD-10-CM ICD-9-CM   1. Stenosis of right carotid artery  I65.21 433.10   2. Coronary artery disease involving native coronary artery of native heart without angina pectoris  I25.10 414.01   3. Essential hypertension  I10 401.9   4. Nonrheumatic aortic valve stenosis  I35.0 424.1   5. Peripheral vascular disease  I73.9 443.9   6. ANTWAN (renal artery stenosis)  I70.1 440.1   7. Dyslipidemia  E78.5 272.4   8. Stented coronary artery  Z95.5 V45.82     Problem List Items Addressed This Visit     Aortic valve stenosis (Chronic)    Coronary artery disease involving native coronary artery of native heart without angina pectoris (Chronic)    Dyslipidemia (Chronic)    Essential hypertension (Chronic)    Peripheral vascular disease    ANTWAN (renal artery stenosis)    Overview     4/2014   Patent right renal stent.         Stenosis of right carotid artery - Primary (Chronic)    Overview     4/14   1. Moderate disease LAD/CX.  2. Diffuse significant ISR of RCA successfully treated 2.5 and 3.0 POBA only.  3. Patent LM and right renal stent    9/10 LM- promus 4x8, LAD [id number], RCA promus 2.5x23 & 2.75x8             Stented coronary artery    Overview     4/14  1. Moderate disease LAD/CX.  2. Diffuse significant ISR of RCA successfully treated 2.5 and 3.0 POBA only.  3. Patent LM and right renal stent.                Plan:           Return to clinic 9 months   Low level/low impact aerobic exercise  5x's/wk. Heart healthy diet and risk factor modification.    See labs and med orders.  Increase in protein in diet.   elevate legs    Portions of this note may have been created with voice recognition software.  Grammatical, syntax and spelling errors may be inevitable.

## 2020-10-05 ENCOUNTER — OFFICE VISIT (OUTPATIENT)
Dept: OPHTHALMOLOGY | Facility: CLINIC | Age: 85
End: 2020-10-05
Payer: MEDICARE

## 2020-10-05 DIAGNOSIS — H43.813 POSTERIOR VITREOUS DETACHMENT OF BOTH EYES: ICD-10-CM

## 2020-10-05 DIAGNOSIS — H35.3222 EXUDATIVE AGE-RELATED MACULAR DEGENERATION OF LEFT EYE WITH INACTIVE CHOROIDAL NEOVASCULARIZATION: Primary | ICD-10-CM

## 2020-10-05 PROCEDURE — 92014 PR EYE EXAM, EST PATIENT,COMPREHESV: ICD-10-PCS | Mod: HCNC,S$GLB,, | Performed by: OPHTHALMOLOGY

## 2020-10-05 PROCEDURE — 92134 POSTERIOR SEGMENT OCT RETINA (OCULAR COHERENCE TOMOGRAPHY)-BOTH EYES: ICD-10-PCS | Mod: HCNC,S$GLB,, | Performed by: OPHTHALMOLOGY

## 2020-10-05 PROCEDURE — 99999 PR PBB SHADOW E&M-EST. PATIENT-LVL IV: CPT | Mod: PBBFAC,HCNC,, | Performed by: OPHTHALMOLOGY

## 2020-10-05 PROCEDURE — 92134 CPTRZ OPH DX IMG PST SGM RTA: CPT | Mod: HCNC,S$GLB,, | Performed by: OPHTHALMOLOGY

## 2020-10-05 PROCEDURE — 92202 OPSCPY EXTND ON/MAC DRAW: CPT | Mod: HCNC,S$GLB,, | Performed by: OPHTHALMOLOGY

## 2020-10-05 PROCEDURE — 99999 PR PBB SHADOW E&M-EST. PATIENT-LVL IV: ICD-10-PCS | Mod: PBBFAC,HCNC,, | Performed by: OPHTHALMOLOGY

## 2020-10-05 PROCEDURE — 92014 COMPRE OPH EXAM EST PT 1/>: CPT | Mod: HCNC,S$GLB,, | Performed by: OPHTHALMOLOGY

## 2020-10-05 PROCEDURE — 92202 PR OPHTHALMOSCOPY, EXT, W/DRAW OPTIC NERVE/MACULA, I&R, UNI/BI: ICD-10-PCS | Mod: HCNC,S$GLB,, | Performed by: OPHTHALMOLOGY

## 2020-10-05 NOTE — PROGRESS NOTES
HPI     DLS: 02/24/2014 Dr. Melgar    Patient states the vision in her right eye has been decreasing. She has   the lines on her chart she keeps have started to become distorted x 4   months.       Assessment:    1. Wet AMD OS - cicatrix.   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 - Fundus photos today    Conservative mgmt.  Poor Va potential.    2. Dry AMD OD     3. PVD OU    4. PCIOL OD    5. NS OS    6. Anatomically narrow OS - ? Lens effect    7.  On plavix     Plan:    Cont. AREDS/AG  Watch OS - Asx, poor va potential.      12 months

## 2020-10-09 ENCOUNTER — HOSPITAL ENCOUNTER (OUTPATIENT)
Dept: RADIOLOGY | Facility: HOSPITAL | Age: 85
Discharge: HOME | End: 2020-10-09
Attending: INTERNAL MEDICINE
Payer: MEDICARE

## 2020-10-09 DIAGNOSIS — Z12.31 SCREENING MAMMOGRAM, ENCOUNTER FOR: ICD-10-CM

## 2020-10-09 DIAGNOSIS — C50.012 MALIGNANT NEOPLASM OF NIPPLE OF LEFT BREAST IN FEMALE, UNSPECIFIED ESTROGEN RECEPTOR STATUS: ICD-10-CM

## 2020-10-09 PROCEDURE — 77063 MAMMO DIGITAL SCREENING BILAT WITH TOMO: ICD-10-PCS | Mod: 26,HCNC,, | Performed by: RADIOLOGY

## 2020-10-09 PROCEDURE — 77067 SCR MAMMO BI INCL CAD: CPT | Mod: TC,HCNC,PO

## 2020-10-09 PROCEDURE — 77080 DXA BONE DENSITY AXIAL: CPT | Mod: TC,HCNC,PO

## 2020-10-09 PROCEDURE — 77080 DEXA BONE DENSITY SPINE HIP: ICD-10-PCS | Mod: 26,HCNC,, | Performed by: RADIOLOGY

## 2020-10-09 PROCEDURE — 71046 XR CHEST PA AND LATERAL: ICD-10-PCS | Mod: 26,HCNC,, | Performed by: RADIOLOGY

## 2020-10-09 PROCEDURE — 77063 BREAST TOMOSYNTHESIS BI: CPT | Mod: 26,HCNC,, | Performed by: RADIOLOGY

## 2020-10-09 PROCEDURE — 77080 DXA BONE DENSITY AXIAL: CPT | Mod: 26,HCNC,, | Performed by: RADIOLOGY

## 2020-10-09 PROCEDURE — 71046 X-RAY EXAM CHEST 2 VIEWS: CPT | Mod: TC,HCNC,FY,PO

## 2020-10-09 PROCEDURE — 77067 SCR MAMMO BI INCL CAD: CPT | Mod: 26,HCNC,, | Performed by: RADIOLOGY

## 2020-10-09 PROCEDURE — 71046 X-RAY EXAM CHEST 2 VIEWS: CPT | Mod: 26,HCNC,, | Performed by: RADIOLOGY

## 2020-10-09 PROCEDURE — 77067 MAMMO DIGITAL SCREENING BILAT WITH TOMO: ICD-10-PCS | Mod: 26,HCNC,, | Performed by: RADIOLOGY

## 2020-10-13 ENCOUNTER — TELEPHONE (OUTPATIENT)
Dept: HEMATOLOGY/ONCOLOGY | Facility: CLINIC | Age: 85
End: 2020-10-13

## 2020-10-13 NOTE — TELEPHONE ENCOUNTER
Called patient, moved 10/16/20 1 pm f/u to NP at 1:20 pm, patient voiced understanding and appreciation.    NOTE: Moved to open schedule for a NEW PATIENT for Dr. Dan

## 2020-10-16 ENCOUNTER — OFFICE VISIT (OUTPATIENT)
Dept: HEMATOLOGY/ONCOLOGY | Facility: CLINIC | Age: 85
End: 2020-10-16
Payer: MEDICARE

## 2020-10-16 VITALS
RESPIRATION RATE: 18 BRPM | WEIGHT: 170.88 LBS | TEMPERATURE: 98 F | OXYGEN SATURATION: 97 % | HEIGHT: 62 IN | HEART RATE: 63 BPM | SYSTOLIC BLOOD PRESSURE: 150 MMHG | DIASTOLIC BLOOD PRESSURE: 70 MMHG | BODY MASS INDEX: 31.45 KG/M2

## 2020-10-16 DIAGNOSIS — Z17.0 MALIGNANT NEOPLASM OF LEFT BREAST IN FEMALE, ESTROGEN RECEPTOR POSITIVE, UNSPECIFIED SITE OF BREAST: Primary | ICD-10-CM

## 2020-10-16 DIAGNOSIS — M94.9 DISORDER OF BONE AND CARTILAGE: ICD-10-CM

## 2020-10-16 DIAGNOSIS — C50.912 MALIGNANT NEOPLASM OF LEFT BREAST IN FEMALE, ESTROGEN RECEPTOR POSITIVE, UNSPECIFIED SITE OF BREAST: Primary | ICD-10-CM

## 2020-10-16 DIAGNOSIS — M81.0 OSTEOPOROSIS, SENILE: ICD-10-CM

## 2020-10-16 DIAGNOSIS — M89.9 DISORDER OF BONE AND CARTILAGE: ICD-10-CM

## 2020-10-16 PROCEDURE — 1159F MED LIST DOCD IN RCRD: CPT | Mod: HCNC,S$GLB,, | Performed by: NURSE PRACTITIONER

## 2020-10-16 PROCEDURE — 99999 PR PBB SHADOW E&M-EST. PATIENT-LVL V: ICD-10-PCS | Mod: PBBFAC,HCNC,, | Performed by: NURSE PRACTITIONER

## 2020-10-16 PROCEDURE — 99214 PR OFFICE/OUTPT VISIT, EST, LEVL IV, 30-39 MIN: ICD-10-PCS | Mod: HCNC,S$GLB,, | Performed by: NURSE PRACTITIONER

## 2020-10-16 PROCEDURE — 1101F PT FALLS ASSESS-DOCD LE1/YR: CPT | Mod: HCNC,CPTII,S$GLB, | Performed by: NURSE PRACTITIONER

## 2020-10-16 PROCEDURE — 99214 OFFICE O/P EST MOD 30 MIN: CPT | Mod: HCNC,S$GLB,, | Performed by: NURSE PRACTITIONER

## 2020-10-16 PROCEDURE — 1101F PR PT FALLS ASSESS DOC 0-1 FALLS W/OUT INJ PAST YR: ICD-10-PCS | Mod: HCNC,CPTII,S$GLB, | Performed by: NURSE PRACTITIONER

## 2020-10-16 PROCEDURE — 99999 PR PBB SHADOW E&M-EST. PATIENT-LVL V: CPT | Mod: PBBFAC,HCNC,, | Performed by: NURSE PRACTITIONER

## 2020-10-16 PROCEDURE — 1126F PR PAIN SEVERITY QUANTIFIED, NO PAIN PRESENT: ICD-10-PCS | Mod: HCNC,S$GLB,, | Performed by: NURSE PRACTITIONER

## 2020-10-16 PROCEDURE — 1159F PR MEDICATION LIST DOCUMENTED IN MEDICAL RECORD: ICD-10-PCS | Mod: HCNC,S$GLB,, | Performed by: NURSE PRACTITIONER

## 2020-10-16 PROCEDURE — 1126F AMNT PAIN NOTED NONE PRSNT: CPT | Mod: HCNC,S$GLB,, | Performed by: NURSE PRACTITIONER

## 2020-10-16 NOTE — Clinical Note
Prolia submitted for auth; schedule asap thereafter; then f/u in 6 months for 18 month post/Prolia with BMP, Vit D prior

## 2020-10-16 NOTE — PROGRESS NOTES
"Subjective:       Patient ID: Sheree Pugh is a 89 y.o. female.    Chief Complaint: Surveillance - 12 month post breast evaluation    HPI The patient is a 89-year-old white female who presented with left palpable breast mass which was biopsied and found to be a Grade 2 infiltrating ductal carcinoma which is ER positive/ID positive and HER2 Aliza negative by FISH.  Patient underwent a lumpectomy and sentinel lymph node sampling on 10/07/2019, Dr. Vallejo.  Her tumor was 15 mm in greatest dimension.  Margins of resection were negative.  Three sampled sentinel lymph nodes were negative for metastatic disease.  The patient recovered well.    The patient started Tamoxifen in November/2019.     She returns to clinic today with her son for her approximate 12 month evaluation.  She has seen her results and is concerned about her BMD "Osteoporosis".  She remains compliant with tamoxifen. She states she recently stopped Caltrate-D under the advice of her PCP.  She denies any new breast concerns, nipple drainage, vaginal bleeding, unexplained weight loss, abdominal discomfort, nausea, vomiting, diarrhea, constipation, myalgia/arthralgias, etc.  She wears dentures and denies any difficulty with her gums.  No other complaints pertinent findings on a 10 point review of systems.    Review of Systems   HENT: Negative for dental problem.    Musculoskeletal: Negative for arthralgias and myalgias.   All other systems reviewed and are negative.        Objective:       Vitals:    10/16/20 1321 10/16/20 1335   BP: (!) 152/67 (!) 150/70   BP Location: Left arm Left arm   Patient Position: Sitting Sitting   BP Method: Medium (Automatic) Medium (Automatic)   Pulse: 63    Resp: 18    Temp: 98.2 °F (36.8 °C)    TempSrc: Temporal    SpO2: 97%    Weight: 77.5 kg (170 lb 13.7 oz)    Height: 5' 2" (1.575 m)        Physical Exam  Vitals signs reviewed.   Constitutional:       Appearance: Normal appearance.   HENT:      Head: Normocephalic and " atraumatic.      Nose: Nose normal.   Eyes:      Conjunctiva/sclera: Conjunctivae normal.      Pupils: Pupils are equal, round, and reactive to light.   Neck:      Musculoskeletal: Normal range of motion.   Cardiovascular:      Rate and Rhythm: Normal rate and regular rhythm.      Pulses: Normal pulses.      Heart sounds: Normal heart sounds.   Pulmonary:      Effort: Pulmonary effort is normal.      Breath sounds: Normal breath sounds.   Chest:      Comments: BREASTS:  Right breast is free from masses, tenderness, nipple discharge.  Left breast with noted  lumpectomy scar and is free from masses, tenderness and nipple discharge.  Abdominal:      General: Bowel sounds are normal.      Palpations: Abdomen is soft.   Musculoskeletal: Normal range of motion.   Skin:     General: Skin is warm and dry.      Capillary Refill: Capillary refill takes less than 2 seconds.   Neurological:      Mental Status: She is alert and oriented to person, place, and time.   Psychiatric:         Mood and Affect: Mood normal.         Behavior: Behavior normal.         Thought Content: Thought content normal.         Judgment: Judgment normal.         Lab Results   Component Value Date    WBC 7.65 10/09/2020    RBC 3.91 (L) 10/09/2020    HGB 12.1 10/09/2020    HCT 37.4 10/09/2020    MCV 96 10/09/2020    MCH 30.9 10/09/2020    MCHC 32.4 10/09/2020    RDW 14.1 10/09/2020     10/09/2020    MPV 9.5 10/09/2020    GRAN 4.4 10/09/2020    GRAN 56.9 10/09/2020    LYMPH 2.3 10/09/2020    LYMPH 29.9 10/09/2020    MONO 0.8 10/09/2020    MONO 11.0 10/09/2020    EOS 0.1 10/09/2020    BASO 0.03 10/09/2020    EOSINOPHIL 1.4 10/09/2020    BASOPHIL 0.4 10/09/2020     CMP  Sodium   Date Value Ref Range Status   10/09/2020 143 136 - 145 mmol/L Final     Potassium   Date Value Ref Range Status   10/09/2020 4.4 3.5 - 5.1 mmol/L Final     Chloride   Date Value Ref Range Status   10/09/2020 107 95 - 110 mmol/L Final     CO2   Date Value Ref Range Status    10/09/2020 26 23 - 29 mmol/L Final     Glucose   Date Value Ref Range Status   10/09/2020 93 70 - 110 mg/dL Final     BUN, Bld   Date Value Ref Range Status   10/09/2020 26 10 - 30 mg/dL Final     Creatinine   Date Value Ref Range Status   10/09/2020 0.9 0.5 - 1.4 mg/dL Final     Calcium   Date Value Ref Range Status   10/09/2020 8.5 (L) 8.7 - 10.5 mg/dL Final     Total Protein   Date Value Ref Range Status   10/09/2020 6.6 6.0 - 8.4 g/dL Final     Albumin   Date Value Ref Range Status   10/09/2020 3.4 (L) 3.5 - 5.2 g/dL Final     Total Bilirubin   Date Value Ref Range Status   10/09/2020 0.4 0.1 - 1.0 mg/dL Final     Comment:     For infants and newborns, interpretation of results should be based  on gestational age, weight and in agreement with clinical  observations.  Premature Infant recommended reference ranges:  Up to 24 hours.............<8.0 mg/dL  Up to 48 hours............<12.0 mg/dL  3-5 days..................<15.0 mg/dL  6-29 days.................<15.0 mg/dL       Alkaline Phosphatase   Date Value Ref Range Status   10/09/2020 81 55 - 135 U/L Final     AST   Date Value Ref Range Status   10/09/2020 26 10 - 40 U/L Final     ALT   Date Value Ref Range Status   10/09/2020 26 10 - 44 U/L Final     Anion Gap   Date Value Ref Range Status   10/09/2020 10 8 - 16 mmol/L Final     eGFR if    Date Value Ref Range Status   10/09/2020 >60 >60 mL/min/1.73 m^2 Final     eGFR if non    Date Value Ref Range Status   10/09/2020 55 (A) >60 mL/min/1.73 m^2 Final     Comment:     Calculation used to obtain the estimated glomerular filtration  rate (eGFR) is the CKD-EPI equation.        LDH:  208  02/01/2020:  Vitamin D:  53    Mammogram dated 10/09/2020:  No mammographic evidence of malignancy.   BI-RADS Category 1: Negative   Recommendation:  Routine screening mammogram in 1 year, or as clinically indicated.    CXR dated 10/09/2020:  Impression: No radiographic evidence of active chest  disease.    BMD dated 10/09/2020:  Osteoporosis of femoral neck; Osteopenia of lumbar spine and hip.    Assessment:       1. Malignant neoplasm of left breast in female, estrogen receptor positive, unspecified site of breast    2. Osteoporosis, senile        Plan:       1.  Continue tamoxifen 20 mg daily.  2.  Initiate Caltrate-D (1) p.o. bid.  3.  Plan submitted for Prolia 60 mg SQ every 6 months.  Discussion was held with patient & son on Prolia & side effects.  4.  Infusion to notify the patient when insurance has given authorization & schedule.  5.  Return to clinic in 6 months after Prolia injection for 18 month post & next Prolia with BMP, Vitamin D prior.    Assessment/plan reviewed and approved by Dr. Dan.

## 2020-10-20 ENCOUNTER — DOCUMENTATION ONLY (OUTPATIENT)
Dept: INFUSION THERAPY | Facility: HOSPITAL | Age: 85
End: 2020-10-20

## 2020-10-20 NOTE — PROGRESS NOTES
Spoke with patient about scheduling prolia she needs to know the cost this was sent to [facility] to call patient regarding this and patient needed to get with spike due to transportation my name and number was giving to patient to call me to schedule if she wanted to proceed due to cost

## 2020-10-21 ENCOUNTER — TELEPHONE (OUTPATIENT)
Dept: INFUSION THERAPY | Facility: HOSPITAL | Age: 85
End: 2020-10-21

## 2020-10-21 RX ORDER — ISOSORBIDE MONONITRATE 30 MG/1
30 TABLET, EXTENDED RELEASE ORAL NIGHTLY
Qty: 90 TABLET | Refills: 4 | Status: CANCELLED | OUTPATIENT
Start: 2020-10-21 | End: 2021-10-21

## 2020-10-21 NOTE — TELEPHONE ENCOUNTER
Spoke with patient gave her the name of the drug so she can look up side effects she has a 180.00 copay she also wanted to know how often she gets the drug due to cost she will call us back to schedule

## 2020-10-21 NOTE — TELEPHONE ENCOUNTER
----- Message from Nelida Monteiro sent at 10/21/2020 11:02 AM CDT -----  Caller:  Pt                Callback number:   [phone]                     Reason:   Request a call to discuss her shots

## 2020-10-23 ENCOUNTER — TELEPHONE (OUTPATIENT)
Dept: INFUSION THERAPY | Facility: HOSPITAL | Age: 85
End: 2020-10-23

## 2020-10-23 DIAGNOSIS — C50.012 MALIGNANT NEOPLASM OF NIPPLE OF LEFT BREAST IN FEMALE, UNSPECIFIED ESTROGEN RECEPTOR STATUS: ICD-10-CM

## 2020-10-23 RX ORDER — TAMOXIFEN CITRATE 20 MG/1
20 TABLET ORAL DAILY
Qty: 90 TABLET | Refills: 3 | Status: SHIPPED | OUTPATIENT
Start: 2020-10-23 | End: 2021-10-13 | Stop reason: SDUPTHER

## 2020-10-23 RX ORDER — ISOSORBIDE MONONITRATE 30 MG/1
30 TABLET, EXTENDED RELEASE ORAL NIGHTLY
Qty: 90 TABLET | Refills: 4 | Status: SHIPPED | OUTPATIENT
Start: 2020-10-23 | End: 2021-06-15 | Stop reason: SDUPTHER

## 2020-10-23 NOTE — TELEPHONE ENCOUNTER
----- Message from Mau Nunez NP sent at 10/16/2020  3:41 PM CDT -----  Prolia submitted for auth; schedule asap thereafter; then f/u in 6 months for 18 month post/Prolia with BMP, Vit D prior

## 2020-10-26 ENCOUNTER — INFUSION (OUTPATIENT)
Dept: INFUSION THERAPY | Facility: HOSPITAL | Age: 85
End: 2020-10-26
Attending: NURSE PRACTITIONER
Payer: MEDICARE

## 2020-10-26 VITALS
RESPIRATION RATE: 20 BRPM | HEART RATE: 71 BPM | DIASTOLIC BLOOD PRESSURE: 65 MMHG | SYSTOLIC BLOOD PRESSURE: 151 MMHG | TEMPERATURE: 98 F

## 2020-10-26 DIAGNOSIS — M81.0 OSTEOPOROSIS, SENILE: Primary | ICD-10-CM

## 2020-10-26 PROCEDURE — 96372 THER/PROPH/DIAG INJ SC/IM: CPT | Mod: HCNC,PN

## 2020-10-26 PROCEDURE — 63600175 PHARM REV CODE 636 W HCPCS: Mod: JG,HCNC,PN | Performed by: NURSE PRACTITIONER

## 2020-10-26 RX ADMIN — DENOSUMAB 60 MG: 60 INJECTION SUBCUTANEOUS at 02:10

## 2020-10-30 ENCOUNTER — HOSPITAL ENCOUNTER (OUTPATIENT)
Dept: RADIOLOGY | Facility: HOSPITAL | Age: 85
Discharge: HOME | End: 2020-10-30
Attending: PHYSICIAN ASSISTANT
Payer: MEDICARE

## 2020-10-30 ENCOUNTER — OFFICE VISIT (OUTPATIENT)
Dept: FAMILY MEDICINE | Facility: CLINIC | Age: 85
End: 2020-10-30
Payer: MEDICARE

## 2020-10-30 VITALS
HEART RATE: 66 BPM | OXYGEN SATURATION: 96 % | BODY MASS INDEX: 31.29 KG/M2 | WEIGHT: 171.06 LBS | DIASTOLIC BLOOD PRESSURE: 68 MMHG | SYSTOLIC BLOOD PRESSURE: 142 MMHG

## 2020-10-30 DIAGNOSIS — M79.669 PAIN AND SWELLING OF LOWER LEG, UNSPECIFIED LATERALITY: ICD-10-CM

## 2020-10-30 DIAGNOSIS — L03.116 CELLULITIS OF LEFT ANTERIOR LOWER LEG: Primary | ICD-10-CM

## 2020-10-30 DIAGNOSIS — M79.662 PAIN AND SWELLING OF LEFT LOWER LEG: ICD-10-CM

## 2020-10-30 DIAGNOSIS — M79.89 PAIN AND SWELLING OF LOWER LEG, UNSPECIFIED LATERALITY: ICD-10-CM

## 2020-10-30 DIAGNOSIS — M79.89 PAIN AND SWELLING OF LEFT LOWER LEG: ICD-10-CM

## 2020-10-30 PROCEDURE — 90694 VACC AIIV4 NO PRSRV 0.5ML IM: CPT | Mod: HCNC,S$GLB,, | Performed by: PHYSICIAN ASSISTANT

## 2020-10-30 PROCEDURE — 1101F PR PT FALLS ASSESS DOC 0-1 FALLS W/OUT INJ PAST YR: ICD-10-PCS | Mod: HCNC,CPTII,S$GLB, | Performed by: PHYSICIAN ASSISTANT

## 2020-10-30 PROCEDURE — G0008 FLU VACCINE - QUADRIVALENT - ADJUVANTED: ICD-10-PCS | Mod: HCNC,S$GLB,, | Performed by: PHYSICIAN ASSISTANT

## 2020-10-30 PROCEDURE — 99999 PR PBB SHADOW E&M-EST. PATIENT-LVL IV: CPT | Mod: PBBFAC,HCNC,, | Performed by: PHYSICIAN ASSISTANT

## 2020-10-30 PROCEDURE — 90694 FLU VACCINE - QUADRIVALENT - ADJUVANTED: ICD-10-PCS | Mod: HCNC,S$GLB,, | Performed by: PHYSICIAN ASSISTANT

## 2020-10-30 PROCEDURE — 93970 EXTREMITY STUDY: CPT | Mod: 26,HCNC,, | Performed by: RADIOLOGY

## 2020-10-30 PROCEDURE — 99214 OFFICE O/P EST MOD 30 MIN: CPT | Mod: HCNC,25,S$GLB, | Performed by: PHYSICIAN ASSISTANT

## 2020-10-30 PROCEDURE — 1159F PR MEDICATION LIST DOCUMENTED IN MEDICAL RECORD: ICD-10-PCS | Mod: HCNC,S$GLB,, | Performed by: PHYSICIAN ASSISTANT

## 2020-10-30 PROCEDURE — 1126F AMNT PAIN NOTED NONE PRSNT: CPT | Mod: HCNC,S$GLB,, | Performed by: PHYSICIAN ASSISTANT

## 2020-10-30 PROCEDURE — 1101F PT FALLS ASSESS-DOCD LE1/YR: CPT | Mod: HCNC,CPTII,S$GLB, | Performed by: PHYSICIAN ASSISTANT

## 2020-10-30 PROCEDURE — 99999 PR PBB SHADOW E&M-EST. PATIENT-LVL IV: ICD-10-PCS | Mod: PBBFAC,HCNC,, | Performed by: PHYSICIAN ASSISTANT

## 2020-10-30 PROCEDURE — 93970 US LOWER EXTREMITY VEINS BILATERAL: ICD-10-PCS | Mod: 26,HCNC,, | Performed by: RADIOLOGY

## 2020-10-30 PROCEDURE — 1159F MED LIST DOCD IN RCRD: CPT | Mod: HCNC,S$GLB,, | Performed by: PHYSICIAN ASSISTANT

## 2020-10-30 PROCEDURE — 99214 PR OFFICE/OUTPT VISIT, EST, LEVL IV, 30-39 MIN: ICD-10-PCS | Mod: HCNC,25,S$GLB, | Performed by: PHYSICIAN ASSISTANT

## 2020-10-30 PROCEDURE — G0008 ADMIN INFLUENZA VIRUS VAC: HCPCS | Mod: HCNC,S$GLB,, | Performed by: PHYSICIAN ASSISTANT

## 2020-10-30 PROCEDURE — 1126F PR PAIN SEVERITY QUANTIFIED, NO PAIN PRESENT: ICD-10-PCS | Mod: HCNC,S$GLB,, | Performed by: PHYSICIAN ASSISTANT

## 2020-10-30 PROCEDURE — 93970 EXTREMITY STUDY: CPT | Mod: TC,HCNC,PO

## 2020-10-30 RX ORDER — CEFADROXIL 500 MG/1
500 CAPSULE ORAL EVERY 12 HOURS
Qty: 14 CAPSULE | Refills: 0 | Status: SHIPPED | OUTPATIENT
Start: 2020-10-30 | End: 2020-11-06

## 2020-10-30 NOTE — PATIENT INSTRUCTIONS
A few reminders from today:      Wear compression stockings. Elevate legs.   Start antibiotic.   U/S today.     Follow up with me if needed.   Please go to ER/urgent care if after hours or symptoms persist/worsen.       Do not hesitate to get in touch with me should you have any further questions.     Thank you for trusting me with your care!  I wish you health and happiness.    -Madalyn Quiñonez PA-C

## 2020-10-30 NOTE — PROGRESS NOTES
Subjective:       Patient ID: Sheree Pugh is a 89 y.o. female.    Chief Complaint: Leg Pain (right leg, swelling, redness)    HPI     Pt is new to me, PCP Dr. Walker.     Pt is a 89 year old female with lumbar DDD, COPD, CAD, right carotid stenosis, aortic valve stenosis, HTN, renal artery stenosis, hx of left breast cancer, diverticulosis, osteoporosis, and insomnia. She presents today complaining of left leg swelling and redness. She has a history of bilateral LE cellulitis for which she has seen Dr. Walker and Dr. Rodríguez multiple times. Denies fever, chills, SOB, CP.     Review of Systems   Constitutional: Negative for chills and fever.   HENT: Negative for nasal congestion, postnasal drip, rhinorrhea, sinus pressure/congestion and sore throat.    Respiratory: Negative for cough, chest tightness, shortness of breath and wheezing.    Cardiovascular: Negative for chest pain and palpitations.   Gastrointestinal: Negative for abdominal pain, change in bowel habit, constipation, diarrhea, nausea, vomiting, reflux, fecal incontinence and change in bowel habit.   Genitourinary: Positive for frequency (due to lasix). Negative for difficulty urinating, dysuria, flank pain, hematuria, nocturia and urgency.   Integumentary:  Negative for rash.   Neurological: Negative for dizziness, vertigo, tremors, syncope, light-headedness, numbness and headaches.   Psychiatric/Behavioral: Negative for sleep disturbance. The patient is not nervous/anxious.          Objective:      Physical Exam  Vitals signs and nursing note reviewed.   Constitutional:       General: She is not in acute distress.     Appearance: Normal appearance.   HENT:      Head: Normocephalic and atraumatic.      Right Ear: External ear normal.      Left Ear: External ear normal.   Eyes:      General: No scleral icterus.        Right eye: No discharge.         Left eye: No discharge.      Extraocular Movements: Extraocular movements intact.      Conjunctiva/sclera:  Conjunctivae normal.      Pupils: Pupils are equal, round, and reactive to light.   Neck:      Musculoskeletal: Normal range of motion and neck supple.   Cardiovascular:      Rate and Rhythm: Normal rate and regular rhythm.      Pulses: Normal pulses.      Heart sounds: Murmur present. No friction rub. No gallop.       Comments: Bilateral Le varicose veins  Pain with point palpation left calf  Soreness with squeezing bilateral calves  Pulmonary:      Effort: Pulmonary effort is normal. No respiratory distress.      Breath sounds: Normal breath sounds. No stridor. No wheezing, rhonchi or rales.   Abdominal:      General: Abdomen is flat. Bowel sounds are normal. There is no distension.      Palpations: Abdomen is soft. There is no mass.      Tenderness: There is no abdominal tenderness. There is no guarding or rebound.   Musculoskeletal:         General: No deformity or signs of injury.      Right lower leg: No edema.      Left lower leg: No edema.   Lymphadenopathy:      Cervical: No cervical adenopathy.   Skin:     General: Skin is warm.      Capillary Refill: Capillary refill takes less than 2 seconds.      Coloration: Skin is not jaundiced or pale.      Findings: No lesion or rash.      Comments: Erythema LLE   Neurological:      General: No focal deficit present.      Mental Status: She is alert and oriented to person, place, and time. Mental status is at baseline.   Psychiatric:         Attention and Perception: Attention and perception normal.         Mood and Affect: Mood normal.         Speech: Speech normal.         Behavior: Behavior normal. Behavior is cooperative.         Thought Content: Thought content normal.         Cognition and Memory: Cognition and memory normal.         Judgment: Judgment normal.           Assessment:       1. Cellulitis of left anterior lower leg    2. Pain and swelling of lower leg, unspecified laterality          Plan:       1. Cellulitis of left anterior lower leg  -  cefadroxil (DURICEF) 500 MG Cap; Take 1 capsule (500 mg total) by mouth every 12 (twelve) hours. for 7 days  Dispense: 14 capsule; Refill: 0  -RTC/ER precautions given    2. Pain and swelling of lower leg  - US Lower Extremity Veins Bilateral; Future         F/U if symptoms worsen or persist.     CARE GAPS:  Flu shot today.

## 2020-11-24 DIAGNOSIS — J44.9 CHRONIC OBSTRUCTIVE PULMONARY DISEASE, UNSPECIFIED COPD TYPE: ICD-10-CM

## 2020-11-24 RX ORDER — BUDESONIDE AND FORMOTEROL FUMARATE DIHYDRATE 160; 4.5 UG/1; UG/1
2 AEROSOL RESPIRATORY (INHALATION) 2 TIMES DAILY
Qty: 30.6 G | Refills: 3 | Status: CANCELLED | OUTPATIENT
Start: 2020-11-24

## 2020-11-24 RX ORDER — BUDESONIDE AND FORMOTEROL FUMARATE DIHYDRATE 160; 4.5 UG/1; UG/1
2 AEROSOL RESPIRATORY (INHALATION) 2 TIMES DAILY
Qty: 30.6 G | Refills: 3 | Status: SHIPPED | OUTPATIENT
Start: 2020-11-24 | End: 2023-09-10

## 2020-12-29 ENCOUNTER — OFFICE VISIT (OUTPATIENT)
Dept: URGENT CARE | Facility: CLINIC | Age: 85
End: 2020-12-29
Payer: MEDICARE

## 2020-12-29 VITALS
TEMPERATURE: 99 F | WEIGHT: 171 LBS | OXYGEN SATURATION: 98 % | DIASTOLIC BLOOD PRESSURE: 69 MMHG | SYSTOLIC BLOOD PRESSURE: 176 MMHG | RESPIRATION RATE: 18 BRPM | HEART RATE: 72 BPM | HEIGHT: 62 IN | BODY MASS INDEX: 31.47 KG/M2

## 2020-12-29 DIAGNOSIS — L03.115 CELLULITIS OF RIGHT LOWER EXTREMITY: Primary | ICD-10-CM

## 2020-12-29 PROCEDURE — 99214 PR OFFICE/OUTPT VISIT, EST, LEVL IV, 30-39 MIN: ICD-10-PCS | Mod: S$GLB,,, | Performed by: PHYSICIAN ASSISTANT

## 2020-12-29 PROCEDURE — 1157F ADVNC CARE PLAN IN RCRD: CPT | Mod: S$GLB,,, | Performed by: PHYSICIAN ASSISTANT

## 2020-12-29 PROCEDURE — 1157F PR ADVANCE CARE PLAN OR EQUIV PRESENT IN MEDICAL RECORD: ICD-10-PCS | Mod: S$GLB,,, | Performed by: PHYSICIAN ASSISTANT

## 2020-12-29 PROCEDURE — 99214 OFFICE O/P EST MOD 30 MIN: CPT | Mod: S$GLB,,, | Performed by: PHYSICIAN ASSISTANT

## 2020-12-29 RX ORDER — CEPHALEXIN 500 MG/1
500 CAPSULE ORAL EVERY 12 HOURS
Qty: 14 CAPSULE | Refills: 0 | Status: SHIPPED | OUTPATIENT
Start: 2020-12-29 | End: 2021-01-05

## 2020-12-30 NOTE — PROGRESS NOTES
"Subjective:       Patient ID: Sheree Pugh is a 89 y.o. female.    Vitals:  height is 5' 2" (1.575 m) and weight is 77.6 kg (171 lb). Her temperature is 98.5 °F (36.9 °C). Her blood pressure is 176/69 (abnormal) and her pulse is 72. Her respiration is 18 and oxygen saturation is 98%.     Chief Complaint: Recurrent Skin Infections (Right leg)    Patient c/o cellulitis in her right leg X 1-2 weeks.     Other  This is a new problem. The current episode started 1 to 4 weeks ago. The problem occurs constantly. The problem has been unchanged. Pertinent negatives include no abdominal pain, anorexia, arthralgias, change in bowel habit, chest pain, chills, congestion, coughing, diaphoresis, fatigue, fever, headaches, joint swelling, myalgias, nausea, neck pain, numbness, rash, sore throat, swollen glands, urinary symptoms, vertigo, visual change, vomiting or weakness. Nothing aggravates the symptoms. Treatments tried: aspercreme, rubbing alcohol. The treatment provided no relief.       Constitution: Negative for chills, sweating, fatigue and fever.   HENT: Negative for congestion and sore throat.    Neck: Negative for neck pain and painful lymph nodes.   Cardiovascular: Positive for leg swelling. Negative for chest pain.   Eyes: Negative for double vision and blurred vision.   Respiratory: Negative for cough and shortness of breath.    Gastrointestinal: Negative for abdominal pain, nausea, vomiting and diarrhea.   Genitourinary: Negative for dysuria, frequency, urgency and history of kidney stones.   Musculoskeletal: Positive for pain. Negative for joint pain, joint swelling, muscle cramps and muscle ache.   Skin: Negative for color change, pale, rash and bruising.   Allergic/Immunologic: Negative for seasonal allergies.   Neurological: Negative for dizziness, history of vertigo, light-headedness, passing out, headaches and numbness.   Hematologic/Lymphatic: Negative for swollen lymph nodes.   Psychiatric/Behavioral: " Negative for nervous/anxious, sleep disturbance and depression. The patient is not nervous/anxious.        Objective:      Physical Exam   Constitutional: She is oriented to person, place, and time. She appears well-developed. She does not appear ill. No distress.   HENT:   Head: Normocephalic and atraumatic.   Ears:   Right Ear: External ear normal.   Left Ear: External ear normal.   Mouth/Throat: Oropharynx is clear and moist.   Eyes: Conjunctivae, EOM and lids are normal. Right eye exhibits no discharge. Left eye exhibits no discharge. extraocular movement intact  Neck: Trachea normal, full passive range of motion without pain and phonation normal. Neck supple.   Pulmonary/Chest: Effort normal. No respiratory distress.   Abdominal: Normal appearance.   Musculoskeletal: Normal range of motion.   Neurological: She is alert and oriented to person, place, and time.   Skin: Skin is warm, dry and intact.         Comments: Right lower leg edematous with vesicles.  Skin avulsion to anterior leg with surrounding erythema. jaundicePsychiatric: Her speech is normal and behavior is normal. Mood, judgment and thought content normal.   Nursing note and vitals reviewed.        Assessment:       1. Cellulitis of right lower extremity        Plan:         Cellulitis of right lower extremity    Other orders  -     cephALEXin (KEFLEX) 500 MG capsule; Take 1 capsule (500 mg total) by mouth every 12 (twelve) hours. for 7 days  Dispense: 14 capsule; Refill: 0      Patient Instructions       Discharge Instructions for Cellulitis  You have been diagnosed with cellulitis. This is an infection in the deepest layer of the skin. In some cases, the infection also affects the muscle. Cellulitis is caused by bacteria. The bacteria can enter the body through broken skin. This can happen with a cut, scratch, animal bite, or an insect bite that has been scratched. You may have been treated in the hospital with antibiotics and fluids. You will  likely be given a prescription for antibiotics to take at home. This sheet will help you take care of yourself at home.  Home care  When you are home:  · Take the prescribed antibiotic medicine you are given as directed until it is gone. Take it even if you feel better. It treats the infection and stops it from returning. Not taking all the medicine can make future infections hard to treat.  · Keep the infected area clean.  · When possible, raise the infected area above the level of your heart. This helps keep swelling down.  · Talk with your healthcare provider if you are in pain. Ask what kind of over-the-counter medicine you can take for pain.  · Apply clean bandages as advised.  · Take your temperature once a day for a week.  · Wash your hands often to prevent spreading the infection.  In the future, wash your hands before and after you touch cuts, scratches, or bandages. This will help prevent infection.   When to call your healthcare provider  Call your healthcare provider immediately if you have any of the following:  · Difficulty or pain when moving the joints above or below the infected area  · Discharge or pus draining from the area  · Fever of 100.4°F (38°C) or higher, or as directed by your healthcare provider  · Pain that gets worse in or around the infected   · Redness that gets worse in or around the infected area, particularly if the area of redness expands to a wider area  · Shaking chills  · Swelling of the infected area  · Vomiting   Date Last Reviewed: 8/1/2016  © 6206-7813 [employer]. [address] [place] [address], [place] [address]. All rights reserved. This information is not intended as a substitute for professional medical care. Always follow your healthcare professional's instructions.

## 2020-12-30 NOTE — PATIENT INSTRUCTIONS
Discharge Instructions for Cellulitis  You have been diagnosed with cellulitis. This is an infection in the deepest layer of the skin. In some cases, the infection also affects the muscle. Cellulitis is caused by bacteria. The bacteria can enter the body through broken skin. This can happen with a cut, scratch, animal bite, or an insect bite that has been scratched. You may have been treated in the hospital with antibiotics and fluids. You will likely be given a prescription for antibiotics to take at home. This sheet will help you take care of yourself at home.  Home care  When you are home:  · Take the prescribed antibiotic medicine you are given as directed until it is gone. Take it even if you feel better. It treats the infection and stops it from returning. Not taking all the medicine can make future infections hard to treat.  · Keep the infected area clean.  · When possible, raise the infected area above the level of your heart. This helps keep swelling down.  · Talk with your healthcare provider if you are in pain. Ask what kind of over-the-counter medicine you can take for pain.  · Apply clean bandages as advised.  · Take your temperature once a day for a week.  · Wash your hands often to prevent spreading the infection.  In the future, wash your hands before and after you touch cuts, scratches, or bandages. This will help prevent infection.   When to call your healthcare provider  Call your healthcare provider immediately if you have any of the following:  · Difficulty or pain when moving the joints above or below the infected area  · Discharge or pus draining from the area  · Fever of 100.4°F (38°C) or higher, or as directed by your healthcare provider  · Pain that gets worse in or around the infected   · Redness that gets worse in or around the infected area, particularly if the area of redness expands to a wider area  · Shaking chills  · Swelling of the infected area  · Vomiting   Date Last Reviewed:  8/1/2016  © 9741-0441 [employer]. [address] [place] [address], [place] [address]. All rights reserved. This information is not intended as a substitute for professional medical care. Always follow your healthcare professional's instructions.

## 2021-01-05 ENCOUNTER — TELEPHONE (OUTPATIENT)
Dept: FAMILY MEDICINE | Facility: CLINIC | Age: 86
End: 2021-01-05

## 2021-01-20 ENCOUNTER — IMMUNIZATION (OUTPATIENT)
Dept: PHARMACY | Facility: CLINIC | Age: 86
End: 2021-01-20
Payer: MEDICARE

## 2021-01-20 DIAGNOSIS — Z23 NEED FOR VACCINATION: Primary | ICD-10-CM

## 2021-02-18 ENCOUNTER — IMMUNIZATION (OUTPATIENT)
Dept: PHARMACY | Facility: CLINIC | Age: 86
End: 2021-02-18
Payer: MEDICARE

## 2021-02-18 DIAGNOSIS — Z23 NEED FOR VACCINATION: Primary | ICD-10-CM

## 2021-02-23 ENCOUNTER — OFFICE VISIT (OUTPATIENT)
Dept: FAMILY MEDICINE | Facility: CLINIC | Age: 86
End: 2021-02-23
Payer: MEDICARE

## 2021-02-23 VITALS
DIASTOLIC BLOOD PRESSURE: 58 MMHG | OXYGEN SATURATION: 97 % | SYSTOLIC BLOOD PRESSURE: 132 MMHG | HEIGHT: 62 IN | BODY MASS INDEX: 31.36 KG/M2 | WEIGHT: 170.44 LBS | HEART RATE: 58 BPM

## 2021-02-23 DIAGNOSIS — H35.3222 EXUDATIVE AGE-RELATED MACULAR DEGENERATION OF LEFT EYE WITH INACTIVE CHOROIDAL NEOVASCULARIZATION: ICD-10-CM

## 2021-02-23 DIAGNOSIS — I10 ESSENTIAL HYPERTENSION: ICD-10-CM

## 2021-02-23 DIAGNOSIS — I73.9 PERIPHERAL VASCULAR DISEASE: ICD-10-CM

## 2021-02-23 DIAGNOSIS — Z00.00 WELLNESS EXAMINATION: Primary | ICD-10-CM

## 2021-02-23 DIAGNOSIS — Z17.0 MALIGNANT NEOPLASM OF LEFT BREAST IN FEMALE, ESTROGEN RECEPTOR POSITIVE, UNSPECIFIED SITE OF BREAST: ICD-10-CM

## 2021-02-23 DIAGNOSIS — M46.96 UNSPECIFIED INFLAMMATORY SPONDYLOPATHY, LUMBAR REGION: ICD-10-CM

## 2021-02-23 DIAGNOSIS — R60.0 PEDAL EDEMA: ICD-10-CM

## 2021-02-23 DIAGNOSIS — C50.912 MALIGNANT NEOPLASM OF LEFT BREAST IN FEMALE, ESTROGEN RECEPTOR POSITIVE, UNSPECIFIED SITE OF BREAST: ICD-10-CM

## 2021-02-23 DIAGNOSIS — J44.9 CHRONIC OBSTRUCTIVE PULMONARY DISEASE, UNSPECIFIED COPD TYPE: ICD-10-CM

## 2021-02-23 PROCEDURE — 1123F PR ADV CARE PLAN DISCUSSED, PLAN OR SURROGATE DOCUMENTED: ICD-10-PCS | Mod: S$GLB,,, | Performed by: INTERNAL MEDICINE

## 2021-02-23 PROCEDURE — 1123F ACP DISCUSS/DSCN MKR DOCD: CPT | Mod: S$GLB,,, | Performed by: INTERNAL MEDICINE

## 2021-02-23 PROCEDURE — 99497 ADVNCD CARE PLAN 30 MIN: CPT | Mod: S$GLB,,, | Performed by: INTERNAL MEDICINE

## 2021-02-23 PROCEDURE — 99397 PR PREVENTIVE VISIT,EST,65 & OVER: ICD-10-PCS | Mod: S$GLB,,, | Performed by: INTERNAL MEDICINE

## 2021-02-23 PROCEDURE — 1101F PR PT FALLS ASSESS DOC 0-1 FALLS W/OUT INJ PAST YR: ICD-10-PCS | Mod: CPTII,S$GLB,, | Performed by: INTERNAL MEDICINE

## 2021-02-23 PROCEDURE — 99397 PER PM REEVAL EST PAT 65+ YR: CPT | Mod: S$GLB,,, | Performed by: INTERNAL MEDICINE

## 2021-02-23 PROCEDURE — 99999 PR PBB SHADOW E&M-EST. PATIENT-LVL IV: ICD-10-PCS | Mod: PBBFAC,,, | Performed by: INTERNAL MEDICINE

## 2021-02-23 PROCEDURE — 99499 RISK ADDL DX/OHS AUDIT: ICD-10-PCS | Mod: S$GLB,,, | Performed by: INTERNAL MEDICINE

## 2021-02-23 PROCEDURE — 3288F FALL RISK ASSESSMENT DOCD: CPT | Mod: CPTII,S$GLB,, | Performed by: INTERNAL MEDICINE

## 2021-02-23 PROCEDURE — 1101F PT FALLS ASSESS-DOCD LE1/YR: CPT | Mod: CPTII,S$GLB,, | Performed by: INTERNAL MEDICINE

## 2021-02-23 PROCEDURE — 99497 PR ADVNCD CARE PLAN 30 MIN: ICD-10-PCS | Mod: S$GLB,,, | Performed by: INTERNAL MEDICINE

## 2021-02-23 PROCEDURE — 3288F PR FALLS RISK ASSESSMENT DOCUMENTED: ICD-10-PCS | Mod: CPTII,S$GLB,, | Performed by: INTERNAL MEDICINE

## 2021-02-23 PROCEDURE — 99499 UNLISTED E&M SERVICE: CPT | Mod: S$GLB,,, | Performed by: INTERNAL MEDICINE

## 2021-02-23 PROCEDURE — 99999 PR PBB SHADOW E&M-EST. PATIENT-LVL IV: CPT | Mod: PBBFAC,,, | Performed by: INTERNAL MEDICINE

## 2021-03-09 ENCOUNTER — TELEPHONE (OUTPATIENT)
Dept: HEMATOLOGY/ONCOLOGY | Facility: CLINIC | Age: 86
End: 2021-03-09

## 2021-04-15 ENCOUNTER — PES CALL (OUTPATIENT)
Dept: ADMINISTRATIVE | Facility: CLINIC | Age: 86
End: 2021-04-15

## 2021-04-26 ENCOUNTER — DOCUMENTATION ONLY (OUTPATIENT)
Dept: HEMATOLOGY/ONCOLOGY | Facility: CLINIC | Age: 86
End: 2021-04-26

## 2021-04-26 ENCOUNTER — OFFICE VISIT (OUTPATIENT)
Dept: HEMATOLOGY/ONCOLOGY | Facility: CLINIC | Age: 86
End: 2021-04-26
Payer: MEDICARE

## 2021-04-26 ENCOUNTER — INFUSION (OUTPATIENT)
Dept: INFUSION THERAPY | Facility: HOSPITAL | Age: 86
End: 2021-04-26
Attending: INTERNAL MEDICINE
Payer: MEDICARE

## 2021-04-26 VITALS
SYSTOLIC BLOOD PRESSURE: 152 MMHG | HEART RATE: 73 BPM | TEMPERATURE: 97 F | WEIGHT: 170.44 LBS | RESPIRATION RATE: 18 BRPM | BODY MASS INDEX: 31.17 KG/M2 | DIASTOLIC BLOOD PRESSURE: 67 MMHG

## 2021-04-26 VITALS
HEIGHT: 62 IN | DIASTOLIC BLOOD PRESSURE: 73 MMHG | OXYGEN SATURATION: 96 % | HEART RATE: 74 BPM | RESPIRATION RATE: 18 BRPM | BODY MASS INDEX: 31.4 KG/M2 | SYSTOLIC BLOOD PRESSURE: 170 MMHG | WEIGHT: 170.63 LBS

## 2021-04-26 DIAGNOSIS — Z17.0 MALIGNANT NEOPLASM OF LEFT BREAST IN FEMALE, ESTROGEN RECEPTOR POSITIVE, UNSPECIFIED SITE OF BREAST: Primary | ICD-10-CM

## 2021-04-26 DIAGNOSIS — M81.0 OSTEOPOROSIS, SENILE: ICD-10-CM

## 2021-04-26 DIAGNOSIS — M81.0 OSTEOPOROSIS, SENILE: Primary | ICD-10-CM

## 2021-04-26 DIAGNOSIS — C50.912 MALIGNANT NEOPLASM OF LEFT BREAST IN FEMALE, ESTROGEN RECEPTOR POSITIVE, UNSPECIFIED SITE OF BREAST: Primary | ICD-10-CM

## 2021-04-26 PROCEDURE — 99214 OFFICE O/P EST MOD 30 MIN: CPT | Mod: S$GLB,,, | Performed by: INTERNAL MEDICINE

## 2021-04-26 PROCEDURE — 1157F ADVNC CARE PLAN IN RCRD: CPT | Mod: S$GLB,,, | Performed by: INTERNAL MEDICINE

## 2021-04-26 PROCEDURE — 99499 RISK ADDL DX/OHS AUDIT: ICD-10-PCS | Mod: S$GLB,,, | Performed by: INTERNAL MEDICINE

## 2021-04-26 PROCEDURE — 96372 THER/PROPH/DIAG INJ SC/IM: CPT | Mod: PN

## 2021-04-26 PROCEDURE — 3288F PR FALLS RISK ASSESSMENT DOCUMENTED: ICD-10-PCS | Mod: CPTII,S$GLB,, | Performed by: INTERNAL MEDICINE

## 2021-04-26 PROCEDURE — 1125F AMNT PAIN NOTED PAIN PRSNT: CPT | Mod: S$GLB,,, | Performed by: INTERNAL MEDICINE

## 2021-04-26 PROCEDURE — 99999 PR PBB SHADOW E&M-EST. PATIENT-LVL V: CPT | Mod: PBBFAC,,, | Performed by: INTERNAL MEDICINE

## 2021-04-26 PROCEDURE — 1157F PR ADVANCE CARE PLAN OR EQUIV PRESENT IN MEDICAL RECORD: ICD-10-PCS | Mod: S$GLB,,, | Performed by: INTERNAL MEDICINE

## 2021-04-26 PROCEDURE — 1101F PR PT FALLS ASSESS DOC 0-1 FALLS W/OUT INJ PAST YR: ICD-10-PCS | Mod: CPTII,S$GLB,, | Performed by: INTERNAL MEDICINE

## 2021-04-26 PROCEDURE — 1159F MED LIST DOCD IN RCRD: CPT | Mod: S$GLB,,, | Performed by: INTERNAL MEDICINE

## 2021-04-26 PROCEDURE — 1125F PR PAIN SEVERITY QUANTIFIED, PAIN PRESENT: ICD-10-PCS | Mod: S$GLB,,, | Performed by: INTERNAL MEDICINE

## 2021-04-26 PROCEDURE — 3288F FALL RISK ASSESSMENT DOCD: CPT | Mod: CPTII,S$GLB,, | Performed by: INTERNAL MEDICINE

## 2021-04-26 PROCEDURE — 99999 PR PBB SHADOW E&M-EST. PATIENT-LVL V: ICD-10-PCS | Mod: PBBFAC,,, | Performed by: INTERNAL MEDICINE

## 2021-04-26 PROCEDURE — 99214 PR OFFICE/OUTPT VISIT, EST, LEVL IV, 30-39 MIN: ICD-10-PCS | Mod: S$GLB,,, | Performed by: INTERNAL MEDICINE

## 2021-04-26 PROCEDURE — 1101F PT FALLS ASSESS-DOCD LE1/YR: CPT | Mod: CPTII,S$GLB,, | Performed by: INTERNAL MEDICINE

## 2021-04-26 PROCEDURE — 63600175 PHARM REV CODE 636 W HCPCS: Mod: JG,PN | Performed by: NURSE PRACTITIONER

## 2021-04-26 PROCEDURE — 99499 UNLISTED E&M SERVICE: CPT | Mod: S$GLB,,, | Performed by: INTERNAL MEDICINE

## 2021-04-26 PROCEDURE — 1159F PR MEDICATION LIST DOCUMENTED IN MEDICAL RECORD: ICD-10-PCS | Mod: S$GLB,,, | Performed by: INTERNAL MEDICINE

## 2021-04-26 RX ADMIN — DENOSUMAB 60 MG: 60 INJECTION SUBCUTANEOUS at 03:04

## 2021-04-30 ENCOUNTER — TELEPHONE (OUTPATIENT)
Dept: HEMATOLOGY/ONCOLOGY | Facility: CLINIC | Age: 86
End: 2021-04-30

## 2021-05-02 DIAGNOSIS — N39.41 URGE INCONTINENCE OF URINE: ICD-10-CM

## 2021-05-03 RX ORDER — OXYBUTYNIN CHLORIDE 10 MG/1
10 TABLET, EXTENDED RELEASE ORAL DAILY
Qty: 90 TABLET | Refills: 3 | Status: SHIPPED | OUTPATIENT
Start: 2021-05-03 | End: 2022-04-21 | Stop reason: SDUPTHER

## 2021-05-27 DIAGNOSIS — G62.9 PERIPHERAL POLYNEUROPATHY: ICD-10-CM

## 2021-05-27 DIAGNOSIS — G47.01 INSOMNIA DUE TO MEDICAL CONDITION: ICD-10-CM

## 2021-05-29 RX ORDER — NORTRIPTYLINE HYDROCHLORIDE 10 MG/1
10 CAPSULE ORAL NIGHTLY
Qty: 30 CAPSULE | Refills: 11 | Status: SHIPPED | OUTPATIENT
Start: 2021-05-29 | End: 2022-05-06 | Stop reason: SDUPTHER

## 2021-06-05 RX ORDER — CYANOCOBALAMIN 1000 UG/ML
INJECTION, SOLUTION INTRAMUSCULAR; SUBCUTANEOUS
Qty: 10 ML | Refills: 3 | Status: SHIPPED | OUTPATIENT
Start: 2021-06-05 | End: 2022-09-01 | Stop reason: SDUPTHER

## 2021-06-15 ENCOUNTER — OFFICE VISIT (OUTPATIENT)
Dept: CARDIOLOGY | Facility: CLINIC | Age: 86
End: 2021-06-15
Payer: MEDICARE

## 2021-06-15 VITALS
HEART RATE: 68 BPM | DIASTOLIC BLOOD PRESSURE: 73 MMHG | BODY MASS INDEX: 30.55 KG/M2 | HEIGHT: 62 IN | WEIGHT: 166 LBS | SYSTOLIC BLOOD PRESSURE: 154 MMHG

## 2021-06-15 DIAGNOSIS — I65.21 STENOSIS OF RIGHT CAROTID ARTERY: ICD-10-CM

## 2021-06-15 DIAGNOSIS — I70.1 RAS (RENAL ARTERY STENOSIS): ICD-10-CM

## 2021-06-15 DIAGNOSIS — E78.5 DYSLIPIDEMIA: ICD-10-CM

## 2021-06-15 DIAGNOSIS — I10 ESSENTIAL HYPERTENSION: ICD-10-CM

## 2021-06-15 DIAGNOSIS — I35.0 NONRHEUMATIC AORTIC VALVE STENOSIS: ICD-10-CM

## 2021-06-15 DIAGNOSIS — I25.10 CORONARY ARTERY DISEASE INVOLVING NATIVE CORONARY ARTERY OF NATIVE HEART WITHOUT ANGINA PECTORIS: ICD-10-CM

## 2021-06-15 DIAGNOSIS — Z95.5 STENTED CORONARY ARTERY: Primary | ICD-10-CM

## 2021-06-15 DIAGNOSIS — I73.9 PERIPHERAL VASCULAR DISEASE: ICD-10-CM

## 2021-06-15 PROCEDURE — 1101F PT FALLS ASSESS-DOCD LE1/YR: CPT | Mod: CPTII,S$GLB,, | Performed by: INTERNAL MEDICINE

## 2021-06-15 PROCEDURE — 1126F PR PAIN SEVERITY QUANTIFIED, NO PAIN PRESENT: ICD-10-PCS | Mod: S$GLB,,, | Performed by: INTERNAL MEDICINE

## 2021-06-15 PROCEDURE — 1159F PR MEDICATION LIST DOCUMENTED IN MEDICAL RECORD: ICD-10-PCS | Mod: S$GLB,,, | Performed by: INTERNAL MEDICINE

## 2021-06-15 PROCEDURE — 99999 PR PBB SHADOW E&M-EST. PATIENT-LVL IV: ICD-10-PCS | Mod: PBBFAC,,, | Performed by: INTERNAL MEDICINE

## 2021-06-15 PROCEDURE — 1157F PR ADVANCE CARE PLAN OR EQUIV PRESENT IN MEDICAL RECORD: ICD-10-PCS | Mod: S$GLB,,, | Performed by: INTERNAL MEDICINE

## 2021-06-15 PROCEDURE — 1126F AMNT PAIN NOTED NONE PRSNT: CPT | Mod: S$GLB,,, | Performed by: INTERNAL MEDICINE

## 2021-06-15 PROCEDURE — 99499 UNLISTED E&M SERVICE: CPT | Mod: S$GLB,,, | Performed by: INTERNAL MEDICINE

## 2021-06-15 PROCEDURE — 99999 PR PBB SHADOW E&M-EST. PATIENT-LVL IV: CPT | Mod: PBBFAC,,, | Performed by: INTERNAL MEDICINE

## 2021-06-15 PROCEDURE — 99499 RISK ADDL DX/OHS AUDIT: ICD-10-PCS | Mod: S$GLB,,, | Performed by: INTERNAL MEDICINE

## 2021-06-15 PROCEDURE — 1157F ADVNC CARE PLAN IN RCRD: CPT | Mod: S$GLB,,, | Performed by: INTERNAL MEDICINE

## 2021-06-15 PROCEDURE — 99214 PR OFFICE/OUTPT VISIT, EST, LEVL IV, 30-39 MIN: ICD-10-PCS | Mod: S$GLB,,, | Performed by: INTERNAL MEDICINE

## 2021-06-15 PROCEDURE — 1159F MED LIST DOCD IN RCRD: CPT | Mod: S$GLB,,, | Performed by: INTERNAL MEDICINE

## 2021-06-15 PROCEDURE — 3288F FALL RISK ASSESSMENT DOCD: CPT | Mod: CPTII,S$GLB,, | Performed by: INTERNAL MEDICINE

## 2021-06-15 PROCEDURE — 1101F PR PT FALLS ASSESS DOC 0-1 FALLS W/OUT INJ PAST YR: ICD-10-PCS | Mod: CPTII,S$GLB,, | Performed by: INTERNAL MEDICINE

## 2021-06-15 PROCEDURE — 99214 OFFICE O/P EST MOD 30 MIN: CPT | Mod: S$GLB,,, | Performed by: INTERNAL MEDICINE

## 2021-06-15 PROCEDURE — 3288F PR FALLS RISK ASSESSMENT DOCUMENTED: ICD-10-PCS | Mod: CPTII,S$GLB,, | Performed by: INTERNAL MEDICINE

## 2021-06-15 RX ORDER — ISOSORBIDE MONONITRATE 60 MG/1
60 TABLET, EXTENDED RELEASE ORAL NIGHTLY
Qty: 90 TABLET | Refills: 4 | Status: SHIPPED | OUTPATIENT
Start: 2021-06-15 | End: 2022-03-24 | Stop reason: SDUPTHER

## 2021-06-15 RX ORDER — CLONIDINE HYDROCHLORIDE 0.1 MG/1
0.1 TABLET ORAL 3 TIMES DAILY PRN
Qty: 90 TABLET | Refills: 6 | Status: SHIPPED | OUTPATIENT
Start: 2021-06-15 | End: 2024-02-15 | Stop reason: SDUPTHER

## 2021-06-15 RX ORDER — ALBUTEROL SULFATE 90 UG/1
2 AEROSOL, METERED RESPIRATORY (INHALATION) EVERY 6 HOURS PRN
Qty: 18 G | Refills: 2 | Status: CANCELLED | OUTPATIENT
Start: 2021-06-15

## 2021-06-16 RX ORDER — ALBUTEROL SULFATE 90 UG/1
2 AEROSOL, METERED RESPIRATORY (INHALATION) EVERY 6 HOURS PRN
Qty: 18 G | Refills: 2 | Status: SHIPPED | OUTPATIENT
Start: 2021-06-16

## 2021-06-17 ENCOUNTER — HOSPITAL ENCOUNTER (OUTPATIENT)
Dept: CARDIOLOGY | Facility: HOSPITAL | Age: 86
Discharge: HOME | End: 2021-06-17
Attending: INTERNAL MEDICINE
Payer: MEDICARE

## 2021-06-17 VITALS — BODY MASS INDEX: 30.55 KG/M2 | HEIGHT: 62 IN | WEIGHT: 166 LBS

## 2021-06-17 DIAGNOSIS — I73.9 PERIPHERAL VASCULAR DISEASE: ICD-10-CM

## 2021-06-17 DIAGNOSIS — I25.10 CORONARY ARTERY DISEASE INVOLVING NATIVE CORONARY ARTERY OF NATIVE HEART WITHOUT ANGINA PECTORIS: Chronic | ICD-10-CM

## 2021-06-17 DIAGNOSIS — I65.21 STENOSIS OF RIGHT CAROTID ARTERY: Chronic | ICD-10-CM

## 2021-06-17 DIAGNOSIS — I35.0 NONRHEUMATIC AORTIC VALVE STENOSIS: Chronic | ICD-10-CM

## 2021-06-17 DIAGNOSIS — Z98.61 HISTORY OF PTCA: Chronic | ICD-10-CM

## 2021-06-17 LAB
ASCENDING AORTA: 3.27 CM
AV INDEX (PROSTH): 1
AV MEAN GRADIENT: 7 MMHG
AV PEAK GRADIENT: 13 MMHG
AV VALVE AREA: 3.99 CM2
AV VELOCITY RATIO: 0.73
BSA FOR ECHO PROCEDURE: 1.81 M2
CV ECHO LV RWT: 0.53 CM
DOP CALC AO PEAK VEL: 1.81 M/S
DOP CALC AO VTI: 30.78 CM
DOP CALC LVOT AREA: 4 CM2
DOP CALC LVOT DIAMETER: 2.26 CM
DOP CALC LVOT PEAK VEL: 1.32 M/S
DOP CALC LVOT STROKE VOLUME: 122.81 CM3
DOP CALCLVOT PEAK VEL VTI: 30.63 CM
E WAVE DECELERATION TIME: 186.61 MSEC
E/A RATIO: 0.6
E/E' RATIO: 14 M/S
ECHO LV POSTERIOR WALL: 1.15 CM (ref 0.6–1.1)
EJECTION FRACTION: 55 %
FRACTIONAL SHORTENING: 28 % (ref 28–44)
INTERVENTRICULAR SEPTUM: 1.25 CM (ref 0.6–1.1)
LA MAJOR: 4.53 CM
LA WIDTH: 3.96 CM
LEFT ATRIUM SIZE: 4.69 CM
LEFT INTERNAL DIMENSION IN SYSTOLE: 3.1 CM (ref 2.1–4)
LEFT VENTRICLE DIASTOLIC VOLUME INDEX: 46.98 ML/M2
LEFT VENTRICLE DIASTOLIC VOLUME: 83.16 ML
LEFT VENTRICLE MASS INDEX: 104 G/M2
LEFT VENTRICLE SYSTOLIC VOLUME INDEX: 21.4 ML/M2
LEFT VENTRICLE SYSTOLIC VOLUME: 37.9 ML
LEFT VENTRICULAR INTERNAL DIMENSION IN DIASTOLE: 4.3 CM (ref 3.5–6)
LEFT VENTRICULAR MASS: 184.68 G
LV LATERAL E/E' RATIO: 14 M/S
LV SEPTAL E/E' RATIO: 14 M/S
MV PEAK A VEL: 0.93 M/S
MV PEAK E VEL: 0.56 M/S
MV STENOSIS PRESSURE HALF TIME: 54.12 MS
MV VALVE AREA P 1/2 METHOD: 4.07 CM2
SINUS: 3.31 CM
STJ: 3.25 CM
TDI LATERAL: 0.04 M/S
TDI SEPTAL: 0.04 M/S
TDI: 0.04 M/S

## 2021-06-17 PROCEDURE — 93306 TTE W/DOPPLER COMPLETE: CPT | Mod: PO

## 2021-06-17 PROCEDURE — 93306 ECHO (CUPID ONLY): ICD-10-PCS | Mod: 26,,, | Performed by: INTERNAL MEDICINE

## 2021-06-17 PROCEDURE — 93306 TTE W/DOPPLER COMPLETE: CPT | Mod: 26,,, | Performed by: INTERNAL MEDICINE

## 2021-06-17 RX ORDER — CLOPIDOGREL BISULFATE 75 MG/1
75 TABLET ORAL DAILY
Qty: 90 TABLET | Refills: 4 | Status: CANCELLED | OUTPATIENT
Start: 2021-06-17

## 2021-06-18 RX ORDER — CLOPIDOGREL BISULFATE 75 MG/1
75 TABLET ORAL DAILY
Qty: 90 TABLET | Refills: 4 | Status: SHIPPED | OUTPATIENT
Start: 2021-06-18 | End: 2022-03-24 | Stop reason: SDUPTHER

## 2021-07-06 ENCOUNTER — HOSPITAL ENCOUNTER (OUTPATIENT)
Dept: CARDIOLOGY | Facility: HOSPITAL | Age: 86
Discharge: HOME | End: 2021-07-06
Attending: INTERNAL MEDICINE
Payer: MEDICARE

## 2021-07-06 DIAGNOSIS — Z95.5 STENTED CORONARY ARTERY: ICD-10-CM

## 2021-07-06 DIAGNOSIS — I73.9 PERIPHERAL VASCULAR DISEASE: ICD-10-CM

## 2021-07-06 DIAGNOSIS — I70.1 RAS (RENAL ARTERY STENOSIS): ICD-10-CM

## 2021-07-06 DIAGNOSIS — I10 ESSENTIAL HYPERTENSION: ICD-10-CM

## 2021-07-06 DIAGNOSIS — I35.0 NONRHEUMATIC AORTIC VALVE STENOSIS: ICD-10-CM

## 2021-07-06 DIAGNOSIS — I25.10 CORONARY ARTERY DISEASE INVOLVING NATIVE CORONARY ARTERY OF NATIVE HEART WITHOUT ANGINA PECTORIS: ICD-10-CM

## 2021-07-06 DIAGNOSIS — I65.21 STENOSIS OF RIGHT CAROTID ARTERY: ICD-10-CM

## 2021-07-06 DIAGNOSIS — E78.5 DYSLIPIDEMIA: ICD-10-CM

## 2021-07-06 PROCEDURE — 93975 VASCULAR STUDY: CPT | Mod: 26,,, | Performed by: INTERNAL MEDICINE

## 2021-07-06 PROCEDURE — 93975 CV US RENAL ARTERY STENOSIS HYPERTENSION COMPLETE (CUPID ONLY): ICD-10-PCS | Mod: 26,,, | Performed by: INTERNAL MEDICINE

## 2021-07-06 PROCEDURE — 93975 VASCULAR STUDY: CPT | Mod: PO

## 2021-07-07 ENCOUNTER — PES CALL (OUTPATIENT)
Dept: ADMINISTRATIVE | Facility: CLINIC | Age: 86
End: 2021-07-07

## 2021-07-08 LAB
ABDOMINAL AORTA PROX EDV: 16 CM/S
ABDOMINAL AORTA PROX PSV: 80 CM/S
LEFT RENAL DIST DIAS: 15 CM/S
LEFT RENAL DIST SYS: 124 CM/S
LEFT RENAL MID DIAS: 14 CM/S
LEFT RENAL MID SYS: 110 CM/S
LEFT RENAL ORIGIN DIAS: 11 CM/S
LEFT RENAL ORIGIN SYS: 59 CM/S
LEFT RENAL PROX DIAS: 20 CM/S
LEFT RENAL PROX RAR: 1.46
LEFT RENAL PROX SYS: 117 CM/S
LEFT RENAL ULTRASOUND ACCELERATION TIME MEASUREMENT 1: 62 MS
LEFT RENAL ULTRASOUND ACCELERATION TIME MEASUREMENT 2: 71 MS
LEFT RENAL ULTRASOUND ACCELERATION TIME MEASUREMENT 3: 79 MS
LEFT RENAL ULTRASOUND ACCELERATION TIME MEASUREMENT AVERAGE: 79 MS
LEFT RENAL ULTRASOUND KIDNEY SIZE MEASUREMENT 1: 10 CM
LEFT RENAL ULTRASOUND KIDNEY SIZE MEASUREMENT 2: 10.6 CM
LEFT RENAL ULTRASOUND KIDNEY SIZE MEASUREMENT 3: 10.5 CM
LEFT RENAL ULTRASOUND KIDNEY SIZE MEASUREMENT AVERAGE: 10.6 CM
LEFT RENAL ULTRASOUND RESISTIVE INDEX MEASUREMENT 1: 0.72
LEFT RENAL ULTRASOUND RESISTIVE INDEX MEASUREMENT 2: 0.75
LEFT RENAL ULTRASOUND RESISTIVE INDEX MEASUREMENT 3: 0.8
LEFT RENAL ULTRASOUND RESISTIVE INDEX MEASUREMENT AVERAGE: 0.8
OHS CV LEFT RENAL RAR: 1.55
OHS CV RIGHT RENAL RAR: 2.08
OHS CV US LEFT RENAL HIGHEST EDV: 20
OHS CV US LEFT RENAL HIGHEST PSV: 124
OHS CV US RIGHT RENAL HIGHEST EDV: 21
OHS CV US RIGHT RENAL HIGHEST PSV: 166
PROX AORTIC AP: 1.7 CM
PROX AORTIC TRANS: 2.2 CM
RIGHT RENAL DIST DIAS: 12 CM/S
RIGHT RENAL DIST SYS: 78 CM/S
RIGHT RENAL MID DIAS: 20 CM/S
RIGHT RENAL MID SYS: 166 CM/S
RIGHT RENAL ORIGIN DIAS: 18 CM/S
RIGHT RENAL ORIGIN SYS: 87 CM/S
RIGHT RENAL PROX DIAS: 21 CM/S
RIGHT RENAL PROX RAR: 1.56
RIGHT RENAL PROX SYS: 125 CM/S
RIGHT RENAL ULTRASOUND ACCELERATION TIME MEASUREMENT 1: 57 MS
RIGHT RENAL ULTRASOUND ACCELERATION TIME MEASUREMENT 2: 51 MS
RIGHT RENAL ULTRASOUND ACCELERATION TIME MEASUREMENT 3: 65 MS
RIGHT RENAL ULTRASOUND ACCELERATION TIME MEASUREMENT AVERAGE: 65 MS
RIGHT RENAL ULTRASOUND KIDNEY SIZE MEASUREMENT 1: 9.5 CM
RIGHT RENAL ULTRASOUND KIDNEY SIZE MEASUREMENT 2: 9.7 CM
RIGHT RENAL ULTRASOUND KIDNEY SIZE MEASUREMENT 3: 9.5 CM
RIGHT RENAL ULTRASOUND KIDNEY SIZE MEASUREMENT AVERAGE: 9.7 CM
RIGHT RENAL ULTRASOUND RESISTIVE INDEX MEASUREMENT 1: 0.8
RIGHT RENAL ULTRASOUND RESISTIVE INDEX MEASUREMENT 2: 0.75
RIGHT RENAL ULTRASOUND RESISTIVE INDEX MEASUREMENT 3: 0.71
RIGHT RENAL ULTRASOUND RESISTIVE INDEX MEASUREMENT AVERAGE: 0.8

## 2021-07-14 DIAGNOSIS — J42 CHRONIC BRONCHITIS, UNSPECIFIED CHRONIC BRONCHITIS TYPE: ICD-10-CM

## 2021-07-15 DIAGNOSIS — N18.30 CHRONIC KIDNEY DISEASE, STAGE III (MODERATE): ICD-10-CM

## 2021-07-15 DIAGNOSIS — Z98.61 HISTORY OF PTCA: Chronic | ICD-10-CM

## 2021-07-15 DIAGNOSIS — E78.5 DYSLIPIDEMIA: Chronic | ICD-10-CM

## 2021-07-15 DIAGNOSIS — I35.0 NONRHEUMATIC AORTIC VALVE STENOSIS: Chronic | ICD-10-CM

## 2021-07-15 DIAGNOSIS — I65.21 STENOSIS OF RIGHT CAROTID ARTERY: Chronic | ICD-10-CM

## 2021-07-15 DIAGNOSIS — I25.10 CORONARY ARTERY DISEASE INVOLVING NATIVE CORONARY ARTERY OF NATIVE HEART WITHOUT ANGINA PECTORIS: Chronic | ICD-10-CM

## 2021-07-15 DIAGNOSIS — I73.9 PERIPHERAL VASCULAR DISEASE: ICD-10-CM

## 2021-07-15 RX ORDER — FUROSEMIDE 20 MG/1
20 TABLET ORAL DAILY PRN
Qty: 90 TABLET | Refills: 4 | Status: SHIPPED | OUTPATIENT
Start: 2021-07-15 | End: 2021-08-23 | Stop reason: SDUPTHER

## 2021-07-15 RX ORDER — POTASSIUM CHLORIDE 20 MEQ/1
20 TABLET, EXTENDED RELEASE ORAL DAILY PRN
Qty: 90 TABLET | Refills: 4 | Status: SHIPPED | OUTPATIENT
Start: 2021-07-15 | End: 2022-03-24 | Stop reason: SDUPTHER

## 2021-07-15 RX ORDER — TIOTROPIUM BROMIDE 18 UG/1
1 CAPSULE ORAL; RESPIRATORY (INHALATION) DAILY
Qty: 90 CAPSULE | Refills: 3 | Status: SHIPPED | OUTPATIENT
Start: 2021-07-15 | End: 2022-07-30 | Stop reason: SDUPTHER

## 2021-08-23 ENCOUNTER — OFFICE VISIT (OUTPATIENT)
Dept: FAMILY MEDICINE | Facility: CLINIC | Age: 86
End: 2021-08-23
Payer: MEDICARE

## 2021-08-23 VITALS
HEIGHT: 62 IN | DIASTOLIC BLOOD PRESSURE: 74 MMHG | WEIGHT: 166.88 LBS | HEART RATE: 70 BPM | OXYGEN SATURATION: 96 % | BODY MASS INDEX: 30.71 KG/M2 | SYSTOLIC BLOOD PRESSURE: 136 MMHG

## 2021-08-23 DIAGNOSIS — J44.9 CHRONIC OBSTRUCTIVE PULMONARY DISEASE, UNSPECIFIED COPD TYPE: ICD-10-CM

## 2021-08-23 DIAGNOSIS — L03.116 BILATERAL LOWER LEG CELLULITIS: ICD-10-CM

## 2021-08-23 DIAGNOSIS — I10 ESSENTIAL HYPERTENSION: Primary | ICD-10-CM

## 2021-08-23 DIAGNOSIS — H35.3222 EXUDATIVE AGE-RELATED MACULAR DEGENERATION OF LEFT EYE WITH INACTIVE CHOROIDAL NEOVASCULARIZATION: ICD-10-CM

## 2021-08-23 DIAGNOSIS — L03.115 BILATERAL LOWER LEG CELLULITIS: ICD-10-CM

## 2021-08-23 DIAGNOSIS — I35.0 NONRHEUMATIC AORTIC VALVE STENOSIS: Chronic | ICD-10-CM

## 2021-08-23 DIAGNOSIS — H90.0 CONDUCTIVE HEARING LOSS, BILATERAL: ICD-10-CM

## 2021-08-23 PROCEDURE — 99214 PR OFFICE/OUTPT VISIT, EST, LEVL IV, 30-39 MIN: ICD-10-PCS | Mod: S$GLB,,, | Performed by: INTERNAL MEDICINE

## 2021-08-23 PROCEDURE — 1126F AMNT PAIN NOTED NONE PRSNT: CPT | Mod: CPTII,S$GLB,, | Performed by: INTERNAL MEDICINE

## 2021-08-23 PROCEDURE — 1157F ADVNC CARE PLAN IN RCRD: CPT | Mod: CPTII,S$GLB,, | Performed by: INTERNAL MEDICINE

## 2021-08-23 PROCEDURE — 99999 PR PBB SHADOW E&M-EST. PATIENT-LVL III: ICD-10-PCS | Mod: PBBFAC,,, | Performed by: INTERNAL MEDICINE

## 2021-08-23 PROCEDURE — 1126F PR PAIN SEVERITY QUANTIFIED, NO PAIN PRESENT: ICD-10-PCS | Mod: CPTII,S$GLB,, | Performed by: INTERNAL MEDICINE

## 2021-08-23 PROCEDURE — 99999 PR PBB SHADOW E&M-EST. PATIENT-LVL III: CPT | Mod: PBBFAC,,, | Performed by: INTERNAL MEDICINE

## 2021-08-23 PROCEDURE — 99499 RISK ADDL DX/OHS AUDIT: ICD-10-PCS | Mod: HCNC,S$GLB,, | Performed by: INTERNAL MEDICINE

## 2021-08-23 PROCEDURE — 1160F PR REVIEW ALL MEDS BY PRESCRIBER/CLIN PHARMACIST DOCUMENTED: ICD-10-PCS | Mod: CPTII,S$GLB,, | Performed by: INTERNAL MEDICINE

## 2021-08-23 PROCEDURE — 99499 UNLISTED E&M SERVICE: CPT | Mod: HCNC,S$GLB,, | Performed by: INTERNAL MEDICINE

## 2021-08-23 PROCEDURE — 1159F MED LIST DOCD IN RCRD: CPT | Mod: CPTII,S$GLB,, | Performed by: INTERNAL MEDICINE

## 2021-08-23 PROCEDURE — 1160F RVW MEDS BY RX/DR IN RCRD: CPT | Mod: CPTII,S$GLB,, | Performed by: INTERNAL MEDICINE

## 2021-08-23 PROCEDURE — 99214 OFFICE O/P EST MOD 30 MIN: CPT | Mod: S$GLB,,, | Performed by: INTERNAL MEDICINE

## 2021-08-23 PROCEDURE — 1157F PR ADVANCE CARE PLAN OR EQUIV PRESENT IN MEDICAL RECORD: ICD-10-PCS | Mod: CPTII,S$GLB,, | Performed by: INTERNAL MEDICINE

## 2021-08-23 PROCEDURE — 1159F PR MEDICATION LIST DOCUMENTED IN MEDICAL RECORD: ICD-10-PCS | Mod: CPTII,S$GLB,, | Performed by: INTERNAL MEDICINE

## 2021-08-23 RX ORDER — FUROSEMIDE 20 MG/1
40 TABLET ORAL DAILY PRN
Qty: 90 TABLET | Refills: 4 | Status: SHIPPED | OUTPATIENT
Start: 2021-08-23 | End: 2022-03-05 | Stop reason: SDUPTHER

## 2021-08-23 RX ORDER — VIT A/VIT C/VIT E/ZINC/COPPER 4296-226
CAPSULE ORAL
COMMUNITY

## 2021-08-23 RX ORDER — MUPIROCIN 20 MG/G
OINTMENT TOPICAL 3 TIMES DAILY
Qty: 30 G | Refills: 1 | Status: SHIPPED | OUTPATIENT
Start: 2021-08-23 | End: 2022-11-28

## 2021-08-26 ENCOUNTER — TELEPHONE (OUTPATIENT)
Dept: FAMILY MEDICINE | Facility: CLINIC | Age: 86
End: 2021-08-26

## 2021-08-26 DIAGNOSIS — L03.115 BILATERAL LOWER LEG CELLULITIS: Primary | ICD-10-CM

## 2021-08-26 DIAGNOSIS — L03.116 BILATERAL LOWER LEG CELLULITIS: Primary | ICD-10-CM

## 2021-08-26 RX ORDER — CEPHALEXIN 500 MG/1
500 CAPSULE ORAL EVERY 8 HOURS
Qty: 21 CAPSULE | Refills: 0 | Status: SHIPPED | OUTPATIENT
Start: 2021-08-26 | End: 2021-09-02

## 2021-08-27 ENCOUNTER — TELEPHONE (OUTPATIENT)
Dept: HEMATOLOGY/ONCOLOGY | Facility: CLINIC | Age: 86
End: 2021-08-27

## 2021-08-27 ENCOUNTER — TELEPHONE (OUTPATIENT)
Dept: INFUSION THERAPY | Facility: HOSPITAL | Age: 86
End: 2021-08-27

## 2021-08-29 DIAGNOSIS — I25.10 CORONARY ARTERY DISEASE INVOLVING NATIVE CORONARY ARTERY OF NATIVE HEART WITHOUT ANGINA PECTORIS: Chronic | ICD-10-CM

## 2021-08-29 DIAGNOSIS — I73.9 PERIPHERAL VASCULAR DISEASE: ICD-10-CM

## 2021-08-29 DIAGNOSIS — N18.30 CHRONIC KIDNEY DISEASE, STAGE III (MODERATE): ICD-10-CM

## 2021-08-29 DIAGNOSIS — E78.5 DYSLIPIDEMIA: Chronic | ICD-10-CM

## 2021-08-29 DIAGNOSIS — I35.0 NONRHEUMATIC AORTIC VALVE STENOSIS: Chronic | ICD-10-CM

## 2021-08-29 DIAGNOSIS — I65.21 STENOSIS OF RIGHT CAROTID ARTERY: Chronic | ICD-10-CM

## 2021-08-29 DIAGNOSIS — Z98.61 HISTORY OF PTCA: Chronic | ICD-10-CM

## 2021-09-01 RX ORDER — ATORVASTATIN CALCIUM 20 MG/1
20 TABLET, FILM COATED ORAL DAILY
Qty: 90 TABLET | Refills: 4 | Status: SHIPPED | OUTPATIENT
Start: 2021-09-01 | End: 2022-03-24 | Stop reason: SDUPTHER

## 2021-09-08 ENCOUNTER — TELEPHONE (OUTPATIENT)
Dept: HEMATOLOGY/ONCOLOGY | Facility: CLINIC | Age: 86
End: 2021-09-08

## 2021-09-10 ENCOUNTER — TELEPHONE (OUTPATIENT)
Dept: HEMATOLOGY/ONCOLOGY | Facility: CLINIC | Age: 86
End: 2021-09-10

## 2021-09-16 ENCOUNTER — OFFICE VISIT (OUTPATIENT)
Dept: OPHTHALMOLOGY | Facility: CLINIC | Age: 86
End: 2021-09-16
Payer: MEDICARE

## 2021-09-16 DIAGNOSIS — H35.3222 EXUDATIVE AGE-RELATED MACULAR DEGENERATION OF LEFT EYE WITH INACTIVE CHOROIDAL NEOVASCULARIZATION: ICD-10-CM

## 2021-09-16 DIAGNOSIS — H35.3211 EXUDATIVE AGE-RELATED MACULAR DEGENERATION OF RIGHT EYE WITH ACTIVE CHOROIDAL NEOVASCULARIZATION: Primary | ICD-10-CM

## 2021-09-16 PROCEDURE — 99999 PR PBB SHADOW E&M-EST. PATIENT-LVL III: ICD-10-PCS | Mod: PBBFAC,,, | Performed by: OPHTHALMOLOGY

## 2021-09-16 PROCEDURE — 99999 PR PBB SHADOW E&M-EST. PATIENT-LVL III: CPT | Mod: PBBFAC,,, | Performed by: OPHTHALMOLOGY

## 2021-09-16 RX ADMIN — Medication 1.25 MG: at 09:09

## 2021-09-17 ENCOUNTER — TELEPHONE (OUTPATIENT)
Dept: OPHTHALMOLOGY | Facility: CLINIC | Age: 86
End: 2021-09-17

## 2021-09-17 DIAGNOSIS — H35.3211 EXUDATIVE AGE-RELATED MACULAR DEGENERATION OF RIGHT EYE WITH ACTIVE CHOROIDAL NEOVASCULARIZATION: Primary | ICD-10-CM

## 2021-09-20 ENCOUNTER — PATIENT MESSAGE (OUTPATIENT)
Dept: CARDIOLOGY | Facility: CLINIC | Age: 86
End: 2021-09-20

## 2021-09-20 ENCOUNTER — LAB VISIT (OUTPATIENT)
Dept: FAMILY MEDICINE | Facility: CLINIC | Age: 86
End: 2021-09-20
Payer: MEDICARE

## 2021-09-20 DIAGNOSIS — H35.3211 EXUDATIVE AGE-RELATED MACULAR DEGENERATION OF RIGHT EYE WITH ACTIVE CHOROIDAL NEOVASCULARIZATION: ICD-10-CM

## 2021-09-20 PROCEDURE — U0005 INFEC AGEN DETEC AMPLI PROBE: HCPCS | Performed by: OPHTHALMOLOGY

## 2021-09-20 PROCEDURE — U0003 INFECTIOUS AGENT DETECTION BY NUCLEIC ACID (DNA OR RNA); SEVERE ACUTE RESPIRATORY SYNDROME CORONAVIRUS 2 (SARS-COV-2) (CORONAVIRUS DISEASE [COVID-19]), AMPLIFIED PROBE TECHNIQUE, MAKING USE OF HIGH THROUGHPUT TECHNOLOGIES AS DESCRIBED BY CMS-2020-01-R: HCPCS | Mod: HCNC | Performed by: OPHTHALMOLOGY

## 2021-09-21 ENCOUNTER — TELEPHONE (OUTPATIENT)
Dept: OPHTHALMOLOGY | Facility: CLINIC | Age: 86
End: 2021-09-21

## 2021-09-21 LAB
SARS-COV-2 RNA RESP QL NAA+PROBE: NOT DETECTED
SARS-COV-2- CYCLE NUMBER: NORMAL

## 2021-09-22 ENCOUNTER — HOSPITAL ENCOUNTER (OUTPATIENT)
Facility: HOSPITAL | Age: 86
Discharge: HOME | End: 2021-09-22
Attending: OPHTHALMOLOGY | Admitting: OPHTHALMOLOGY
Payer: MEDICARE

## 2021-09-22 ENCOUNTER — ANESTHESIA EVENT (OUTPATIENT)
Dept: SURGERY | Facility: HOSPITAL | Age: 86
End: 2021-09-22
Payer: MEDICARE

## 2021-09-22 ENCOUNTER — ANESTHESIA (OUTPATIENT)
Dept: SURGERY | Facility: HOSPITAL | Age: 86
End: 2021-09-22
Payer: MEDICARE

## 2021-09-22 VITALS
OXYGEN SATURATION: 95 % | RESPIRATION RATE: 16 BRPM | DIASTOLIC BLOOD PRESSURE: 78 MMHG | SYSTOLIC BLOOD PRESSURE: 178 MMHG | HEART RATE: 63 BPM | TEMPERATURE: 98 F | HEIGHT: 62 IN | BODY MASS INDEX: 30.71 KG/M2 | WEIGHT: 166.88 LBS

## 2021-09-22 DIAGNOSIS — H35.61: Primary | ICD-10-CM

## 2021-09-22 PROCEDURE — 63600175 PHARM REV CODE 636 W HCPCS: Mod: JG,HCNC | Performed by: STUDENT IN AN ORGANIZED HEALTH CARE EDUCATION/TRAINING PROGRAM

## 2021-09-22 PROCEDURE — 67036 REMOVAL OF INNER EYE FLUID: CPT | Mod: HCNC,RT,, | Performed by: OPHTHALMOLOGY

## 2021-09-22 PROCEDURE — 27201423 OPTIME MED/SURG SUP & DEVICES STERILE SUPPLY: Mod: HCNC | Performed by: OPHTHALMOLOGY

## 2021-09-22 PROCEDURE — 71000044 HC DOSC ROUTINE RECOVERY FIRST HOUR: Mod: HCNC | Performed by: OPHTHALMOLOGY

## 2021-09-22 PROCEDURE — 36000707: Mod: HCNC | Performed by: OPHTHALMOLOGY

## 2021-09-22 PROCEDURE — 63600175 PHARM REV CODE 636 W HCPCS: Mod: HCNC | Performed by: NURSE ANESTHETIST, CERTIFIED REGISTERED

## 2021-09-22 PROCEDURE — 71000015 HC POSTOP RECOV 1ST HR: Mod: HCNC | Performed by: OPHTHALMOLOGY

## 2021-09-22 PROCEDURE — 25000003 PHARM REV CODE 250: Mod: HCNC | Performed by: STUDENT IN AN ORGANIZED HEALTH CARE EDUCATION/TRAINING PROGRAM

## 2021-09-22 PROCEDURE — S0020 INJECTION, BUPIVICAINE HYDRO: HCPCS | Mod: HCNC | Performed by: OPHTHALMOLOGY

## 2021-09-22 PROCEDURE — 37000009 HC ANESTHESIA EA ADD 15 MINS: Mod: HCNC | Performed by: OPHTHALMOLOGY

## 2021-09-22 PROCEDURE — 63600175 PHARM REV CODE 636 W HCPCS: Mod: HCNC | Performed by: OPHTHALMOLOGY

## 2021-09-22 PROCEDURE — 36000706: Mod: HCNC | Performed by: OPHTHALMOLOGY

## 2021-09-22 PROCEDURE — 27200651 HC AIRWAY, LMA: Mod: HCNC | Performed by: ANESTHESIOLOGY

## 2021-09-22 PROCEDURE — 37000008 HC ANESTHESIA 1ST 15 MINUTES: Mod: HCNC | Performed by: OPHTHALMOLOGY

## 2021-09-22 PROCEDURE — 67036 PR VITRECTOMY,MECHANICAL: ICD-10-PCS | Mod: HCNC,RT,, | Performed by: OPHTHALMOLOGY

## 2021-09-22 PROCEDURE — 25000003 PHARM REV CODE 250: Mod: HCNC | Performed by: OPHTHALMOLOGY

## 2021-09-22 PROCEDURE — 25000003 PHARM REV CODE 250: Mod: HCNC | Performed by: NURSE ANESTHETIST, CERTIFIED REGISTERED

## 2021-09-22 PROCEDURE — C1784 OCULAR DEV, INTRAOP, DET RET: HCPCS | Mod: HCNC | Performed by: OPHTHALMOLOGY

## 2021-09-22 RX ORDER — PHENYLEPHRINE HYDROCHLORIDE 25 MG/ML
1 SOLUTION/ DROPS OPHTHALMIC
Status: DISCONTINUED | OUTPATIENT
Start: 2021-09-22 | End: 2021-09-22 | Stop reason: HOSPADM

## 2021-09-22 RX ORDER — ONDANSETRON 4 MG/1
4 TABLET, FILM COATED ORAL EVERY 8 HOURS PRN
Qty: 12 TABLET | Refills: 0 | Status: SHIPPED | OUTPATIENT
Start: 2021-09-22

## 2021-09-22 RX ORDER — LIDOCAINE HCL/PF 100 MG/5ML
SYRINGE (ML) INTRAVENOUS
Status: DISCONTINUED | OUTPATIENT
Start: 2021-09-22 | End: 2021-09-22

## 2021-09-22 RX ORDER — BUPIVACAINE HYDROCHLORIDE 7.5 MG/ML
INJECTION, SOLUTION EPIDURAL; RETROBULBAR
Status: DISCONTINUED
Start: 2021-09-22 | End: 2021-09-22 | Stop reason: HOSPADM

## 2021-09-22 RX ORDER — ATROPINE SULFATE 10 MG/ML
1 SOLUTION/ DROPS OPHTHALMIC
Status: DISCONTINUED | OUTPATIENT
Start: 2021-09-22 | End: 2021-09-22 | Stop reason: HOSPADM

## 2021-09-22 RX ORDER — PROPOFOL 10 MG/ML
VIAL (ML) INTRAVENOUS
Status: DISCONTINUED | OUTPATIENT
Start: 2021-09-22 | End: 2021-09-22

## 2021-09-22 RX ORDER — NEOMYCIN SULFATE, POLYMYXIN B SULFATE, AND DEXAMETHASONE 3.5; 10000; 1 MG/G; [USP'U]/G; MG/G
OINTMENT OPHTHALMIC
Status: DISCONTINUED
Start: 2021-09-22 | End: 2021-09-22 | Stop reason: HOSPADM

## 2021-09-22 RX ORDER — HYDROCODONE BITARTRATE AND ACETAMINOPHEN 5; 325 MG/1; MG/1
1 TABLET ORAL EVERY 4 HOURS PRN
Status: CANCELLED | OUTPATIENT
Start: 2021-09-22

## 2021-09-22 RX ORDER — EPINEPHRINE 1 MG/ML
INJECTION, SOLUTION INTRACARDIAC; INTRAMUSCULAR; INTRAVENOUS; SUBCUTANEOUS
Status: DISCONTINUED | OUTPATIENT
Start: 2021-09-22 | End: 2021-09-22 | Stop reason: HOSPADM

## 2021-09-22 RX ORDER — FENTANYL CITRATE 50 UG/ML
INJECTION, SOLUTION INTRAMUSCULAR; INTRAVENOUS
Status: DISCONTINUED | OUTPATIENT
Start: 2021-09-22 | End: 2021-09-22

## 2021-09-22 RX ORDER — CYCLOPENTOLATE HYDROCHLORIDE 10 MG/ML
1 SOLUTION/ DROPS OPHTHALMIC
Status: DISCONTINUED | OUTPATIENT
Start: 2021-09-22 | End: 2021-09-22 | Stop reason: HOSPADM

## 2021-09-22 RX ORDER — DEXAMETHASONE SODIUM PHOSPHATE 4 MG/ML
INJECTION, SOLUTION INTRA-ARTICULAR; INTRALESIONAL; INTRAMUSCULAR; INTRAVENOUS; SOFT TISSUE
Status: DISCONTINUED | OUTPATIENT
Start: 2021-09-22 | End: 2021-09-22 | Stop reason: HOSPADM

## 2021-09-22 RX ORDER — OXYCODONE AND ACETAMINOPHEN 5; 325 MG/1; MG/1
1 TABLET ORAL EVERY 6 HOURS PRN
Qty: 12 TABLET | Refills: 0 | Status: SHIPPED | OUTPATIENT
Start: 2021-09-22 | End: 2022-03-24

## 2021-09-22 RX ORDER — VANCOMYCIN HYDROCHLORIDE 500 MG/10ML
INJECTION, POWDER, LYOPHILIZED, FOR SOLUTION INTRAVENOUS
Status: DISCONTINUED | OUTPATIENT
Start: 2021-09-22 | End: 2021-09-22 | Stop reason: HOSPADM

## 2021-09-22 RX ORDER — MOXIFLOXACIN 5 MG/ML
1 SOLUTION/ DROPS OPHTHALMIC
Status: DISCONTINUED | OUTPATIENT
Start: 2021-09-22 | End: 2021-09-22 | Stop reason: HOSPADM

## 2021-09-22 RX ORDER — SODIUM CHLORIDE 0.9 % (FLUSH) 0.9 %
3 SYRINGE (ML) INJECTION
Status: DISCONTINUED | OUTPATIENT
Start: 2021-09-22 | End: 2021-09-22 | Stop reason: HOSPADM

## 2021-09-22 RX ORDER — NEOMYCIN SULFATE, POLYMYXIN B SULFATE, AND DEXAMETHASONE 3.5; 10000; 1 MG/G; [USP'U]/G; MG/G
OINTMENT OPHTHALMIC
Status: DISCONTINUED | OUTPATIENT
Start: 2021-09-22 | End: 2021-09-22 | Stop reason: HOSPADM

## 2021-09-22 RX ORDER — HYDROMORPHONE HYDROCHLORIDE 1 MG/ML
0.2 INJECTION, SOLUTION INTRAMUSCULAR; INTRAVENOUS; SUBCUTANEOUS EVERY 5 MIN PRN
Status: DISCONTINUED | OUTPATIENT
Start: 2021-09-22 | End: 2021-09-22 | Stop reason: HOSPADM

## 2021-09-22 RX ORDER — SODIUM CHLORIDE 0.9 % (FLUSH) 0.9 %
10 SYRINGE (ML) INJECTION
Status: DISCONTINUED | OUTPATIENT
Start: 2021-09-22 | End: 2021-09-22 | Stop reason: HOSPADM

## 2021-09-22 RX ORDER — DEXAMETHASONE SODIUM PHOSPHATE 4 MG/ML
INJECTION, SOLUTION INTRA-ARTICULAR; INTRALESIONAL; INTRAMUSCULAR; INTRAVENOUS; SOFT TISSUE
Status: COMPLETED
Start: 2021-09-22 | End: 2021-09-22

## 2021-09-22 RX ORDER — ACETAMINOPHEN 325 MG/1
650 TABLET ORAL EVERY 4 HOURS PRN
Status: CANCELLED | OUTPATIENT
Start: 2021-09-22

## 2021-09-22 RX ORDER — TROPICAMIDE 10 MG/ML
1 SOLUTION/ DROPS OPHTHALMIC
Status: DISCONTINUED | OUTPATIENT
Start: 2021-09-22 | End: 2021-09-22 | Stop reason: HOSPADM

## 2021-09-22 RX ORDER — TETRACAINE HYDROCHLORIDE 5 MG/ML
1 SOLUTION OPHTHALMIC
Status: DISCONTINUED | OUTPATIENT
Start: 2021-09-22 | End: 2021-09-22 | Stop reason: HOSPADM

## 2021-09-22 RX ORDER — ONDANSETRON HYDROCHLORIDE 2 MG/ML
INJECTION, SOLUTION INTRAMUSCULAR; INTRAVENOUS
Status: DISCONTINUED | OUTPATIENT
Start: 2021-09-22 | End: 2021-09-22

## 2021-09-22 RX ORDER — LIDOCAINE HYDROCHLORIDE 20 MG/ML
INJECTION, SOLUTION EPIDURAL; INFILTRATION; INTRACAUDAL; PERINEURAL
Status: DISCONTINUED | OUTPATIENT
Start: 2021-09-22 | End: 2021-09-22 | Stop reason: HOSPADM

## 2021-09-22 RX ORDER — VANCOMYCIN HYDROCHLORIDE 500 MG/10ML
INJECTION, POWDER, LYOPHILIZED, FOR SOLUTION INTRAVENOUS
Status: DISCONTINUED
Start: 2021-09-22 | End: 2021-09-22 | Stop reason: HOSPADM

## 2021-09-22 RX ORDER — PREDNISOLONE ACETATE 10 MG/ML
1 SUSPENSION/ DROPS OPHTHALMIC
Status: DISCONTINUED | OUTPATIENT
Start: 2021-09-22 | End: 2021-09-22 | Stop reason: HOSPADM

## 2021-09-22 RX ORDER — EPINEPHRINE 1 MG/ML
INJECTION, SOLUTION INTRACARDIAC; INTRAMUSCULAR; INTRAVENOUS; SUBCUTANEOUS
Status: DISCONTINUED
Start: 2021-09-22 | End: 2021-09-22 | Stop reason: HOSPADM

## 2021-09-22 RX ORDER — BUPIVACAINE HYDROCHLORIDE 7.5 MG/ML
INJECTION, SOLUTION EPIDURAL; RETROBULBAR
Status: DISCONTINUED | OUTPATIENT
Start: 2021-09-22 | End: 2021-09-22 | Stop reason: HOSPADM

## 2021-09-22 RX ADMIN — MOXIFLOXACIN 1 DROP: 5 SOLUTION/ DROPS OPHTHALMIC at 10:09

## 2021-09-22 RX ADMIN — ONDANSETRON 4 MG: 2 INJECTION, SOLUTION INTRAMUSCULAR; INTRAVENOUS at 12:09

## 2021-09-22 RX ADMIN — CYCLOPENTOLATE HYDROCHLORIDE 1 DROP: 10 SOLUTION OPHTHALMIC at 10:09

## 2021-09-22 RX ADMIN — TETRACAINE HYDROCHLORIDE 1 DROP: 5 SOLUTION OPHTHALMIC at 10:09

## 2021-09-22 RX ADMIN — PREDNISOLONE ACETATE 1 DROP: 10 SUSPENSION OPHTHALMIC at 10:09

## 2021-09-22 RX ADMIN — TROPICAMIDE 1 DROP: 10 SOLUTION/ DROPS OPHTHALMIC at 10:09

## 2021-09-22 RX ADMIN — PHENYLEPHRINE HYDROCHLORIDE 1 DROP: 25 SOLUTION/ DROPS OPHTHALMIC at 10:09

## 2021-09-22 RX ADMIN — PROPOFOL 120 MG: 10 INJECTION, EMULSION INTRAVENOUS at 11:09

## 2021-09-22 RX ADMIN — FENTANYL CITRATE 50 MCG: 50 INJECTION, SOLUTION INTRAMUSCULAR; INTRAVENOUS at 11:09

## 2021-09-22 RX ADMIN — LIDOCAINE HYDROCHLORIDE 50 MG: 20 INJECTION, SOLUTION INTRAVENOUS at 11:09

## 2021-09-22 RX ADMIN — ATROPINE SULFATE 1 DROP: 10 SOLUTION OPHTHALMIC at 10:09

## 2021-09-22 RX ADMIN — Medication 4 MG: at 12:09

## 2021-09-22 RX ADMIN — SODIUM CHLORIDE: 9 INJECTION, SOLUTION INTRAVENOUS at 11:09

## 2021-09-22 RX ADMIN — BEVACIZUMAB 1.25 MG: 100 INJECTION, SOLUTION INTRAVENOUS at 12:09

## 2021-09-23 ENCOUNTER — OFFICE VISIT (OUTPATIENT)
Dept: OPHTHALMOLOGY | Facility: CLINIC | Age: 86
End: 2021-09-23
Payer: MEDICARE

## 2021-09-23 DIAGNOSIS — Z98.890 POST-OPERATIVE STATE: Primary | ICD-10-CM

## 2021-09-23 PROCEDURE — 99999 PR PBB SHADOW E&M-EST. PATIENT-LVL III: ICD-10-PCS | Mod: PBBFAC,HCNC,, | Performed by: STUDENT IN AN ORGANIZED HEALTH CARE EDUCATION/TRAINING PROGRAM

## 2021-09-23 PROCEDURE — 1101F PT FALLS ASSESS-DOCD LE1/YR: CPT | Mod: HCNC,CPTII,S$GLB, | Performed by: STUDENT IN AN ORGANIZED HEALTH CARE EDUCATION/TRAINING PROGRAM

## 2021-09-23 PROCEDURE — 1157F PR ADVANCE CARE PLAN OR EQUIV PRESENT IN MEDICAL RECORD: ICD-10-PCS | Mod: HCNC,CPTII,S$GLB, | Performed by: STUDENT IN AN ORGANIZED HEALTH CARE EDUCATION/TRAINING PROGRAM

## 2021-09-23 PROCEDURE — 1159F MED LIST DOCD IN RCRD: CPT | Mod: HCNC,CPTII,S$GLB, | Performed by: STUDENT IN AN ORGANIZED HEALTH CARE EDUCATION/TRAINING PROGRAM

## 2021-09-23 PROCEDURE — 1157F ADVNC CARE PLAN IN RCRD: CPT | Mod: HCNC,CPTII,S$GLB, | Performed by: STUDENT IN AN ORGANIZED HEALTH CARE EDUCATION/TRAINING PROGRAM

## 2021-09-23 PROCEDURE — 1126F AMNT PAIN NOTED NONE PRSNT: CPT | Mod: HCNC,CPTII,S$GLB, | Performed by: STUDENT IN AN ORGANIZED HEALTH CARE EDUCATION/TRAINING PROGRAM

## 2021-09-23 PROCEDURE — 1126F PR PAIN SEVERITY QUANTIFIED, NO PAIN PRESENT: ICD-10-PCS | Mod: HCNC,CPTII,S$GLB, | Performed by: STUDENT IN AN ORGANIZED HEALTH CARE EDUCATION/TRAINING PROGRAM

## 2021-09-23 PROCEDURE — 1101F PR PT FALLS ASSESS DOC 0-1 FALLS W/OUT INJ PAST YR: ICD-10-PCS | Mod: HCNC,CPTII,S$GLB, | Performed by: STUDENT IN AN ORGANIZED HEALTH CARE EDUCATION/TRAINING PROGRAM

## 2021-09-23 PROCEDURE — 99024 POSTOP FOLLOW-UP VISIT: CPT | Mod: HCNC,S$GLB,, | Performed by: STUDENT IN AN ORGANIZED HEALTH CARE EDUCATION/TRAINING PROGRAM

## 2021-09-23 PROCEDURE — 99999 PR PBB SHADOW E&M-EST. PATIENT-LVL III: CPT | Mod: PBBFAC,HCNC,, | Performed by: STUDENT IN AN ORGANIZED HEALTH CARE EDUCATION/TRAINING PROGRAM

## 2021-09-23 PROCEDURE — 99024 PR POST-OP FOLLOW-UP VISIT: ICD-10-PCS | Mod: HCNC,S$GLB,, | Performed by: STUDENT IN AN ORGANIZED HEALTH CARE EDUCATION/TRAINING PROGRAM

## 2021-09-23 PROCEDURE — 1159F PR MEDICATION LIST DOCUMENTED IN MEDICAL RECORD: ICD-10-PCS | Mod: HCNC,CPTII,S$GLB, | Performed by: STUDENT IN AN ORGANIZED HEALTH CARE EDUCATION/TRAINING PROGRAM

## 2021-09-23 PROCEDURE — 3288F PR FALLS RISK ASSESSMENT DOCUMENTED: ICD-10-PCS | Mod: HCNC,CPTII,S$GLB, | Performed by: STUDENT IN AN ORGANIZED HEALTH CARE EDUCATION/TRAINING PROGRAM

## 2021-09-23 PROCEDURE — 3288F FALL RISK ASSESSMENT DOCD: CPT | Mod: HCNC,CPTII,S$GLB, | Performed by: STUDENT IN AN ORGANIZED HEALTH CARE EDUCATION/TRAINING PROGRAM

## 2021-09-30 ENCOUNTER — OFFICE VISIT (OUTPATIENT)
Dept: OPTOMETRY | Facility: CLINIC | Age: 86
End: 2021-09-30
Payer: MEDICARE

## 2021-09-30 DIAGNOSIS — H35.3211 EXUDATIVE AGE-RELATED MACULAR DEGENERATION OF RIGHT EYE WITH ACTIVE CHOROIDAL NEOVASCULARIZATION: ICD-10-CM

## 2021-09-30 DIAGNOSIS — H35.61: Primary | ICD-10-CM

## 2021-09-30 PROCEDURE — 1101F PR PT FALLS ASSESS DOC 0-1 FALLS W/OUT INJ PAST YR: ICD-10-PCS | Mod: HCNC,CPTII,S$GLB, | Performed by: OPTOMETRIST

## 2021-09-30 PROCEDURE — 1157F PR ADVANCE CARE PLAN OR EQUIV PRESENT IN MEDICAL RECORD: ICD-10-PCS | Mod: HCNC,CPTII,S$GLB, | Performed by: OPTOMETRIST

## 2021-09-30 PROCEDURE — 1126F AMNT PAIN NOTED NONE PRSNT: CPT | Mod: HCNC,CPTII,S$GLB, | Performed by: OPTOMETRIST

## 2021-09-30 PROCEDURE — 3288F FALL RISK ASSESSMENT DOCD: CPT | Mod: HCNC,CPTII,S$GLB, | Performed by: OPTOMETRIST

## 2021-09-30 PROCEDURE — 1160F RVW MEDS BY RX/DR IN RCRD: CPT | Mod: HCNC,CPTII,S$GLB, | Performed by: OPTOMETRIST

## 2021-09-30 PROCEDURE — 1159F PR MEDICATION LIST DOCUMENTED IN MEDICAL RECORD: ICD-10-PCS | Mod: HCNC,CPTII,S$GLB, | Performed by: OPTOMETRIST

## 2021-09-30 PROCEDURE — 99999 PR PBB SHADOW E&M-EST. PATIENT-LVL III: ICD-10-PCS | Mod: PBBFAC,HCNC,, | Performed by: OPTOMETRIST

## 2021-09-30 PROCEDURE — 1159F MED LIST DOCD IN RCRD: CPT | Mod: HCNC,CPTII,S$GLB, | Performed by: OPTOMETRIST

## 2021-09-30 PROCEDURE — 1126F PR PAIN SEVERITY QUANTIFIED, NO PAIN PRESENT: ICD-10-PCS | Mod: HCNC,CPTII,S$GLB, | Performed by: OPTOMETRIST

## 2021-09-30 PROCEDURE — 99024 POSTOP FOLLOW-UP VISIT: CPT | Mod: HCNC,S$GLB,, | Performed by: OPTOMETRIST

## 2021-09-30 PROCEDURE — 1101F PT FALLS ASSESS-DOCD LE1/YR: CPT | Mod: HCNC,CPTII,S$GLB, | Performed by: OPTOMETRIST

## 2021-09-30 PROCEDURE — 1157F ADVNC CARE PLAN IN RCRD: CPT | Mod: HCNC,CPTII,S$GLB, | Performed by: OPTOMETRIST

## 2021-09-30 PROCEDURE — 1160F PR REVIEW ALL MEDS BY PRESCRIBER/CLIN PHARMACIST DOCUMENTED: ICD-10-PCS | Mod: HCNC,CPTII,S$GLB, | Performed by: OPTOMETRIST

## 2021-09-30 PROCEDURE — 3288F PR FALLS RISK ASSESSMENT DOCUMENTED: ICD-10-PCS | Mod: HCNC,CPTII,S$GLB, | Performed by: OPTOMETRIST

## 2021-09-30 PROCEDURE — 99999 PR PBB SHADOW E&M-EST. PATIENT-LVL III: CPT | Mod: PBBFAC,HCNC,, | Performed by: OPTOMETRIST

## 2021-09-30 PROCEDURE — 99024 PR POST-OP FOLLOW-UP VISIT: ICD-10-PCS | Mod: HCNC,S$GLB,, | Performed by: OPTOMETRIST

## 2021-10-01 ENCOUNTER — TELEPHONE (OUTPATIENT)
Dept: HEMATOLOGY/ONCOLOGY | Facility: CLINIC | Age: 86
End: 2021-10-01

## 2021-10-01 ENCOUNTER — PATIENT MESSAGE (OUTPATIENT)
Dept: OPHTHALMOLOGY | Facility: CLINIC | Age: 86
End: 2021-10-01

## 2021-10-13 DIAGNOSIS — C50.012 MALIGNANT NEOPLASM OF NIPPLE OF LEFT BREAST IN FEMALE, UNSPECIFIED ESTROGEN RECEPTOR STATUS: ICD-10-CM

## 2021-10-13 RX ORDER — TAMOXIFEN CITRATE 20 MG/1
20 TABLET ORAL DAILY
Qty: 90 TABLET | Refills: 3 | Status: SHIPPED | OUTPATIENT
Start: 2021-10-13 | End: 2022-09-22 | Stop reason: SDUPTHER

## 2021-10-18 ENCOUNTER — PATIENT MESSAGE (OUTPATIENT)
Dept: FAMILY MEDICINE | Facility: CLINIC | Age: 86
End: 2021-10-18
Payer: MEDICARE

## 2021-11-01 ENCOUNTER — OFFICE VISIT (OUTPATIENT)
Dept: OPHTHALMOLOGY | Facility: CLINIC | Age: 86
End: 2021-11-01
Payer: MEDICARE

## 2021-11-01 DIAGNOSIS — H35.3222 EXUDATIVE AGE-RELATED MACULAR DEGENERATION OF LEFT EYE WITH INACTIVE CHOROIDAL NEOVASCULARIZATION: ICD-10-CM

## 2021-11-01 DIAGNOSIS — H35.61: ICD-10-CM

## 2021-11-01 DIAGNOSIS — H35.3211 EXUDATIVE AGE-RELATED MACULAR DEGENERATION OF RIGHT EYE WITH ACTIVE CHOROIDAL NEOVASCULARIZATION: Primary | ICD-10-CM

## 2021-11-01 PROCEDURE — 67028 INJECTION EYE DRUG: CPT | Mod: 79,HCNC,RT,S$GLB | Performed by: OPHTHALMOLOGY

## 2021-11-01 PROCEDURE — 99999 PR PBB SHADOW E&M-EST. PATIENT-LVL IV: CPT | Mod: PBBFAC,HCNC,, | Performed by: OPHTHALMOLOGY

## 2021-11-01 PROCEDURE — 1159F MED LIST DOCD IN RCRD: CPT | Mod: HCNC,CPTII,S$GLB, | Performed by: OPHTHALMOLOGY

## 2021-11-01 PROCEDURE — 99499 NO LOS: ICD-10-PCS | Mod: HCNC,S$GLB,, | Performed by: OPHTHALMOLOGY

## 2021-11-01 PROCEDURE — 1159F PR MEDICATION LIST DOCUMENTED IN MEDICAL RECORD: ICD-10-PCS | Mod: HCNC,CPTII,S$GLB, | Performed by: OPHTHALMOLOGY

## 2021-11-01 PROCEDURE — 67028 PR INJECT INTRAVITREAL PHARMCOLOGIC: ICD-10-PCS | Mod: 79,HCNC,RT,S$GLB | Performed by: OPHTHALMOLOGY

## 2021-11-01 PROCEDURE — 92134 POSTERIOR SEGMENT OCT RETINA (OCULAR COHERENCE TOMOGRAPHY)-BOTH EYES: ICD-10-PCS | Mod: HCNC,S$GLB,, | Performed by: OPHTHALMOLOGY

## 2021-11-01 PROCEDURE — 1157F PR ADVANCE CARE PLAN OR EQUIV PRESENT IN MEDICAL RECORD: ICD-10-PCS | Mod: HCNC,CPTII,S$GLB, | Performed by: OPHTHALMOLOGY

## 2021-11-01 PROCEDURE — 1160F RVW MEDS BY RX/DR IN RCRD: CPT | Mod: HCNC,CPTII,S$GLB, | Performed by: OPHTHALMOLOGY

## 2021-11-01 PROCEDURE — 3288F FALL RISK ASSESSMENT DOCD: CPT | Mod: HCNC,CPTII,S$GLB, | Performed by: OPHTHALMOLOGY

## 2021-11-01 PROCEDURE — 99999 PR PBB SHADOW E&M-EST. PATIENT-LVL IV: ICD-10-PCS | Mod: PBBFAC,HCNC,, | Performed by: OPHTHALMOLOGY

## 2021-11-01 PROCEDURE — 1157F ADVNC CARE PLAN IN RCRD: CPT | Mod: HCNC,CPTII,S$GLB, | Performed by: OPHTHALMOLOGY

## 2021-11-01 PROCEDURE — 99499 UNLISTED E&M SERVICE: CPT | Mod: HCNC,S$GLB,, | Performed by: OPHTHALMOLOGY

## 2021-11-01 PROCEDURE — 1126F PR PAIN SEVERITY QUANTIFIED, NO PAIN PRESENT: ICD-10-PCS | Mod: HCNC,CPTII,S$GLB, | Performed by: OPHTHALMOLOGY

## 2021-11-01 PROCEDURE — 1160F PR REVIEW ALL MEDS BY PRESCRIBER/CLIN PHARMACIST DOCUMENTED: ICD-10-PCS | Mod: HCNC,CPTII,S$GLB, | Performed by: OPHTHALMOLOGY

## 2021-11-01 PROCEDURE — 1126F AMNT PAIN NOTED NONE PRSNT: CPT | Mod: HCNC,CPTII,S$GLB, | Performed by: OPHTHALMOLOGY

## 2021-11-01 PROCEDURE — 1101F PR PT FALLS ASSESS DOC 0-1 FALLS W/OUT INJ PAST YR: ICD-10-PCS | Mod: HCNC,CPTII,S$GLB, | Performed by: OPHTHALMOLOGY

## 2021-11-01 PROCEDURE — 1101F PT FALLS ASSESS-DOCD LE1/YR: CPT | Mod: HCNC,CPTII,S$GLB, | Performed by: OPHTHALMOLOGY

## 2021-11-01 PROCEDURE — 92134 CPTRZ OPH DX IMG PST SGM RTA: CPT | Mod: HCNC,S$GLB,, | Performed by: OPHTHALMOLOGY

## 2021-11-01 PROCEDURE — 3288F PR FALLS RISK ASSESSMENT DOCUMENTED: ICD-10-PCS | Mod: HCNC,CPTII,S$GLB, | Performed by: OPHTHALMOLOGY

## 2021-11-01 RX ADMIN — Medication 1.25 MG: at 04:11

## 2021-11-13 ENCOUNTER — OFFICE VISIT (OUTPATIENT)
Dept: URGENT CARE | Facility: CLINIC | Age: 86
End: 2021-11-13
Payer: MEDICARE

## 2021-11-13 VITALS
SYSTOLIC BLOOD PRESSURE: 173 MMHG | HEIGHT: 62 IN | RESPIRATION RATE: 16 BRPM | BODY MASS INDEX: 29.45 KG/M2 | DIASTOLIC BLOOD PRESSURE: 61 MMHG | TEMPERATURE: 99 F | HEART RATE: 75 BPM | OXYGEN SATURATION: 95 % | WEIGHT: 160.06 LBS

## 2021-11-13 DIAGNOSIS — R09.89 CHEST CONGESTION: Primary | ICD-10-CM

## 2021-11-13 DIAGNOSIS — R05.9 COUGH: ICD-10-CM

## 2021-11-13 DIAGNOSIS — J40 BRONCHITIS: ICD-10-CM

## 2021-11-13 LAB
CTP QC/QA: YES
SARS-COV-2 RDRP RESP QL NAA+PROBE: NEGATIVE

## 2021-11-13 PROCEDURE — 1159F PR MEDICATION LIST DOCUMENTED IN MEDICAL RECORD: ICD-10-PCS | Mod: CPTII,S$GLB,, | Performed by: INTERNAL MEDICINE

## 2021-11-13 PROCEDURE — 99214 PR OFFICE/OUTPT VISIT, EST, LEVL IV, 30-39 MIN: ICD-10-PCS | Mod: S$GLB,,, | Performed by: INTERNAL MEDICINE

## 2021-11-13 PROCEDURE — 1160F PR REVIEW ALL MEDS BY PRESCRIBER/CLIN PHARMACIST DOCUMENTED: ICD-10-PCS | Mod: CPTII,S$GLB,, | Performed by: INTERNAL MEDICINE

## 2021-11-13 PROCEDURE — 71046 X-RAY EXAM CHEST 2 VIEWS: CPT | Mod: S$GLB,,, | Performed by: RADIOLOGY

## 2021-11-13 PROCEDURE — 71046 XR CHEST PA AND LATERAL: ICD-10-PCS | Mod: S$GLB,,, | Performed by: RADIOLOGY

## 2021-11-13 PROCEDURE — 1159F MED LIST DOCD IN RCRD: CPT | Mod: CPTII,S$GLB,, | Performed by: INTERNAL MEDICINE

## 2021-11-13 PROCEDURE — 99214 OFFICE O/P EST MOD 30 MIN: CPT | Mod: S$GLB,,, | Performed by: INTERNAL MEDICINE

## 2021-11-13 PROCEDURE — 1157F ADVNC CARE PLAN IN RCRD: CPT | Mod: CPTII,S$GLB,, | Performed by: INTERNAL MEDICINE

## 2021-11-13 PROCEDURE — U0002: ICD-10-PCS | Mod: QW,S$GLB,, | Performed by: INTERNAL MEDICINE

## 2021-11-13 PROCEDURE — 1160F RVW MEDS BY RX/DR IN RCRD: CPT | Mod: CPTII,S$GLB,, | Performed by: INTERNAL MEDICINE

## 2021-11-13 PROCEDURE — U0002 COVID-19 LAB TEST NON-CDC: HCPCS | Mod: QW,S$GLB,, | Performed by: INTERNAL MEDICINE

## 2021-11-13 PROCEDURE — 1157F PR ADVANCE CARE PLAN OR EQUIV PRESENT IN MEDICAL RECORD: ICD-10-PCS | Mod: CPTII,S$GLB,, | Performed by: INTERNAL MEDICINE

## 2021-11-13 RX ORDER — BENZONATATE 100 MG/1
100 CAPSULE ORAL 2 TIMES DAILY PRN
Qty: 14 CAPSULE | Refills: 0 | Status: SHIPPED | OUTPATIENT
Start: 2021-11-13 | End: 2021-11-20

## 2021-11-13 RX ORDER — DOXYCYCLINE 100 MG/1
100 CAPSULE ORAL 2 TIMES DAILY
Qty: 14 CAPSULE | Refills: 0 | Status: SHIPPED | OUTPATIENT
Start: 2021-11-13 | End: 2021-11-20

## 2021-11-27 DIAGNOSIS — I10 ESSENTIAL HYPERTENSION: Chronic | ICD-10-CM

## 2021-11-28 RX ORDER — DILTIAZEM HYDROCHLORIDE 180 MG/1
180 CAPSULE, COATED, EXTENDED RELEASE ORAL EVERY MORNING
Qty: 90 CAPSULE | Refills: 4 | Status: SHIPPED | OUTPATIENT
Start: 2021-11-28 | End: 2022-03-24 | Stop reason: SDUPTHER

## 2021-11-29 ENCOUNTER — OFFICE VISIT (OUTPATIENT)
Dept: OPHTHALMOLOGY | Facility: CLINIC | Age: 86
End: 2021-11-29
Payer: MEDICARE

## 2021-11-29 DIAGNOSIS — H35.3211 EXUDATIVE AGE-RELATED MACULAR DEGENERATION OF RIGHT EYE WITH ACTIVE CHOROIDAL NEOVASCULARIZATION: Primary | ICD-10-CM

## 2021-11-29 DIAGNOSIS — H35.3222 EXUDATIVE AGE-RELATED MACULAR DEGENERATION OF LEFT EYE WITH INACTIVE CHOROIDAL NEOVASCULARIZATION: ICD-10-CM

## 2021-11-29 DIAGNOSIS — H43.813 POSTERIOR VITREOUS DETACHMENT OF BOTH EYES: ICD-10-CM

## 2021-11-29 PROCEDURE — 99999 PR PBB SHADOW E&M-EST. PATIENT-LVL IV: ICD-10-PCS | Mod: PBBFAC,HCNC,, | Performed by: OPHTHALMOLOGY

## 2021-11-29 PROCEDURE — 2023F DILAT RTA XM W/O RTNOPTHY: CPT | Mod: HCNC,CPTII,S$GLB, | Performed by: OPHTHALMOLOGY

## 2021-11-29 PROCEDURE — 67028 PR INJECT INTRAVITREAL PHARMCOLOGIC: ICD-10-PCS | Mod: 79,HCNC,RT,S$GLB | Performed by: OPHTHALMOLOGY

## 2021-11-29 PROCEDURE — 92134 CPTRZ OPH DX IMG PST SGM RTA: CPT | Mod: HCNC,S$GLB,, | Performed by: OPHTHALMOLOGY

## 2021-11-29 PROCEDURE — 67028 INJECTION EYE DRUG: CPT | Mod: 79,HCNC,RT,S$GLB | Performed by: OPHTHALMOLOGY

## 2021-11-29 PROCEDURE — 1157F PR ADVANCE CARE PLAN OR EQUIV PRESENT IN MEDICAL RECORD: ICD-10-PCS | Mod: HCNC,CPTII,S$GLB, | Performed by: OPHTHALMOLOGY

## 2021-11-29 PROCEDURE — 99999 PR PBB SHADOW E&M-EST. PATIENT-LVL IV: CPT | Mod: PBBFAC,HCNC,, | Performed by: OPHTHALMOLOGY

## 2021-11-29 PROCEDURE — 92134 POSTERIOR SEGMENT OCT RETINA (OCULAR COHERENCE TOMOGRAPHY)-BOTH EYES: ICD-10-PCS | Mod: HCNC,S$GLB,, | Performed by: OPHTHALMOLOGY

## 2021-11-29 PROCEDURE — 92012 INTRM OPH EXAM EST PATIENT: CPT | Mod: 24,25,HCNC,S$GLB | Performed by: OPHTHALMOLOGY

## 2021-11-29 PROCEDURE — 1157F ADVNC CARE PLAN IN RCRD: CPT | Mod: HCNC,CPTII,S$GLB, | Performed by: OPHTHALMOLOGY

## 2021-11-29 PROCEDURE — 92012 PR EYE EXAM, EST PATIENT,INTERMED: ICD-10-PCS | Mod: 24,25,HCNC,S$GLB | Performed by: OPHTHALMOLOGY

## 2021-11-29 PROCEDURE — 2023F PR DILATED RETINAL EXAM W/O EVID OF RETINOPATHY: ICD-10-PCS | Mod: HCNC,CPTII,S$GLB, | Performed by: OPHTHALMOLOGY

## 2021-11-29 RX ADMIN — Medication 1.25 MG: at 04:11

## 2021-12-03 ENCOUNTER — OFFICE VISIT (OUTPATIENT)
Dept: URGENT CARE | Facility: CLINIC | Age: 86
End: 2021-12-03
Payer: MEDICARE

## 2021-12-03 VITALS
SYSTOLIC BLOOD PRESSURE: 162 MMHG | TEMPERATURE: 97 F | HEART RATE: 79 BPM | DIASTOLIC BLOOD PRESSURE: 77 MMHG | HEIGHT: 62 IN | BODY MASS INDEX: 29.44 KG/M2 | OXYGEN SATURATION: 97 % | WEIGHT: 160 LBS | RESPIRATION RATE: 16 BRPM

## 2021-12-03 DIAGNOSIS — S51.802A AVULSION OF SKIN OF FOREARM, LEFT, INITIAL ENCOUNTER: ICD-10-CM

## 2021-12-03 DIAGNOSIS — S50.812A ABRASION OF LEFT FOREARM, INITIAL ENCOUNTER: ICD-10-CM

## 2021-12-03 DIAGNOSIS — R03.0 ELEVATED BLOOD PRESSURE READING: Primary | ICD-10-CM

## 2021-12-03 PROCEDURE — 90715 TDAP VACCINE 7 YRS/> IM: CPT | Mod: S$GLB,,, | Performed by: PHYSICIAN ASSISTANT

## 2021-12-03 PROCEDURE — 90715 TDAP VACCINE GREATER THAN OR EQUAL TO 7YO IM: ICD-10-PCS | Mod: S$GLB,,, | Performed by: PHYSICIAN ASSISTANT

## 2021-12-03 PROCEDURE — 1157F PR ADVANCE CARE PLAN OR EQUIV PRESENT IN MEDICAL RECORD: ICD-10-PCS | Mod: CPTII,S$GLB,, | Performed by: PHYSICIAN ASSISTANT

## 2021-12-03 PROCEDURE — 90471 TDAP VACCINE GREATER THAN OR EQUAL TO 7YO IM: ICD-10-PCS | Mod: S$GLB,,, | Performed by: PHYSICIAN ASSISTANT

## 2021-12-03 PROCEDURE — 90471 IMMUNIZATION ADMIN: CPT | Mod: S$GLB,,, | Performed by: PHYSICIAN ASSISTANT

## 2021-12-03 PROCEDURE — 99213 PR OFFICE/OUTPT VISIT, EST, LEVL III, 20-29 MIN: ICD-10-PCS | Mod: 25,S$GLB,, | Performed by: PHYSICIAN ASSISTANT

## 2021-12-03 PROCEDURE — 99213 OFFICE O/P EST LOW 20 MIN: CPT | Mod: 25,S$GLB,, | Performed by: PHYSICIAN ASSISTANT

## 2021-12-03 PROCEDURE — 1157F ADVNC CARE PLAN IN RCRD: CPT | Mod: CPTII,S$GLB,, | Performed by: PHYSICIAN ASSISTANT

## 2021-12-03 RX ORDER — MUPIROCIN 20 MG/G
OINTMENT TOPICAL
Qty: 60 G | Refills: 0 | Status: SHIPPED | OUTPATIENT
Start: 2021-12-03 | End: 2022-11-28

## 2021-12-03 RX ORDER — CEPHALEXIN 500 MG/1
500 CAPSULE ORAL EVERY 8 HOURS
Qty: 15 CAPSULE | Refills: 0 | Status: SHIPPED | OUTPATIENT
Start: 2021-12-03 | End: 2021-12-08

## 2021-12-16 ENCOUNTER — OFFICE VISIT (OUTPATIENT)
Dept: FAMILY MEDICINE | Facility: CLINIC | Age: 86
End: 2021-12-16
Payer: MEDICARE

## 2021-12-16 VITALS
WEIGHT: 162.69 LBS | DIASTOLIC BLOOD PRESSURE: 64 MMHG | OXYGEN SATURATION: 96 % | SYSTOLIC BLOOD PRESSURE: 122 MMHG | HEIGHT: 62 IN | HEART RATE: 70 BPM | BODY MASS INDEX: 29.94 KG/M2

## 2021-12-16 DIAGNOSIS — S51.812D SKIN TEAR OF LEFT FOREARM WITHOUT COMPLICATION, SUBSEQUENT ENCOUNTER: Primary | ICD-10-CM

## 2021-12-16 DIAGNOSIS — I10 ESSENTIAL HYPERTENSION: ICD-10-CM

## 2021-12-16 DIAGNOSIS — H54.3 BLINDNESS OF BOTH EYES: ICD-10-CM

## 2021-12-16 DIAGNOSIS — C50.912 MALIGNANT NEOPLASM OF LEFT BREAST IN FEMALE, ESTROGEN RECEPTOR POSITIVE, UNSPECIFIED SITE OF BREAST: ICD-10-CM

## 2021-12-16 DIAGNOSIS — R60.0 PEDAL EDEMA: ICD-10-CM

## 2021-12-16 DIAGNOSIS — N18.31 STAGE 3A CHRONIC KIDNEY DISEASE: ICD-10-CM

## 2021-12-16 DIAGNOSIS — Z17.0 MALIGNANT NEOPLASM OF LEFT BREAST IN FEMALE, ESTROGEN RECEPTOR POSITIVE, UNSPECIFIED SITE OF BREAST: ICD-10-CM

## 2021-12-16 DIAGNOSIS — G47.62 SLEEP RELATED LEG CRAMPS: ICD-10-CM

## 2021-12-16 PROBLEM — S51.812A SKIN TEAR OF LEFT FOREARM WITHOUT COMPLICATION: Status: ACTIVE | Noted: 2021-12-16

## 2021-12-16 PROCEDURE — 99499 RISK ADDL DX/OHS AUDIT: ICD-10-PCS | Mod: S$GLB,,, | Performed by: INTERNAL MEDICINE

## 2021-12-16 PROCEDURE — 1157F PR ADVANCE CARE PLAN OR EQUIV PRESENT IN MEDICAL RECORD: ICD-10-PCS | Mod: HCNC,CPTII,S$GLB, | Performed by: INTERNAL MEDICINE

## 2021-12-16 PROCEDURE — 99999 PR PBB SHADOW E&M-EST. PATIENT-LVL IV: ICD-10-PCS | Mod: PBBFAC,HCNC,, | Performed by: INTERNAL MEDICINE

## 2021-12-16 PROCEDURE — 99214 PR OFFICE/OUTPT VISIT, EST, LEVL IV, 30-39 MIN: ICD-10-PCS | Mod: HCNC,S$GLB,, | Performed by: INTERNAL MEDICINE

## 2021-12-16 PROCEDURE — 99499 UNLISTED E&M SERVICE: CPT | Mod: S$GLB,,, | Performed by: INTERNAL MEDICINE

## 2021-12-16 PROCEDURE — 1157F ADVNC CARE PLAN IN RCRD: CPT | Mod: HCNC,CPTII,S$GLB, | Performed by: INTERNAL MEDICINE

## 2021-12-16 PROCEDURE — 99999 PR PBB SHADOW E&M-EST. PATIENT-LVL IV: CPT | Mod: PBBFAC,HCNC,, | Performed by: INTERNAL MEDICINE

## 2021-12-16 PROCEDURE — 99214 OFFICE O/P EST MOD 30 MIN: CPT | Mod: HCNC,S$GLB,, | Performed by: INTERNAL MEDICINE

## 2021-12-16 RX ORDER — GABAPENTIN 100 MG/1
100 CAPSULE ORAL NIGHTLY
Qty: 30 CAPSULE | Refills: 11 | Status: SHIPPED | OUTPATIENT
Start: 2021-12-16 | End: 2023-04-10 | Stop reason: SDUPTHER

## 2021-12-21 ENCOUNTER — PATIENT MESSAGE (OUTPATIENT)
Dept: FAMILY MEDICINE | Facility: CLINIC | Age: 86
End: 2021-12-21
Payer: MEDICARE

## 2021-12-21 DIAGNOSIS — L60.0 INGROWING TOENAIL: Primary | ICD-10-CM

## 2022-01-10 ENCOUNTER — PROCEDURE VISIT (OUTPATIENT)
Dept: OPHTHALMOLOGY | Facility: CLINIC | Age: 87
End: 2022-01-10
Payer: MEDICARE

## 2022-01-10 DIAGNOSIS — H35.3222 EXUDATIVE AGE-RELATED MACULAR DEGENERATION OF LEFT EYE WITH INACTIVE CHOROIDAL NEOVASCULARIZATION: ICD-10-CM

## 2022-01-10 DIAGNOSIS — H35.3211 EXUDATIVE AGE-RELATED MACULAR DEGENERATION OF RIGHT EYE WITH ACTIVE CHOROIDAL NEOVASCULARIZATION: Primary | ICD-10-CM

## 2022-01-10 DIAGNOSIS — H43.813 POSTERIOR VITREOUS DETACHMENT OF BOTH EYES: ICD-10-CM

## 2022-01-10 PROCEDURE — 92014 COMPRE OPH EXAM EST PT 1/>: CPT | Mod: 25,HCNC,S$GLB, | Performed by: OPHTHALMOLOGY

## 2022-01-10 PROCEDURE — 99499 UNLISTED E&M SERVICE: CPT | Mod: S$GLB,,, | Performed by: OPHTHALMOLOGY

## 2022-01-10 PROCEDURE — 92014 PR EYE EXAM, EST PATIENT,COMPREHESV: ICD-10-PCS | Mod: 25,HCNC,S$GLB, | Performed by: OPHTHALMOLOGY

## 2022-01-10 PROCEDURE — 92134 CPTRZ OPH DX IMG PST SGM RTA: CPT | Mod: HCNC,S$GLB,, | Performed by: OPHTHALMOLOGY

## 2022-01-10 PROCEDURE — 92134 POSTERIOR SEGMENT OCT RETINA (OCULAR COHERENCE TOMOGRAPHY)-BOTH EYES: ICD-10-PCS | Mod: HCNC,S$GLB,, | Performed by: OPHTHALMOLOGY

## 2022-01-10 PROCEDURE — 99499 RISK ADDL DX/OHS AUDIT: ICD-10-PCS | Mod: S$GLB,,, | Performed by: OPHTHALMOLOGY

## 2022-01-10 PROCEDURE — 67028 PR INJECT INTRAVITREAL PHARMCOLOGIC: ICD-10-PCS | Mod: HCNC,RT,S$GLB, | Performed by: OPHTHALMOLOGY

## 2022-01-10 PROCEDURE — 67028 INJECTION EYE DRUG: CPT | Mod: HCNC,RT,S$GLB, | Performed by: OPHTHALMOLOGY

## 2022-01-10 RX ADMIN — Medication 1.25 MG: at 03:01

## 2022-01-10 NOTE — PROGRESS NOTES
HPI     DLS 11/29/2021    VA stable ou. No pain. No f/f. C/o crusting the the corner of  the eye,   especially in the morning and itching of the lower lids.          OCT - OD sig SR heme displaced, still with Sub RPE heme as expected  OS - cicatrix with atrophy      A/P    1. Wet AMD OS - cicatrix.   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 - Fundus photos today    Conservative mgmt.  Poor Va potential.    2. New Wet AMD OD  With large SR macular hemorrhage started 3 days  S/p Avastin OD x 13  s/p 25g PPV/subretinal tPa/partial AFx/SF6/Avastin OD 9/22/21 1/22 - still with active heme around cicatrix    Avastin OD today    Try extension     3. PVD OU    4. PCIOL OD    5. NS OS    6. Anatomically narrow OS - ? Lens effect    7.  On plavix      2 month OCT no dilate         Cont. AREDS/AG  Watch OS - Asx, poor va potential.      Risks, benefits, and alternatives to treatment discussed in detail with the patient.  The patient voiced understanding and wished to proceed with the procedure    Injection Procedure Note:  Diagnosis: Wet AMD OD    Patient Identified and Time Out complete  Pt marked  Topical Proparacaine and Betadine.  Inject Avastin OD at 6:00 @ 3.5-4mm posterior to limbus  Post Operative Dx: Same  Complications: None  Follow up as above.

## 2022-01-10 NOTE — PATIENT INSTRUCTIONS

## 2022-01-14 ENCOUNTER — PATIENT MESSAGE (OUTPATIENT)
Dept: FAMILY MEDICINE | Facility: CLINIC | Age: 87
End: 2022-01-14
Payer: MEDICARE

## 2022-01-18 ENCOUNTER — DOCUMENTATION ONLY (OUTPATIENT)
Dept: PHARMACY | Facility: CLINIC | Age: 87
End: 2022-01-18
Payer: MEDICARE

## 2022-01-19 ENCOUNTER — TELEPHONE (OUTPATIENT)
Dept: OPHTHALMOLOGY | Facility: CLINIC | Age: 87
End: 2022-01-19
Payer: MEDICARE

## 2022-01-19 ENCOUNTER — PATIENT MESSAGE (OUTPATIENT)
Dept: OPHTHALMOLOGY | Facility: CLINIC | Age: 87
End: 2022-01-19
Payer: MEDICARE

## 2022-01-19 NOTE — PROGRESS NOTES
I administered IM  CYANOCOBALAMIN 1,000MCG/ML into the left deltoid muscle at 2:45pm.    LOT#[id number]  EXP 11/2022

## 2022-01-19 NOTE — TELEPHONE ENCOUNTER
Spoke with patients son Larry. Ms Pugh reports some pain OD post injection. Suggested OTC pain med and ATs. Explained that some mild pain may persisit after injection. If no better in a few days will come in for urgent care visit.

## 2022-01-19 NOTE — TELEPHONE ENCOUNTER
----- Message from Stephanie aBzan, Patient Care Assistant sent at 1/19/2022  2:59 PM CST -----  Regarding: advice  Contact: brenice esquivel's son  Type: Needs Medical Advice  Who Called:  bernice esquivel's son   Symptoms (please be specific):  injection - reaction   How long has patient had these symptoms:  2 days   Best Call Back Number: [phone]    Additional Information: please call bernice esquivel's son  to advise. Thanks!

## 2022-02-11 ENCOUNTER — OFFICE VISIT (OUTPATIENT)
Dept: PODIATRY | Facility: CLINIC | Age: 87
End: 2022-02-11
Payer: MEDICARE

## 2022-02-11 DIAGNOSIS — B35.1 ONYCHOMYCOSIS: ICD-10-CM

## 2022-02-11 DIAGNOSIS — G60.9 IDIOPATHIC PERIPHERAL NEUROPATHY: Primary | ICD-10-CM

## 2022-02-11 PROCEDURE — 11721 DEBRIDE NAIL 6 OR MORE: CPT | Mod: HCNC,S$GLB,, | Performed by: PODIATRIST

## 2022-02-11 PROCEDURE — 11721 PR DEBRIDEMENT OF NAILS, 6 OR MORE: ICD-10-PCS | Mod: HCNC,S$GLB,, | Performed by: PODIATRIST

## 2022-02-11 PROCEDURE — 1157F ADVNC CARE PLAN IN RCRD: CPT | Mod: HCNC,CPTII,S$GLB, | Performed by: PODIATRIST

## 2022-02-11 PROCEDURE — 99203 PR OFFICE/OUTPT VISIT, NEW, LEVL III, 30-44 MIN: ICD-10-PCS | Mod: 25,HCNC,S$GLB, | Performed by: PODIATRIST

## 2022-02-11 PROCEDURE — 1157F PR ADVANCE CARE PLAN OR EQUIV PRESENT IN MEDICAL RECORD: ICD-10-PCS | Mod: HCNC,CPTII,S$GLB, | Performed by: PODIATRIST

## 2022-02-11 PROCEDURE — 99203 OFFICE O/P NEW LOW 30 MIN: CPT | Mod: 25,HCNC,S$GLB, | Performed by: PODIATRIST

## 2022-02-13 NOTE — PROGRESS NOTES
Subjective:      Patient ID: Sheree Pugh is a 89 y.o. female.    Chief Complaint: No chief complaint on file.    Sheree is a 89 y.o. female who presents to the clinic for evaluation and treatment of high risk feet. Sheree has a past medical history of Anticoagulant long-term use, ARMD (age related macular degeneration), Arthritis, Breast cancer, Cataract, COPD (chronic obstructive pulmonary disease), Coronary artery disease, Disorder of kidney and ureter, DE LOS SANTOS (dyspnea on exertion), Elevated cholesterol, Heart disease, Hypertension, Insomnia, Macular degeneration, Obstetrical blood-clot embolism, postpartum, PVD (peripheral vascular disease), Retinal detachment, Stented coronary artery (2/25/2019), and Urinary incontinence. The patient's chief complaint is long, thick toenails that she is unable to trim on her own on account of impaired vision..  Denies the nails being a source of pain.  Requests to have them trimmed.  This patient has documented high risk feet requiring routine maintenance secondary to peripheral neuropathy.    PCP: Piotr Walker MD    Date Last Seen by PCP: 12/21      Hemoglobin A1C   Date Value Ref Range Status   02/01/2020 5.7 (H) 4.0 - 5.6 % Final     Comment:     ADA Screening Guidelines:  5.7-6.4%  Consistent with prediabetes  >or=6.5%  Consistent with diabetes  High levels of fetal hemoglobin interfere with the HbA1C  assay. Heterozygous hemoglobin variants (HbS, HgC, etc)do  not significantly interfere with this assay.   However, presence of multiple variants may affect accuracy.     09/01/2018 5.8 (H) 0.0 - 5.6 % Final     Comment:     Reference Interval:  5.0 - 5.6 Normal   5.7 - 6.4 High Risk   > 6.5 Diabetic    Hgb A1c results are standardized based on the (NGSP) National   Glycohemoglobin Standardization Program.    Hemoglobin A1C levels are related to mean serum/plasma glucose   during the preceding 2-3 months.        04/13/2015 5.8 4.5 - 6.2 % Final       Review of Systems    Constitutional: Negative for chills and fever.   Cardiovascular: Positive for leg swelling. Negative for claudication.   Skin: Positive for nail changes.   Musculoskeletal: Positive for joint swelling. Negative for muscle cramps and muscle weakness.   Gastrointestinal: Negative for nausea and vomiting.   Neurological: Positive for numbness. Negative for paresthesias.   Psychiatric/Behavioral: Negative for altered mental status.           Objective:      Physical Exam  Constitutional:       Appearance: Normal appearance. She is not ill-appearing.   Cardiovascular:      Pulses:           Dorsalis pedis pulses are 2+ on the right side and 2+ on the left side.        Posterior tibial pulses are 1+ on the right side and 1+ on the left side.      Comments: CFT is < 3 seconds bilateral.  Pedal hair growth is decreased bilateral.  Varicosities noted bilateral.  Moderate nonpitting lower extremity edema noted bilateral. Toes are cool to touch bilateral.    Musculoskeletal:         General: Swelling present. No tenderness.      Right lower leg: Edema present.      Left lower leg: Edema present.      Comments: Muscle strength 5/5 in all muscle groups bilateral.  No tenderness nor crepitation with ROM of foot/ankle joints bilateral.  No tenderness with palpation of bilateral foot and ankle.     Skin:     General: Skin is warm and dry.      Capillary Refill: Capillary refill takes 2 to 3 seconds.      Findings: No bruising, ecchymosis, erythema, signs of injury, laceration, lesion, petechiae, rash or wound.      Comments: Pedal skin appears edematous bilateral.  Toenails x 10 appear thickened by 4 mm, elongated by 6 mm, and discolored with subungual debris.   Examination of the skin reveals no evidence of significant maceration, rashes, open lesions, suspicious appearing nevi or other concerning lesions.    Neurological:      Mental Status: She is alert.      Sensory: Sensory deficit present.      Motor: No weakness or  atrophy.      Comments: Protective sensation per [place] monofilament is absent bilateral.  Vibratory sensation is absent bilateral.  Light touch is absent bilateral.               Assessment:       Encounter Diagnoses   Name Primary?    Idiopathic peripheral neuropathy Yes    Onychomycosis          Plan:       Diagnoses and all orders for this visit:    Idiopathic peripheral neuropathy    Onychomycosis  -     Ambulatory referral/consult to Podiatry      I counseled the patient on her conditions, their implications and medical management.      - Neuropathy appears stable at the current time.  Advised to call should she develop paresthesias of the lower extremities.    - Shoe inspection. Patient instructed on proper foot hygeine. We discussed wearing proper shoe gear, daily foot inspections, never walking without protective shoe gear, never putting sharp instruments to feet, routine podiatric nail visits every 2-3 months.      - With patient's permission, nails were aggressively reduced and debrided x 10 to their soft tissue attachment mechanically and with electric , removing all offending nail and debris. Patient relates relief following the procedure. She will continue to monitor the areas daily, inspect her feet, wear protective shoe gear when ambulatory, moisturizer to maintain skin integrity and follow in this office in approximately 3 months, sooner p.r.n.    aMrquez Ramirez DPM

## 2022-03-03 ENCOUNTER — PATIENT MESSAGE (OUTPATIENT)
Dept: FAMILY MEDICINE | Facility: CLINIC | Age: 87
End: 2022-03-03
Payer: MEDICARE

## 2022-03-05 DIAGNOSIS — I35.0 NONRHEUMATIC AORTIC VALVE STENOSIS: Chronic | ICD-10-CM

## 2022-03-05 NOTE — TELEPHONE ENCOUNTER
No new care gaps identified.  Powered by [employer]. Reference number: [id number].   3/05/2022 2:00:06 PM CST

## 2022-03-07 ENCOUNTER — PROCEDURE VISIT (OUTPATIENT)
Dept: OPHTHALMOLOGY | Facility: CLINIC | Age: 87
End: 2022-03-07
Payer: MEDICARE

## 2022-03-07 DIAGNOSIS — H43.813 POSTERIOR VITREOUS DETACHMENT OF BOTH EYES: ICD-10-CM

## 2022-03-07 DIAGNOSIS — H35.3211 EXUDATIVE AGE-RELATED MACULAR DEGENERATION OF RIGHT EYE WITH ACTIVE CHOROIDAL NEOVASCULARIZATION: ICD-10-CM

## 2022-03-07 DIAGNOSIS — H35.3222 EXUDATIVE AGE-RELATED MACULAR DEGENERATION OF LEFT EYE WITH INACTIVE CHOROIDAL NEOVASCULARIZATION: Primary | ICD-10-CM

## 2022-03-07 PROCEDURE — 92201 PR OPHTHALMOSCOPY, EXT, W/RET DRAW/SCLERAL DEPR, I&R, UNI/BI: ICD-10-PCS | Mod: S$GLB,,, | Performed by: OPHTHALMOLOGY

## 2022-03-07 PROCEDURE — 92134 CPTRZ OPH DX IMG PST SGM RTA: CPT | Mod: S$GLB,,, | Performed by: OPHTHALMOLOGY

## 2022-03-07 PROCEDURE — 92201 OPSCPY EXTND RTA DRAW UNI/BI: CPT | Mod: S$GLB,,, | Performed by: OPHTHALMOLOGY

## 2022-03-07 PROCEDURE — 92134 POSTERIOR SEGMENT OCT RETINA (OCULAR COHERENCE TOMOGRAPHY)-BOTH EYES: ICD-10-PCS | Mod: S$GLB,,, | Performed by: OPHTHALMOLOGY

## 2022-03-07 PROCEDURE — 92014 COMPRE OPH EXAM EST PT 1/>: CPT | Mod: S$GLB,,, | Performed by: OPHTHALMOLOGY

## 2022-03-07 PROCEDURE — 92014 PR EYE EXAM, EST PATIENT,COMPREHESV: ICD-10-PCS | Mod: S$GLB,,, | Performed by: OPHTHALMOLOGY

## 2022-03-07 RX ORDER — FUROSEMIDE 20 MG/1
40 TABLET ORAL DAILY PRN
Qty: 90 TABLET | Refills: 1 | Status: SHIPPED | OUTPATIENT
Start: 2022-03-07 | End: 2022-03-24 | Stop reason: SDUPTHER

## 2022-03-07 NOTE — PROGRESS NOTES
HPI     DLS:  1/10/22    Pt here for 8 week Avastin OD and OCT. Pt son has some questions regarding   injection. Pt states after her last injection she had aching around her   eyes.  Pt states floaters OU.  Pt states she has a yolk looking object in   her vision OU along with banana looking objects OU.  Pt states she has   crusting OU in the morning since last visit OU.     No eyedrops    POHx:       HPI     DLS 11/29/2021    VA stable ou. No pain. No f/f. C/o crusting the the corner of  the eye,   especially in the morning and itching of the lower lids.          OCT - OD sig SR heme displaced, still with Sub RPE heme as expected  OS - cicatrix with atrophy      A/P    1. Wet AMD OS - cicatrix.   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 - Fundus photos today    Conservative mgmt.  Poor Va potential.    2. New Wet AMD OD  With large SR macular hemorrhage started 3 days  S/p Avastin OD x 14  s/p 25g PPV/subretinal tPa/partial AFx/SF6/Avastin OD 9/22/21 1/22 - still with active heme around cicatrix  3/22 - stable    Try observation    3. PVD OU    4. PCIOL OD    5. NS OS    6. Anatomically narrow OS - ? Lens effect    7.  On plavix    8. Ariel Bonnet Syndrome      3 month OCT and dilate

## 2022-03-08 NOTE — TELEPHONE ENCOUNTER
Refill Authorization Note   Sheree Pugh  is requesting a refill authorization.  Brief Assessment and Rationale for Refill:  Approve     Medication Therapy Plan:       Medication Reconciliation Completed: No   Comments:   --->Care Gap information included below if applicable.   Orders Placed This Encounter    furosemide (LASIX) 20 MG tablet      Requested Prescriptions   Signed Prescriptions Disp Refills    furosemide (LASIX) 20 MG tablet 90 tablet 1     Sig: Take 2 tablets (40 mg total) by mouth daily as needed (edema).       Cardiovascular:  Diuretics - Loop Passed - 3/5/2022  1:59 PM        Passed - Patient is at least 18 years old        Passed - Last BP in normal range within 360 days     BP Readings from Last 1 Encounters:   12/16/21 122/64               Passed - Valid encounter within last 15 months     Recent Visits  Date Type Provider Dept   12/16/21 Office Visit Piotr Walker MD [facility]   08/23/21 Office Visit Piotr Walker MD [facility]   02/23/21 Office Visit Piotr Walker MD [facility]   08/25/20 Office Visit Piotr Walker MD [facility]   05/28/20 Office Visit Piotr Walker MD [facility]   Showing recent visits within past 720 days and meeting all other requirements  Future Appointments  No visits were found meeting these conditions.  Showing future appointments within next 150 days and meeting all other requirements      Future Appointments              In 2 weeks MD Keith Lima - Cardiology, [place]    In 2 weeks MD Keith Ash - Family Medicine, [place]    In 2 months TRISTA Mcneil - Podiatry, [place]    In 3 months MD Keith Redmond - Ophthalmology, [place]                Passed - K in normal range and within 360 days     Potassium   Date Value Ref Range Status   07/06/2021 4.4 3.5 - 5.1 mmol/L Final   04/26/2021 5.7 (H) 3.5 - 5.1 mmol/L Final     Comment:     Specimen  moderately hemolyzed   10/09/2020 4.4 3.5 - 5.1 mmol/L Final              Passed - Na is between 130 and 148 and within 360 days     Sodium   Date Value Ref Range Status   07/06/2021 141 136 - 145 mmol/L Final   04/26/2021 139 136 - 145 mmol/L Final   10/09/2020 143 136 - 145 mmol/L Final              Passed - Cr is 1.39 or below and within 360 days     Lab Results   Component Value Date    CREATININE 0.9 07/06/2021    CREATININE 0.86 04/26/2021    CREATININE 0.9 10/09/2020              Passed - eGFR within 360 days     Lab Results   Component Value Date    EGFRNONAA 55.3 (A) 07/06/2021    EGFRNONAA 58 (A) 04/26/2021    EGFRNONAA 55 (A) 10/09/2020                    Appointments  past 12m or future 3m with PCP    Date Provider   Last Visit   12/16/2021 Piotr Walker MD   Next Visit   3/25/2022 Piotr Walker MD   ED visits in past 90 days: 0     Note composed:7:24 PM 03/07/2022

## 2022-03-21 ENCOUNTER — PATIENT OUTREACH (OUTPATIENT)
Dept: ADMINISTRATIVE | Facility: OTHER | Age: 87
End: 2022-03-21
Payer: MEDICARE

## 2022-03-24 ENCOUNTER — OFFICE VISIT (OUTPATIENT)
Dept: CARDIOLOGY | Facility: CLINIC | Age: 87
End: 2022-03-24
Payer: MEDICARE

## 2022-03-24 VITALS
WEIGHT: 164 LBS | HEART RATE: 84 BPM | SYSTOLIC BLOOD PRESSURE: 133 MMHG | BODY MASS INDEX: 30.18 KG/M2 | DIASTOLIC BLOOD PRESSURE: 66 MMHG | HEIGHT: 62 IN

## 2022-03-24 DIAGNOSIS — I70.1 RAS (RENAL ARTERY STENOSIS): ICD-10-CM

## 2022-03-24 DIAGNOSIS — Z98.61 HISTORY OF PTCA: Chronic | ICD-10-CM

## 2022-03-24 DIAGNOSIS — E78.5 DYSLIPIDEMIA: Primary | Chronic | ICD-10-CM

## 2022-03-24 DIAGNOSIS — N18.30 CHRONIC KIDNEY DISEASE, STAGE III (MODERATE): ICD-10-CM

## 2022-03-24 DIAGNOSIS — I10 ESSENTIAL HYPERTENSION: Chronic | ICD-10-CM

## 2022-03-24 DIAGNOSIS — I35.0 NONRHEUMATIC AORTIC VALVE STENOSIS: Chronic | ICD-10-CM

## 2022-03-24 DIAGNOSIS — I65.21 STENOSIS OF RIGHT CAROTID ARTERY: Chronic | ICD-10-CM

## 2022-03-24 DIAGNOSIS — I73.9 PERIPHERAL VASCULAR DISEASE: ICD-10-CM

## 2022-03-24 DIAGNOSIS — I25.10 CORONARY ARTERY DISEASE INVOLVING NATIVE CORONARY ARTERY OF NATIVE HEART WITHOUT ANGINA PECTORIS: Chronic | ICD-10-CM

## 2022-03-24 PROCEDURE — 3288F FALL RISK ASSESSMENT DOCD: CPT | Mod: CPTII,S$GLB,, | Performed by: INTERNAL MEDICINE

## 2022-03-24 PROCEDURE — 1101F PR PT FALLS ASSESS DOC 0-1 FALLS W/OUT INJ PAST YR: ICD-10-PCS | Mod: CPTII,S$GLB,, | Performed by: INTERNAL MEDICINE

## 2022-03-24 PROCEDURE — 1126F AMNT PAIN NOTED NONE PRSNT: CPT | Mod: CPTII,S$GLB,, | Performed by: INTERNAL MEDICINE

## 2022-03-24 PROCEDURE — 1159F PR MEDICATION LIST DOCUMENTED IN MEDICAL RECORD: ICD-10-PCS | Mod: CPTII,S$GLB,, | Performed by: INTERNAL MEDICINE

## 2022-03-24 PROCEDURE — 99214 OFFICE O/P EST MOD 30 MIN: CPT | Mod: S$GLB,,, | Performed by: INTERNAL MEDICINE

## 2022-03-24 PROCEDURE — 99999 PR PBB SHADOW E&M-EST. PATIENT-LVL IV: CPT | Mod: PBBFAC,,, | Performed by: INTERNAL MEDICINE

## 2022-03-24 PROCEDURE — 1126F PR PAIN SEVERITY QUANTIFIED, NO PAIN PRESENT: ICD-10-PCS | Mod: CPTII,S$GLB,, | Performed by: INTERNAL MEDICINE

## 2022-03-24 PROCEDURE — 1157F PR ADVANCE CARE PLAN OR EQUIV PRESENT IN MEDICAL RECORD: ICD-10-PCS | Mod: CPTII,S$GLB,, | Performed by: INTERNAL MEDICINE

## 2022-03-24 PROCEDURE — 3288F PR FALLS RISK ASSESSMENT DOCUMENTED: ICD-10-PCS | Mod: CPTII,S$GLB,, | Performed by: INTERNAL MEDICINE

## 2022-03-24 PROCEDURE — 1101F PT FALLS ASSESS-DOCD LE1/YR: CPT | Mod: CPTII,S$GLB,, | Performed by: INTERNAL MEDICINE

## 2022-03-24 PROCEDURE — 1157F ADVNC CARE PLAN IN RCRD: CPT | Mod: CPTII,S$GLB,, | Performed by: INTERNAL MEDICINE

## 2022-03-24 PROCEDURE — 1160F RVW MEDS BY RX/DR IN RCRD: CPT | Mod: CPTII,S$GLB,, | Performed by: INTERNAL MEDICINE

## 2022-03-24 PROCEDURE — 99214 PR OFFICE/OUTPT VISIT, EST, LEVL IV, 30-39 MIN: ICD-10-PCS | Mod: S$GLB,,, | Performed by: INTERNAL MEDICINE

## 2022-03-24 PROCEDURE — 1160F PR REVIEW ALL MEDS BY PRESCRIBER/CLIN PHARMACIST DOCUMENTED: ICD-10-PCS | Mod: CPTII,S$GLB,, | Performed by: INTERNAL MEDICINE

## 2022-03-24 PROCEDURE — 1159F MED LIST DOCD IN RCRD: CPT | Mod: CPTII,S$GLB,, | Performed by: INTERNAL MEDICINE

## 2022-03-24 PROCEDURE — 99999 PR PBB SHADOW E&M-EST. PATIENT-LVL IV: ICD-10-PCS | Mod: PBBFAC,,, | Performed by: INTERNAL MEDICINE

## 2022-03-24 RX ORDER — ISOSORBIDE MONONITRATE 60 MG/1
60 TABLET, EXTENDED RELEASE ORAL NIGHTLY
Qty: 90 TABLET | Refills: 4 | Status: SHIPPED | OUTPATIENT
Start: 2022-03-24 | End: 2023-03-27 | Stop reason: SDUPTHER

## 2022-03-24 RX ORDER — DILTIAZEM HYDROCHLORIDE 180 MG/1
180 CAPSULE, COATED, EXTENDED RELEASE ORAL EVERY MORNING
Qty: 90 CAPSULE | Refills: 4 | Status: SHIPPED | OUTPATIENT
Start: 2022-03-24 | End: 2023-06-15 | Stop reason: SDUPTHER

## 2022-03-24 RX ORDER — CLOPIDOGREL BISULFATE 75 MG/1
75 TABLET ORAL DAILY
Qty: 90 TABLET | Refills: 4 | Status: SHIPPED | OUTPATIENT
Start: 2022-03-24 | End: 2023-03-27 | Stop reason: SDUPTHER

## 2022-03-24 RX ORDER — ATORVASTATIN CALCIUM 20 MG/1
20 TABLET, FILM COATED ORAL DAILY
Qty: 90 TABLET | Refills: 4 | Status: SHIPPED | OUTPATIENT
Start: 2022-03-24 | End: 2023-03-27 | Stop reason: SDUPTHER

## 2022-03-24 RX ORDER — POTASSIUM CHLORIDE 20 MEQ/1
20 TABLET, EXTENDED RELEASE ORAL DAILY PRN
Qty: 90 TABLET | Refills: 4 | Status: SHIPPED | OUTPATIENT
Start: 2022-03-24 | End: 2022-09-13

## 2022-03-24 RX ORDER — FUROSEMIDE 20 MG/1
40 TABLET ORAL DAILY PRN
Qty: 90 TABLET | Refills: 3 | Status: SHIPPED | OUTPATIENT
Start: 2022-03-24 | End: 2023-02-10 | Stop reason: SDUPTHER

## 2022-03-24 NOTE — PROGRESS NOTES
Subjective:    Patient ID:  Sheree Pugh is a 89 y.o. female who presents for follow-up of Follow-up (9mths), Coronary Artery Disease, and Hypertension      HPI  Here for follow up of CAD-PCI (4/14 )/ANTWAN. Very active. No angina. Bp controled.     Review of Systems   Constitutional: Negative for malaise/fatigue.   Eyes: Negative for blurred vision.   Cardiovascular: Negative for chest pain, claudication, cyanosis, dyspnea on exertion, irregular heartbeat, leg swelling, near-syncope, orthopnea, palpitations, paroxysmal nocturnal dyspnea and syncope.   Respiratory: Negative for cough and shortness of breath.    Hematologic/Lymphatic: Does not bruise/bleed easily.   Musculoskeletal: Negative for back pain, falls, joint pain, muscle cramps, muscle weakness and myalgias.   Gastrointestinal: Negative for abdominal pain, change in bowel habit, nausea and vomiting.   Genitourinary: Negative for urgency.   Neurological: Negative for dizziness, focal weakness and light-headedness.       Past Medical History:   Diagnosis Date    Anticoagulant long-term use     Plavix & Aspirin    ARMD (age related macular degeneration)     os    Arthritis     Breast cancer     Cataract     os    COPD (chronic obstructive pulmonary disease)     Coronary artery disease     Disorder of kidney and ureter     DE LOS SANTOS (dyspnea on exertion)     Elevated cholesterol     Heart disease     Hypertension     Insomnia     Macular degeneration     OS    Obstetrical blood-clot embolism, postpartum     PVD (peripheral vascular disease)     Retinal detachment     OS    Stented coronary artery 2/25/2019 4/14 1. Moderate disease LAD/CX. 2. Diffuse significant ISR of RCA successfully treated 2.5 and 3.0 POBA only. 3. Patent LM and right renal stent.    Urinary incontinence      There are no hospital problems to display for this patient.       Objective:     Vitals:    03/24/22 1351   BP: 133/66   Pulse: 84        Physical Exam  Constitutional:        Appearance: She is well-developed.   HENT:      Head: Normocephalic.   Eyes:      Conjunctiva/sclera: Conjunctivae normal.   Neck:      Vascular: No JVD.   Cardiovascular:      Rate and Rhythm: Normal rate and regular rhythm.      Pulses: Intact distal pulses.           Carotid pulses are 2+ on the right side and 2+ on the left side.       Radial pulses are 2+ on the right side and 2+ on the left side.        Dorsalis pedis pulses are 2+ on the right side and 2+ on the left side.        Posterior tibial pulses are 2+ on the right side and 2+ on the left side.      Heart sounds: Murmur heard.    Harsh midsystolic murmur is present with a grade of 2/6 at the upper right sternal border radiating to the neck.  Pulmonary:      Effort: Pulmonary effort is normal.      Breath sounds: Normal breath sounds.   Abdominal:      General: Bowel sounds are normal.      Palpations: Abdomen is soft.   Musculoskeletal:         General: No tenderness.      Cervical back: Normal range of motion and neck supple.   Skin:     General: Skin is warm and dry.      Nails: There is no clubbing.   Neurological:      Mental Status: She is alert and oriented to person, place, and time.      Gait: Gait normal.   Psychiatric:         Speech: Speech normal.         Behavior: Behavior normal.         Thought Content: Thought content normal.               ..    Chemistry        Component Value Date/Time     07/06/2021 0857    K 4.4 07/06/2021 0857     07/06/2021 0857    CO2 32 (H) 07/06/2021 0857    BUN 24 07/06/2021 0857    CREATININE 0.9 07/06/2021 0857    GLU 97 07/06/2021 0857        Component Value Date/Time    CALCIUM 9.3 07/06/2021 0857    ALKPHOS 66 07/06/2021 0857    AST 22 07/06/2021 0857    ALT 19 07/06/2021 0857    BILITOT 0.3 07/06/2021 0857    ESTGFRAFRICA >60.0 07/06/2021 0857    EGFRNONAA 55.3 (A) 07/06/2021 0857            ..  Lab Results   Component Value Date    CHOL 130 07/06/2021    CHOL 156 02/01/2020    CHOL  170 08/19/2019     Lab Results   Component Value Date    HDL 52 07/06/2021    HDL 66 02/01/2020    HDL 51 08/19/2019     Lab Results   Component Value Date    LDLCALC 65.0 07/06/2021    LDLCALC 77.4 02/01/2020    LDLCALC 93.6 08/19/2019     Lab Results   Component Value Date    TRIG 65 07/06/2021    TRIG 63 02/01/2020    TRIG 127 08/19/2019     Lab Results   Component Value Date    CHOLHDL 40.0 07/06/2021    CHOLHDL 42.3 02/01/2020    CHOLHDL 30.0 08/19/2019     ..  Lab Results   Component Value Date    WBC 10.12 07/06/2021    HGB 11.6 (L) 07/06/2021    HCT 35.8 (L) 07/06/2021    MCV 97 07/06/2021     07/06/2021       Test(s) Reviewed  I have reviewed the following in detail:  [] Stress test   [] Angiography   [x] Echocardiogram   [x] Labs   [] Other:       Assessment:         ICD-10-CM ICD-9-CM   1. Dyslipidemia  E78.5 272.4   2. Stenosis of right carotid artery  I65.21 433.10   3. ANTWAN (renal artery stenosis)  I70.1 440.1   4. Peripheral vascular disease  I73.9 443.9   5. Essential hypertension  I10 401.9   6. Coronary artery disease involving native coronary artery of native heart without angina pectoris  I25.10 414.01   7. Nonrheumatic aortic valve stenosis  I35.0 424.1     Active Problem List with Overview Notes    Diagnosis Date Noted    Sleep related leg cramps 12/16/2021    Blindness of both eyes 12/16/2021    Skin tear of left forearm without complication 12/16/2021    Bronchitis 11/13/2021    Macular subretinal hemorrhage, right 09/22/2021    Exudative age-related macular degeneration of right eye with active choroidal neovascularization 09/16/2021    Cellulitis of right lower extremity 08/25/2020    Conductive hearing loss, bilateral 08/25/2020    Memory loss 08/25/2020    Unspecified inflammatory spondylopathy, lumbar region 05/28/2020    Cellulitis of left lower extremity 05/28/2020    Pedal edema 05/28/2020    Malignant neoplasm of left breast in female, estrogen receptor positive  10/07/2019    ANTWAN (renal artery stenosis) 02/25/2019 4/2014   Patent right renal stent.      Stented coronary artery 02/25/2019 4/14  1. Moderate disease LAD/CX.  2. Diffuse significant ISR of RCA successfully treated 2.5 and 3.0 POBA only.  3. Patent LM and right renal stent.      Diverticulitis 09/01/2018    Aortic atherosclerosis 08/16/2017     Lumbar xr 1/26/17      Tortuous aorta 08/16/2017     CXR 12/6/16      DDD (degenerative disc disease), lumbar 08/31/2016    Chronic kidney disease, stage III (moderate) 06/23/2016    Urinary incontinence 06/23/2016    Chronic obstructive pulmonary disease 06/23/2016    Stenosis of right carotid artery 06/23/2016 4/14   1. Moderate disease LAD/CX.  2. Diffuse significant ISR of RCA successfully treated 2.5 and 3.0 POBA only.  3. Patent LM and right renal stent    9/10 LM- promus 4x8, LAD [id number], RCA promus 2.5x23 & 2.75x8          Coronary artery disease involving native coronary artery of native heart without angina pectoris 06/23/2016    Aortic valve stenosis 06/23/2016    Essential hypertension 06/23/2016    Nuclear sclerosis of left eye 06/23/2016    Pseudophakia of right eye 06/23/2016    Anemia due to vitamin B12 deficiency 06/23/2016    Peripheral polyneuropathy 06/23/2016    Insomnia 06/23/2016    Osteoporosis, senile 06/23/2016    Arthritis 06/23/2016    Lichen sclerosus et atrophicus of the vulva 03/29/2016    Peripheral vascular disease 04/17/2013    Exudative age-related macular degeneration of left eye with inactive choroidal neovascularization 08/13/2012    Posterior vitreous detachment of both eyes 08/13/2012    At risk for falls 05/25/2012     Advanced age / multiple chronic medical conditions / polypharmacy       Legal blindness - Left Eye 01/13/2012    Dyslipidemia 01/03/2012        Plan:           Return to clinic 1 year   Low level/low impact aerobic exercise 5x's/wk. Heart healthy diet and risk factor  modification.    See labs and med orders.    .  Orders Placed This Encounter   Procedures    Lipid Panel    Comprehensive Metabolic Panel    CBC Auto Differential    TSH     F/u PCP    Portions of this note may have been created with voice recognition software.  Grammatical, syntax and spelling errors may be inevitable.

## 2022-03-25 ENCOUNTER — OFFICE VISIT (OUTPATIENT)
Dept: FAMILY MEDICINE | Facility: CLINIC | Age: 87
End: 2022-03-25
Payer: MEDICARE

## 2022-03-25 VITALS
HEIGHT: 62 IN | WEIGHT: 164.44 LBS | DIASTOLIC BLOOD PRESSURE: 80 MMHG | HEART RATE: 81 BPM | SYSTOLIC BLOOD PRESSURE: 124 MMHG | OXYGEN SATURATION: 95 % | BODY MASS INDEX: 30.26 KG/M2

## 2022-03-25 DIAGNOSIS — H91.93 DECREASED HEARING OF BOTH EARS: ICD-10-CM

## 2022-03-25 DIAGNOSIS — R19.03 RIGHT LOWER QUADRANT ABDOMINAL MASS: ICD-10-CM

## 2022-03-25 DIAGNOSIS — I25.10 CORONARY ARTERY DISEASE INVOLVING NATIVE CORONARY ARTERY OF NATIVE HEART WITHOUT ANGINA PECTORIS: Chronic | ICD-10-CM

## 2022-03-25 DIAGNOSIS — I10 ESSENTIAL HYPERTENSION: ICD-10-CM

## 2022-03-25 DIAGNOSIS — Z00.00 WELLNESS EXAMINATION: Primary | ICD-10-CM

## 2022-03-25 DIAGNOSIS — M46.96 UNSPECIFIED INFLAMMATORY SPONDYLOPATHY, LUMBAR REGION: ICD-10-CM

## 2022-03-25 DIAGNOSIS — J41.0 SIMPLE CHRONIC BRONCHITIS: ICD-10-CM

## 2022-03-25 DIAGNOSIS — N18.31 STAGE 3A CHRONIC KIDNEY DISEASE: ICD-10-CM

## 2022-03-25 DIAGNOSIS — Z85.3 HISTORY OF BREAST CANCER: ICD-10-CM

## 2022-03-25 DIAGNOSIS — R60.0 BILATERAL LEG EDEMA: ICD-10-CM

## 2022-03-25 DIAGNOSIS — H54.10 BLINDNESS OF RIGHT EYE WITH LOW VISION IN CONTRALATERAL EYE: ICD-10-CM

## 2022-03-25 PROBLEM — Z17.0 MALIGNANT NEOPLASM OF LEFT BREAST IN FEMALE, ESTROGEN RECEPTOR POSITIVE: Status: RESOLVED | Noted: 2019-10-07 | Resolved: 2022-03-25

## 2022-03-25 PROBLEM — C50.912 MALIGNANT NEOPLASM OF LEFT BREAST IN FEMALE, ESTROGEN RECEPTOR POSITIVE: Status: RESOLVED | Noted: 2019-10-07 | Resolved: 2022-03-25

## 2022-03-25 PROCEDURE — 99499 RISK ADDL DX/OHS AUDIT: ICD-10-PCS | Mod: HCNC,S$GLB,, | Performed by: INTERNAL MEDICINE

## 2022-03-25 PROCEDURE — 3288F FALL RISK ASSESSMENT DOCD: CPT | Mod: CPTII,S$GLB,, | Performed by: INTERNAL MEDICINE

## 2022-03-25 PROCEDURE — 99999 PR PBB SHADOW E&M-EST. PATIENT-LVL V: CPT | Mod: PBBFAC,,, | Performed by: INTERNAL MEDICINE

## 2022-03-25 PROCEDURE — 99214 PR OFFICE/OUTPT VISIT, EST, LEVL IV, 30-39 MIN: ICD-10-PCS | Mod: S$GLB,,, | Performed by: INTERNAL MEDICINE

## 2022-03-25 PROCEDURE — 1157F ADVNC CARE PLAN IN RCRD: CPT | Mod: CPTII,S$GLB,, | Performed by: INTERNAL MEDICINE

## 2022-03-25 PROCEDURE — 1157F PR ADVANCE CARE PLAN OR EQUIV PRESENT IN MEDICAL RECORD: ICD-10-PCS | Mod: CPTII,S$GLB,, | Performed by: INTERNAL MEDICINE

## 2022-03-25 PROCEDURE — 99214 OFFICE O/P EST MOD 30 MIN: CPT | Mod: S$GLB,,, | Performed by: INTERNAL MEDICINE

## 2022-03-25 PROCEDURE — 3288F PR FALLS RISK ASSESSMENT DOCUMENTED: ICD-10-PCS | Mod: CPTII,S$GLB,, | Performed by: INTERNAL MEDICINE

## 2022-03-25 PROCEDURE — 1101F PR PT FALLS ASSESS DOC 0-1 FALLS W/OUT INJ PAST YR: ICD-10-PCS | Mod: CPTII,S$GLB,, | Performed by: INTERNAL MEDICINE

## 2022-03-25 PROCEDURE — 1101F PT FALLS ASSESS-DOCD LE1/YR: CPT | Mod: CPTII,S$GLB,, | Performed by: INTERNAL MEDICINE

## 2022-03-25 PROCEDURE — 1126F PR PAIN SEVERITY QUANTIFIED, NO PAIN PRESENT: ICD-10-PCS | Mod: CPTII,S$GLB,, | Performed by: INTERNAL MEDICINE

## 2022-03-25 PROCEDURE — 99999 PR PBB SHADOW E&M-EST. PATIENT-LVL V: ICD-10-PCS | Mod: PBBFAC,,, | Performed by: INTERNAL MEDICINE

## 2022-03-25 PROCEDURE — 1160F RVW MEDS BY RX/DR IN RCRD: CPT | Mod: CPTII,S$GLB,, | Performed by: INTERNAL MEDICINE

## 2022-03-25 PROCEDURE — 1159F MED LIST DOCD IN RCRD: CPT | Mod: CPTII,S$GLB,, | Performed by: INTERNAL MEDICINE

## 2022-03-25 PROCEDURE — 1160F PR REVIEW ALL MEDS BY PRESCRIBER/CLIN PHARMACIST DOCUMENTED: ICD-10-PCS | Mod: CPTII,S$GLB,, | Performed by: INTERNAL MEDICINE

## 2022-03-25 PROCEDURE — 1126F AMNT PAIN NOTED NONE PRSNT: CPT | Mod: CPTII,S$GLB,, | Performed by: INTERNAL MEDICINE

## 2022-03-25 PROCEDURE — 99499 UNLISTED E&M SERVICE: CPT | Mod: HCNC,S$GLB,, | Performed by: INTERNAL MEDICINE

## 2022-03-25 PROCEDURE — 1159F PR MEDICATION LIST DOCUMENTED IN MEDICAL RECORD: ICD-10-PCS | Mod: CPTII,S$GLB,, | Performed by: INTERNAL MEDICINE

## 2022-03-25 NOTE — PROGRESS NOTES
Patient ID: Sheree Pugh     Chief Complaint:   Chief Complaint   Patient presents with    wellness exam        HPI:  Annual exam.  She does complain of a tender mass in the right lower quadrant of her abdomen that is been there for at least a month.  She denies any overlying rash that her vision is a bit blurry nowadays.  Another doctor felt it and thought to be a hernia.  What I feel today I find a fusiform hard this just under the skin over right lower quadrant that is tender when I push on it.  I did review a CT scan of her belly from approximately 4 years ago and did not show any abnormalities in that area.  She did have extensive diverticulitis at that time but that is on the opposite side of her belly.  With her permission I would like to get a CT scan to further delineate what this is.  She does still have her usual decreased vision and decreased hearing and I will look into her ears to see if she has any wax impactions.  She got a good report from cardiologist yet she still does have some bilateral lower extremity edema mainly around the ankles.  She is not wearing her compression socks today but she does wear them at home and I do recommend that she not sleep with them.  He is due for blood work and I will get that tomorrow in advance of the CT scan.  She was wondering if it could be cancer or it could be a hernia.  Her blood pressure is very well controlled.    Review of Systems   Constitutional: Negative.    HENT: Negative.    Eyes: Negative.    Respiratory: Negative.    Cardiovascular: Negative.    Gastrointestinal: Positive for abdominal pain.   Endocrine: Negative.    Genitourinary: Negative.    Musculoskeletal: Negative.    Skin: Negative.    Allergic/Immunologic: Negative.    Neurological: Negative.    Hematological: Negative.    Psychiatric/Behavioral: Negative.           Objective:      Physical Exam   Physical Exam  Vitals and nursing note reviewed.   Constitutional:       Appearance: Normal  "appearance. She is well-developed.   HENT:      Head: Normocephalic and atraumatic.      Right Ear: Tympanic membrane, ear canal and external ear normal.      Left Ear: Tympanic membrane, ear canal and external ear normal.      Nose: Nose normal.   Eyes:      Extraocular Movements: Extraocular movements intact.      Conjunctiva/sclera: Conjunctivae normal.      Pupils: Pupils are equal, round, and reactive to light.   Cardiovascular:      Rate and Rhythm: Normal rate and regular rhythm.      Pulses: Normal pulses.      Heart sounds: Murmur heard.   Pulmonary:      Effort: Pulmonary effort is normal.      Comments: Decreased breath sounds bilaterally   Abdominal:      General: Bowel sounds are normal.      Palpations: Abdomen is soft. There is mass.      Tenderness: There is abdominal tenderness.      Comments: Egg sized Right Lower Quadrant mass that's Tenderness to Palpation    Musculoskeletal:         General: Normal range of motion.      Cervical back: Normal range of motion and neck supple.   Skin:     General: Skin is warm and dry.      Capillary Refill: Capillary refill takes less than 2 seconds.   Neurological:      General: No focal deficit present.      Mental Status: She is alert and oriented to person, place, and time.   Psychiatric:         Mood and Affect: Mood normal.         Behavior: Behavior normal.         Thought Content: Thought content normal.         Judgment: Judgment normal.            Vitals:   Vitals:    03/25/22 1305   BP: 124/80   Pulse: 81   SpO2: 95%   Weight: 74.6 kg (164 lb 7.4 oz)   Height: 5' 2" (1.575 m)          Current Outpatient Medications:     ACETAMINOPHEN (TYLENOL ARTHRITIS ORAL), Take 2 tablets by mouth daily as needed. , Disp: , Rfl:     albuterol (VENTOLIN HFA) 90 mcg/actuation inhaler, Inhale 2 puffs into the lungs every 6 (six) hours as needed for Wheezing or Shortness of Breath., Disp: 18 g, Rfl: 2    atorvastatin (LIPITOR) 20 MG tablet, Take 1 tablet (20 mg total) " "by mouth once daily., Disp: 90 tablet, Rfl: 4    budesonide-formoterol 160-4.5 mcg (SYMBICORT) 160-4.5 mcg/actuation HFAA, Inhale 2 puffs into the lungs 2 (two) times daily. Controller., Disp: 30.6 g, Rfl: 3    cloNIDine (CATAPRES) 0.1 MG tablet, Take 1 tablet (0.1 mg total) by mouth 3 (three) times daily as needed (PRN SBP > 165 mmHg)., Disp: 90 tablet, Rfl: 6    clopidogreL (PLAVIX) 75 mg tablet, Take 1 tablet (75 mg total) by mouth once daily., Disp: 90 tablet, Rfl: 4    cyanocobalamin 1,000 mcg/mL injection, INJECT 1CC (1ML) INTRAMUSCULARLY EVERY THREE WEEKS, Disp: 10 mL, Rfl: 3    diltiaZEM (CARDIZEM CD) 180 MG 24 hr capsule, Take 1 capsule (180 mg total) by mouth every morning., Disp: 90 capsule, Rfl: 4    furosemide (LASIX) 20 MG tablet, Take 2 tablets (40 mg total) by mouth daily as needed (edema)., Disp: 90 tablet, Rfl: 3    gabapentin (NEURONTIN) 100 MG capsule, Take 1 capsule (100 mg total) by mouth every evening., Disp: 30 capsule, Rfl: 11    isosorbide mononitrate (IMDUR) 60 MG 24 hr tablet, Take 1 tablet (60 mg total) by mouth every evening., Disp: 90 tablet, Rfl: 4    mupirocin (BACTROBAN) 2 % ointment, Apply topically 3 (three) times daily., Disp: 30 g, Rfl: 1    mupirocin (BACTROBAN) 2 % ointment, Apply to affected area 2 times daily, Disp: 60 g, Rfl: 0    nortriptyline (PAMELOR) 10 MG capsule, Take 1 capsule (10 mg total) by mouth every evening., Disp: 30 capsule, Rfl: 11    ondansetron (ZOFRAN) 4 MG tablet, Take 1 tablet (4 mg total) by mouth every 8 (eight) hours as needed for Nausea., Disp: 12 tablet, Rfl: 0    oxybutynin (DITROPAN-XL) 10 MG 24 hr tablet, Take 1 tablet (10 mg total) by mouth once daily., Disp: 90 tablet, Rfl: 3    potassium chloride SA (K-DUR,KLOR-CON) 20 MEQ tablet, Take 1 tablet (20 mEq total) by mouth daily as needed., Disp: 90 tablet, Rfl: 4    syringe with needle (BD LUER-FRANCI SYRINGE) 3 mL 25 gauge x 1" Syrg, USE TO INJECT B-12 AS DIRECTED, Disp: 10 Syringe, " Rfl: 11    tamoxifen (NOLVADEX) 20 MG Tab, Take 1 tablet (20 mg total) by mouth once daily., Disp: 90 tablet, Rfl: 3    tiotropium (SPIRIVA WITH HANDIHALER) 18 mcg inhalation capsule, Inhale 1 capsule (18 mcg total) into the lungs once daily using handihaler., Disp: 90 capsule, Rfl: 3    vitamins  A,C,E-zinc-copper (PRESERVISION AREDS) [phone] unit-mg-unit Cap, Take by mouth., Disp: , Rfl:    Assessment:       Patient Active Problem List    Diagnosis Date Noted    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 03/25/2022    Decreased hearing of both ears 03/25/2022    Right lower quadrant abdominal mass 03/25/2022    Sleep related leg cramps 12/16/2021    Blindness of both eyes 12/16/2021    Skin tear of left forearm without complication 12/16/2021    Simple chronic bronchitis 11/13/2021    Macular subretinal hemorrhage, right 09/22/2021    Exudative age-related macular degeneration of right eye with active choroidal neovascularization 09/16/2021    Cellulitis of right lower extremity 08/25/2020    Conductive hearing loss, bilateral 08/25/2020    Memory loss 08/25/2020    Unspecified inflammatory spondylopathy, lumbar region 05/28/2020    Cellulitis of left lower extremity 05/28/2020    Bilateral leg edema 05/28/2020    ANTWAN (renal artery stenosis) 02/25/2019    Stented coronary artery 02/25/2019    Diverticulitis 09/01/2018    Aortic atherosclerosis 08/16/2017    Tortuous aorta 08/16/2017    DDD (degenerative disc disease), lumbar 08/31/2016    Stage 3a chronic kidney disease 06/23/2016    Urinary incontinence 06/23/2016    Chronic obstructive pulmonary disease 06/23/2016    Stenosis of right carotid artery 06/23/2016    Coronary artery disease involving native coronary artery of native heart without angina pectoris 06/23/2016    Aortic valve stenosis 06/23/2016    Essential hypertension 06/23/2016    Nuclear sclerosis of left eye 06/23/2016    Pseudophakia of  right eye 06/23/2016    Anemia due to vitamin B12 deficiency 06/23/2016    Peripheral polyneuropathy 06/23/2016    Insomnia 06/23/2016    Osteoporosis, senile 06/23/2016    Arthritis 06/23/2016    Lichen sclerosus et atrophicus of the vulva 03/29/2016    Peripheral vascular disease 04/17/2013    Exudative age-related macular degeneration of left eye with inactive choroidal neovascularization 08/13/2012    Posterior vitreous detachment of both eyes 08/13/2012    At risk for falls 05/25/2012    Legal blindness - Left Eye 01/13/2012    Dyslipidemia 01/03/2012          Plan:       Sheree Pugh  was seen today for follow-up and may need lab work.    Diagnoses and all orders for this visit:    Sheree was seen today for wellness exam.    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

## 2022-03-26 ENCOUNTER — LAB VISIT (OUTPATIENT)
Dept: LAB | Facility: HOSPITAL | Age: 87
End: 2022-03-26
Attending: INTERNAL MEDICINE
Payer: MEDICARE

## 2022-03-26 DIAGNOSIS — I10 ESSENTIAL HYPERTENSION: Chronic | ICD-10-CM

## 2022-03-26 DIAGNOSIS — E78.5 DYSLIPIDEMIA: Chronic | ICD-10-CM

## 2022-03-26 DIAGNOSIS — I73.9 PERIPHERAL VASCULAR DISEASE: ICD-10-CM

## 2022-03-26 DIAGNOSIS — I70.1 RAS (RENAL ARTERY STENOSIS): ICD-10-CM

## 2022-03-26 DIAGNOSIS — I65.21 STENOSIS OF RIGHT CAROTID ARTERY: Chronic | ICD-10-CM

## 2022-03-26 LAB
ALBUMIN SERPL BCP-MCNC: 3 G/DL (ref 3.5–5.2)
ALP SERPL-CCNC: 75 U/L (ref 55–135)
ALT SERPL W/O P-5'-P-CCNC: 11 U/L (ref 10–44)
ANION GAP SERPL CALC-SCNC: 10 MMOL/L (ref 8–16)
AST SERPL-CCNC: 16 U/L (ref 10–40)
BASOPHILS # BLD AUTO: 0.07 K/UL (ref 0–0.2)
BASOPHILS NFR BLD: 0.9 % (ref 0–1.9)
BILIRUB SERPL-MCNC: 0.2 MG/DL (ref 0.1–1)
BUN SERPL-MCNC: 19 MG/DL (ref 10–30)
CALCIUM SERPL-MCNC: 9.6 MG/DL (ref 8.7–10.5)
CHLORIDE SERPL-SCNC: 109 MMOL/L (ref 95–110)
CHOLEST SERPL-MCNC: 125 MG/DL (ref 120–199)
CHOLEST/HDLC SERPL: 2.7 {RATIO} (ref 2–5)
CO2 SERPL-SCNC: 26 MMOL/L (ref 23–29)
CREAT SERPL-MCNC: 0.8 MG/DL (ref 0.5–1.4)
DIFFERENTIAL METHOD: ABNORMAL
EOSINOPHIL # BLD AUTO: 0.2 K/UL (ref 0–0.5)
EOSINOPHIL NFR BLD: 2.8 % (ref 0–8)
ERYTHROCYTE [DISTWIDTH] IN BLOOD BY AUTOMATED COUNT: 14.9 % (ref 11.5–14.5)
EST. GFR  (AFRICAN AMERICAN): >60 ML/MIN/1.73 M^2
EST. GFR  (NON AFRICAN AMERICAN): >60 ML/MIN/1.73 M^2
GLUCOSE SERPL-MCNC: 96 MG/DL (ref 70–110)
HCT VFR BLD AUTO: 36.1 % (ref 37–48.5)
HDLC SERPL-MCNC: 47 MG/DL (ref 40–75)
HDLC SERPL: 37.6 % (ref 20–50)
HGB BLD-MCNC: 11.8 G/DL (ref 12–16)
IMM GRANULOCYTES # BLD AUTO: 0.06 K/UL (ref 0–0.04)
IMM GRANULOCYTES NFR BLD AUTO: 0.8 % (ref 0–0.5)
LDLC SERPL CALC-MCNC: 64.8 MG/DL (ref 63–159)
LYMPHOCYTES # BLD AUTO: 1.9 K/UL (ref 1–4.8)
LYMPHOCYTES NFR BLD: 25.4 % (ref 18–48)
MCH RBC QN AUTO: 31.5 PG (ref 27–31)
MCHC RBC AUTO-ENTMCNC: 32.7 G/DL (ref 32–36)
MCV RBC AUTO: 96 FL (ref 82–98)
MONOCYTES # BLD AUTO: 1.1 K/UL (ref 0.3–1)
MONOCYTES NFR BLD: 14.3 % (ref 4–15)
NEUTROPHILS # BLD AUTO: 4.1 K/UL (ref 1.8–7.7)
NEUTROPHILS NFR BLD: 55.8 % (ref 38–73)
NONHDLC SERPL-MCNC: 78 MG/DL
NRBC BLD-RTO: 0 /100 WBC
PLATELET # BLD AUTO: 227 K/UL (ref 150–450)
PMV BLD AUTO: 10.1 FL (ref 9.2–12.9)
POTASSIUM SERPL-SCNC: 4.7 MMOL/L (ref 3.5–5.1)
PROT SERPL-MCNC: 6.5 G/DL (ref 6–8.4)
RBC # BLD AUTO: 3.75 M/UL (ref 4–5.4)
SODIUM SERPL-SCNC: 145 MMOL/L (ref 136–145)
TRIGL SERPL-MCNC: 66 MG/DL (ref 30–150)
TSH SERPL DL<=0.005 MIU/L-ACNC: 3.57 UIU/ML (ref 0.4–4)
WBC # BLD AUTO: 7.41 K/UL (ref 3.9–12.7)

## 2022-03-26 PROCEDURE — 85025 COMPLETE CBC W/AUTO DIFF WBC: CPT | Performed by: INTERNAL MEDICINE

## 2022-03-26 PROCEDURE — 80061 LIPID PANEL: CPT | Performed by: INTERNAL MEDICINE

## 2022-03-26 PROCEDURE — 84443 ASSAY THYROID STIM HORMONE: CPT | Performed by: INTERNAL MEDICINE

## 2022-03-26 PROCEDURE — 36415 COLL VENOUS BLD VENIPUNCTURE: CPT | Mod: PO | Performed by: INTERNAL MEDICINE

## 2022-03-26 PROCEDURE — 80053 COMPREHEN METABOLIC PANEL: CPT | Performed by: INTERNAL MEDICINE

## 2022-04-06 ENCOUNTER — PATIENT MESSAGE (OUTPATIENT)
Dept: CARDIOLOGY | Facility: CLINIC | Age: 87
End: 2022-04-06
Payer: MEDICARE

## 2022-04-18 ENCOUNTER — PATIENT MESSAGE (OUTPATIENT)
Dept: OPHTHALMOLOGY | Facility: CLINIC | Age: 87
End: 2022-04-18
Payer: MEDICARE

## 2022-04-21 DIAGNOSIS — N39.41 URGE INCONTINENCE OF URINE: ICD-10-CM

## 2022-04-21 RX ORDER — OXYBUTYNIN CHLORIDE 10 MG/1
10 TABLET, EXTENDED RELEASE ORAL DAILY
Qty: 90 TABLET | Refills: 3 | Status: SHIPPED | OUTPATIENT
Start: 2022-04-21 | End: 2023-04-20 | Stop reason: SDUPTHER

## 2022-05-02 ENCOUNTER — DOCUMENTATION ONLY (OUTPATIENT)
Dept: PHARMACY | Facility: CLINIC | Age: 87
End: 2022-05-02
Payer: MEDICARE

## 2022-05-02 NOTE — PROGRESS NOTES
I administered Cyanocobalamin 1,000mcg/ml in the LD at 1:46pm. She tolerated well.    Lot#  [id number]

## 2022-05-06 DIAGNOSIS — G62.9 PERIPHERAL POLYNEUROPATHY: ICD-10-CM

## 2022-05-06 DIAGNOSIS — G47.01 INSOMNIA DUE TO MEDICAL CONDITION: ICD-10-CM

## 2022-05-06 NOTE — TELEPHONE ENCOUNTER
No new care gaps identified.  [facility] Embedded Care Gaps. Reference number: [id number]. 5/06/2022   5:08:57 AM JANETHT

## 2022-05-06 NOTE — TELEPHONE ENCOUNTER
Refill Routing Note   Medication(s) are not appropriate for processing by [facility] for the following reason(s):      - Indication is outside of scope for [facility]    [facility] action(s):  Route          Medication reconciliation completed: No     Appointments  past 12m or future 3m with PCP    Date Provider   Last Visit   3/25/2022 Piotr Walker MD   Next Visit   9/30/2022 Piotr Walker MD   ED visits in past 90 days: 0        Note composed:11:18 AM 05/06/2022

## 2022-05-07 RX ORDER — NORTRIPTYLINE HYDROCHLORIDE 10 MG/1
10 CAPSULE ORAL NIGHTLY
Qty: 90 CAPSULE | Refills: 3 | Status: SHIPPED | OUTPATIENT
Start: 2022-05-07 | End: 2023-06-15 | Stop reason: SDUPTHER

## 2022-05-09 ENCOUNTER — PATIENT MESSAGE (OUTPATIENT)
Dept: SMOKING CESSATION | Facility: CLINIC | Age: 87
End: 2022-05-09
Payer: MEDICARE

## 2022-05-13 ENCOUNTER — PATIENT MESSAGE (OUTPATIENT)
Dept: FAMILY MEDICINE | Facility: CLINIC | Age: 87
End: 2022-05-13
Payer: MEDICARE

## 2022-05-17 ENCOUNTER — PATIENT MESSAGE (OUTPATIENT)
Dept: FAMILY MEDICINE | Facility: CLINIC | Age: 87
End: 2022-05-17
Payer: MEDICARE

## 2022-05-24 ENCOUNTER — PATIENT MESSAGE (OUTPATIENT)
Dept: FAMILY MEDICINE | Facility: CLINIC | Age: 87
End: 2022-05-24
Payer: MEDICARE

## 2022-05-24 ENCOUNTER — TELEPHONE (OUTPATIENT)
Dept: FAMILY MEDICINE | Facility: CLINIC | Age: 87
End: 2022-05-24
Payer: MEDICARE

## 2022-06-02 ENCOUNTER — OFFICE VISIT (OUTPATIENT)
Dept: PODIATRY | Facility: CLINIC | Age: 87
End: 2022-06-02
Payer: MEDICARE

## 2022-06-02 DIAGNOSIS — G60.9 IDIOPATHIC PERIPHERAL NEUROPATHY: Primary | ICD-10-CM

## 2022-06-02 DIAGNOSIS — B35.1 ONYCHOMYCOSIS: ICD-10-CM

## 2022-06-02 DIAGNOSIS — I87.2 VENOUS INSUFFICIENCY OF BOTH LOWER EXTREMITIES: ICD-10-CM

## 2022-06-02 DIAGNOSIS — R60.0 PERIPHERAL EDEMA: ICD-10-CM

## 2022-06-02 DIAGNOSIS — M72.2 PLANTAR FASCIITIS: ICD-10-CM

## 2022-06-02 PROCEDURE — 99999 PR PBB SHADOW E&M-EST. PATIENT-LVL I: ICD-10-PCS | Mod: PBBFAC,,, | Performed by: PODIATRIST

## 2022-06-02 PROCEDURE — 1157F ADVNC CARE PLAN IN RCRD: CPT | Mod: CPTII,S$GLB,, | Performed by: PODIATRIST

## 2022-06-02 PROCEDURE — 99499 UNLISTED E&M SERVICE: CPT | Mod: S$GLB,,, | Performed by: PODIATRIST

## 2022-06-02 PROCEDURE — 99999 PR PBB SHADOW E&M-EST. PATIENT-LVL I: CPT | Mod: PBBFAC,,, | Performed by: PODIATRIST

## 2022-06-02 PROCEDURE — 99499 NO LOS: ICD-10-PCS | Mod: S$GLB,,, | Performed by: PODIATRIST

## 2022-06-02 PROCEDURE — 11721 PR DEBRIDEMENT OF NAILS, 6 OR MORE: ICD-10-PCS | Mod: Q9,S$GLB,, | Performed by: PODIATRIST

## 2022-06-02 PROCEDURE — 1159F MED LIST DOCD IN RCRD: CPT | Mod: CPTII,S$GLB,, | Performed by: PODIATRIST

## 2022-06-02 PROCEDURE — 1157F PR ADVANCE CARE PLAN OR EQUIV PRESENT IN MEDICAL RECORD: ICD-10-PCS | Mod: CPTII,S$GLB,, | Performed by: PODIATRIST

## 2022-06-02 PROCEDURE — 1159F PR MEDICATION LIST DOCUMENTED IN MEDICAL RECORD: ICD-10-PCS | Mod: CPTII,S$GLB,, | Performed by: PODIATRIST

## 2022-06-02 PROCEDURE — 11721 DEBRIDE NAIL 6 OR MORE: CPT | Mod: Q9,S$GLB,, | Performed by: PODIATRIST

## 2022-06-02 PROCEDURE — 1125F AMNT PAIN NOTED PAIN PRSNT: CPT | Mod: CPTII,S$GLB,, | Performed by: PODIATRIST

## 2022-06-02 PROCEDURE — 1125F PR PAIN SEVERITY QUANTIFIED, PAIN PRESENT: ICD-10-PCS | Mod: CPTII,S$GLB,, | Performed by: PODIATRIST

## 2022-06-03 NOTE — PROGRESS NOTES
Subjective:      Patient ID: Sheree Pugh is a 89 y.o. female.    Chief Complaint: idiopathic peripheral neuropathy (Rt heel pain and toe nail check )    Sheree is a 89 y.o. female who presents to the clinic for evaluation and treatment of high risk feet. Sheree has a past medical history of Anticoagulant long-term use, ARMD (age related macular degeneration), Arthritis, Breast cancer, Cataract, COPD (chronic obstructive pulmonary disease), Coronary artery disease, Disorder of kidney and ureter, [place] (dyspnea on exertion), Elevated cholesterol, Heart disease, Hypertension, Insomnia, Macular degeneration, Obstetrical blood-clot embolism, postpartum, PVD (peripheral vascular disease), Retinal detachment, Stented coronary artery (2/25/2019), and Urinary incontinence. The patient's chief complaint is toenails that are in need of trimming as she is vision imparied.  Denies the nails being a source of pain.  Requests to have them trimmed.  States she has been experiencing pain in the Rt. Heel with weight bearing.  Notes wearing primarily a pair of UGZ slip on shoes.  Denies recent injury to said heel.  Has not attempted to self treat.  States symptoms are consistently sharp in presentation and exacerbated only with weight bearing.  Symptoms are alleviated with rest.  This patient has documented high risk feet requiring routine maintenance secondary to peripheral neuropathy.    PCP: Piotr Walker MD    Date Last Seen by PCP: 3/22      Hemoglobin A1C   Date Value Ref Range Status   02/01/2020 5.7 (H) 4.0 - 5.6 % Final     Comment:     ADA Screening Guidelines:  5.7-6.4%  Consistent with prediabetes  >or=6.5%  Consistent with diabetes  High levels of fetal hemoglobin interfere with the HbA1C  assay. Heterozygous hemoglobin variants (HbS, HgC, etc)do  not significantly interfere with this assay.   However, presence of multiple variants may affect accuracy.     09/01/2018 5.8 (H) 0.0 - 5.6 % Final     Comment:     Reference  Interval:  5.0 - 5.6 Normal   5.7 - 6.4 High Risk   > 6.5 Diabetic    Hgb A1c results are standardized based on the (NGSP) National   Glycohemoglobin Standardization Program.    Hemoglobin A1C levels are related to mean serum/plasma glucose   during the preceding 2-3 months.        2015 5.8 4.5 - 6.2 % Final       Review of Systems   Constitutional: Negative for chills and fever.   Cardiovascular: Positive for leg swelling. Negative for claudication.   Skin: Positive for nail changes.   Musculoskeletal: Positive for joint swelling and myalgias. Negative for muscle cramps and muscle weakness.   Gastrointestinal: Negative for nausea and vomiting.   Neurological: Positive for numbness. Negative for paresthesias.   Psychiatric/Behavioral: Negative for altered mental status.           Objective:      Physical Exam  Constitutional:       Appearance: Normal appearance. She is not ill-appearing.   Cardiovascular:      Pulses:           Dorsalis pedis pulses are 2+ on the right side and 2+ on the left side.        Posterior tibial pulses are 2+ on the right side and 2+ on the left side.      Comments: CFT is < 3 seconds bilateral.  Pedal hair growth is decreased bilateral.  Varicosities noted bilateral.   Toes are cool to touch bilateral.    Musculoskeletal:         General: Swelling and tenderness present.      Right lower le+ Edema present.      Left lower le+ Edema present.      Comments: Muscle strength 5/5 in all muscle groups bilateral.  No tenderness nor crepitation with ROM of foot/ankle joints bilateral.  Mild pain with palpation to the plantar fascia at its insertion to the Rt. Medial calcaneal tubercle.  Bilateral pes planus foot type.   Skin:     General: Skin is warm and dry.      Capillary Refill: Capillary refill takes 2 to 3 seconds.      Findings: No bruising, ecchymosis, erythema, signs of injury, laceration, lesion, petechiae, rash or wound.      Comments: Pedal skin appears edematous  bilateral.  Toenails x 10 appear thickened by 4 mm, elongated by 4 mm, and discolored with subungual debris.   Examination of the skin reveals no evidence of significant maceration, rashes, open lesions, suspicious appearing nevi or other concerning lesions.    Neurological:      Mental Status: She is alert.      Sensory: Sensory deficit present.      Motor: No weakness or atrophy.      Comments: Protective sensation per Sobieski-Elvi monofilament is absent bilateral.  Light touch is absent bilateral.               Assessment:       Encounter Diagnoses   Name Primary?    Idiopathic peripheral neuropathy Yes    Onychomycosis     Peripheral edema     Venous insufficiency of both lower extremities     Plantar fasciitis - Right Foot          Plan:       Sheree was seen today for idiopathic peripheral neuropathy.    Diagnoses and all orders for this visit:    Idiopathic peripheral neuropathy    Onychomycosis    Peripheral edema    Venous insufficiency of both lower extremities    Plantar fasciitis - Right Foot      I counseled the patient on her conditions, their implications and medical management.      - Patient has a mild case of plantar fasciitis of the Rt. Heel.  Suspect this is secondary to poor shoe gear.  Advised to revert to use of SAS shoes throughout the day.  This will also help to minimize pressure to the dorsum of the forefeet while edematous.    - Advised to elevate the legs as she is able.    - Shoe inspection. Patient instructed on proper foot hygeine. We discussed wearing proper shoe gear, daily foot inspections, never walking without protective shoe gear, never putting sharp instruments to feet, routine podiatric nail visits every 4 months.      - With patient's permission, nails were aggressively reduced and debrided x 10 to their soft tissue attachment mechanically and with electric , removing all offending nail and debris. Patient relates relief following the procedure. She will  continue to monitor the areas daily, inspect her feet, wear protective shoe gear when ambulatory, moisturizer to maintain skin integrity and follow in this office in approximately 4 months, sooner p.r.n.    Marquez Ramirez DPM

## 2022-06-13 ENCOUNTER — OFFICE VISIT (OUTPATIENT)
Dept: OPHTHALMOLOGY | Facility: CLINIC | Age: 87
End: 2022-06-13
Payer: MEDICARE

## 2022-06-13 DIAGNOSIS — H35.61: ICD-10-CM

## 2022-06-13 DIAGNOSIS — H35.3211 EXUDATIVE AGE-RELATED MACULAR DEGENERATION OF RIGHT EYE WITH ACTIVE CHOROIDAL NEOVASCULARIZATION: Primary | ICD-10-CM

## 2022-06-13 DIAGNOSIS — H43.813 POSTERIOR VITREOUS DETACHMENT OF BOTH EYES: ICD-10-CM

## 2022-06-13 DIAGNOSIS — H35.3222 EXUDATIVE AGE-RELATED MACULAR DEGENERATION OF LEFT EYE WITH INACTIVE CHOROIDAL NEOVASCULARIZATION: ICD-10-CM

## 2022-06-13 PROCEDURE — 1157F PR ADVANCE CARE PLAN OR EQUIV PRESENT IN MEDICAL RECORD: ICD-10-PCS | Mod: CPTII,S$GLB,, | Performed by: OPHTHALMOLOGY

## 2022-06-13 PROCEDURE — 1160F PR REVIEW ALL MEDS BY PRESCRIBER/CLIN PHARMACIST DOCUMENTED: ICD-10-PCS | Mod: CPTII,S$GLB,, | Performed by: OPHTHALMOLOGY

## 2022-06-13 PROCEDURE — 1159F PR MEDICATION LIST DOCUMENTED IN MEDICAL RECORD: ICD-10-PCS | Mod: CPTII,S$GLB,, | Performed by: OPHTHALMOLOGY

## 2022-06-13 PROCEDURE — 1157F ADVNC CARE PLAN IN RCRD: CPT | Mod: CPTII,S$GLB,, | Performed by: OPHTHALMOLOGY

## 2022-06-13 PROCEDURE — 92014 COMPRE OPH EXAM EST PT 1/>: CPT | Mod: S$GLB,,, | Performed by: OPHTHALMOLOGY

## 2022-06-13 PROCEDURE — 1160F RVW MEDS BY RX/DR IN RCRD: CPT | Mod: CPTII,S$GLB,, | Performed by: OPHTHALMOLOGY

## 2022-06-13 PROCEDURE — 92201 PR OPHTHALMOSCOPY, EXT, W/RET DRAW/SCLERAL DEPR, I&R, UNI/BI: ICD-10-PCS | Mod: S$GLB,,, | Performed by: OPHTHALMOLOGY

## 2022-06-13 PROCEDURE — 92134 CPTRZ OPH DX IMG PST SGM RTA: CPT | Mod: S$GLB,,, | Performed by: OPHTHALMOLOGY

## 2022-06-13 PROCEDURE — 1126F AMNT PAIN NOTED NONE PRSNT: CPT | Mod: CPTII,S$GLB,, | Performed by: OPHTHALMOLOGY

## 2022-06-13 PROCEDURE — 99999 PR PBB SHADOW E&M-EST. PATIENT-LVL IV: ICD-10-PCS | Mod: PBBFAC,,, | Performed by: OPHTHALMOLOGY

## 2022-06-13 PROCEDURE — 99999 PR PBB SHADOW E&M-EST. PATIENT-LVL IV: CPT | Mod: PBBFAC,,, | Performed by: OPHTHALMOLOGY

## 2022-06-13 PROCEDURE — 3288F FALL RISK ASSESSMENT DOCD: CPT | Mod: CPTII,S$GLB,, | Performed by: OPHTHALMOLOGY

## 2022-06-13 PROCEDURE — 92201 OPSCPY EXTND RTA DRAW UNI/BI: CPT | Mod: S$GLB,,, | Performed by: OPHTHALMOLOGY

## 2022-06-13 PROCEDURE — 1126F PR PAIN SEVERITY QUANTIFIED, NO PAIN PRESENT: ICD-10-PCS | Mod: CPTII,S$GLB,, | Performed by: OPHTHALMOLOGY

## 2022-06-13 PROCEDURE — 1159F MED LIST DOCD IN RCRD: CPT | Mod: CPTII,S$GLB,, | Performed by: OPHTHALMOLOGY

## 2022-06-13 PROCEDURE — 92014 PR EYE EXAM, EST PATIENT,COMPREHESV: ICD-10-PCS | Mod: S$GLB,,, | Performed by: OPHTHALMOLOGY

## 2022-06-13 PROCEDURE — 1101F PR PT FALLS ASSESS DOC 0-1 FALLS W/OUT INJ PAST YR: ICD-10-PCS | Mod: CPTII,S$GLB,, | Performed by: OPHTHALMOLOGY

## 2022-06-13 PROCEDURE — 1101F PT FALLS ASSESS-DOCD LE1/YR: CPT | Mod: CPTII,S$GLB,, | Performed by: OPHTHALMOLOGY

## 2022-06-13 PROCEDURE — 3288F PR FALLS RISK ASSESSMENT DOCUMENTED: ICD-10-PCS | Mod: CPTII,S$GLB,, | Performed by: OPHTHALMOLOGY

## 2022-06-13 PROCEDURE — 92134 POSTERIOR SEGMENT OCT RETINA (OCULAR COHERENCE TOMOGRAPHY)-BOTH EYES: ICD-10-PCS | Mod: S$GLB,,, | Performed by: OPHTHALMOLOGY

## 2022-06-13 NOTE — PROGRESS NOTES
HPI     DLS:  3/7/22    AT's PRN OU     Pt here for 3 month OCT and DFE. Pt states in the morning she has difficulty opening her eyes due to crusting.  Pt states FBS OD. Pt states she has banana looking objects OU.           OCT - OD sig SR heme displaced, still with Sub RPE heme as expected  OS - cicatrix with atrophy      A/P    1. Wet AMD OS - cicatrix.   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 - Fundus photos today    Conservative mgmt.  Poor Va potential.    2. New Wet AMD OD  With large SR macular hemorrhage started 3 days  S/p Avastin OD x 14  s/p 25g PPV/subretinal tPa/partial AFx/SF6/Avastin OD 9/22/21 1/22 - still with active heme around cicatrix  3/22 - stable    Continue observation    3. PVD OU    4. PCIOL OD    5. NS OS    6. Anatomically narrow OS - ? Lens effect    7.  On plavix    8. Ariel Bonnet Syndrome        6 month OCT and dilate

## 2022-06-17 ENCOUNTER — PATIENT MESSAGE (OUTPATIENT)
Dept: ADMINISTRATIVE | Facility: CLINIC | Age: 87
End: 2022-06-17
Payer: MEDICARE

## 2022-06-17 ENCOUNTER — NURSE TRIAGE (OUTPATIENT)
Dept: ADMINISTRATIVE | Facility: CLINIC | Age: 87
End: 2022-06-17
Payer: MEDICARE

## 2022-06-17 ENCOUNTER — OFFICE VISIT (OUTPATIENT)
Dept: URGENT CARE | Facility: CLINIC | Age: 87
End: 2022-06-17
Payer: MEDICARE

## 2022-06-17 VITALS
HEIGHT: 62 IN | BODY MASS INDEX: 30.18 KG/M2 | SYSTOLIC BLOOD PRESSURE: 142 MMHG | OXYGEN SATURATION: 97 % | RESPIRATION RATE: 18 BRPM | HEART RATE: 86 BPM | WEIGHT: 164 LBS | DIASTOLIC BLOOD PRESSURE: 62 MMHG | TEMPERATURE: 101 F

## 2022-06-17 DIAGNOSIS — R05.9 COUGH: ICD-10-CM

## 2022-06-17 DIAGNOSIS — U07.1 COVID-19: Primary | ICD-10-CM

## 2022-06-17 LAB
CTP QC/QA: YES
CTP QC/QA: YES
POC MOLECULAR INFLUENZA A AGN: NEGATIVE
POC MOLECULAR INFLUENZA B AGN: NEGATIVE
SARS-COV-2 RDRP RESP QL NAA+PROBE: POSITIVE

## 2022-06-17 PROCEDURE — 87502 INFLUENZA DNA AMP PROBE: CPT | Mod: QW,S$GLB,, | Performed by: PHYSICIAN ASSISTANT

## 2022-06-17 PROCEDURE — 1126F PR PAIN SEVERITY QUANTIFIED, NO PAIN PRESENT: ICD-10-PCS | Mod: CPTII,S$GLB,, | Performed by: PHYSICIAN ASSISTANT

## 2022-06-17 PROCEDURE — 1160F RVW MEDS BY RX/DR IN RCRD: CPT | Mod: CPTII,S$GLB,, | Performed by: PHYSICIAN ASSISTANT

## 2022-06-17 PROCEDURE — 1159F MED LIST DOCD IN RCRD: CPT | Mod: CPTII,S$GLB,, | Performed by: PHYSICIAN ASSISTANT

## 2022-06-17 PROCEDURE — U0002: ICD-10-PCS | Mod: QW,S$GLB,, | Performed by: PHYSICIAN ASSISTANT

## 2022-06-17 PROCEDURE — 71046 X-RAY EXAM CHEST 2 VIEWS: CPT | Mod: S$GLB,,, | Performed by: RADIOLOGY

## 2022-06-17 PROCEDURE — 1160F PR REVIEW ALL MEDS BY PRESCRIBER/CLIN PHARMACIST DOCUMENTED: ICD-10-PCS | Mod: CPTII,S$GLB,, | Performed by: PHYSICIAN ASSISTANT

## 2022-06-17 PROCEDURE — 1157F PR ADVANCE CARE PLAN OR EQUIV PRESENT IN MEDICAL RECORD: ICD-10-PCS | Mod: CPTII,S$GLB,, | Performed by: PHYSICIAN ASSISTANT

## 2022-06-17 PROCEDURE — 1159F PR MEDICATION LIST DOCUMENTED IN MEDICAL RECORD: ICD-10-PCS | Mod: CPTII,S$GLB,, | Performed by: PHYSICIAN ASSISTANT

## 2022-06-17 PROCEDURE — 71046 XR CHEST PA AND LATERAL: ICD-10-PCS | Mod: S$GLB,,, | Performed by: RADIOLOGY

## 2022-06-17 PROCEDURE — 1126F AMNT PAIN NOTED NONE PRSNT: CPT | Mod: CPTII,S$GLB,, | Performed by: PHYSICIAN ASSISTANT

## 2022-06-17 PROCEDURE — 99499 UNLISTED E&M SERVICE: CPT | Mod: S$GLB,,, | Performed by: PHYSICIAN ASSISTANT

## 2022-06-17 PROCEDURE — 99499 RISK ADDL DX/OHS AUDIT: ICD-10-PCS | Mod: S$GLB,,, | Performed by: PHYSICIAN ASSISTANT

## 2022-06-17 PROCEDURE — 1157F ADVNC CARE PLAN IN RCRD: CPT | Mod: CPTII,S$GLB,, | Performed by: PHYSICIAN ASSISTANT

## 2022-06-17 PROCEDURE — U0002 COVID-19 LAB TEST NON-CDC: HCPCS | Mod: QW,S$GLB,, | Performed by: PHYSICIAN ASSISTANT

## 2022-06-17 PROCEDURE — 99214 OFFICE O/P EST MOD 30 MIN: CPT | Mod: S$GLB,,, | Performed by: PHYSICIAN ASSISTANT

## 2022-06-17 PROCEDURE — 87502 POCT INFLUENZA A/B MOLECULAR: ICD-10-PCS | Mod: QW,S$GLB,, | Performed by: PHYSICIAN ASSISTANT

## 2022-06-17 PROCEDURE — 99214 PR OFFICE/OUTPT VISIT, EST, LEVL IV, 30-39 MIN: ICD-10-PCS | Mod: S$GLB,,, | Performed by: PHYSICIAN ASSISTANT

## 2022-06-17 RX ORDER — BENZONATATE 200 MG/1
200 CAPSULE ORAL 3 TIMES DAILY PRN
Qty: 30 CAPSULE | Refills: 0 | Status: SHIPPED | OUTPATIENT
Start: 2022-06-17 | End: 2022-06-27

## 2022-06-17 NOTE — PATIENT INSTRUCTIONS
Your test was POSITIVE for COVID-19 (coronavirus).       Please isolate yourself at home.  You may leave home and/or return to work once the following conditions are met:    If you were not hospitalized and are not moderately to severely immunocompromised:   More than 5 days since symptoms first appeared AND  More than 24 hours fever free without medications AND  Symptoms are improving  Continue to wear a mask around others for 5 additional days.    If you were hospitalized OR are moderately to severely immunocompromised:  More than 20 days since symptoms first appeared  More than 24 hours fever free without medications  Symptoms have improved    If you had no symptoms but tested positive:  More than 5 days since the date of the first positive test (20 days if moderately to severely immunocompromised). If you develop symptoms, then use the guidelines above.  Continue to wear a mask around others for 5 additional days.      Contact Tracing    As one of the next steps, you will receive a call or text from the [place] Department of Health ([facility]) COVID-19 Tracing Team. See the contact information below so you know not to ignore the health departments call. It is important that you contact them back immediately so they can help.      Contact Tracer Number:  [phone]  Caller ID for most carriers: [facility]     What is contact tracing?  Contact tracing is a process that helps identify everyone who has been in close contact with an infected person. Contact tracers let those people know they may have been exposed and guide them on next steps. Confidentiality is important for everyone; no one will be told who may have exposed them to the virus.  Your involvement is important. The more we know about where and how this virus is spreading, the better chance we have at stopping it from spreading further.  What does exposure mean?  Exposure means you have been within 6 feet for more than 15 minutes with a person who  has or had COVID-19.  What kind of questions do the contact tracers ask?  A contact tracer will confirm your basic contact information including name, address, phone number, and next of kin, as well as asking about any symptoms you may have had. Theyll also ask you how you think you may have gotten sick, such as places where you may have been exposed to the virus, and people you were with. Those names will never be shared with anyone outside of that call, and will only be used to help trace and stop the spread of the virus.   I have privacy concerns. How will the state use my information?  Your privacy about your health is important. All calls are completed using call centers that use the appropriate health privacy protection measures (HIPAA compliance), meaning that your patient information is safe. No one will ever ask you any questions related to immigration status. Your health comes first.   Do I have to participate?  You do not have to participate, but we strongly encourage you to. Contact tracing can help us catch and control new outbreaks as theyre developing to keep your friends and family safe.   What if I dont hear from anyone?  If you dont receive a call within 24 hours, you can call the number above right away to inquire about your status. That line is open from 8:00 am - 8:00 p.m., 7 days a week.  Contact tracing saves lives! Together, we have the power to beat this virus and keep our loved ones and neighbors safe.    For more information see CDC link below.      https://www.cdc.gov/coronavirus/2019-ncov/hcp/guidance-prevent-spread.html#precautions        Sources:  [facility] [place], [place] [facility] and [facility]           Sincerely,     ROSAMARIA Louise

## 2022-06-17 NOTE — PROGRESS NOTES
"Subjective:       Patient ID: Sheree Pugh is a 89 y.o. female.    Vitals:  height is 5' 2" (1.575 m) and weight is 74.4 kg (164 lb). Her temperature is 101.1 °F (38.4 °C) (abnormal). Her blood pressure is 142/62 (abnormal) and her pulse is 86. Her respiration is 18 and oxygen saturation is 97%.     Chief Complaint: Cough    Patient presents to the clinic today with a cough x 1-2 days. Pt has temperature of 101.1and  dry cough.Pt is vacinated. Tylenol taken.    Cough  This is a new problem. The current episode started yesterday. The cough is non-productive. Associated symptoms include a fever and postnasal drip. Pertinent negatives include no sore throat. Treatments tried: tylenol.       Constitution: Positive for appetite change and fever.   HENT: Positive for postnasal drip. Negative for sore throat.    Respiratory: Positive for cough and COPD.        Objective:      Physical Exam   Constitutional: She does not appear ill. No distress.   HENT:   Head: Normocephalic and atraumatic.   Ears:   Right Ear: External ear normal.   Left Ear: External ear normal.   Eyes: Conjunctivae are normal. Right eye exhibits no discharge. Left eye exhibits no discharge. Extraocular movement intact   Cardiovascular: Normal rate and regular rhythm.   Murmur heard.  Pulmonary/Chest: Effort normal and breath sounds normal. No respiratory distress. She has no wheezes. She has no rhonchi. She has no rales.   Abdominal: Normal appearance.   Musculoskeletal: Normal range of motion.         General: Normal range of motion.   Neurological: no focal deficit. She is alert.   Skin: Skin is warm, dry and not pale. jaundice  Psychiatric: Her behavior is normal. Mood, judgment and thought content normal.   Nursing note and vitals reviewed.        Assessment:       1. COVID-19    2. Cough          Plan:         COVID-19    Cough  -     X-Ray Chest PA And Lateral; Future; Expected date: 06/17/2022    X-Ray Chest PA And Lateral    Result Date: " 6/17/2022  EXAMINATION: XR CHEST PA AND LATERAL CLINICAL HISTORY: Cough, unspecified TECHNIQUE: PA and lateral views of the chest were performed. COMPARISON: Chest of November 13, 2021 FINDINGS: There is mild cardiomegaly.  There is mild aortic ectasia with atherosclerotic calcification noted.  No intrapulmonary mass or infiltrate is seen.  No pneumothorax or pleural effusion is noted.  There is moderate thoracic kyphosis identified.     Mild cardiomegaly.  Aortic ectasia and atherosclerosis.  Moderate thoracic kyphosis. Electronically signed by: Daquan Cee MD Date:    06/17/2022 Time:    08:42    Posterior Segment OCT Retina-Both eyes    Result Date: 6/13/2022    -     POCT COVID-19 Rapid Screening  -     POCT Influenza A/B MOLECULAR    Results for orders placed or performed in visit on 06/17/22   POCT COVID-19 Rapid Screening   Result Value Ref Range    POC Rapid COVID Positive (A) Negative     Acceptable Yes    POCT Influenza A/B MOLECULAR   Result Value Ref Range    POC Molecular Influenza A Ag Negative Negative, Not Reported    POC Molecular Influenza B Ag Negative Negative, Not Reported     Acceptable Yes        Will take Tylenol at home for fever. Discussed if fever does not respond or she becomes short of breath, to ED.    Other orders  -     molnupiravir 200 mg capsule (EUA); Take 4 capsules (800 mg total) by mouth every 12 (twelve) hours. for 5 days  Dispense: 40 capsule; Refill: 0  -     benzonatate (TESSALON) 200 MG capsule; Take 1 capsule (200 mg total) by mouth 3 (three) times daily as needed for Cough.  Dispense: 30 capsule; Refill: 0    Patient Instructions   Your test was POSITIVE for COVID-19 (coronavirus).       Please isolate yourself at home.  You may leave home and/or return to work once the following conditions are met:    If you were not hospitalized and are not moderately to severely immunocompromised:    More than 5 days since symptoms first appeared  AND   More than 24 hours fever free without medications AND   Symptoms are improving   Continue to wear a mask around others for 5 additional days.    If you were hospitalized OR are moderately to severely immunocompromised:   More than 20 days since symptoms first appeared   More than 24 hours fever free without medications   Symptoms have improved    If you had no symptoms but tested positive:   More than 5 days since the date of the first positive test (20 days if moderately to severely immunocompromised). If you develop symptoms, then use the guidelines above.   Continue to wear a mask around others for 5 additional days.      Contact Tracing    As one of the next steps, you will receive a call or text from the [place] Department of Health ([facility]) COVID-19 Tracing Team. See the contact information below so you know not to ignore the health departments call. It is important that you contact them back immediately so they can help.      Contact Tracer Number:  [phone]  Caller ID for most carriers: [place] Dept Health     What is contact tracing?  · Contact tracing is a process that helps identify everyone who has been in close contact with an infected person. Contact tracers let those people know they may have been exposed and guide them on next steps. Confidentiality is important for everyone; no one will be told who may have exposed them to the virus.  · Your involvement is important. The more we know about where and how this virus is spreading, the better chance we have at stopping it from spreading further.  What does exposure mean?  · Exposure means you have been within 6 feet for more than 15 minutes with a person who has or had COVID-19.  What kind of questions do the contact tracers ask?  · A contact tracer will confirm your basic contact information including name, address, phone number, and next of kin, as well as asking about any symptoms you may have had. Theyll also ask you how you think  you may have gotten sick, such as places where you may have been exposed to the virus, and people you were with. Those names will never be shared with anyone outside of that call, and will only be used to help trace and stop the spread of the virus.   I have privacy concerns. How will the state use my information?  · Your privacy about your health is important. All calls are completed using call centers that use the appropriate health privacy protection measures (HIPAA compliance), meaning that your patient information is safe. No one will ever ask you any questions related to immigration status. Your health comes first.   Do I have to participate?  · You do not have to participate, but we strongly encourage you to. Contact tracing can help us catch and control new outbreaks as theyre developing to keep your friends and family safe.   What if I dont hear from anyone?  · If you dont receive a call within 24 hours, you can call the number above right away to inquire about your status. That line is open from 8:00 am - 8:00 p.m., 7 days a week.  Contact tracing saves lives! Together, we have the power to beat this virus and keep our loved ones and neighbors safe.    For more information see CDC link below.      https://www.cdc.gov/coronavirus/2019-ncov/hcp/guidance-prevent-spread.html#precautions        Sources:  [facility], [facility]           Sincerely,     ROSAMARIA Louise

## 2022-06-17 NOTE — TELEPHONE ENCOUNTER
Pt called and spoke to her son and he wasn't pleasant pt s son was saying that he doesn't know why we think that the elderly can do all this stuff. Pt 's son explained that its a voluntary program and he doesn't have to do this is they dont want. He then said that he will self enroll and explained the program and he said that she lives alone and he will do the best that he can. Pt son told to reach out if any problems  Reason for Disposition   Message left with person in household    Protocols used: NO CONTACT OR DUPLICATE CONTACT CALL-A-[place]

## 2022-06-18 ENCOUNTER — NURSE TRIAGE (OUTPATIENT)
Dept: ADMINISTRATIVE | Facility: CLINIC | Age: 87
End: 2022-06-18
Payer: MEDICARE

## 2022-06-18 NOTE — TELEPHONE ENCOUNTER
2nd surveillance program enrollment attempt. No contact.  Left VM including OOC number.  Exploretript message previously sent.    Reason for Disposition   Message left on unidentified voice mail.  Phone number verified.    Additional Information   Negative: Caller is angry or rude (e.g., hangs up, verbally abusive, yelling)   Negative: Caller hangs up   Negative: Caller has already spoken with the PCP and has no further questions.   Negative: Caller has already spoken with another triager and has no further questions.   Negative: Caller has already spoken with another triager or PCP AND has further questions AND triager able to answer questions.   Negative: Busy signal.  First attempt to contact caller.  Follow-up call scheduled within 15 minutes.   Negative: No answer.  First attempt to contact caller.  Follow-up call scheduled within 15 minutes.   Negative: Message left on identified voice mail    Protocols used: NO CONTACT OR DUPLICATE CONTACT CALL-A-

## 2022-06-19 ENCOUNTER — NURSE TRIAGE (OUTPATIENT)
Dept: ADMINISTRATIVE | Facility: CLINIC | Age: 87
End: 2022-06-19
Payer: MEDICARE

## 2022-06-19 NOTE — TELEPHONE ENCOUNTER
3rd COVID-19 surveillance program enrollment attempt.  Spoke with pt's son Larry.  Mr. Juarez asked for pt to be removed from program because she is better now.  Mr. Juarez stated he already has OOC number.  No triage.    Reason for Disposition   General information question, no triage required and triager able to answer question    Additional Information   Negative: [1] Caller is not with the adult (patient) AND [2] reporting urgent symptoms   Negative: Lab result questions   Negative: Medication questions   Negative: Caller can't be reached by phone   Negative: Caller has already spoken to PCP or another triager   Negative: RN needs further essential information from caller in order to complete triage   Negative: Requesting regular office appointment   Negative: [1] Caller requesting NON-URGENT health information AND [2] PCP's office is the best resource   Negative: Health Information question, no triage required and triager able to answer question    Protocols used: INFORMATION ONLY CALL - NO TRIAGE-A-

## 2022-06-24 ENCOUNTER — OFFICE VISIT (OUTPATIENT)
Dept: FAMILY MEDICINE | Facility: CLINIC | Age: 87
End: 2022-06-24
Payer: MEDICARE

## 2022-06-24 VITALS
OXYGEN SATURATION: 95 % | DIASTOLIC BLOOD PRESSURE: 82 MMHG | HEIGHT: 62 IN | BODY MASS INDEX: 28.84 KG/M2 | SYSTOLIC BLOOD PRESSURE: 134 MMHG | HEART RATE: 93 BPM | WEIGHT: 156.75 LBS

## 2022-06-24 DIAGNOSIS — U07.1 COVID-19 VIRUS INFECTION: ICD-10-CM

## 2022-06-24 DIAGNOSIS — R19.03 RIGHT LOWER QUADRANT ABDOMINAL MASS: ICD-10-CM

## 2022-06-24 DIAGNOSIS — R41.3 MEMORY LOSS: ICD-10-CM

## 2022-06-24 DIAGNOSIS — J01.40 ACUTE NON-RECURRENT PANSINUSITIS: Primary | ICD-10-CM

## 2022-06-24 DIAGNOSIS — R19.00 INTRA-ABDOMINAL AND PELVIC SWELLING, MASS AND LUMP, UNSPECIFIED SITE: ICD-10-CM

## 2022-06-24 PROCEDURE — 99214 OFFICE O/P EST MOD 30 MIN: CPT | Mod: S$GLB,,, | Performed by: INTERNAL MEDICINE

## 2022-06-24 PROCEDURE — 1126F PR PAIN SEVERITY QUANTIFIED, NO PAIN PRESENT: ICD-10-PCS | Mod: CPTII,S$GLB,, | Performed by: INTERNAL MEDICINE

## 2022-06-24 PROCEDURE — 1126F AMNT PAIN NOTED NONE PRSNT: CPT | Mod: CPTII,S$GLB,, | Performed by: INTERNAL MEDICINE

## 2022-06-24 PROCEDURE — 1101F PT FALLS ASSESS-DOCD LE1/YR: CPT | Mod: CPTII,S$GLB,, | Performed by: INTERNAL MEDICINE

## 2022-06-24 PROCEDURE — 1160F RVW MEDS BY RX/DR IN RCRD: CPT | Mod: CPTII,S$GLB,, | Performed by: INTERNAL MEDICINE

## 2022-06-24 PROCEDURE — 1159F MED LIST DOCD IN RCRD: CPT | Mod: CPTII,S$GLB,, | Performed by: INTERNAL MEDICINE

## 2022-06-24 PROCEDURE — 1157F PR ADVANCE CARE PLAN OR EQUIV PRESENT IN MEDICAL RECORD: ICD-10-PCS | Mod: CPTII,S$GLB,, | Performed by: INTERNAL MEDICINE

## 2022-06-24 PROCEDURE — 99999 PR PBB SHADOW E&M-EST. PATIENT-LVL V: ICD-10-PCS | Mod: PBBFAC,,, | Performed by: INTERNAL MEDICINE

## 2022-06-24 PROCEDURE — 1157F ADVNC CARE PLAN IN RCRD: CPT | Mod: CPTII,S$GLB,, | Performed by: INTERNAL MEDICINE

## 2022-06-24 PROCEDURE — 3288F PR FALLS RISK ASSESSMENT DOCUMENTED: ICD-10-PCS | Mod: CPTII,S$GLB,, | Performed by: INTERNAL MEDICINE

## 2022-06-24 PROCEDURE — 99214 PR OFFICE/OUTPT VISIT, EST, LEVL IV, 30-39 MIN: ICD-10-PCS | Mod: S$GLB,,, | Performed by: INTERNAL MEDICINE

## 2022-06-24 PROCEDURE — 99999 PR PBB SHADOW E&M-EST. PATIENT-LVL V: CPT | Mod: PBBFAC,,, | Performed by: INTERNAL MEDICINE

## 2022-06-24 PROCEDURE — 3288F FALL RISK ASSESSMENT DOCD: CPT | Mod: CPTII,S$GLB,, | Performed by: INTERNAL MEDICINE

## 2022-06-24 PROCEDURE — 1101F PR PT FALLS ASSESS DOC 0-1 FALLS W/OUT INJ PAST YR: ICD-10-PCS | Mod: CPTII,S$GLB,, | Performed by: INTERNAL MEDICINE

## 2022-06-24 PROCEDURE — 1159F PR MEDICATION LIST DOCUMENTED IN MEDICAL RECORD: ICD-10-PCS | Mod: CPTII,S$GLB,, | Performed by: INTERNAL MEDICINE

## 2022-06-24 PROCEDURE — 1160F PR REVIEW ALL MEDS BY PRESCRIBER/CLIN PHARMACIST DOCUMENTED: ICD-10-PCS | Mod: CPTII,S$GLB,, | Performed by: INTERNAL MEDICINE

## 2022-06-24 PROCEDURE — 99499 UNLISTED E&M SERVICE: CPT | Mod: S$GLB,,, | Performed by: INTERNAL MEDICINE

## 2022-06-24 PROCEDURE — 99499 RISK ADDL DX/OHS AUDIT: ICD-10-PCS | Mod: S$GLB,,, | Performed by: INTERNAL MEDICINE

## 2022-06-24 RX ORDER — DONEPEZIL HYDROCHLORIDE 5 MG/1
5 TABLET, FILM COATED ORAL NIGHTLY
Qty: 30 TABLET | Refills: 11 | Status: SHIPPED | OUTPATIENT
Start: 2022-06-24 | End: 2023-06-19 | Stop reason: SDUPTHER

## 2022-06-24 RX ORDER — AMOXICILLIN 500 MG/1
500 CAPSULE ORAL 3 TIMES DAILY
Qty: 21 CAPSULE | Refills: 0 | Status: SHIPPED | OUTPATIENT
Start: 2022-06-24 | End: 2022-07-01

## 2022-06-24 NOTE — PROGRESS NOTES
Patient ID: Sheree Pugh     Chief Complaint:   Chief Complaint   Patient presents with    Insomnia        HPI:  Patient was diagnosed with COVID-19 on June 17th and did get seen in urgent care and got an oral antiviral pill.  Thankfully her fever resolved quickly and she is almost done with her quarantine.  She does complain of persistent cough with some the yucky phlegm that occurs numerous times per hour.  It is getting better over time but she would like it resolved faster.  A I listen to her lungs and they do sound very good so I do not think this represents a pneumonia and she has not had any fevers or shortness of breath or wheezing or sore throat.  I think she could have a lingering sinus infection which is a Hallmark of this variant of COVID-19 so I am going to give her a week's worth of amoxicillin and hopefully that will clear up this cough with phlegm.  She still is taking her inhaler and I do not think so this represents a COPD flare.  She does note that she can see little better because her retinal hemorrhages have stopped and this is good, but she is still very hard of hearing.  She notes that she has some memory problems and her son notes that as well.  I do not think she has full-blown dementia but I think she could benefit from low-dose Aricept once daily to help her memory and will monitor her for now.  She would like me to reorder the CT scan of her belly at [facility] because it is cheaper and I will be more than happy to do that.    Review of Systems   Constitutional: Negative.    HENT: Negative.    Eyes: Negative.    Respiratory: Positive for cough.    Cardiovascular: Negative.    Gastrointestinal: Negative.    Endocrine: Negative.    Genitourinary: Negative.    Musculoskeletal: Negative.    Skin: Negative.    Allergic/Immunologic: Negative.    Neurological: Negative.    Hematological: Negative.    Psychiatric/Behavioral: Negative.           Objective:      Physical Exam   Physical Exam  Vitals  "and nursing note reviewed.   Constitutional:       Appearance: Normal appearance. She is well-developed.   HENT:      Head: Normocephalic and atraumatic.      Nose: Nose normal.   Eyes:      Extraocular Movements: Extraocular movements intact.      Conjunctiva/sclera: Conjunctivae normal.      Pupils: Pupils are equal, round, and reactive to light.   Cardiovascular:      Rate and Rhythm: Normal rate and regular rhythm.      Pulses: Normal pulses.      Heart sounds: Normal heart sounds.   Pulmonary:      Effort: Pulmonary effort is normal.      Breath sounds: Normal breath sounds.   Abdominal:      General: Bowel sounds are normal.      Palpations: Abdomen is soft.   Musculoskeletal:         General: Normal range of motion.      Cervical back: Normal range of motion and neck supple.   Skin:     General: Skin is warm and dry.      Capillary Refill: Capillary refill takes less than 2 seconds.   Neurological:      General: No focal deficit present.      Mental Status: She is alert and oriented to person, place, and time.   Psychiatric:         Mood and Affect: Mood normal.         Behavior: Behavior normal.         Thought Content: Thought content normal.         Judgment: Judgment normal.            Vitals:   Vitals:    06/24/22 1302   BP: 134/82   Pulse: 93   SpO2: 95%   Weight: 71.1 kg (156 lb 12 oz)   Height: 5' 2" (1.575 m)          Current Outpatient Medications:     ACETAMINOPHEN (TYLENOL ARTHRITIS ORAL), Take 2 tablets by mouth daily as needed. , Disp: , Rfl:     albuterol (VENTOLIN HFA) 90 mcg/actuation inhaler, Inhale 2 puffs into the lungs every 6 (six) hours as needed for Wheezing or Shortness of Breath., Disp: 18 g, Rfl: 2    amoxicillin (AMOXIL) 500 MG capsule, Take 1 capsule (500 mg total) by mouth 3 (three) times daily. for 7 days, Disp: 21 capsule, Rfl: 0    atorvastatin (LIPITOR) 20 MG tablet, Take 1 tablet (20 mg total) by mouth once daily., Disp: 90 tablet, Rfl: 4    benzonatate (TESSALON) 200 " MG capsule, Take 1 capsule (200 mg total) by mouth 3 (three) times daily as needed for Cough., Disp: 30 capsule, Rfl: 0    budesonide-formoterol 160-4.5 mcg (SYMBICORT) 160-4.5 mcg/actuation HFAA, Inhale 2 puffs into the lungs 2 (two) times daily. Controller., Disp: 30.6 g, Rfl: 3    cloNIDine (CATAPRES) 0.1 MG tablet, Take 1 tablet (0.1 mg total) by mouth 3 (three) times daily as needed (PRN SBP > 165 mmHg)., Disp: 90 tablet, Rfl: 6    clopidogreL (PLAVIX) 75 mg tablet, Take 1 tablet (75 mg total) by mouth once daily., Disp: 90 tablet, Rfl: 4    cyanocobalamin 1,000 mcg/mL injection, INJECT 1CC (1ML) INTRAMUSCULARLY EVERY THREE WEEKS, Disp: 10 mL, Rfl: 3    diltiaZEM (CARDIZEM CD) 180 MG 24 hr capsule, Take 1 capsule (180 mg total) by mouth every morning., Disp: 90 capsule, Rfl: 4    donepeziL (ARICEPT) 5 MG tablet, Take 1 tablet (5 mg total) by mouth every evening., Disp: 30 tablet, Rfl: 11    furosemide (LASIX) 20 MG tablet, Take 2 tablets (40 mg total) by mouth daily as needed (edema)., Disp: 90 tablet, Rfl: 3    gabapentin (NEURONTIN) 100 MG capsule, Take 1 capsule (100 mg total) by mouth every evening., Disp: 30 capsule, Rfl: 11    isosorbide mononitrate (IMDUR) 60 MG 24 hr tablet, Take 1 tablet (60 mg total) by mouth every evening., Disp: 90 tablet, Rfl: 4    mupirocin (BACTROBAN) 2 % ointment, Apply topically 3 (three) times daily., Disp: 30 g, Rfl: 1    mupirocin (BACTROBAN) 2 % ointment, Apply to affected area 2 times daily, Disp: 60 g, Rfl: 0    nortriptyline (PAMELOR) 10 MG capsule, Take 1 capsule (10 mg total) by mouth every evening., Disp: 90 capsule, Rfl: 3    ondansetron (ZOFRAN) 4 MG tablet, Take 1 tablet (4 mg total) by mouth every 8 (eight) hours as needed for Nausea., Disp: 12 tablet, Rfl: 0    oxybutynin (DITROPAN-XL) 10 MG 24 hr tablet, Take 1 tablet (10 mg total) by mouth once daily., Disp: 90 tablet, Rfl: 3    potassium chloride SA (K-DUR,KLOR-CON) 20 MEQ tablet, Take 1 tablet  "(20 mEq total) by mouth daily as needed., Disp: 90 tablet, Rfl: 4    pulse oximeter (PULSE OXIMETER) device, Use twice daily at 8 AM and 3 PM and record the value in MyChart as directed., Disp: 1 each, Rfl: 0    syringe with needle (BD LUER-FRANCI SYRINGE) 3 mL 25 gauge x 1" Syrg, USE TO INJECT B-12 AS DIRECTED, Disp: 10 Syringe, Rfl: 11    tamoxifen (NOLVADEX) 20 MG Tab, Take 1 tablet (20 mg total) by mouth once daily., Disp: 90 tablet, Rfl: 3    tiotropium (SPIRIVA WITH HANDIHALER) 18 mcg inhalation capsule, Inhale 1 capsule (18 mcg total) into the lungs once daily using handihaler., Disp: 90 capsule, Rfl: 3    vitamins  A,C,E-zinc-copper (PRESERVISION AREDS) [id number] unit-mg-unit Cap, Take by mouth., Disp: , Rfl:    Assessment:       Patient Active Problem List    Diagnosis Date Noted    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 03/25/2022    Decreased hearing of both ears 03/25/2022    Right lower quadrant abdominal mass 03/25/2022    Sleep related leg cramps 12/16/2021    Blindness of both eyes 12/16/2021    Skin tear of left forearm without complication 12/16/2021    Simple chronic bronchitis 11/13/2021    Macular subretinal hemorrhage, right 09/22/2021    Exudative age-related macular degeneration of right eye with active choroidal neovascularization 09/16/2021    Cellulitis of right lower extremity 08/25/2020    Conductive hearing loss, bilateral 08/25/2020    Memory loss 08/25/2020    Unspecified inflammatory spondylopathy, lumbar region 05/28/2020    Cellulitis of left lower extremity 05/28/2020    Bilateral leg edema 05/28/2020    ANTWAN (renal artery stenosis) 02/25/2019    Stented coronary artery 02/25/2019    Diverticulitis 09/01/2018    Aortic atherosclerosis 08/16/2017    Tortuous aorta 08/16/2017    DDD (degenerative disc disease), lumbar 08/31/2016    Stage 3a chronic kidney disease 06/23/2016    Urinary incontinence 06/23/2016    Chronic obstructive " pulmonary disease 06/23/2016    Stenosis of right carotid artery 06/23/2016    Coronary artery disease involving native coronary artery of native heart without angina pectoris 06/23/2016    Aortic valve stenosis 06/23/2016    Essential hypertension 06/23/2016    Nuclear sclerosis of left eye 06/23/2016    Pseudophakia of right eye 06/23/2016    Anemia due to vitamin B12 deficiency 06/23/2016    Peripheral polyneuropathy 06/23/2016    Insomnia 06/23/2016    Osteoporosis, senile 06/23/2016    Arthritis 06/23/2016    Lichen sclerosus et atrophicus of the vulva 03/29/2016    Peripheral vascular disease 04/17/2013    Exudative age-related macular degeneration of left eye with inactive choroidal neovascularization 08/13/2012    Posterior vitreous detachment of both eyes 08/13/2012    At risk for falls 05/25/2012    Legal blindness - Left Eye 01/13/2012    Dyslipidemia 01/03/2012          Plan:       Sheree Pugh  was seen today for follow-up and may need lab work.    Diagnoses and all orders for this visit:    Sheree was seen today for insomnia.    Diagnoses and all orders for this visit:    Acute non-recurrent pansinusitis  -     amoxicillin (AMOXIL) 500 MG capsule; Take 1 capsule (500 mg total) by mouth 3 (three) times daily. for 7 days  Also try to use Albuterol rescue inhaler    Intra-abdominal and pelvic swelling, mass and lump, unspecified site  -     CT Abdomen Without Contrast; Future  -     CT Abdomen Without Contrast    Right lower quadrant abdominal mass  -     CT Abdomen Without Contrast; Future  -     CT Abdomen Without Contrast    Memory loss  -     donepeziL (ARICEPT) 5 MG tablet; Take 1 tablet (5 mg total) by mouth every evening.    COVID-19 virus infection  Resolved

## 2022-06-29 ENCOUNTER — PATIENT MESSAGE (OUTPATIENT)
Dept: FAMILY MEDICINE | Facility: CLINIC | Age: 87
End: 2022-06-29
Payer: MEDICARE

## 2022-06-30 DIAGNOSIS — R19.00 INTRA-ABDOMINAL AND PELVIC SWELLING, MASS AND LUMP, UNSPECIFIED SITE: Primary | ICD-10-CM

## 2022-07-12 ENCOUNTER — PATIENT MESSAGE (OUTPATIENT)
Dept: FAMILY MEDICINE | Facility: CLINIC | Age: 87
End: 2022-07-12
Payer: MEDICARE

## 2022-07-30 DIAGNOSIS — J42 CHRONIC BRONCHITIS, UNSPECIFIED CHRONIC BRONCHITIS TYPE: ICD-10-CM

## 2022-07-30 NOTE — TELEPHONE ENCOUNTER
No new care gaps identified.  [facility] Embedded Care Gaps. Reference number: [id number]. 7/30/2022   5:08:39 AM CDT

## 2022-07-31 RX ORDER — TIOTROPIUM BROMIDE 18 UG/1
1 CAPSULE ORAL; RESPIRATORY (INHALATION) DAILY
Qty: 90 CAPSULE | Refills: 3 | Status: SHIPPED | OUTPATIENT
Start: 2022-07-31 | End: 2023-07-27

## 2022-07-31 NOTE — TELEPHONE ENCOUNTER
Refill Decision Note   Sheree Pugh  is requesting a refill authorization.  Brief Assessment and Rationale for Refill:  Approve     Medication Therapy Plan:       Medication Reconciliation Completed: No   Comments:     No Care Gaps recommended.     Note composed:3:15 PM 07/31/2022

## 2022-08-22 ENCOUNTER — PATIENT MESSAGE (OUTPATIENT)
Dept: OPHTHALMOLOGY | Facility: CLINIC | Age: 87
End: 2022-08-22
Payer: MEDICARE

## 2022-09-01 ENCOUNTER — PATIENT MESSAGE (OUTPATIENT)
Dept: HEMATOLOGY/ONCOLOGY | Facility: CLINIC | Age: 87
End: 2022-09-01
Payer: MEDICARE

## 2022-09-03 RX ORDER — CYANOCOBALAMIN 1000 UG/ML
INJECTION, SOLUTION INTRAMUSCULAR; SUBCUTANEOUS
Qty: 10 ML | Refills: 3 | Status: SHIPPED | OUTPATIENT
Start: 2022-09-03 | End: 2023-10-22 | Stop reason: SDUPTHER

## 2022-09-13 ENCOUNTER — OFFICE VISIT (OUTPATIENT)
Dept: FAMILY MEDICINE | Facility: CLINIC | Age: 87
End: 2022-09-13
Payer: MEDICARE

## 2022-09-13 VITALS
HEIGHT: 62 IN | SYSTOLIC BLOOD PRESSURE: 134 MMHG | WEIGHT: 166.69 LBS | DIASTOLIC BLOOD PRESSURE: 64 MMHG | BODY MASS INDEX: 30.67 KG/M2 | OXYGEN SATURATION: 94 % | HEART RATE: 77 BPM

## 2022-09-13 DIAGNOSIS — R26.2 DIFFICULTY WALKING: ICD-10-CM

## 2022-09-13 DIAGNOSIS — R60.0 BILATERAL LEG EDEMA: Primary | ICD-10-CM

## 2022-09-13 DIAGNOSIS — H90.0 CONDUCTIVE HEARING LOSS, BILATERAL: ICD-10-CM

## 2022-09-13 DIAGNOSIS — N18.31 STAGE 3A CHRONIC KIDNEY DISEASE: ICD-10-CM

## 2022-09-13 DIAGNOSIS — R41.3 MEMORY LOSS: ICD-10-CM

## 2022-09-13 DIAGNOSIS — I10 ESSENTIAL HYPERTENSION: Chronic | ICD-10-CM

## 2022-09-13 DIAGNOSIS — J41.0 SIMPLE CHRONIC BRONCHITIS: ICD-10-CM

## 2022-09-13 DIAGNOSIS — I25.10 CORONARY ARTERY DISEASE INVOLVING NATIVE CORONARY ARTERY OF NATIVE HEART WITHOUT ANGINA PECTORIS: Chronic | ICD-10-CM

## 2022-09-13 DIAGNOSIS — H54.3 BLINDNESS OF BOTH EYES: ICD-10-CM

## 2022-09-13 PROCEDURE — 99999 PR PBB SHADOW E&M-EST. PATIENT-LVL IV: ICD-10-PCS | Mod: PBBFAC,,, | Performed by: INTERNAL MEDICINE

## 2022-09-13 PROCEDURE — 99214 PR OFFICE/OUTPT VISIT, EST, LEVL IV, 30-39 MIN: ICD-10-PCS | Mod: S$GLB,,, | Performed by: INTERNAL MEDICINE

## 2022-09-13 PROCEDURE — 1159F PR MEDICATION LIST DOCUMENTED IN MEDICAL RECORD: ICD-10-PCS | Mod: CPTII,S$GLB,, | Performed by: INTERNAL MEDICINE

## 2022-09-13 PROCEDURE — 1159F MED LIST DOCD IN RCRD: CPT | Mod: CPTII,S$GLB,, | Performed by: INTERNAL MEDICINE

## 2022-09-13 PROCEDURE — 3288F FALL RISK ASSESSMENT DOCD: CPT | Mod: CPTII,S$GLB,, | Performed by: INTERNAL MEDICINE

## 2022-09-13 PROCEDURE — 1160F PR REVIEW ALL MEDS BY PRESCRIBER/CLIN PHARMACIST DOCUMENTED: ICD-10-PCS | Mod: CPTII,S$GLB,, | Performed by: INTERNAL MEDICINE

## 2022-09-13 PROCEDURE — 99999 PR PBB SHADOW E&M-EST. PATIENT-LVL IV: CPT | Mod: PBBFAC,,, | Performed by: INTERNAL MEDICINE

## 2022-09-13 PROCEDURE — 1101F PR PT FALLS ASSESS DOC 0-1 FALLS W/OUT INJ PAST YR: ICD-10-PCS | Mod: CPTII,S$GLB,, | Performed by: INTERNAL MEDICINE

## 2022-09-13 PROCEDURE — 1101F PT FALLS ASSESS-DOCD LE1/YR: CPT | Mod: CPTII,S$GLB,, | Performed by: INTERNAL MEDICINE

## 2022-09-13 PROCEDURE — 3288F PR FALLS RISK ASSESSMENT DOCUMENTED: ICD-10-PCS | Mod: CPTII,S$GLB,, | Performed by: INTERNAL MEDICINE

## 2022-09-13 PROCEDURE — 1125F AMNT PAIN NOTED PAIN PRSNT: CPT | Mod: CPTII,S$GLB,, | Performed by: INTERNAL MEDICINE

## 2022-09-13 PROCEDURE — 99214 OFFICE O/P EST MOD 30 MIN: CPT | Mod: S$GLB,,, | Performed by: INTERNAL MEDICINE

## 2022-09-13 PROCEDURE — 1157F ADVNC CARE PLAN IN RCRD: CPT | Mod: CPTII,S$GLB,, | Performed by: INTERNAL MEDICINE

## 2022-09-13 PROCEDURE — 1157F PR ADVANCE CARE PLAN OR EQUIV PRESENT IN MEDICAL RECORD: ICD-10-PCS | Mod: CPTII,S$GLB,, | Performed by: INTERNAL MEDICINE

## 2022-09-13 PROCEDURE — 1160F RVW MEDS BY RX/DR IN RCRD: CPT | Mod: CPTII,S$GLB,, | Performed by: INTERNAL MEDICINE

## 2022-09-13 PROCEDURE — 1125F PR PAIN SEVERITY QUANTIFIED, PAIN PRESENT: ICD-10-PCS | Mod: CPTII,S$GLB,, | Performed by: INTERNAL MEDICINE

## 2022-09-13 RX ORDER — SPIRONOLACTONE 25 MG/1
25 TABLET ORAL DAILY
Qty: 90 TABLET | Refills: 3 | Status: SHIPPED | OUTPATIENT
Start: 2022-09-13 | End: 2023-09-18 | Stop reason: SDUPTHER

## 2022-09-13 NOTE — PROGRESS NOTES
Patient ID: Sheree Pugh     Chief Complaint:   Chief Complaint   Patient presents with    Follow-up        HPI:  Patient is here for routine follow-up and she is a bit distressed that she gained 10 lb over the past few months.  This is due to bilateral lower extremity edema that is 2+ all the way up to her knees.  She does do well to take her Lasix but may skip a dose if she is going out that day.  She does have trouble taking her potassium supplementation.  With this, I will stop the potassium supplementation in favor of spironolactone 25 mg a day to be taken with her Lasix.  I told her to anticipate lots of urination so do not take it after 3:00 p.m..  I do want her to also try to get some over-the-counter med he  to see if we can apply some external compression to her legs to help force the fluid back into her body.  She does complain of sensitivity in her left forearm where she had some skin tears back in January.  She says the pain can travel all the way up to her shoulder making me think it is referred pain from some nerve irritation and we are going to monitor that.  She also complains of some pain originating in her left neck radiating up to her left temple area and she is concerned about the caliber of the temporal vein.  I do not appreciate temporal arteritis and I think vein is normal.  I can only imagine that the pain in her neck could be referred pain from some neck arthritis.  Finally, she does complain of insomnia.  Unfortunately she has lost her eyesight and does listen to the radio during the day which puts her sleep.  The side effect is she is awake during the evening and this is a problem.  Her son would like her to get out and visit her neighbors more and I agree with that.  I would like her to try an over-the-counter sleep aid to see if she can get her sleep-wake cycle regulated yet again.  If she would like a flu shot I do recommend she wait till October.  Her vital signs do look good.  She also has Left knee pain likely due to osteoarthritis and edema.     Review of Systems   Constitutional: Negative.  Negative for activity change.   HENT:  Positive for hearing loss. Negative for trouble swallowing.    Eyes:  Positive for visual disturbance. Negative for discharge.   Respiratory: Negative.  Negative for chest tightness and wheezing.    Cardiovascular: Negative.  Negative for chest pain and palpitations.   Gastrointestinal: Negative.  Negative for constipation, diarrhea and vomiting.   Endocrine: Negative.    Genitourinary: Negative.  Negative for difficulty urinating and hematuria.   Musculoskeletal: Negative.    Skin: Negative.    Allergic/Immunologic: Negative.    Neurological: Negative.  Negative for headaches.   Hematological: Negative.    Psychiatric/Behavioral:  Positive for sleep disturbance. Negative for dysphoric mood.         Objective:      Physical Exam   Physical Exam  Vitals and nursing note reviewed.   Constitutional:       Appearance: Normal appearance. She is well-developed.   HENT:      Head: Normocephalic and atraumatic.      Nose: Nose normal.   Eyes:      Extraocular Movements: Extraocular movements intact.      Conjunctiva/sclera: Conjunctivae normal.      Pupils: Pupils are equal, round, and reactive to light.   Cardiovascular:      Rate and Rhythm: Normal rate and regular rhythm.      Pulses: Normal pulses.      Heart sounds: Murmur heard.   Pulmonary:      Effort: Pulmonary effort is normal.      Breath sounds: Normal breath sounds.   Abdominal:      General: Bowel sounds are normal.      Palpations: Abdomen is soft.   Musculoskeletal:      Cervical back: Normal range of motion and neck supple.      Right lower leg: Edema present.      Left lower leg: Edema present.      Comments: 2+ bilateral lower extremity edema up to knees.    Skin:     General: Skin is warm and dry.      Capillary Refill: Capillary refill takes less than 2 seconds.   Neurological:      General: No  "focal deficit present.      Mental Status: She is alert and oriented to person, place, and time.   Psychiatric:         Mood and Affect: Mood normal.         Behavior: Behavior normal.         Thought Content: Thought content normal.         Judgment: Judgment normal.          Vitals:   Vitals:    09/13/22 1603   BP: 134/64   BP Location: Left arm   Pulse: 77   SpO2: (!) 94%   Weight: 75.6 kg (166 lb 10.7 oz)   Height: 5' 2" (1.575 m)          Current Outpatient Medications:     ACETAMINOPHEN (TYLENOL ARTHRITIS ORAL), Take 2 tablets by mouth daily as needed. , Disp: , Rfl:     albuterol (VENTOLIN HFA) 90 mcg/actuation inhaler, Inhale 2 puffs into the lungs every 6 (six) hours as needed for Wheezing or Shortness of Breath., Disp: 18 g, Rfl: 2    atorvastatin (LIPITOR) 20 MG tablet, Take 1 tablet (20 mg total) by mouth once daily., Disp: 90 tablet, Rfl: 4    budesonide-formoterol 160-4.5 mcg (SYMBICORT) 160-4.5 mcg/actuation HFAA, Inhale 2 puffs into the lungs 2 (two) times daily. Controller., Disp: 30.6 g, Rfl: 3    clopidogreL (PLAVIX) 75 mg tablet, Take 1 tablet (75 mg total) by mouth once daily., Disp: 90 tablet, Rfl: 4    cyanocobalamin 1,000 mcg/mL injection, INJECT 1CC (1ML) INTRAMUSCULARLY EVERY THREE WEEKS, Disp: 10 mL, Rfl: 3    diltiaZEM (CARDIZEM CD) 180 MG 24 hr capsule, Take 1 capsule (180 mg total) by mouth every morning., Disp: 90 capsule, Rfl: 4    donepeziL (ARICEPT) 5 MG tablet, Take 1 tablet (5 mg total) by mouth every evening., Disp: 30 tablet, Rfl: 11    furosemide (LASIX) 20 MG tablet, Take 2 tablets (40 mg total) by mouth daily as needed (edema)., Disp: 90 tablet, Rfl: 3    gabapentin (NEURONTIN) 100 MG capsule, Take 1 capsule (100 mg total) by mouth every evening., Disp: 30 capsule, Rfl: 11    isosorbide mononitrate (IMDUR) 60 MG 24 hr tablet, Take 1 tablet (60 mg total) by mouth every evening., Disp: 90 tablet, Rfl: 4    mupirocin (BACTROBAN) 2 % ointment, Apply topically 3 (three) times " "daily., Disp: 30 g, Rfl: 1    mupirocin (BACTROBAN) 2 % ointment, Apply to affected area 2 times daily, Disp: 60 g, Rfl: 0    nortriptyline (PAMELOR) 10 MG capsule, Take 1 capsule (10 mg total) by mouth every evening., Disp: 90 capsule, Rfl: 3    ondansetron (ZOFRAN) 4 MG tablet, Take 1 tablet (4 mg total) by mouth every 8 (eight) hours as needed for Nausea., Disp: 12 tablet, Rfl: 0    oxybutynin (DITROPAN-XL) 10 MG 24 hr tablet, Take 1 tablet (10 mg total) by mouth once daily., Disp: 90 tablet, Rfl: 3    pulse oximeter (PULSE OXIMETER) device, Use twice daily at 8 AM and 3 PM and record the value in MyChart as directed., Disp: 1 each, Rfl: 0    syringe with needle (BD LUER-FRANCI SYRINGE) 3 mL 25 gauge x 1" Syrg, USE TO INJECT B-12 AS DIRECTED, Disp: 10 Syringe, Rfl: 11    tamoxifen (NOLVADEX) 20 MG Tab, Take 1 tablet (20 mg total) by mouth once daily., Disp: 90 tablet, Rfl: 3    tiotropium (SPIRIVA WITH HANDIHALER) 18 mcg inhalation capsule, Inhale 1 capsule (18 mcg total) into the lungs once daily using handihaler., Disp: 90 capsule, Rfl: 3    vitamins  A,C,E-zinc-copper (PRESERVISION AREDS) [phone] unit-mg-unit Cap, Take by mouth., Disp: , Rfl:     cloNIDine (CATAPRES) 0.1 MG tablet, Take 1 tablet (0.1 mg total) by mouth 3 (three) times daily as needed (PRN SBP > 165 mmHg)., Disp: 90 tablet, Rfl: 6    spironolactone (ALDACTONE) 25 MG tablet, Take 1 tablet (25 mg total) by mouth once daily., Disp: 90 tablet, Rfl: 3   Assessment:       Patient Active Problem List    Diagnosis Date Noted    Difficulty walking 09/13/2022    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 03/25/2022    Decreased hearing of both ears 03/25/2022    Right lower quadrant abdominal mass 03/25/2022    Sleep related leg cramps 12/16/2021    Blindness of both eyes 12/16/2021    Skin tear of left forearm without complication 12/16/2021    Simple chronic bronchitis 11/13/2021    Macular subretinal hemorrhage, right 09/22/2021 "    Exudative age-related macular degeneration of right eye with active choroidal neovascularization 09/16/2021    Cellulitis of right lower extremity 08/25/2020    Conductive hearing loss, bilateral 08/25/2020    Memory loss 08/25/2020    Unspecified inflammatory spondylopathy, lumbar region 05/28/2020    Cellulitis of left lower extremity 05/28/2020    Bilateral leg edema 05/28/2020    ANTWAN (renal artery stenosis) 02/25/2019    Stented coronary artery 02/25/2019    Diverticulitis 09/01/2018    Atherosclerosis of aorta 08/16/2017    Tortuous aorta 08/16/2017    DDD (degenerative disc disease), lumbar 08/31/2016    Stage 3a chronic kidney disease 06/23/2016    Urinary incontinence 06/23/2016    Stenosis of right carotid artery 06/23/2016    Coronary artery disease involving native coronary artery of native heart without angina pectoris 06/23/2016    Aortic valve stenosis 06/23/2016    Essential hypertension 06/23/2016    Nuclear sclerosis of left eye 06/23/2016    Pseudophakia of right eye 06/23/2016    Anemia due to vitamin B12 deficiency 06/23/2016    Peripheral polyneuropathy 06/23/2016    Insomnia 06/23/2016    Osteoporosis, senile 06/23/2016    Arthritis 06/23/2016    Lichen sclerosus et atrophicus of the vulva 03/29/2016    Peripheral vascular disease 04/17/2013    Exudative age-related macular degeneration of left eye with inactive choroidal neovascularization 08/13/2012    Posterior vitreous detachment of both eyes 08/13/2012    At risk for falls 05/25/2012    Legal blindness - Left Eye 01/13/2012    Dyslipidemia 01/03/2012          Plan:       Sheree Pugh  was seen today for follow-up and may need lab work.    Diagnoses and all orders for this visit:    Sheree was seen today for follow-up.    Diagnoses and all orders for this visit:    Bilateral leg edema  -     spironolactone (ALDACTONE) 25 MG tablet; Take 1 tablet (25 mg total) by mouth once daily.  Continue Lasix   Stop potassium     Memory  loss  Stable    Blindness of both eyes  Stable    Conductive hearing loss, bilateral  To get hearing aids soon     Simple chronic bronchitis  Stable     Coronary artery disease involving native coronary artery of native heart without angina pectoris  Stable     Essential hypertension  Controlled with med     Stage 3a chronic kidney disease  Monitor     Difficulty walking   Stable

## 2022-09-14 ENCOUNTER — PATIENT MESSAGE (OUTPATIENT)
Dept: FAMILY MEDICINE | Facility: CLINIC | Age: 87
End: 2022-09-14
Payer: MEDICARE

## 2022-09-21 ENCOUNTER — PATIENT MESSAGE (OUTPATIENT)
Dept: FAMILY MEDICINE | Facility: CLINIC | Age: 87
End: 2022-09-21
Payer: MEDICARE

## 2022-09-22 ENCOUNTER — PATIENT MESSAGE (OUTPATIENT)
Dept: FAMILY MEDICINE | Facility: CLINIC | Age: 87
End: 2022-09-22
Payer: MEDICARE

## 2022-09-22 DIAGNOSIS — C50.012 MALIGNANT NEOPLASM OF NIPPLE OF LEFT BREAST IN FEMALE, UNSPECIFIED ESTROGEN RECEPTOR STATUS: ICD-10-CM

## 2022-09-22 RX ORDER — TAMOXIFEN CITRATE 20 MG/1
20 TABLET ORAL DAILY
Qty: 90 TABLET | Refills: 3 | Status: SHIPPED | OUTPATIENT
Start: 2022-09-22 | End: 2023-09-18 | Stop reason: SDUPTHER

## 2022-10-04 ENCOUNTER — OFFICE VISIT (OUTPATIENT)
Dept: PODIATRY | Facility: CLINIC | Age: 87
End: 2022-10-04
Payer: MEDICARE

## 2022-10-04 VITALS — HEIGHT: 62 IN | BODY MASS INDEX: 30.36 KG/M2 | WEIGHT: 165 LBS

## 2022-10-04 DIAGNOSIS — G60.9 IDIOPATHIC PERIPHERAL NEUROPATHY: Primary | ICD-10-CM

## 2022-10-04 DIAGNOSIS — B35.1 ONYCHOMYCOSIS: ICD-10-CM

## 2022-10-04 PROCEDURE — 99999 PR PBB SHADOW E&M-EST. PATIENT-LVL III: ICD-10-PCS | Mod: PBBFAC,,, | Performed by: PODIATRIST

## 2022-10-04 PROCEDURE — 99999 PR PBB SHADOW E&M-EST. PATIENT-LVL III: CPT | Mod: PBBFAC,,, | Performed by: PODIATRIST

## 2022-10-04 PROCEDURE — 1126F AMNT PAIN NOTED NONE PRSNT: CPT | Mod: CPTII,S$GLB,, | Performed by: PODIATRIST

## 2022-10-04 PROCEDURE — 11721 PR DEBRIDEMENT OF NAILS, 6 OR MORE: ICD-10-PCS | Mod: Q9,S$GLB,, | Performed by: PODIATRIST

## 2022-10-04 PROCEDURE — 11721 DEBRIDE NAIL 6 OR MORE: CPT | Mod: Q9,S$GLB,, | Performed by: PODIATRIST

## 2022-10-04 PROCEDURE — 99499 NO LOS: ICD-10-PCS | Mod: S$GLB,,, | Performed by: PODIATRIST

## 2022-10-04 PROCEDURE — 1101F PT FALLS ASSESS-DOCD LE1/YR: CPT | Mod: CPTII,S$GLB,, | Performed by: PODIATRIST

## 2022-10-04 PROCEDURE — 1126F PR PAIN SEVERITY QUANTIFIED, NO PAIN PRESENT: ICD-10-PCS | Mod: CPTII,S$GLB,, | Performed by: PODIATRIST

## 2022-10-04 PROCEDURE — 3288F FALL RISK ASSESSMENT DOCD: CPT | Mod: CPTII,S$GLB,, | Performed by: PODIATRIST

## 2022-10-04 PROCEDURE — 1159F PR MEDICATION LIST DOCUMENTED IN MEDICAL RECORD: ICD-10-PCS | Mod: CPTII,S$GLB,, | Performed by: PODIATRIST

## 2022-10-04 PROCEDURE — 1157F ADVNC CARE PLAN IN RCRD: CPT | Mod: CPTII,S$GLB,, | Performed by: PODIATRIST

## 2022-10-04 PROCEDURE — 1101F PR PT FALLS ASSESS DOC 0-1 FALLS W/OUT INJ PAST YR: ICD-10-PCS | Mod: CPTII,S$GLB,, | Performed by: PODIATRIST

## 2022-10-04 PROCEDURE — 1157F PR ADVANCE CARE PLAN OR EQUIV PRESENT IN MEDICAL RECORD: ICD-10-PCS | Mod: CPTII,S$GLB,, | Performed by: PODIATRIST

## 2022-10-04 PROCEDURE — 99499 UNLISTED E&M SERVICE: CPT | Mod: S$GLB,,, | Performed by: PODIATRIST

## 2022-10-04 PROCEDURE — 3288F PR FALLS RISK ASSESSMENT DOCUMENTED: ICD-10-PCS | Mod: CPTII,S$GLB,, | Performed by: PODIATRIST

## 2022-10-04 PROCEDURE — 1159F MED LIST DOCD IN RCRD: CPT | Mod: CPTII,S$GLB,, | Performed by: PODIATRIST

## 2022-10-04 NOTE — PROGRESS NOTES
Subjective:      Patient ID: Sheree Pugh is a 89 y.o. female.    Chief Complaint: Routine Foot Care (4 months follow up)    Sheree is a 89 y.o. female who presents to the clinic for evaluation and treatment of high risk feet. Sheree has a past medical history of Anticoagulant long-term use, ARMD (age related macular degeneration), Arthritis, Breast cancer, Cataract, COPD (chronic obstructive pulmonary disease), Coronary artery disease, Disorder of kidney and ureter, [place] (dyspnea on exertion), Elevated cholesterol, Heart disease, Hypertension, Insomnia, Macular degeneration, Obstetrical blood-clot embolism, postpartum, PVD (peripheral vascular disease), Retinal detachment, Stented coronary artery (2/25/2019), and Urinary incontinence. The patient's chief complaint is toenails that are in need of trimming as she is vision imparied.  Denies the nails being a source of pain.  Requests to have them trimmed.  This patient has documented high risk feet requiring routine maintenance secondary to peripheral neuropathy.    PCP: Piotr Walker MD    Date Last Seen by PCP: 9/22      Hemoglobin A1C   Date Value Ref Range Status   02/01/2020 5.7 (H) 4.0 - 5.6 % Final     Comment:     ADA Screening Guidelines:  5.7-6.4%  Consistent with prediabetes  >or=6.5%  Consistent with diabetes  High levels of fetal hemoglobin interfere with the HbA1C  assay. Heterozygous hemoglobin variants (HbS, HgC, etc)do  not significantly interfere with this assay.   However, presence of multiple variants may affect accuracy.     09/01/2018 5.8 (H) 0.0 - 5.6 % Final     Comment:     Reference Interval:  5.0 - 5.6 Normal   5.7 - 6.4 High Risk   > 6.5 Diabetic    Hgb A1c results are standardized based on the (NGSP) National   Glycohemoglobin Standardization Program.    Hemoglobin A1C levels are related to mean serum/plasma glucose   during the preceding 2-3 months.        04/13/2015 5.8 4.5 - 6.2 % Final       Review of Systems   Constitutional:  Negative for chills and fever.   Cardiovascular:  Positive for leg swelling. Negative for claudication.   Skin:  Positive for nail changes.   Musculoskeletal:  Positive for joint swelling. Negative for muscle cramps, muscle weakness and myalgias.   Gastrointestinal:  Negative for nausea and vomiting.   Neurological:  Positive for numbness. Negative for paresthesias.   Psychiatric/Behavioral:  Negative for altered mental status.          Objective:      Physical Exam  Constitutional:       Appearance: Normal appearance. She is not ill-appearing.   Cardiovascular:      Pulses:           Dorsalis pedis pulses are 2+ on the right side and 2+ on the left side.        Posterior tibial pulses are 2+ on the right side and 2+ on the left side.      Comments: CFT is < 3 seconds bilateral.  Pedal hair growth is decreased bilateral.  Varicosities noted bilateral.   Toes are cool to touch bilateral.    Musculoskeletal:         General: Swelling present. No tenderness.      Right lower le+ Edema present.      Left lower le+ Edema present.      Comments: Muscle strength 5/5 in all muscle groups bilateral.  No tenderness nor crepitation with ROM of foot/ankle joints bilateral.  (-) for pain with palpation of bilateral foot and ankle.  Bilateral pes planus foot type.   Skin:     General: Skin is warm and dry.      Capillary Refill: Capillary refill takes 2 to 3 seconds.      Findings: No bruising, ecchymosis, erythema, signs of injury, laceration, lesion, petechiae, rash or wound.      Comments: Pedal skin appears edematous bilateral.  Toenails x 10 appear thickened by 4 mm, elongated by 4 mm, and discolored with subungual debris.   Examination of the skin reveals no evidence of significant maceration, rashes, open lesions, suspicious appearing nevi or other concerning lesions.    Neurological:      Mental Status: She is alert.      Sensory: Sensory deficit present.      Motor: No weakness or atrophy.      Comments:  Protective sensation per T[place]-Elvi monofilament is absent bilateral.  Light touch is absent bilateral.             Assessment:       Encounter Diagnoses   Name Primary?    Idiopathic peripheral neuropathy Yes    Onychomycosis            Plan:       Sheree was seen today for routine foot care.    Diagnoses and all orders for this visit:    Idiopathic peripheral neuropathy    Onychomycosis      I counseled the patient on her conditions, their implications and medical management.    - Shoe inspection. Patient instructed on proper foot hygeine. We discussed wearing proper shoe gear, daily foot inspections, never walking without protective shoe gear, never putting sharp instruments to feet, routine podiatric nail visits every 4 months.      - With patient's permission, nails were aggressively reduced and debrided x 10 to their soft tissue attachment mechanically and with electric , removing all offending nail and debris. Patient relates relief following the procedure. She will continue to monitor the areas daily, inspect her feet, wear protective shoe gear when ambulatory, moisturizer to maintain skin integrity and follow in this office in approximately 3 months, sooner p.r.n.    Marquez Ramirez DPM

## 2022-11-02 ENCOUNTER — PATIENT MESSAGE (OUTPATIENT)
Dept: FAMILY MEDICINE | Facility: CLINIC | Age: 87
End: 2022-11-02
Payer: MEDICARE

## 2022-11-04 ENCOUNTER — IMMUNIZATION (OUTPATIENT)
Dept: PHARMACY | Facility: CLINIC | Age: 87
End: 2022-11-04
Payer: MEDICARE

## 2022-11-18 ENCOUNTER — PES CALL (OUTPATIENT)
Dept: ADMINISTRATIVE | Facility: CLINIC | Age: 87
End: 2022-11-18
Payer: MEDICARE

## 2022-11-28 ENCOUNTER — OFFICE VISIT (OUTPATIENT)
Dept: HOME HEALTH SERVICES | Facility: CLINIC | Age: 87
End: 2022-11-28
Payer: MEDICARE

## 2022-11-28 VITALS
OXYGEN SATURATION: 96 % | SYSTOLIC BLOOD PRESSURE: 166 MMHG | HEART RATE: 65 BPM | HEIGHT: 62 IN | BODY MASS INDEX: 29.44 KG/M2 | WEIGHT: 160 LBS | DIASTOLIC BLOOD PRESSURE: 71 MMHG

## 2022-11-28 DIAGNOSIS — H90.0 CONDUCTIVE HEARING LOSS, BILATERAL: ICD-10-CM

## 2022-11-28 DIAGNOSIS — H54.8 LEGAL BLINDNESS: ICD-10-CM

## 2022-11-28 DIAGNOSIS — Z99.89 DEPENDENCE ON OTHER ENABLING MACHINES AND DEVICES: ICD-10-CM

## 2022-11-28 DIAGNOSIS — I10 ESSENTIAL HYPERTENSION: Chronic | ICD-10-CM

## 2022-11-28 DIAGNOSIS — G62.9 PERIPHERAL POLYNEUROPATHY: ICD-10-CM

## 2022-11-28 DIAGNOSIS — Z00.00 ENCOUNTER FOR PREVENTIVE HEALTH EXAMINATION: Primary | ICD-10-CM

## 2022-11-28 DIAGNOSIS — E78.5 DYSLIPIDEMIA: Chronic | ICD-10-CM

## 2022-11-28 DIAGNOSIS — N18.31 STAGE 3A CHRONIC KIDNEY DISEASE: ICD-10-CM

## 2022-11-28 DIAGNOSIS — I25.10 CORONARY ARTERY DISEASE INVOLVING NATIVE CORONARY ARTERY OF NATIVE HEART WITHOUT ANGINA PECTORIS: Chronic | ICD-10-CM

## 2022-11-28 DIAGNOSIS — I35.0 NONRHEUMATIC AORTIC VALVE STENOSIS: Chronic | ICD-10-CM

## 2022-11-28 DIAGNOSIS — M81.0 OSTEOPOROSIS, SENILE: ICD-10-CM

## 2022-11-28 DIAGNOSIS — I70.0 ATHEROSCLEROSIS OF AORTA: ICD-10-CM

## 2022-11-28 DIAGNOSIS — H35.3211 EXUDATIVE AGE-RELATED MACULAR DEGENERATION OF RIGHT EYE WITH ACTIVE CHOROIDAL NEOVASCULARIZATION: ICD-10-CM

## 2022-11-28 DIAGNOSIS — J41.0 SIMPLE CHRONIC BRONCHITIS: ICD-10-CM

## 2022-11-28 DIAGNOSIS — I73.9 PERIPHERAL VASCULAR DISEASE: ICD-10-CM

## 2022-11-28 DIAGNOSIS — H54.10 BLINDNESS OF RIGHT EYE WITH LOW VISION IN CONTRALATERAL EYE: ICD-10-CM

## 2022-11-28 DIAGNOSIS — D51.8 OTHER VITAMIN B12 DEFICIENCY ANEMIA: ICD-10-CM

## 2022-11-28 DIAGNOSIS — Z74.09 OTHER REDUCED MOBILITY: ICD-10-CM

## 2022-11-28 PROBLEM — K57.92 DIVERTICULITIS: Status: RESOLVED | Noted: 2018-09-01 | Resolved: 2022-11-28

## 2022-11-28 PROBLEM — L03.115 CELLULITIS OF RIGHT LOWER EXTREMITY: Status: RESOLVED | Noted: 2020-08-25 | Resolved: 2022-11-28

## 2022-11-28 PROBLEM — L03.116 CELLULITIS OF LEFT LOWER EXTREMITY: Status: RESOLVED | Noted: 2020-05-28 | Resolved: 2022-11-28

## 2022-11-28 PROCEDURE — 1170F PR FUNCTIONAL STATUS ASSESSED: ICD-10-PCS | Mod: CPTII,S$GLB,, | Performed by: NURSE PRACTITIONER

## 2022-11-28 PROCEDURE — 1170F FXNL STATUS ASSESSED: CPT | Mod: CPTII,S$GLB,, | Performed by: NURSE PRACTITIONER

## 2022-11-28 PROCEDURE — 3288F FALL RISK ASSESSMENT DOCD: CPT | Mod: CPTII,S$GLB,, | Performed by: NURSE PRACTITIONER

## 2022-11-28 PROCEDURE — 99499 UNLISTED E&M SERVICE: CPT | Mod: S$GLB,,, | Performed by: NURSE PRACTITIONER

## 2022-11-28 PROCEDURE — G0439 PPPS, SUBSEQ VISIT: HCPCS | Mod: S$GLB,,, | Performed by: NURSE PRACTITIONER

## 2022-11-28 PROCEDURE — 1160F RVW MEDS BY RX/DR IN RCRD: CPT | Mod: CPTII,S$GLB,, | Performed by: NURSE PRACTITIONER

## 2022-11-28 PROCEDURE — 3288F PR FALLS RISK ASSESSMENT DOCUMENTED: ICD-10-PCS | Mod: CPTII,S$GLB,, | Performed by: NURSE PRACTITIONER

## 2022-11-28 PROCEDURE — 1101F PR PT FALLS ASSESS DOC 0-1 FALLS W/OUT INJ PAST YR: ICD-10-PCS | Mod: CPTII,S$GLB,, | Performed by: NURSE PRACTITIONER

## 2022-11-28 PROCEDURE — 1159F PR MEDICATION LIST DOCUMENTED IN MEDICAL RECORD: ICD-10-PCS | Mod: CPTII,S$GLB,, | Performed by: NURSE PRACTITIONER

## 2022-11-28 PROCEDURE — 1160F PR REVIEW ALL MEDS BY PRESCRIBER/CLIN PHARMACIST DOCUMENTED: ICD-10-PCS | Mod: CPTII,S$GLB,, | Performed by: NURSE PRACTITIONER

## 2022-11-28 PROCEDURE — 1157F ADVNC CARE PLAN IN RCRD: CPT | Mod: CPTII,S$GLB,, | Performed by: NURSE PRACTITIONER

## 2022-11-28 PROCEDURE — 1157F PR ADVANCE CARE PLAN OR EQUIV PRESENT IN MEDICAL RECORD: ICD-10-PCS | Mod: CPTII,S$GLB,, | Performed by: NURSE PRACTITIONER

## 2022-11-28 PROCEDURE — 1159F MED LIST DOCD IN RCRD: CPT | Mod: CPTII,S$GLB,, | Performed by: NURSE PRACTITIONER

## 2022-11-28 PROCEDURE — 99499 RISK ADDL DX/OHS AUDIT: ICD-10-PCS | Mod: S$GLB,,, | Performed by: NURSE PRACTITIONER

## 2022-11-28 PROCEDURE — 1101F PT FALLS ASSESS-DOCD LE1/YR: CPT | Mod: CPTII,S$GLB,, | Performed by: NURSE PRACTITIONER

## 2022-11-28 PROCEDURE — G0439 PR MEDICARE ANNUAL WELLNESS SUBSEQUENT VISIT: ICD-10-PCS | Mod: S$GLB,,, | Performed by: NURSE PRACTITIONER

## 2022-11-28 NOTE — PATIENT INSTRUCTIONS
Counseling and Referral of Other Preventative  (Italic type indicates deductible and co-insurance are waived)    Patient Name: Sheree Pugh  Today's Date: 11/28/2022    Health Maintenance       Date Due Completion Date    COVID-19 Vaccine (3 - Booster for Moderna series) 03/25/2023 (Originally 4/15/2021) 2/18/2021    Aspirin/Antiplatelet Therapy 11/28/2023 11/28/2022    Lipid Panel 03/26/2027 3/26/2022    TETANUS VACCINE 12/03/2031 12/3/2021        No orders of the defined types were placed in this encounter.    The following information is provided to all patients.  This information is to help you find resources for any of the problems found today that may be affecting your health:                Living healthy guide: www.[facility].[place].gov      Understanding Diabetes: www.diabetes.org      Eating healthy: www.cdc.gov/healthyweight      [facility] home safety checklist: www.cdc.gov/steadi/patient.html      Agency on Aging: www.goea.[place].gov      Alcoholics anonymous (AA): www.aa.org      Physical Activity: www.josr.nih.gov/vf6nwqa      Tobacco use: www.quitwithusla.org

## 2022-11-28 NOTE — PROGRESS NOTES
"Sheree Pugh presented for a  Medicare AWV and comprehensive Health Risk Assessment today. The following components were reviewed and updated:    Medical history  Family History  Social history  Allergies and Current Medications  Health Risk Assessment  Health Maintenance  Care Team         ** See Completed Assessments for Annual Wellness Visit within the encounter summary.**         The following assessments were completed:  Living Situation  CAGE  Depression Screening  Timed Get Up and Go  Whisper Test  Cognitive Function Screening  Nutrition Screening  ADL Screening  PAQ Screening        Vitals:    11/28/22 1035   BP: (!) 166/71   Pulse: 65   SpO2: 96%   Weight: 72.6 kg (160 lb)   Height: 5' 2" (1.575 m)     Body mass index is 29.26 kg/m².  Physical Exam  Constitutional:       Appearance: Normal appearance.   HENT:      Head: Normocephalic and atraumatic.      Nose: Nose normal.   Cardiovascular:      Rate and Rhythm: Normal rate.      Pulses: Normal pulses.   Pulmonary:      Effort: Pulmonary effort is normal.   Musculoskeletal:      Cervical back: Normal range of motion and neck supple.   Neurological:      General: No focal deficit present.      Mental Status: She is alert and oriented to person, place, and time.             Diagnoses and health risks identified today and associated recommendations/orders:    1. Encounter for preventive health examination  Awv completed      2. Dependence on other enabling machines and devices  Chronic and stable. Continue current treatment. Follow with PCP.      3. Other reduced mobility  Chronic and stable. Continue current treatment. Follow with PCP.  Walking with walker    4. Peripheral polyneuropathy  Chronic and stable. Continue current treatment. Follow with PCP.      5. Exudative age-related macular degeneration of right eye with active choroidal neovascularization  Chronic and stable. Continue current treatment. Follow with PCP.      6. Legal blindness - Left " Eye  Chronic and stable. Continue current treatment. Follow with PCP.  Able to get around in her apartment      7. 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  Chronic and stable. Continue current treatment. Follow with PCP.  Safety discussed      8. Conductive hearing loss, bilateral  Chronic and stable. Continue current treatment. Follow with PCP.  Wearing hearing aids    9. Simple chronic bronchitis  Chronic and stable. Continue current treatment. Follow with PCP.  Spiriva      10. Peripheral vascular disease  Chronic and stable. Continue current treatment. Follow with PCP.      11. Essential hypertension  Chronic and stable. Continue current treatment. Follow with PCP.  On meds       12. Dyslipidemia  Chronic and stable. Continue current treatment. Follow with PCP.  On statin       13. Coronary artery disease involving native coronary artery of native heart without angina pectoris  Chronic and stable. Continue current treatment. Follow with PCP.  Following with cardiology      14. Atherosclerosis of aorta  Chronic and stable. Continue current treatment. Follow with PCP.      15. Nonrheumatic aortic valve stenosis  Chronic and stable. Continue current treatment. Follow with PCP.  Followed with cardiology      16. Stage 3a chronic kidney disease  Chronic and stable. Continue current treatment. Follow with PCP.  Followed with labs       17. Other vitamin B12 deficiency anemia  Chronic and stable. Continue current treatment. Follow with PCP.  B12 replacement q3 wks      18. Osteoporosis, senile  Chronic and stable. Continue current treatment. Follow with PCP.        Provided Sheree with a 5-10 year written screening schedule and personal prevention plan. Recommendations were developed using the USPSTF age appropriate recommendations. Education, counseling, and referrals were provided as needed. After Visit Summary printed and given to patient which includes a list of additional screenings\tests  needed.    Fu in 1 yr for AWV          Jaelyn Sampson, NP      I offered to discuss advanced care planning, including how to pick a person who would make decisions for you if you were unable to make them for yourself, called a health care power of , and what kind of decisions you might make such as use of life sustaining treatments such as ventilators and tube feeding when faced with a life limiting illness recorded on a living will that they will need to know. (How you want to be cared for as you near the end of your natural life)     X  Patient has advanced directives on file, which we reviewed, and they do not wish to make changes.

## 2022-12-07 ENCOUNTER — OFFICE VISIT (OUTPATIENT)
Dept: URGENT CARE | Facility: CLINIC | Age: 87
End: 2022-12-07
Payer: MEDICARE

## 2022-12-07 VITALS
HEART RATE: 66 BPM | SYSTOLIC BLOOD PRESSURE: 155 MMHG | BODY MASS INDEX: 29.44 KG/M2 | WEIGHT: 160 LBS | RESPIRATION RATE: 16 BRPM | HEIGHT: 62 IN | DIASTOLIC BLOOD PRESSURE: 65 MMHG | OXYGEN SATURATION: 97 %

## 2022-12-07 DIAGNOSIS — M25.562 ACUTE PAIN OF LEFT KNEE: Primary | ICD-10-CM

## 2022-12-07 DIAGNOSIS — R52 PAIN: ICD-10-CM

## 2022-12-07 PROCEDURE — 99213 OFFICE O/P EST LOW 20 MIN: CPT | Mod: S$GLB,,, | Performed by: PHYSICIAN ASSISTANT

## 2022-12-07 PROCEDURE — 73560 X-RAY EXAM OF KNEE 1 OR 2: CPT | Mod: LT,S$GLB,, | Performed by: RADIOLOGY

## 2022-12-07 PROCEDURE — 1160F PR REVIEW ALL MEDS BY PRESCRIBER/CLIN PHARMACIST DOCUMENTED: ICD-10-PCS | Mod: CPTII,S$GLB,, | Performed by: PHYSICIAN ASSISTANT

## 2022-12-07 PROCEDURE — 1160F RVW MEDS BY RX/DR IN RCRD: CPT | Mod: CPTII,S$GLB,, | Performed by: PHYSICIAN ASSISTANT

## 2022-12-07 PROCEDURE — 1157F ADVNC CARE PLAN IN RCRD: CPT | Mod: CPTII,S$GLB,, | Performed by: PHYSICIAN ASSISTANT

## 2022-12-07 PROCEDURE — 99213 PR OFFICE/OUTPT VISIT, EST, LEVL III, 20-29 MIN: ICD-10-PCS | Mod: S$GLB,,, | Performed by: PHYSICIAN ASSISTANT

## 2022-12-07 PROCEDURE — 1159F PR MEDICATION LIST DOCUMENTED IN MEDICAL RECORD: ICD-10-PCS | Mod: CPTII,S$GLB,, | Performed by: PHYSICIAN ASSISTANT

## 2022-12-07 PROCEDURE — 1157F PR ADVANCE CARE PLAN OR EQUIV PRESENT IN MEDICAL RECORD: ICD-10-PCS | Mod: CPTII,S$GLB,, | Performed by: PHYSICIAN ASSISTANT

## 2022-12-07 PROCEDURE — 73560 XR KNEE 1 OR 2 VIEW LEFT: ICD-10-PCS | Mod: LT,S$GLB,, | Performed by: RADIOLOGY

## 2022-12-07 PROCEDURE — 1159F MED LIST DOCD IN RCRD: CPT | Mod: CPTII,S$GLB,, | Performed by: PHYSICIAN ASSISTANT

## 2022-12-07 NOTE — PATIENT INSTRUCTIONS

## 2022-12-07 NOTE — PROGRESS NOTES
"Subjective:       Patient ID: Sheree Puhg is a 89 y.o. female.    Vitals:  height is 5' 2" (1.575 m) and weight is 72.6 kg (160 lb). Her blood pressure is 155/65 (abnormal) and her pulse is 66. Her respiration is 16 and oxygen saturation is 97%.     Chief Complaint: Knee Pain (Left/)    Pt came in today with complaints of left knee pain that started Monday. She stated that she woke up with her knee swollen and in pain running down the inner leg to her bone. She stated that she has taking OTC pain medications and creams for the pain    Knee Pain   The incident occurred 3 to 5 days ago. The incident occurred at home. There was no injury mechanism. The pain is present in the left knee. The quality of the pain is described as aching. The pain is at a severity of 3/10. The pain is mild. The pain has been Constant since onset. Associated symptoms include an inability to bear weight. Pertinent negatives include no loss of motion, loss of sensation, muscle weakness, numbness or tingling. She reports no foreign bodies present. The symptoms are aggravated by movement and weight bearing. She has tried NSAIDs for the symptoms. The treatment provided no relief.   Musculoskeletal:  Positive for joint pain and joint swelling. Negative for trauma.   Skin:  Negative for erythema and bruising.   Neurological:  Negative for numbness and tingling.     Objective:      Physical Exam   Constitutional: She does not appear ill. No distress.   HENT:   Head: Normocephalic and atraumatic.   Ears:   Right Ear: External ear normal.   Left Ear: External ear normal.   Eyes: Conjunctivae are normal. Right eye exhibits no discharge. Left eye exhibits no discharge. Extraocular movement intact   Pulmonary/Chest: Effort normal. No respiratory distress.   Abdominal: Normal appearance.   Musculoskeletal:      Left knee: She exhibits swelling (minimal). She exhibits no ecchymosis and no erythema. Tenderness found. Medial joint line tenderness noted. "   Neurological: no focal deficit. She is alert.   Skin: Skin is warm and dry. No bruising and No erythema   Psychiatric: Her behavior is normal. Mood, judgment and thought content normal.   Nursing note and vitals reviewed.      Assessment:       1. Acute pain of left knee    2. Pain          Plan:         Acute pain of left knee    Pain  -     X-Ray Knee 1 or 2 View Left; Future; Expected date: 12/07/2022     X-Ray Knee 1 or 2 View Left    Result Date: 12/7/2022  EXAMINATION: XR KNEE 1 OR 2 VIEW LEFT CLINICAL HISTORY: Pain, unspecified COMPARISON: 11/11/2005 FINDINGS: Two views left knee. There is osteopenia.  There are degenerative changes of the knee particularly noting meniscal calcification.  No acute displaced fracture or dislocation of the knee.  There is vascular calcification.  No large knee joint effusion.     1. No acute displaced fracture or dislocation of the knee. Electronically signed by: Adriano Gastelum MD Date:    12/07/2022 Time:    11:00       Take extra strength Tylenol regularly. If symptoms do not improve, follow up with Ortho. Activity as tolerated.    Patient Instructions   You must understand that you've received an Urgent Care treatment only and that you may be released before all your medical problems are known or treated. You, the patient, will arrange for follow up care as instructed.  Follow up with your PCP or specialty clinic as directed in the next 1-2 weeks if not improved or as needed.  You can call [phone] to schedule an appointment with the appropriate provider.  If your condition worsens we recommend that you receive another evaluation at the emergency room immediately or contact your primary medical clinics after hours call service to discuss your concerns.  Please return here or go to the Emergency Department for any concerns or worsening of condition.

## 2022-12-08 ENCOUNTER — PATIENT MESSAGE (OUTPATIENT)
Dept: FAMILY MEDICINE | Facility: CLINIC | Age: 87
End: 2022-12-08
Payer: MEDICARE

## 2022-12-13 ENCOUNTER — TELEPHONE (OUTPATIENT)
Dept: PHARMACY | Facility: CLINIC | Age: 87
End: 2022-12-13
Payer: MEDICARE

## 2023-01-09 ENCOUNTER — OFFICE VISIT (OUTPATIENT)
Dept: PODIATRY | Facility: CLINIC | Age: 88
End: 2023-01-09
Payer: MEDICARE

## 2023-01-09 VITALS — BODY MASS INDEX: 29.45 KG/M2 | WEIGHT: 160.06 LBS | HEIGHT: 62 IN

## 2023-01-09 DIAGNOSIS — R60.0 PERIPHERAL EDEMA: ICD-10-CM

## 2023-01-09 DIAGNOSIS — B35.1 ONYCHOMYCOSIS: ICD-10-CM

## 2023-01-09 DIAGNOSIS — I87.2 VENOUS INSUFFICIENCY OF BOTH LOWER EXTREMITIES: ICD-10-CM

## 2023-01-09 DIAGNOSIS — G60.9 IDIOPATHIC PERIPHERAL NEUROPATHY: Primary | ICD-10-CM

## 2023-01-09 PROCEDURE — 1159F PR MEDICATION LIST DOCUMENTED IN MEDICAL RECORD: ICD-10-PCS | Mod: HCNC,CPTII,S$GLB, | Performed by: PODIATRIST

## 2023-01-09 PROCEDURE — 1157F ADVNC CARE PLAN IN RCRD: CPT | Mod: HCNC,CPTII,S$GLB, | Performed by: PODIATRIST

## 2023-01-09 PROCEDURE — 99213 OFFICE O/P EST LOW 20 MIN: CPT | Mod: 25,HCNC,S$GLB, | Performed by: PODIATRIST

## 2023-01-09 PROCEDURE — 99999 PR PBB SHADOW E&M-EST. PATIENT-LVL II: CPT | Mod: PBBFAC,HCNC,, | Performed by: PODIATRIST

## 2023-01-09 PROCEDURE — 99213 PR OFFICE/OUTPT VISIT, EST, LEVL III, 20-29 MIN: ICD-10-PCS | Mod: 25,HCNC,S$GLB, | Performed by: PODIATRIST

## 2023-01-09 PROCEDURE — 1159F MED LIST DOCD IN RCRD: CPT | Mod: HCNC,CPTII,S$GLB, | Performed by: PODIATRIST

## 2023-01-09 PROCEDURE — 1157F PR ADVANCE CARE PLAN OR EQUIV PRESENT IN MEDICAL RECORD: ICD-10-PCS | Mod: HCNC,CPTII,S$GLB, | Performed by: PODIATRIST

## 2023-01-09 PROCEDURE — 1126F PR PAIN SEVERITY QUANTIFIED, NO PAIN PRESENT: ICD-10-PCS | Mod: HCNC,CPTII,S$GLB, | Performed by: PODIATRIST

## 2023-01-09 PROCEDURE — 11721 PR DEBRIDEMENT OF NAILS, 6 OR MORE: ICD-10-PCS | Mod: Q9,HCNC,S$GLB, | Performed by: PODIATRIST

## 2023-01-09 PROCEDURE — 99999 PR PBB SHADOW E&M-EST. PATIENT-LVL II: ICD-10-PCS | Mod: PBBFAC,HCNC,, | Performed by: PODIATRIST

## 2023-01-09 PROCEDURE — 11721 DEBRIDE NAIL 6 OR MORE: CPT | Mod: Q9,HCNC,S$GLB, | Performed by: PODIATRIST

## 2023-01-09 PROCEDURE — 1126F AMNT PAIN NOTED NONE PRSNT: CPT | Mod: HCNC,CPTII,S$GLB, | Performed by: PODIATRIST

## 2023-01-10 NOTE — PROGRESS NOTES
Subjective:      Patient ID: Sheree Pugh is a 89 y.o. female.    Chief Complaint: Peripheral Neuropathy (Denise 1/13/23)      Sheree is a 89 y.o. female who presents to the clinic for evaluation and treatment of high risk feet. Sheree has a past medical history of Anticoagulant long-term use, ARMD (age related macular degeneration), Arthritis, Breast cancer, Cataract, COPD (chronic obstructive pulmonary disease), Coronary artery disease, Disorder of kidney and ureter, [place] (dyspnea on exertion), Elevated cholesterol, Heart disease, Hypertension, Insomnia, Macular degeneration, Obstetrical blood-clot embolism, postpartum, PVD (peripheral vascular disease), Retinal detachment, Stented coronary artery (2/25/2019), and Urinary incontinence. The patient's chief complaint is toenails that are in need of trimming.  Denies the nails being a source of pain.  Requests to have them trimmed, as she is visually impaired.  This patient has documented high risk feet requiring routine maintenance secondary to peripheral neuropathy.  Denies any additional pedal complaints.      PCP: Piotr Walker MD    Date Last Seen by PCP: 1/23      Hemoglobin A1C   Date Value Ref Range Status   02/01/2020 5.7 (H) 4.0 - 5.6 % Final     Comment:     ADA Screening Guidelines:  5.7-6.4%  Consistent with prediabetes  >or=6.5%  Consistent with diabetes  High levels of fetal hemoglobin interfere with the HbA1C  assay. Heterozygous hemoglobin variants (HbS, HgC, etc)do  not significantly interfere with this assay.   However, presence of multiple variants may affect accuracy.     09/01/2018 5.8 (H) 0.0 - 5.6 % Final     Comment:     Reference Interval:  5.0 - 5.6 Normal   5.7 - 6.4 High Risk   > 6.5 Diabetic    Hgb A1c results are standardized based on the (NGSP) National   Glycohemoglobin Standardization Program.    Hemoglobin A1C levels are related to mean serum/plasma glucose   during the preceding 2-3 months.        04/13/2015 5.8 4.5 - 6.2 % Final        Review of Systems   Constitutional: Negative for chills and fever.   Cardiovascular:  Positive for leg swelling. Negative for claudication.   Skin:  Positive for nail changes.   Musculoskeletal:  Positive for joint swelling. Negative for muscle cramps, muscle weakness and myalgias.   Gastrointestinal:  Negative for nausea and vomiting.   Neurological:  Positive for numbness and paresthesias.   Psychiatric/Behavioral:  Negative for altered mental status.          Objective:      Physical Exam  Constitutional:       Appearance: Normal appearance. She is not ill-appearing.   Cardiovascular:      Pulses:           Dorsalis pedis pulses are 2+ on the right side and 2+ on the left side.        Posterior tibial pulses are 2+ on the right side and 2+ on the left side.      Comments: CFT is < 3 seconds bilateral.  Pedal hair growth is decreased bilateral.  Varicosities noted bilateral.   Toes are cool to touch bilateral.    Musculoskeletal:         General: Swelling present. No tenderness.      Right lower le+ Edema present.      Left lower le+ Edema present.      Comments: Muscle strength 5/5 in all muscle groups bilateral.  No tenderness nor crepitation with ROM of foot/ankle joints bilateral.  (-) for pain with palpation of bilateral foot and ankle.  Bilateral pes planus foot type.   Skin:     General: Skin is warm and dry.      Capillary Refill: Capillary refill takes 2 to 3 seconds.      Findings: No bruising, ecchymosis, erythema, signs of injury, laceration, lesion, petechiae, rash or wound.      Comments: Pedal skin appears edematous bilateral.  Toenails x 10 appear thickened by 4 mm, elongated by 4 mm, and discolored with subungual debris.   Examination of the skin reveals no evidence of significant maceration, rashes, open lesions, suspicious appearing nevi or other concerning lesions.    Neurological:      Mental Status: She is alert.      Sensory: Sensory deficit present.      Motor: No weakness or  atrophy.      Comments: Protective sensation per [place] monofilament is absent bilateral.  Light touch is absent bilateral.             Assessment:       Encounter Diagnoses   Name Primary?    Idiopathic peripheral neuropathy Yes    Onychomycosis     Venous insufficiency of both lower extremities     Peripheral edema              Plan:       Sheree was seen today for peripheral neuropathy.    Diagnoses and all orders for this visit:    Idiopathic peripheral neuropathy    Onychomycosis    Venous insufficiency of both lower extremities    Peripheral edema          I counseled the patient on her conditions, their implications and medical management.    - Advised to elevate the limbs while at rest.    - Recommend daily application of voltaren cream to help with LE paresthesias.    - Shoe inspection. Patient instructed on proper foot hygeine. We discussed wearing proper shoe gear, daily foot inspections, never walking without protective shoe gear, never putting sharp instruments to feet, routine podiatric nail visits every 3 months.      - With patient's permission, nails were aggressively reduced and debrided x 10 to their soft tissue attachment mechanically and with electric , removing all offending nail and debris. Patient relates relief following the procedure. She will continue to monitor the areas daily, inspect her feet, wear protective shoe gear when ambulatory, moisturizer to maintain skin integrity and follow in this office in approximately 3 months, sooner p.r.n.    Marquez Ramirez DPM

## 2023-01-13 ENCOUNTER — OFFICE VISIT (OUTPATIENT)
Dept: FAMILY MEDICINE | Facility: CLINIC | Age: 88
End: 2023-01-13
Payer: MEDICARE

## 2023-01-13 VITALS
DIASTOLIC BLOOD PRESSURE: 64 MMHG | SYSTOLIC BLOOD PRESSURE: 138 MMHG | HEIGHT: 62 IN | OXYGEN SATURATION: 97 % | BODY MASS INDEX: 29.01 KG/M2 | WEIGHT: 157.63 LBS | HEART RATE: 67 BPM

## 2023-01-13 DIAGNOSIS — R60.0 BILATERAL LEG EDEMA: ICD-10-CM

## 2023-01-13 DIAGNOSIS — G89.29 CHRONIC PAIN OF LEFT KNEE: Primary | ICD-10-CM

## 2023-01-13 DIAGNOSIS — I70.0 ATHEROSCLEROSIS OF AORTA: ICD-10-CM

## 2023-01-13 DIAGNOSIS — I10 ESSENTIAL HYPERTENSION: ICD-10-CM

## 2023-01-13 DIAGNOSIS — M46.96 UNSPECIFIED INFLAMMATORY SPONDYLOPATHY, LUMBAR REGION: ICD-10-CM

## 2023-01-13 DIAGNOSIS — I25.10 CORONARY ARTERY DISEASE INVOLVING NATIVE CORONARY ARTERY OF NATIVE HEART WITHOUT ANGINA PECTORIS: ICD-10-CM

## 2023-01-13 DIAGNOSIS — R26.2 DIFFICULTY WALKING: ICD-10-CM

## 2023-01-13 DIAGNOSIS — H35.3211 EXUDATIVE AGE-RELATED MACULAR DEGENERATION OF RIGHT EYE WITH ACTIVE CHOROIDAL NEOVASCULARIZATION: ICD-10-CM

## 2023-01-13 DIAGNOSIS — M25.562 CHRONIC PAIN OF LEFT KNEE: Primary | ICD-10-CM

## 2023-01-13 DIAGNOSIS — J41.0 SIMPLE CHRONIC BRONCHITIS: ICD-10-CM

## 2023-01-13 DIAGNOSIS — N18.31 STAGE 3A CHRONIC KIDNEY DISEASE: ICD-10-CM

## 2023-01-13 PROCEDURE — 1159F PR MEDICATION LIST DOCUMENTED IN MEDICAL RECORD: ICD-10-PCS | Mod: HCNC,CPTII,S$GLB, | Performed by: INTERNAL MEDICINE

## 2023-01-13 PROCEDURE — 99214 OFFICE O/P EST MOD 30 MIN: CPT | Mod: HCNC,S$GLB,, | Performed by: INTERNAL MEDICINE

## 2023-01-13 PROCEDURE — 1101F PT FALLS ASSESS-DOCD LE1/YR: CPT | Mod: HCNC,CPTII,S$GLB, | Performed by: INTERNAL MEDICINE

## 2023-01-13 PROCEDURE — 1157F ADVNC CARE PLAN IN RCRD: CPT | Mod: HCNC,CPTII,S$GLB, | Performed by: INTERNAL MEDICINE

## 2023-01-13 PROCEDURE — 1160F RVW MEDS BY RX/DR IN RCRD: CPT | Mod: HCNC,CPTII,S$GLB, | Performed by: INTERNAL MEDICINE

## 2023-01-13 PROCEDURE — 99999 PR PBB SHADOW E&M-EST. PATIENT-LVL IV: ICD-10-PCS | Mod: PBBFAC,HCNC,, | Performed by: INTERNAL MEDICINE

## 2023-01-13 PROCEDURE — 1159F MED LIST DOCD IN RCRD: CPT | Mod: HCNC,CPTII,S$GLB, | Performed by: INTERNAL MEDICINE

## 2023-01-13 PROCEDURE — 99214 PR OFFICE/OUTPT VISIT, EST, LEVL IV, 30-39 MIN: ICD-10-PCS | Mod: HCNC,S$GLB,, | Performed by: INTERNAL MEDICINE

## 2023-01-13 PROCEDURE — 3288F FALL RISK ASSESSMENT DOCD: CPT | Mod: HCNC,CPTII,S$GLB, | Performed by: INTERNAL MEDICINE

## 2023-01-13 PROCEDURE — 99999 PR PBB SHADOW E&M-EST. PATIENT-LVL IV: CPT | Mod: PBBFAC,HCNC,, | Performed by: INTERNAL MEDICINE

## 2023-01-13 PROCEDURE — 3288F PR FALLS RISK ASSESSMENT DOCUMENTED: ICD-10-PCS | Mod: HCNC,CPTII,S$GLB, | Performed by: INTERNAL MEDICINE

## 2023-01-13 PROCEDURE — 1101F PR PT FALLS ASSESS DOC 0-1 FALLS W/OUT INJ PAST YR: ICD-10-PCS | Mod: HCNC,CPTII,S$GLB, | Performed by: INTERNAL MEDICINE

## 2023-01-13 PROCEDURE — 1160F PR REVIEW ALL MEDS BY PRESCRIBER/CLIN PHARMACIST DOCUMENTED: ICD-10-PCS | Mod: HCNC,CPTII,S$GLB, | Performed by: INTERNAL MEDICINE

## 2023-01-13 PROCEDURE — 1157F PR ADVANCE CARE PLAN OR EQUIV PRESENT IN MEDICAL RECORD: ICD-10-PCS | Mod: HCNC,CPTII,S$GLB, | Performed by: INTERNAL MEDICINE

## 2023-02-07 DIAGNOSIS — Z00.00 ENCOUNTER FOR MEDICARE ANNUAL WELLNESS EXAM: ICD-10-CM

## 2023-02-09 DIAGNOSIS — Z00.00 ENCOUNTER FOR MEDICARE ANNUAL WELLNESS EXAM: ICD-10-CM

## 2023-02-10 ENCOUNTER — TELEPHONE (OUTPATIENT)
Dept: PHARMACY | Facility: CLINIC | Age: 88
End: 2023-02-10
Payer: MEDICARE

## 2023-02-10 NOTE — TELEPHONE ENCOUNTER
Spoke with son about [facility] and explained to him the [facility] is closed at the moment and as soon as it opens back up we can re-enroll her in the program

## 2023-03-14 ENCOUNTER — TELEPHONE (OUTPATIENT)
Dept: FAMILY MEDICINE | Facility: CLINIC | Age: 88
End: 2023-03-14
Payer: MEDICARE

## 2023-03-14 NOTE — TELEPHONE ENCOUNTER
----- Message from Yury Kelley sent at 3/14/2023  2:27 PM CDT -----  Contact: Son  Type: Needs Medical Advice    Who Called:Pt Son  Best Call Back Number:[phone]    Additional Information Requesting a call back regarding Pt was calling to speak with nurse in regards to a form that  needs to feel out for senior living to adjust pt rent son stated would it be okay to come and get form signed if so please call Thank you  Please Advise-Thank you

## 2023-03-27 DIAGNOSIS — I25.10 CORONARY ARTERY DISEASE INVOLVING NATIVE CORONARY ARTERY OF NATIVE HEART WITHOUT ANGINA PECTORIS: Chronic | ICD-10-CM

## 2023-03-27 DIAGNOSIS — I73.9 PERIPHERAL VASCULAR DISEASE: ICD-10-CM

## 2023-03-27 DIAGNOSIS — N18.30 CHRONIC KIDNEY DISEASE, STAGE III (MODERATE): ICD-10-CM

## 2023-03-27 DIAGNOSIS — E78.5 DYSLIPIDEMIA: Chronic | ICD-10-CM

## 2023-03-27 DIAGNOSIS — I65.21 STENOSIS OF RIGHT CAROTID ARTERY: Chronic | ICD-10-CM

## 2023-03-27 DIAGNOSIS — Z98.61 HISTORY OF PTCA: Chronic | ICD-10-CM

## 2023-03-27 DIAGNOSIS — I35.0 NONRHEUMATIC AORTIC VALVE STENOSIS: Chronic | ICD-10-CM

## 2023-03-27 RX ORDER — ISOSORBIDE MONONITRATE 60 MG/1
60 TABLET, EXTENDED RELEASE ORAL NIGHTLY
Qty: 90 TABLET | Refills: 4 | Status: SHIPPED | OUTPATIENT
Start: 2023-03-27 | End: 2024-02-15 | Stop reason: SDUPTHER

## 2023-03-27 RX ORDER — CLOPIDOGREL BISULFATE 75 MG/1
75 TABLET ORAL DAILY
Qty: 90 TABLET | Refills: 4 | Status: SHIPPED | OUTPATIENT
Start: 2023-03-27 | End: 2024-02-15 | Stop reason: SDUPTHER

## 2023-03-27 RX ORDER — ATORVASTATIN CALCIUM 20 MG/1
20 TABLET, FILM COATED ORAL DAILY
Qty: 90 TABLET | Refills: 4 | Status: SHIPPED | OUTPATIENT
Start: 2023-03-27 | End: 2024-02-15 | Stop reason: SDUPTHER

## 2023-04-10 DIAGNOSIS — G47.62 SLEEP RELATED LEG CRAMPS: ICD-10-CM

## 2023-04-10 RX ORDER — GABAPENTIN 100 MG/1
100 CAPSULE ORAL NIGHTLY
Qty: 30 CAPSULE | Refills: 11 | Status: CANCELLED | OUTPATIENT
Start: 2023-04-10 | End: 2024-04-09

## 2023-04-10 NOTE — TELEPHONE ENCOUNTER
No new care gaps identified.  [facility] Embedded Care Gaps. Reference number: [id number]. 4/10/2023   6:07:48 PM CDT

## 2023-04-10 NOTE — TELEPHONE ENCOUNTER
No new care gaps identified.  [facility] Embedded Care Gaps. Reference number: [id number]. 4/10/2023   3:50:36 PM CDT

## 2023-04-11 RX ORDER — GABAPENTIN 100 MG/1
100 CAPSULE ORAL NIGHTLY
Qty: 30 CAPSULE | Refills: 11 | Status: SHIPPED | OUTPATIENT
Start: 2023-04-11 | End: 2024-03-26 | Stop reason: SDUPTHER

## 2023-04-19 NOTE — PROGRESS NOTES
"Subjective:       Patient ID: Sheree Pugh is a 89 y.o. female.     Chief Complaint: Rib Injury (right side ) and Muscle Pain (right side )     HPI      Presents to the clinic with right sided rib pain which began yesterday (11/16/17) while she was cooking. She reports she was unable to sleep last night (11/16/17) and was having "андрей horses" down her right leg. Denies fall or injury. She has sharp pain when she takes a deep breath. She reports it does not radiate down her leg anymore today.  Describes the pain as achy and "like a bruise", rated 4-6/10 (last night it was 10/10), and hurts worse when she presses on the location.  She denies shortness of breath but reports its difficult to take a deep breath d/t pain. Recently saw pulmonologist and he stopped her Spiriva and Symbicort as her lungs were clear.  She took 2 tylenol yesterday when the pain began which allowed her to continue her ADLs.       Review of Systems   Constitutional: Negative for chills and fatigue.   Respiratory: Negative for cough, chest tightness and shortness of breath.    Cardiovascular: Negative for chest pain and palpitations.   Gastrointestinal: Positive for diarrhea (11/16/17 x 1 ). Negative for constipation, nausea and vomiting.   Musculoskeletal: Positive for arthralgias. Negative for back pain and neck stiffness.   Skin: Negative for rash and wound.   Neurological: Negative for dizziness, weakness, numbness and headaches.   Psychiatric/Behavioral: Positive for sleep disturbance (d/t pain and lorena horses.).       Objective:      Physical Exam   Constitutional: She is oriented to person, place, and time. She appears well-developed and well-nourished. No distress.   HENT:   Head: Normocephalic and atraumatic.   Eyes: EOM are normal. Pupils are equal, round, and reactive to light. Right eye exhibits no discharge. Left eye exhibits no discharge.   Neck: Normal range of motion.   Cardiovascular: Normal rate and regular rhythm.    No " CHIEF COMPLAINT/HISTORY OF PRESENT ILLNESS:    Cyril Meek is a 43 year old male that presents to the Urgent Care  for   Chief Complaint   Patient presents with   • Fever   • Sore Throat     Cyril presents to the urgent care due to URI symptoms that started last night.  The patient did a home COVID test which was negative.  The patient does have fever, sore throat, and body aches.  He has used cold medicine with minimal relief.  He denies any nausea, vomiting, diarrhea, changes in taste or smell, or shortness of breath.  No known exposure to COVID or influenza.  Patient does have fever, sore throat, and body aches. Patient has mild head congestion He does not have palpitations.     Reviewed RN/LPN/MA's notes.    Past Medical History:  Problem list: Reviewed.  Allergies: ALLERGIES:  No Known Allergies    Medication list:  Current Outpatient Medications   Medication Sig Dispense Refill   • benzonatate (Tessalon Perles) 100 MG capsule Take 1-2 capsules by mouth 3 times daily as needed for cough. Maximum 6 caps in 24 hours 30 capsule 0   • albuterol 108 (90 Base) MCG/ACT inhaler Inhale 2 puffs into the lungs every 4 hours as needed for Shortness of Breath or Wheezing. 8.5 g 0   • albuterol (PROAIR RESPICLICK) 108 (90 Base) MCG/ACT inhaler Inhale 2 puffs into the lungs every 4 hours as needed for Shortness of Breath or Wheezing. 1 Inhaler 12     No current facility-administered medications for this visit.     Facility-Administered Medications Ordered in Other Visits   Medication Dose Route Frequency Provider Last Rate Last Admin   • iopamidol (ISOVUE-250) 51 % injection 50 mL  50 mL Other Once Viktor Gayle MD         Social History:      Social History     Tobacco Use   Smoking Status Never   Smokeless Tobacco Current   • Types: Chew   Vaping Use   Vaping Status Not on file         GENERAL:  General appearance:  Well developed and well nourished.  VITAL SIGNS: WNL, Afebrile, elevated HR  Vitals:    04/19/23 0800    BP: 134/88   Pulse: (!) 128   Resp: 20   Temp: 98.5 °F (36.9 °C)   TempSrc: Temporal   SpO2: 97%   Weight: (!) 163.9 kg (361 lb 3.6 oz)     SKIN:  Clear to inspection and palpation.  HEAD:  Normocephalic   EYES: Conjunctivae and lids clear without injection.   SUSHANT.  EOMI.  ENT: Normal ear canals.  TMs clear.  Hearing within normal limits.  Sinuses nontender to palpation and gravity.  Lips and dentition within normal limits.   Oropharynx erythema.  3+ tonsils with erythema, edema and exudate No petechiae   NECK:  Supple.    No lymphadenopathy .    HEART:  Normal S1 and S2.  tachycardia No murmurs, rubs or gallops.   RESPIRATORY:  Full, easy respiratory effort.  Clear to auscultation bilaterally.    PSYCHOLOGICAL: Alert and oriented x3, Pleasant, Cooperative    Procedures/Diagnostic Tests: Covid/Influenza, Strep    Cyril was seen today for fever and sore throat.    Diagnoses and all orders for this visit:    Acute URI  -     SARS-COV-2/INFLUENZA BY PCR  -     Streptococcus group A PCR    Fever, unspecified fever cause    ST (sore throat)    Body aches    Tachycardia  -     Electrocardiogram 12-Lead    Other orders  -     amoxicillin-clavulanate (AUGMENTIN) 875-125 MG per tablet; Take 1 tablet by mouth in the morning and 1 tablet in the evening. Do all this for 10 days.      We will call with results when available.  I also suggested over-the-counter Mucinex and Tylenol to help with sputum movement and pain/inflammation. For a sore throat they may try taking teaspoons of honey and/or saltwater gargles.    At this time I highly suspect strep.  Will start Augmentin twice daily for 10 days.  New toothbrush day 3.  Patient is aware that his 12 lead did show a sinus tach with a incomplete right bundle block.  No significant changes noted from his previous 12 lead. I suggest that they start a probiotic and/or start eating yogurt while taking the antibiotic.      Cyril is in agreement with this plan.  For any worsening  murmur heard.  Pulmonary/Chest: Effort normal and breath sounds normal. No respiratory distress. She has no wheezes. She exhibits bony tenderness (R lateral chest wall with palpation/pressure). She exhibits no laceration, no crepitus, no edema and no swelling.       Abdominal: Soft. Bowel sounds are normal. She exhibits no distension and no mass. There is no tenderness. There is no rebound and no guarding.   Musculoskeletal: Normal range of motion. She exhibits tenderness. She exhibits no edema.   Neurological: She is alert and oriented to person, place, and time. No sensory deficit.   Skin: Skin is warm and dry. She is not diaphoretic.   Psychiatric: She has a normal mood and affect. Her behavior is normal.     Rib pain on right side      Pt today presents with sudden onset of 10/10 right side rib pain last night w/o injury or unusual activity. Rates pain and 4-6 of 10 today.  Not short of breath.  Pulmonologist stopped her inhalers and she still feels the same.  Exam shows no swelling or ecchymosis, lungs clear, tenderness to palp at the site..    This is a new problem to me.  No work up is planned.     We discussed rib xray but she declines at this time.    Cautioned to cough and deep breath to prevent pneumonia.    Pt advised to perform comfort measures recommended on patient instruction sheet .    Advised to call if s/s develop  Explained exam findings, diagnosis and treatment plan to patient.  Questions answered and patient states understanding.       symptoms return to the clinic, primary care provider, or emergency room.     Cyril was appreciative of his care today.      AUBREY Tucker: KATHRYN  This patient was seen under the supervision of Dr. Enamorado       The 21st Century Cures Act makes medical notes like these available to patients in the interest of transparency. However, be advised this is a medical document. It is intended as peer to peer communication. It is written in medical language and may contain abbreviations or verbiage that are unfamiliar to the general public. It may appear blunt or direct. Medical documents are intended to carry relevant information, facts as evident, and the clinical opinion of the practitioner.

## 2023-04-20 ENCOUNTER — OFFICE VISIT (OUTPATIENT)
Dept: FAMILY MEDICINE | Facility: CLINIC | Age: 88
End: 2023-04-20
Payer: MEDICARE

## 2023-04-20 VITALS
WEIGHT: 158.75 LBS | BODY MASS INDEX: 29.21 KG/M2 | SYSTOLIC BLOOD PRESSURE: 125 MMHG | DIASTOLIC BLOOD PRESSURE: 55 MMHG | HEIGHT: 62 IN | HEART RATE: 68 BPM | OXYGEN SATURATION: 96 %

## 2023-04-20 DIAGNOSIS — R60.0 BILATERAL LEG EDEMA: ICD-10-CM

## 2023-04-20 DIAGNOSIS — R26.2 DIFFICULTY WALKING: ICD-10-CM

## 2023-04-20 DIAGNOSIS — N39.41 URGE INCONTINENCE OF URINE: ICD-10-CM

## 2023-04-20 DIAGNOSIS — L03.115 BILATERAL LOWER LEG CELLULITIS: Primary | ICD-10-CM

## 2023-04-20 DIAGNOSIS — L03.116 BILATERAL LOWER LEG CELLULITIS: Primary | ICD-10-CM

## 2023-04-20 PROCEDURE — 1157F PR ADVANCE CARE PLAN OR EQUIV PRESENT IN MEDICAL RECORD: ICD-10-PCS | Mod: HCNC,CPTII,S$GLB, | Performed by: INTERNAL MEDICINE

## 2023-04-20 PROCEDURE — 1101F PT FALLS ASSESS-DOCD LE1/YR: CPT | Mod: HCNC,CPTII,S$GLB, | Performed by: INTERNAL MEDICINE

## 2023-04-20 PROCEDURE — 1126F AMNT PAIN NOTED NONE PRSNT: CPT | Mod: HCNC,CPTII,S$GLB, | Performed by: INTERNAL MEDICINE

## 2023-04-20 PROCEDURE — 99213 OFFICE O/P EST LOW 20 MIN: CPT | Mod: HCNC,S$GLB,, | Performed by: INTERNAL MEDICINE

## 2023-04-20 PROCEDURE — 3288F FALL RISK ASSESSMENT DOCD: CPT | Mod: HCNC,CPTII,S$GLB, | Performed by: INTERNAL MEDICINE

## 2023-04-20 PROCEDURE — 1101F PR PT FALLS ASSESS DOC 0-1 FALLS W/OUT INJ PAST YR: ICD-10-PCS | Mod: HCNC,CPTII,S$GLB, | Performed by: INTERNAL MEDICINE

## 2023-04-20 PROCEDURE — 1160F PR REVIEW ALL MEDS BY PRESCRIBER/CLIN PHARMACIST DOCUMENTED: ICD-10-PCS | Mod: HCNC,CPTII,S$GLB, | Performed by: INTERNAL MEDICINE

## 2023-04-20 PROCEDURE — 1160F RVW MEDS BY RX/DR IN RCRD: CPT | Mod: HCNC,CPTII,S$GLB, | Performed by: INTERNAL MEDICINE

## 2023-04-20 PROCEDURE — 1126F PR PAIN SEVERITY QUANTIFIED, NO PAIN PRESENT: ICD-10-PCS | Mod: HCNC,CPTII,S$GLB, | Performed by: INTERNAL MEDICINE

## 2023-04-20 PROCEDURE — 3288F PR FALLS RISK ASSESSMENT DOCUMENTED: ICD-10-PCS | Mod: HCNC,CPTII,S$GLB, | Performed by: INTERNAL MEDICINE

## 2023-04-20 PROCEDURE — 99999 PR PBB SHADOW E&M-EST. PATIENT-LVL IV: ICD-10-PCS | Mod: PBBFAC,HCNC,, | Performed by: INTERNAL MEDICINE

## 2023-04-20 PROCEDURE — 1159F PR MEDICATION LIST DOCUMENTED IN MEDICAL RECORD: ICD-10-PCS | Mod: HCNC,CPTII,S$GLB, | Performed by: INTERNAL MEDICINE

## 2023-04-20 PROCEDURE — 99213 PR OFFICE/OUTPT VISIT, EST, LEVL III, 20-29 MIN: ICD-10-PCS | Mod: HCNC,S$GLB,, | Performed by: INTERNAL MEDICINE

## 2023-04-20 PROCEDURE — 1157F ADVNC CARE PLAN IN RCRD: CPT | Mod: HCNC,CPTII,S$GLB, | Performed by: INTERNAL MEDICINE

## 2023-04-20 PROCEDURE — 1159F MED LIST DOCD IN RCRD: CPT | Mod: HCNC,CPTII,S$GLB, | Performed by: INTERNAL MEDICINE

## 2023-04-20 PROCEDURE — 99999 PR PBB SHADOW E&M-EST. PATIENT-LVL IV: CPT | Mod: PBBFAC,HCNC,, | Performed by: INTERNAL MEDICINE

## 2023-04-20 RX ORDER — CLINDAMYCIN HYDROCHLORIDE 300 MG/1
600 CAPSULE ORAL 2 TIMES DAILY
Qty: 28 CAPSULE | Refills: 0 | Status: SHIPPED | OUTPATIENT
Start: 2023-04-20 | End: 2023-04-27

## 2023-04-20 RX ORDER — OXYBUTYNIN CHLORIDE 10 MG/1
10 TABLET, EXTENDED RELEASE ORAL DAILY
Qty: 90 TABLET | Refills: 3 | Status: CANCELLED | OUTPATIENT
Start: 2023-04-20 | End: 2024-04-19

## 2023-04-20 NOTE — PROGRESS NOTES
"Patient ID: Sheree Pugh     Chief Complaint:   Chief Complaint   Patient presents with    Blister     Leg blisters on both leg.         HPI: Patient Complains of blisters on bilateral legs and  increased bilateral lower extremity edema and pain x 3 days. On Physical Exam, she has 2+ bilateral lower extremity edema up to her knees which is an increase from her baseline. Son says that she has been going out and shopping a lot more recently and not wearing compression hose. She is compliant with lasix and aldactone, but she does skip on the days she goes out. Will treat with Clindamycin and have her take lasix & aldactone more regularly.     Review of Systems       Bilateral lower extremity edema     Objective:      Physical Exam   Physical Exam           2+ bilateral lower extremity edema with cellulitis   + Murmur  Chest clear     Vitals:   Vitals:    04/20/23 0833 04/20/23 0906   BP: (!) 148/62 (!) 125/55   Pulse: 68    SpO2: 96%    Weight: 72 kg (158 lb 11.7 oz)    Height: 5' 2" (1.575 m)           Current Outpatient Medications:     ACETAMINOPHEN (TYLENOL ARTHRITIS ORAL), Take 2 tablets by mouth daily as needed. , Disp: , Rfl:     albuterol (VENTOLIN HFA) 90 mcg/actuation inhaler, Inhale 2 puffs into the lungs every 6 (six) hours as needed for Wheezing or Shortness of Breath., Disp: 18 g, Rfl: 2    atorvastatin (LIPITOR) 20 MG tablet, Take 1 tablet (20 mg total) by mouth once daily., Disp: 90 tablet, Rfl: 4    budesonide-formoterol 160-4.5 mcg (SYMBICORT) 160-4.5 mcg/actuation HFAA, Inhale 2 puffs into the lungs 2 (two) times daily. Controller., Disp: 30.6 g, Rfl: 3    clopidogreL (PLAVIX) 75 mg tablet, Take 1 tablet (75 mg total) by mouth once daily., Disp: 90 tablet, Rfl: 4    cyanocobalamin 1,000 mcg/mL injection, INJECT 1CC (1ML) INTRAMUSCULARLY EVERY THREE WEEKS, Disp: 10 mL, Rfl: 3    diltiaZEM (CARDIZEM CD) 180 MG 24 hr capsule, Take 1 capsule (180 mg total) by mouth every morning., Disp: 90 capsule, " "Rfl: 4    donepeziL (ARICEPT) 5 MG tablet, Take 1 tablet (5 mg total) by mouth every evening., Disp: 30 tablet, Rfl: 11    furosemide (LASIX) 20 MG tablet, Take 1 tablet (20 mg total) by mouth daily as needed (edema)., Disp: 90 tablet, Rfl: 3    gabapentin (NEURONTIN) 100 MG capsule, Take 1 capsule (100 mg total) by mouth every evening., Disp: 30 capsule, Rfl: 11    isosorbide mononitrate (IMDUR) 60 MG 24 hr tablet, Take 1 tablet (60 mg total) by mouth every evening., Disp: 90 tablet, Rfl: 4    nortriptyline (PAMELOR) 10 MG capsule, Take 1 capsule (10 mg total) by mouth every evening., Disp: 90 capsule, Rfl: 3    ondansetron (ZOFRAN) 4 MG tablet, Take 1 tablet (4 mg total) by mouth every 8 (eight) hours as needed for Nausea., Disp: 12 tablet, Rfl: 0    oxybutynin (DITROPAN-XL) 10 MG 24 hr tablet, Take 1 tablet (10 mg total) by mouth once daily., Disp: 90 tablet, Rfl: 3    spironolactone (ALDACTONE) 25 MG tablet, Take 1 tablet (25 mg total) by mouth once daily., Disp: 90 tablet, Rfl: 3    syringe with needle (BD LUER-FRANCI SYRINGE) 3 mL 25 gauge x 1" Syrg, USE TO INJECT B-12 AS DIRECTED, Disp: 10 Syringe, Rfl: 11    tamoxifen (NOLVADEX) 20 MG Tab, Take 1 tablet (20 mg total) by mouth once daily., Disp: 90 tablet, Rfl: 3    tiotropium (SPIRIVA WITH HANDIHALER) 18 mcg inhalation capsule, Inhale 1 capsule (18 mcg total) into the lungs once daily using handihaler., Disp: 90 capsule, Rfl: 3    vitamins  A,C,E-zinc-copper (PRESERVISION AREDS) 14,320-226-200 unit-mg-unit Cap, Take by mouth., Disp: , Rfl:     clindamycin (CLEOCIN) 300 MG capsule, Take 2 capsules (600 mg total) by mouth 2 (two) times a day. for 7 days, Disp: 28 capsule, Rfl: 0    cloNIDine (CATAPRES) 0.1 MG tablet, Take 1 tablet (0.1 mg total) by mouth 3 (three) times daily as needed (PRN SBP > 165 mmHg)., Disp: 90 tablet, Rfl: 6   Assessment:       Patient Active Problem List    Diagnosis Date Noted    Chronic pain of left knee 01/13/2023    Difficulty walking " 09/13/2022    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 03/25/2022    Decreased hearing of both ears 03/25/2022    Right lower quadrant abdominal mass 03/25/2022    Sleep related leg cramps 12/16/2021    Decreased vision in both eyes 12/16/2021    Skin tear of left forearm without complication 12/16/2021    Simple chronic bronchitis 11/13/2021    Macular subretinal hemorrhage, right 09/22/2021    Exudative age-related macular degeneration of right eye with active choroidal neovascularization 09/16/2021    Conductive hearing loss, bilateral 08/25/2020    Memory loss 08/25/2020    Unspecified inflammatory spondylopathy, lumbar region 05/28/2020    Bilateral leg edema 05/28/2020    ANTWAN (renal artery stenosis) 02/25/2019    Stented coronary artery 02/25/2019    Atherosclerosis of aorta 08/16/2017    Tortuous aorta 08/16/2017    DDD (degenerative disc disease), lumbar 08/31/2016    Stage 3a chronic kidney disease 06/23/2016    Urinary incontinence 06/23/2016    Stenosis of right carotid artery 06/23/2016    Coronary artery disease involving native coronary artery of native heart without angina pectoris 06/23/2016    Aortic valve stenosis 06/23/2016    Essential hypertension 06/23/2016    Nuclear sclerosis of left eye 06/23/2016    Pseudophakia of right eye 06/23/2016    Anemia due to vitamin B12 deficiency 06/23/2016    Peripheral polyneuropathy 06/23/2016    Insomnia 06/23/2016    Osteoporosis, senile 06/23/2016    Arthritis 06/23/2016    Lichen sclerosus et atrophicus of the vulva 03/29/2016    Peripheral vascular disease 04/17/2013    Exudative age-related macular degeneration of left eye with inactive choroidal neovascularization 08/13/2012    Posterior vitreous detachment of both eyes 08/13/2012    At risk for falls 05/25/2012    Legal blindness - Left Eye 01/13/2012    Dyslipidemia 01/03/2012          Plan:       Sheree Pugh  was seen today for follow-up and may need lab work.    Diagnoses and  all orders for this visit:    Sheree was seen today for blister.    Diagnoses and all orders for this visit:    Bilateral lower leg cellulitis  -     clindamycin (CLEOCIN) 300 MG capsule; Take 2 capsules (600 mg total) by mouth 2 (two) times a day. for 7 days    Bilateral leg edema  Uncontrolled - take lasix and aldactone more consistently     Difficulty walking  Ambulates with walker

## 2023-04-20 NOTE — TELEPHONE ENCOUNTER
No new care gaps identified.  [facility] Embedded Care Gaps. Reference number: [id number]. 4/20/2023   8:50:56 AM CDT

## 2023-04-20 NOTE — TELEPHONE ENCOUNTER
No new care gaps identified.  [facility] Embedded Care Gaps. Reference number: [id number]. 4/20/2023   2:43:13 PM CDT

## 2023-04-21 RX ORDER — OXYBUTYNIN CHLORIDE 10 MG/1
10 TABLET, EXTENDED RELEASE ORAL DAILY
Qty: 90 TABLET | Refills: 3 | Status: SHIPPED | OUTPATIENT
Start: 2023-04-21 | End: 2024-04-24

## 2023-05-11 ENCOUNTER — OFFICE VISIT (OUTPATIENT)
Dept: FAMILY MEDICINE | Facility: CLINIC | Age: 88
End: 2023-05-11
Payer: MEDICARE

## 2023-05-11 VITALS
OXYGEN SATURATION: 97 % | BODY MASS INDEX: 29.53 KG/M2 | DIASTOLIC BLOOD PRESSURE: 52 MMHG | SYSTOLIC BLOOD PRESSURE: 144 MMHG | WEIGHT: 160.5 LBS | HEIGHT: 62 IN | HEART RATE: 54 BPM

## 2023-05-11 DIAGNOSIS — I87.2 LYMPHEDEMA DUE TO VENOUS INSUFFICIENCY: Primary | ICD-10-CM

## 2023-05-11 DIAGNOSIS — I89.0 LYMPHEDEMA DUE TO VENOUS INSUFFICIENCY: Primary | ICD-10-CM

## 2023-05-11 PROCEDURE — 99213 OFFICE O/P EST LOW 20 MIN: CPT | Mod: HCNC,S$GLB,, | Performed by: INTERNAL MEDICINE

## 2023-05-11 PROCEDURE — 1160F RVW MEDS BY RX/DR IN RCRD: CPT | Mod: HCNC,CPTII,S$GLB, | Performed by: INTERNAL MEDICINE

## 2023-05-11 PROCEDURE — 99213 PR OFFICE/OUTPT VISIT, EST, LEVL III, 20-29 MIN: ICD-10-PCS | Mod: HCNC,S$GLB,, | Performed by: INTERNAL MEDICINE

## 2023-05-11 PROCEDURE — 1159F PR MEDICATION LIST DOCUMENTED IN MEDICAL RECORD: ICD-10-PCS | Mod: HCNC,CPTII,S$GLB, | Performed by: INTERNAL MEDICINE

## 2023-05-11 PROCEDURE — 99999 PR PBB SHADOW E&M-EST. PATIENT-LVL V: CPT | Mod: PBBFAC,HCNC,, | Performed by: INTERNAL MEDICINE

## 2023-05-11 PROCEDURE — 1126F AMNT PAIN NOTED NONE PRSNT: CPT | Mod: HCNC,CPTII,S$GLB, | Performed by: INTERNAL MEDICINE

## 2023-05-11 PROCEDURE — 3288F PR FALLS RISK ASSESSMENT DOCUMENTED: ICD-10-PCS | Mod: HCNC,CPTII,S$GLB, | Performed by: INTERNAL MEDICINE

## 2023-05-11 PROCEDURE — 3288F FALL RISK ASSESSMENT DOCD: CPT | Mod: HCNC,CPTII,S$GLB, | Performed by: INTERNAL MEDICINE

## 2023-05-11 PROCEDURE — 1160F PR REVIEW ALL MEDS BY PRESCRIBER/CLIN PHARMACIST DOCUMENTED: ICD-10-PCS | Mod: HCNC,CPTII,S$GLB, | Performed by: INTERNAL MEDICINE

## 2023-05-11 PROCEDURE — 1157F PR ADVANCE CARE PLAN OR EQUIV PRESENT IN MEDICAL RECORD: ICD-10-PCS | Mod: HCNC,CPTII,S$GLB, | Performed by: INTERNAL MEDICINE

## 2023-05-11 PROCEDURE — 1159F MED LIST DOCD IN RCRD: CPT | Mod: HCNC,CPTII,S$GLB, | Performed by: INTERNAL MEDICINE

## 2023-05-11 PROCEDURE — 99999 PR PBB SHADOW E&M-EST. PATIENT-LVL V: ICD-10-PCS | Mod: PBBFAC,HCNC,, | Performed by: INTERNAL MEDICINE

## 2023-05-11 PROCEDURE — 1126F PR PAIN SEVERITY QUANTIFIED, NO PAIN PRESENT: ICD-10-PCS | Mod: HCNC,CPTII,S$GLB, | Performed by: INTERNAL MEDICINE

## 2023-05-11 PROCEDURE — 1101F PT FALLS ASSESS-DOCD LE1/YR: CPT | Mod: HCNC,CPTII,S$GLB, | Performed by: INTERNAL MEDICINE

## 2023-05-11 PROCEDURE — 1157F ADVNC CARE PLAN IN RCRD: CPT | Mod: HCNC,CPTII,S$GLB, | Performed by: INTERNAL MEDICINE

## 2023-05-11 PROCEDURE — 1101F PR PT FALLS ASSESS DOC 0-1 FALLS W/OUT INJ PAST YR: ICD-10-PCS | Mod: HCNC,CPTII,S$GLB, | Performed by: INTERNAL MEDICINE

## 2023-05-11 NOTE — PROGRESS NOTES
"Patient ID: Sheree Pugh     Chief Complaint:   Chief Complaint   Patient presents with    Follow-up     4 month         HPI:  Patient complains of persistent and recurrent bilateral lower extremity edema.  We last met about 3 weeks ago due to the edema and I did treat her for some cellulitis at that time.  She does not have cellulitis today but does give a very good story of how throughout the day and she is active her legs will swell and then start to her.  She has been trying to take her Lasix and Aldactone more regularly, but does skip a dose if she is very active going out of the town that day.  She has also been trying to wear her compression stockings more regularly.  We have not made much improvement in the edema.  I did offer referral to a vascular surgeon but she admits that they probably will not do anything for her due to her age.  I would like to refer her to lymphedema therapy as we have not tried this in the past and hopefully that can get her lymphedema more manageable.    Review of Systems       Leg swelling      Objective:      Physical Exam   Physical Exam       Bilateral lower extremity lymphedema     Vitals:   Vitals:    05/11/23 1551   BP: (!) 144/52   Pulse: (!) 54   SpO2: 97%   Weight: 72.8 kg (160 lb 7.9 oz)   Height: 5' 2" (1.575 m)          Current Outpatient Medications:     ACETAMINOPHEN (TYLENOL ARTHRITIS ORAL), Take 2 tablets by mouth daily as needed. , Disp: , Rfl:     albuterol (VENTOLIN HFA) 90 mcg/actuation inhaler, Inhale 2 puffs into the lungs every 6 (six) hours as needed for Wheezing or Shortness of Breath., Disp: 18 g, Rfl: 2    atorvastatin (LIPITOR) 20 MG tablet, Take 1 tablet (20 mg total) by mouth once daily., Disp: 90 tablet, Rfl: 4    budesonide-formoterol 160-4.5 mcg (SYMBICORT) 160-4.5 mcg/actuation HFAA, Inhale 2 puffs into the lungs 2 (two) times daily. Controller., Disp: 30.6 g, Rfl: 3    clopidogreL (PLAVIX) 75 mg tablet, Take 1 tablet (75 mg total) by mouth " "once daily., Disp: 90 tablet, Rfl: 4    cyanocobalamin 1,000 mcg/mL injection, INJECT 1CC (1ML) INTRAMUSCULARLY EVERY THREE WEEKS, Disp: 10 mL, Rfl: 3    diltiaZEM (CARDIZEM CD) 180 MG 24 hr capsule, Take 1 capsule (180 mg total) by mouth every morning., Disp: 90 capsule, Rfl: 4    donepeziL (ARICEPT) 5 MG tablet, Take 1 tablet (5 mg total) by mouth every evening., Disp: 30 tablet, Rfl: 11    furosemide (LASIX) 20 MG tablet, Take 1 tablet (20 mg total) by mouth daily as needed (edema)., Disp: 90 tablet, Rfl: 3    gabapentin (NEURONTIN) 100 MG capsule, Take 1 capsule (100 mg total) by mouth every evening., Disp: 30 capsule, Rfl: 11    isosorbide mononitrate (IMDUR) 60 MG 24 hr tablet, Take 1 tablet (60 mg total) by mouth every evening., Disp: 90 tablet, Rfl: 4    nortriptyline (PAMELOR) 10 MG capsule, Take 1 capsule (10 mg total) by mouth every evening., Disp: 90 capsule, Rfl: 3    ondansetron (ZOFRAN) 4 MG tablet, Take 1 tablet (4 mg total) by mouth every 8 (eight) hours as needed for Nausea., Disp: 12 tablet, Rfl: 0    oxybutynin (DITROPAN-XL) 10 MG 24 hr tablet, Take 1 tablet (10 mg total) by mouth once daily., Disp: 90 tablet, Rfl: 3    spironolactone (ALDACTONE) 25 MG tablet, Take 1 tablet (25 mg total) by mouth once daily., Disp: 90 tablet, Rfl: 3    syringe with needle (BD LUER-FRANCI SYRINGE) 3 mL 25 gauge x 1" Syrg, USE TO INJECT B-12 AS DIRECTED, Disp: 10 Syringe, Rfl: 11    tamoxifen (NOLVADEX) 20 MG Tab, Take 1 tablet (20 mg total) by mouth once daily., Disp: 90 tablet, Rfl: 3    tiotropium (SPIRIVA WITH HANDIHALER) 18 mcg inhalation capsule, Inhale 1 capsule (18 mcg total) into the lungs once daily using handihaler., Disp: 90 capsule, Rfl: 3    vitamins  A,C,E-zinc-copper (PRESERVISION AREDS) [phone] unit-mg-unit Cap, Take by mouth., Disp: , Rfl:     cloNIDine (CATAPRES) 0.1 MG tablet, Take 1 tablet (0.1 mg total) by mouth 3 (three) times daily as needed (PRN SBP > 165 mmHg)., Disp: 90 tablet, Rfl: 6 "   Assessment:       Patient Active Problem List    Diagnosis Date Noted    Lymphedema due to venous insufficiency 05/11/2023    Chronic pain of left knee 01/13/2023    Difficulty walking 09/13/2022    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 03/25/2022    Decreased hearing of both ears 03/25/2022    Right lower quadrant abdominal mass 03/25/2022    Sleep related leg cramps 12/16/2021    Decreased vision in both eyes 12/16/2021    Skin tear of left forearm without complication 12/16/2021    Simple chronic bronchitis 11/13/2021    Macular subretinal hemorrhage, right 09/22/2021    Exudative age-related macular degeneration of right eye with active choroidal neovascularization 09/16/2021    Conductive hearing loss, bilateral 08/25/2020    Memory loss 08/25/2020    Unspecified inflammatory spondylopathy, lumbar region 05/28/2020    Bilateral leg edema 05/28/2020    ANTWAN (renal artery stenosis) 02/25/2019    Stented coronary artery 02/25/2019    Atherosclerosis of aorta 08/16/2017    Tortuous aorta 08/16/2017    DDD (degenerative disc disease), lumbar 08/31/2016    Stage 3a chronic kidney disease 06/23/2016    Urinary incontinence 06/23/2016    Stenosis of right carotid artery 06/23/2016    Coronary artery disease involving native coronary artery of native heart without angina pectoris 06/23/2016    Aortic valve stenosis 06/23/2016    Essential hypertension 06/23/2016    Nuclear sclerosis of left eye 06/23/2016    Pseudophakia of right eye 06/23/2016    Anemia due to vitamin B12 deficiency 06/23/2016    Peripheral polyneuropathy 06/23/2016    Insomnia 06/23/2016    Osteoporosis, senile 06/23/2016    Arthritis 06/23/2016    Lichen sclerosus et atrophicus of the vulva 03/29/2016    Peripheral vascular disease 04/17/2013    Exudative age-related macular degeneration of left eye with inactive choroidal neovascularization 08/13/2012    Posterior vitreous detachment of both eyes 08/13/2012    At risk for  falls 05/25/2012    Legal blindness - Left Eye 01/13/2012    Dyslipidemia 01/03/2012          Plan:       Sheree Pugh  was seen today for follow-up and may need lab work.    Diagnoses and all orders for this visit:    Sheree was seen today for follow-up.    Diagnoses and all orders for this visit:    Lymphedema due to venous insufficiency  -     Ambulatory referral/consult to Physical/Occupational Therapy; Future  Continue Lasix and aldactone

## 2023-05-25 ENCOUNTER — OFFICE VISIT (OUTPATIENT)
Dept: PODIATRY | Facility: CLINIC | Age: 88
End: 2023-05-25
Payer: MEDICARE

## 2023-05-25 DIAGNOSIS — B35.1 ONYCHOMYCOSIS: ICD-10-CM

## 2023-05-25 DIAGNOSIS — G60.9 IDIOPATHIC PERIPHERAL NEUROPATHY: Primary | ICD-10-CM

## 2023-05-25 DIAGNOSIS — I87.2 VENOUS INSUFFICIENCY OF BOTH LOWER EXTREMITIES: ICD-10-CM

## 2023-05-25 PROCEDURE — 11721 PR DEBRIDEMENT OF NAILS, 6 OR MORE: ICD-10-PCS | Mod: Q9,S$GLB,, | Performed by: PODIATRIST

## 2023-05-25 PROCEDURE — 99499 UNLISTED E&M SERVICE: CPT | Mod: S$GLB,,, | Performed by: PODIATRIST

## 2023-05-25 PROCEDURE — 99999 PR PBB SHADOW E&M-EST. PATIENT-LVL III: CPT | Mod: PBBFAC,,, | Performed by: PODIATRIST

## 2023-05-25 PROCEDURE — 11721 DEBRIDE NAIL 6 OR MORE: CPT | Mod: Q9,S$GLB,, | Performed by: PODIATRIST

## 2023-05-25 PROCEDURE — 99999 PR PBB SHADOW E&M-EST. PATIENT-LVL III: ICD-10-PCS | Mod: PBBFAC,,, | Performed by: PODIATRIST

## 2023-05-25 PROCEDURE — 99499 NO LOS: ICD-10-PCS | Mod: S$GLB,,, | Performed by: PODIATRIST

## 2023-05-28 NOTE — PROGRESS NOTES
Subjective:      Patient ID: Sheree Pugh is a 89 y.o. female.    Chief Complaint: Nail Care      Sheree is a 89 y.o. female who presents to the clinic for evaluation and treatment of high risk feet. Sheree has a past medical history of Anticoagulant long-term use, ARMD (age related macular degeneration), Arthritis, Breast cancer, Cataract, COPD (chronic obstructive pulmonary disease), Coronary artery disease, Disorder of kidney and ureter, [place] (dyspnea on exertion), Elevated cholesterol, Heart disease, Hypertension, Insomnia, Macular degeneration, Obstetrical blood-clot embolism, postpartum, PVD (peripheral vascular disease), Retinal detachment, Stented coronary artery (2/25/2019), and Urinary incontinence. The patient's chief complaint is toenails that are in need of trimming.  Denies the nails being a source of pain.  Is unable to trim the nails on her own. This patient has documented high risk feet requiring routine maintenance secondary to peripheral neuropathy.  Denies any additional pedal complaints.      PCP: Piotr Walker MD    Date Last Seen by PCP: 4/23      Hemoglobin A1C   Date Value Ref Range Status   02/01/2020 5.7 (H) 4.0 - 5.6 % Final     Comment:     ADA Screening Guidelines:  5.7-6.4%  Consistent with prediabetes  >or=6.5%  Consistent with diabetes  High levels of fetal hemoglobin interfere with the HbA1C  assay. Heterozygous hemoglobin variants (HbS, HgC, etc)do  not significantly interfere with this assay.   However, presence of multiple variants may affect accuracy.     09/01/2018 5.8 (H) 0.0 - 5.6 % Final     Comment:     Reference Interval:  5.0 - 5.6 Normal   5.7 - 6.4 High Risk   > 6.5 Diabetic    Hgb A1c results are standardized based on the (NGSP) National   Glycohemoglobin Standardization Program.    Hemoglobin A1C levels are related to mean serum/plasma glucose   during the preceding 2-3 months.        04/13/2015 5.8 4.5 - 6.2 % Final       Review of Systems   Constitutional: Negative  for chills and fever.   Cardiovascular:  Positive for leg swelling. Negative for claudication.   Skin:  Positive for nail changes.   Musculoskeletal:  Positive for joint swelling. Negative for muscle cramps, muscle weakness and myalgias.   Gastrointestinal:  Negative for nausea and vomiting.   Neurological:  Positive for numbness and paresthesias.   Psychiatric/Behavioral:  Negative for altered mental status.          Objective:      Physical Exam  Constitutional:       Appearance: Normal appearance. She is not ill-appearing.   Cardiovascular:      Pulses:           Dorsalis pedis pulses are 2+ on the right side and 2+ on the left side.        Posterior tibial pulses are 2+ on the right side and 2+ on the left side.      Comments: CFT is < 3 seconds bilateral.  Pedal hair growth is decreased bilateral.  Varicosities noted bilateral.   Toes are cool to touch bilateral.    Musculoskeletal:         General: Swelling present. No tenderness.      Right lower le+ Edema present.      Left lower le+ Edema present.      Comments: Muscle strength 5/5 in all muscle groups bilateral.  No tenderness nor crepitation with ROM of foot/ankle joints bilateral.  (-) for pain with palpation of bilateral foot and ankle.  Bilateral pes planus foot type.   Skin:     General: Skin is warm and dry.      Capillary Refill: Capillary refill takes 2 to 3 seconds.      Findings: No bruising, ecchymosis, erythema, signs of injury, laceration, lesion, petechiae, rash or wound.      Comments: Pedal skin appears edematous bilateral.  Toenails x 10 appear thickened by 4 mm, elongated by 4 mm, and discolored with subungual debris.   Examination of the skin reveals no evidence of significant maceration, rashes, open lesions, suspicious appearing nevi or other concerning lesions.    Neurological:      Mental Status: She is alert.      Sensory: Sensory deficit present.      Motor: No weakness or atrophy.      Comments: Protective sensation per  [employer] monofilament is absent bilateral.  Light touch is absent bilateral.             Assessment:       Encounter Diagnoses   Name Primary?    Idiopathic peripheral neuropathy Yes    Venous insufficiency of both lower extremities     Onychomycosis              Plan:       Sheree was seen today for nail care.    Diagnoses and all orders for this visit:    Idiopathic peripheral neuropathy    Venous insufficiency of both lower extremities    Onychomycosis          I counseled the patient on her conditions, their implications and medical management.    - Advised to elevate the limbs while at rest.  Also, to continue monitoring for signs of stasis dermatitis and venous blistering.      - Recommend daily application of voltaren cream to help with LE paresthesias.    - Shoe inspection. Patient instructed on proper foot hygeine. We discussed wearing proper shoe gear, daily foot inspections, never walking without protective shoe gear, never putting sharp instruments to feet, routine podiatric nail visits every 3 months.      - With patient's permission, nails were aggressively reduced and debrided x 10 to their soft tissue attachment mechanically and with electric , removing all offending nail and debris. Patient relates relief following the procedure. She will continue to monitor the areas daily, inspect her feet, wear protective shoe gear when ambulatory, moisturizer to maintain skin integrity and follow in this office in approximately 3 months, sooner p.r.n.    Marquez Ramirez DPM

## 2023-06-15 DIAGNOSIS — I10 ESSENTIAL HYPERTENSION: Chronic | ICD-10-CM

## 2023-06-15 DIAGNOSIS — G47.01 INSOMNIA DUE TO MEDICAL CONDITION: ICD-10-CM

## 2023-06-15 DIAGNOSIS — G62.9 PERIPHERAL POLYNEUROPATHY: ICD-10-CM

## 2023-06-15 RX ORDER — NORTRIPTYLINE HYDROCHLORIDE 10 MG/1
10 CAPSULE ORAL NIGHTLY
Qty: 90 CAPSULE | Refills: 3 | Status: SHIPPED | OUTPATIENT
Start: 2023-06-15 | End: 2024-06-16

## 2023-06-15 RX ORDER — DILTIAZEM HYDROCHLORIDE 180 MG/1
180 CAPSULE, COATED, EXTENDED RELEASE ORAL EVERY MORNING
Qty: 90 CAPSULE | Refills: 4 | Status: CANCELLED | OUTPATIENT
Start: 2023-06-15 | End: 2024-06-14

## 2023-06-15 NOTE — TELEPHONE ENCOUNTER
Care Due:                  Date            Visit Type   Department     Provider  --------------------------------------------------------------------------------                                EP -                              [facility]  Last Visit: 05-      CARE ([facility])   JOANNA Walker                               -                              [facility]  Next Visit: 11-      CARE ([facility])   MEDICINE       Piotr Walker                                                            Last  Test          Frequency    Reason                     Performed    Due Date  --------------------------------------------------------------------------------    CMP.........  12 months..  spironolactone...........  09- 09-    [facility] Embedded Care Due Messages. Reference number: [id number].   6/15/2023 5:53:49 PM CDT

## 2023-06-16 DIAGNOSIS — I10 ESSENTIAL HYPERTENSION: Chronic | ICD-10-CM

## 2023-06-16 RX ORDER — DILTIAZEM HYDROCHLORIDE 180 MG/1
180 CAPSULE, COATED, EXTENDED RELEASE ORAL EVERY MORNING
Qty: 90 CAPSULE | Refills: 4 | Status: CANCELLED | OUTPATIENT
Start: 2023-06-15 | End: 2024-06-14

## 2023-06-16 NOTE — TELEPHONE ENCOUNTER
Provider Staff:  Action required for this patient     CMP due    Please see care gap opportunities below in Care Due Message.    Thanks!  [facility]     Appointments      Date Provider   Last Visit   5/11/2023 Piotr Walker MD   Next Visit   11/13/2023 Piotr Walker MD     Refill Decision Note   Sheree Pugh  is requesting a refill authorization.  Brief Assessment and Rationale for Refill:  Approve     Medication Therapy Plan:         Comments:     Note composed:7:16 PM 06/15/2023

## 2023-06-19 DIAGNOSIS — R41.3 MEMORY LOSS: ICD-10-CM

## 2023-06-19 RX ORDER — DONEPEZIL HYDROCHLORIDE 5 MG/1
5 TABLET, FILM COATED ORAL NIGHTLY
Qty: 30 TABLET | Refills: 11 | Status: CANCELLED | OUTPATIENT
Start: 2023-06-19 | End: 2024-06-18

## 2023-06-19 RX ORDER — DILTIAZEM HYDROCHLORIDE 180 MG/1
180 CAPSULE, COATED, EXTENDED RELEASE ORAL EVERY MORNING
Qty: 90 CAPSULE | Refills: 4 | Status: SHIPPED | OUTPATIENT
Start: 2023-06-19 | End: 2024-02-15 | Stop reason: SDUPTHER

## 2023-06-20 RX ORDER — DONEPEZIL HYDROCHLORIDE 5 MG/1
5 TABLET, FILM COATED ORAL NIGHTLY
Qty: 90 TABLET | Refills: 3 | Status: SHIPPED | OUTPATIENT
Start: 2023-06-20 | End: 2024-06-19

## 2023-06-27 ENCOUNTER — OFFICE VISIT (OUTPATIENT)
Dept: URGENT CARE | Facility: CLINIC | Age: 88
End: 2023-06-27
Payer: MEDICARE

## 2023-06-27 VITALS
DIASTOLIC BLOOD PRESSURE: 86 MMHG | HEIGHT: 62 IN | OXYGEN SATURATION: 96 % | HEART RATE: 64 BPM | SYSTOLIC BLOOD PRESSURE: 144 MMHG | RESPIRATION RATE: 18 BRPM | WEIGHT: 160 LBS | TEMPERATURE: 98 F | BODY MASS INDEX: 29.44 KG/M2

## 2023-06-27 DIAGNOSIS — R51.9 OCCIPITAL HEADACHE: ICD-10-CM

## 2023-06-27 DIAGNOSIS — M54.2 NECK PAIN: Primary | ICD-10-CM

## 2023-06-27 PROCEDURE — 93010 EKG 12-LEAD: ICD-10-PCS | Mod: S$GLB,,, | Performed by: INTERNAL MEDICINE

## 2023-06-27 PROCEDURE — 93005 EKG 12-LEAD: ICD-10-PCS | Mod: S$GLB,,, | Performed by: PHYSICIAN ASSISTANT

## 2023-06-27 PROCEDURE — 93005 ELECTROCARDIOGRAM TRACING: CPT | Mod: S$GLB,,, | Performed by: PHYSICIAN ASSISTANT

## 2023-06-27 PROCEDURE — 99214 PR OFFICE/OUTPT VISIT, EST, LEVL IV, 30-39 MIN: ICD-10-PCS | Mod: S$GLB,,, | Performed by: PHYSICIAN ASSISTANT

## 2023-06-27 PROCEDURE — 93010 ELECTROCARDIOGRAM REPORT: CPT | Mod: S$GLB,,, | Performed by: INTERNAL MEDICINE

## 2023-06-27 PROCEDURE — 99214 OFFICE O/P EST MOD 30 MIN: CPT | Mod: S$GLB,,, | Performed by: PHYSICIAN ASSISTANT

## 2023-06-27 NOTE — PATIENT INSTRUCTIONS
"You must understand that you've received an Urgent Care treatment only and that you may be released before all your medical problems are known or treated. You, the patient, will arrange for follow up care as instructed.  Follow up with your PCP or specialty clinic as directed if not improved or as needed. You can call [phone] to schedule an appointment with the appropriate provider.  If your condition worsens we recommend that you receive another evaluation at the Emergency Department for any concerns or worsening of condition.  Patient aware and verbalized understanding.     Encouraged patient to get lots of rest and hydrate with plenty of fluids.  Recommended very bland diet for the next 24-48 hours.   Avoid spicy foods and fast food/greasy food until symptoms have resolved.  Advised patient to apply ice pack on the forehead, base of the skull and/or over the eye to help with pain relief.  Advised patient to avoid "screen time" for the next 24 hours because rest is most important without light or sound.  When you get home, advised patient to get a dark and quiet room and rest. Bright glaring light and loud noise can worsen headaches.    Keep headache diary as discussed to identify possible triggers.  Zofran RX as prescribed for nausea/vomiting as needed.  Advised patient to follow-up appointment with PCP in the next 24 hours for further evaluation as needed.  Advised patient to go to the nearest ER for further evaluation if any of the following occurs:   - Worsening of your head pain or no improvement within 12 hours  - Repeated vomiting (unable to keep liquids down)  - Fever over 100.0ºF (37.8ºC)  - Stiff neck  - Extreme drowsiness, confusion or fainting  - Dizziness, vertigo (dizziness with spinning sensation)  - Weakness of an arm or leg or one side of the face  - Difficulty with speech or vision  Patient aware, verbalized understanding and agreed with patient's plan of care.   "

## 2023-06-27 NOTE — PROGRESS NOTES
"Subjective:      Patient ID: Sheree Pugh is a 89 y.o. female.    Vitals:  height is 5' 2" (1.575 m) and weight is 72.6 kg (160 lb). Her temporal temperature is 98.4 °F (36.9 °C). Her blood pressure is 144/86 (abnormal) and her pulse is 64. Her respiration is 18 and oxygen saturation is 96%.     Chief Complaint: Headache    Patient began feeling "pressure" from the top of her head, down the left side of her face, to her neck. The feeling began this morning.    Headache   This is a new problem. The current episode started today. The problem occurs constantly. The problem has been unchanged. The pain radiates to the left shoulder. The pain quality is not similar to prior headaches. The quality of the pain is described as squeezing. The pain is at a severity of 0/10. The patient is experiencing no pain. Pertinent negatives include no abdominal pain, back pain, blurred vision, coughing, dizziness, ear pain, eye pain, eye redness, fever, loss of balance, nausea, neck pain, numbness, photophobia, seizures, sore throat or vomiting. Her past medical history is significant for cancer, hypertension and migraine headaches.     Constitution: Negative for chills, sweating, fatigue and fever.   HENT:  Negative for ear pain, drooling, congestion, sore throat, trouble swallowing and voice change.    Neck: Negative for neck pain, neck stiffness, painful lymph nodes and neck swelling.   Cardiovascular:  Negative for chest pain, leg swelling, palpitations, sob on exertion and passing out.   Eyes:  Negative for eye pain, eye redness, photophobia, double vision, blurred vision and eyelid swelling.   Respiratory:  Negative for chest tightness, cough, sputum production, bloody sputum, shortness of breath, stridor and wheezing.    Gastrointestinal:  Negative for abdominal pain, abdominal bloating, nausea, vomiting, constipation, diarrhea and heartburn.   Musculoskeletal:  Negative for joint pain, joint swelling, abnormal ROM of joint, " back pain, muscle cramps and muscle ache.   Skin:  Negative for rash and hives.   Allergic/Immunologic: Negative for seasonal allergies, food allergies, hives, itching and sneezing.   Neurological:  Positive for headaches and history of migraines. Negative for dizziness, light-headedness, passing out, loss of balance, disorientation, altered mental status, loss of consciousness, numbness, tingling and seizures.   Hematologic/Lymphatic: Negative for swollen lymph nodes.   Psychiatric/Behavioral:  Negative for altered mental status, disorientation and nervous/anxious. The patient is not nervous/anxious.     Objective:     Physical Exam   Constitutional: She is oriented to person, place, and time. She appears well-developed. She is cooperative.   HENT:   Head: Normocephalic and atraumatic.   Ears:   Right Ear: Hearing, tympanic membrane, external ear and ear canal normal.   Left Ear: Hearing, tympanic membrane, external ear and ear canal normal.   Nose: Nose normal. No mucosal edema or nasal deformity. No epistaxis. Right sinus exhibits no maxillary sinus tenderness and no frontal sinus tenderness. Left sinus exhibits no maxillary sinus tenderness and no frontal sinus tenderness.   Mouth/Throat: Uvula is midline, oropharynx is clear and moist and mucous membranes are normal. No trismus in the jaw. Normal dentition. No uvula swelling.   Eyes: Conjunctivae and lids are normal.   Neck: Trachea normal and phonation normal. Neck supple.   Cardiovascular: Normal rate, regular rhythm, normal heart sounds and normal pulses.   Pulmonary/Chest: Effort normal and breath sounds normal.   Abdominal: Normal appearance and bowel sounds are normal. Soft.   Musculoskeletal: Normal range of motion.         General: Tenderness present. Normal range of motion.      Comments: + reproducible pain to left occipital region. No signs of rash or cellulitis. Neuro exam normal. FROM neck   Neurological: no focal deficit. She is alert and oriented  "to person, place, and time. She displays no weakness. No cranial nerve deficit. She exhibits normal muscle tone.      Comments: Full stroke assessment performed. WNL   Skin: Skin is warm, dry and intact.   Psychiatric: Her speech is normal and behavior is normal. Judgment and thought content normal.   Nursing note and vitals reviewed.        Assessment:     1. Neck pain    2. Occipital headache        Plan:   Spent extra time with patient for full evaluation    Neck pain    Occipital headache      Patient Instructions   You must understand that you've received an Urgent Care treatment only and that you may be released before all your medical problems are known or treated. You, the patient, will arrange for follow up care as instructed.  Follow up with your PCP or specialty clinic as directed if not improved or as needed. You can call [phone] to schedule an appointment with the appropriate provider.  If your condition worsens we recommend that you receive another evaluation at the Emergency Department for any concerns or worsening of condition.  Patient aware and verbalized understanding.     Encouraged patient to get lots of rest and hydrate with plenty of fluids.  Recommended very bland diet for the next 24-48 hours.   Avoid spicy foods and fast food/greasy food until symptoms have resolved.  Advised patient to apply ice pack on the forehead, base of the skull and/or over the eye to help with pain relief.  Advised patient to avoid "screen time" for the next 24 hours because rest is most important without light or sound.  When you get home, advised patient to get a dark and quiet room and rest. Bright glaring light and loud noise can worsen headaches.    Keep headache diary as discussed to identify possible triggers.  Zofran RX as prescribed for nausea/vomiting as needed.  Advised patient to follow-up appointment with PCP in the next 24 hours for further evaluation as needed.  Advised patient to go to the " nearest ER for further evaluation if any of the following occurs:   - Worsening of your head pain or no improvement within 12 hours  - Repeated vomiting (unable to keep liquids down)  - Fever over 100.0ºF (37.8ºC)  - Stiff neck  - Extreme drowsiness, confusion or fainting  - Dizziness, vertigo (dizziness with spinning sensation)  - Weakness of an arm or leg or one side of the face  - Difficulty with speech or vision  Patient aware, verbalized understanding and agreed with patient's plan of care.

## 2023-07-27 ENCOUNTER — TELEPHONE (OUTPATIENT)
Dept: FAMILY MEDICINE | Facility: CLINIC | Age: 88
End: 2023-07-27
Payer: MEDICARE

## 2023-07-27 DIAGNOSIS — J41.0 SIMPLE CHRONIC BRONCHITIS: Primary | ICD-10-CM

## 2023-07-27 RX ORDER — TIOTROPIUM BROMIDE INHALATION SPRAY 3.12 UG/1
2 SPRAY, METERED RESPIRATORY (INHALATION) DAILY
Qty: 4 G | Refills: 3 | Status: SHIPPED | OUTPATIENT
Start: 2023-07-27 | End: 2023-10-28 | Stop reason: SDUPTHER

## 2023-07-27 NOTE — TELEPHONE ENCOUNTER
----- Message from Grazyna Negron [facility] sent at 7/27/2023  8:47 AM CDT -----  Regarding: Therapeutic Alternative Change  Good Morning! Ms. Pugh was maintained on the Spiriva Inhaler but the [employer] will only cover the Spirivia Respimat. Could you please send in a new prescription for this so I can ship out her medication?

## 2023-08-14 ENCOUNTER — PES CALL (OUTPATIENT)
Dept: ADMINISTRATIVE | Facility: CLINIC | Age: 88
End: 2023-08-14
Payer: MEDICARE

## 2023-08-28 ENCOUNTER — OFFICE VISIT (OUTPATIENT)
Dept: PODIATRY | Facility: CLINIC | Age: 88
End: 2023-08-28
Payer: MEDICARE

## 2023-08-28 VITALS — HEIGHT: 62 IN | BODY MASS INDEX: 29.44 KG/M2 | WEIGHT: 160 LBS

## 2023-08-28 DIAGNOSIS — B35.1 ONYCHOMYCOSIS: ICD-10-CM

## 2023-08-28 DIAGNOSIS — I87.2 VENOUS INSUFFICIENCY OF BOTH LOWER EXTREMITIES: ICD-10-CM

## 2023-08-28 DIAGNOSIS — G60.9 IDIOPATHIC PERIPHERAL NEUROPATHY: Primary | ICD-10-CM

## 2023-08-28 PROCEDURE — 99999 PR PBB SHADOW E&M-EST. PATIENT-LVL III: CPT | Mod: PBBFAC,HCNC,, | Performed by: PODIATRIST

## 2023-08-28 PROCEDURE — 11721 PR DEBRIDEMENT OF NAILS, 6 OR MORE: ICD-10-PCS | Mod: Q9,HCNC,S$GLB, | Performed by: PODIATRIST

## 2023-08-28 PROCEDURE — 99499 UNLISTED E&M SERVICE: CPT | Mod: HCNC,S$GLB,, | Performed by: PODIATRIST

## 2023-08-28 PROCEDURE — 99499 NO LOS: ICD-10-PCS | Mod: HCNC,S$GLB,, | Performed by: PODIATRIST

## 2023-08-28 PROCEDURE — 99999 PR PBB SHADOW E&M-EST. PATIENT-LVL III: ICD-10-PCS | Mod: PBBFAC,HCNC,, | Performed by: PODIATRIST

## 2023-08-28 PROCEDURE — 11721 DEBRIDE NAIL 6 OR MORE: CPT | Mod: Q9,HCNC,S$GLB, | Performed by: PODIATRIST

## 2023-08-29 NOTE — PROGRESS NOTES
Subjective:      Patient ID: Sheree Pugh is a 89 y.o. female.    Chief Complaint: Nail Care and Toe Pain      Sheree is a 89 y.o. female who presents to the clinic for evaluation and treatment of high risk feet. Sheree has a past medical history of Anticoagulant long-term use, ARMD (age related macular degeneration), Arthritis, Breast cancer, Cataract, COPD (chronic obstructive pulmonary disease), Coronary artery disease, Disorder of kidney and ureter, [place] (dyspnea on exertion), Elevated cholesterol, Heart disease, Hypertension, Insomnia, Macular degeneration, Obstetrical blood-clot embolism, postpartum, PVD (peripheral vascular disease), Retinal detachment, Stented coronary artery (2/25/2019), and Urinary incontinence. The patient's chief complaint is toenails that are in need of trimming.  Notes experiencing pain from the medial distal edge of the Lt. Hallux toenail.  Rates pain as a 4/10.  Symptoms are aggravated with applied pressure to the site and alleviated with rest.  Has not attempted to self trim on account of visual acuity issues.  Denies any additional pedal complaints.      PCP: Piotr Walker MD    Date Last Seen by PCP: 5/23      Hemoglobin A1C   Date Value Ref Range Status   02/01/2020 5.7 (H) 4.0 - 5.6 % Final     Comment:     ADA Screening Guidelines:  5.7-6.4%  Consistent with prediabetes  >or=6.5%  Consistent with diabetes  High levels of fetal hemoglobin interfere with the HbA1C  assay. Heterozygous hemoglobin variants (HbS, HgC, etc)do  not significantly interfere with this assay.   However, presence of multiple variants may affect accuracy.     09/01/2018 5.8 (H) 0.0 - 5.6 % Final     Comment:     Reference Interval:  5.0 - 5.6 Normal   5.7 - 6.4 High Risk   > 6.5 Diabetic    Hgb A1c results are standardized based on the (NGSP) National   Glycohemoglobin Standardization Program.    Hemoglobin A1C levels are related to mean serum/plasma glucose   during the preceding 2-3 months.         2015 5.8 4.5 - 6.2 % Final       Review of Systems   Constitutional: Negative for chills and fever.   Cardiovascular:  Positive for leg swelling. Negative for claudication.   Skin:  Positive for nail changes.   Musculoskeletal:  Positive for joint swelling. Negative for muscle cramps, muscle weakness and myalgias.   Gastrointestinal:  Negative for nausea and vomiting.   Neurological:  Positive for numbness and paresthesias.   Psychiatric/Behavioral:  Negative for altered mental status.            Objective:      Physical Exam  Constitutional:       Appearance: Normal appearance. She is not ill-appearing.   Cardiovascular:      Pulses:           Dorsalis pedis pulses are 2+ on the right side and 2+ on the left side.        Posterior tibial pulses are 2+ on the right side and 2+ on the left side.      Comments: CFT is < 3 seconds bilateral.  Pedal hair growth is decreased bilateral.  Varicosities noted bilateral.   Toes are cool to touch bilateral.    Musculoskeletal:         General: Swelling and tenderness present.      Right lower le+ Edema present.      Left lower le+ Edema present.      Comments: Muscle strength 5/5 in all muscle groups bilateral.  No tenderness nor crepitation with ROM of foot/ankle joints bilateral.  Pain with palpation to the distal medial border of the Lt. Great toenail.  Bilateral pes planus foot type.   Skin:     General: Skin is warm and dry.      Capillary Refill: Capillary refill takes 2 to 3 seconds.      Findings: No bruising, ecchymosis, erythema, signs of injury, laceration, lesion, petechiae, rash or wound.      Comments: Pedal skin appears edematous bilateral.  Toenails x 10 appear thickened by 4 mm, elongated by 4 mm, and discolored with subungual debris.  Incurvation noted to the distal medial edge of the Lt. Hallux toenail.  No localized sign of infection noted.    Neurological:      Mental Status: She is alert.      Sensory: Sensory deficit present.      Motor:  No weakness or atrophy.      Comments: Protective sensation per [place]-Elvi monofilament is absent bilateral.  Light touch is absent bilateral.               Assessment:       Encounter Diagnoses   Name Primary?    Idiopathic peripheral neuropathy Yes    Venous insufficiency of both lower extremities     Onychomycosis              Plan:       Sheree was seen today for nail care and toe pain.    Diagnoses and all orders for this visit:    Idiopathic peripheral neuropathy    Venous insufficiency of both lower extremities    Onychomycosis          I counseled the patient on her conditions, their implications and medical management.    - Advised to elevate the limbs while at rest.  Also, to continue monitoring for signs of stasis dermatitis and venous blistering.      - Shoe inspection. Patient instructed on proper foot hygeine. We discussed wearing proper shoe gear, daily foot inspections, never walking without protective shoe gear, never putting sharp instruments to feet, routine podiatric nail visits every 3 months.      - With patient's permission, nails were aggressively reduced and debrided x 10 to their soft tissue attachment mechanically and with electric , removing all offending nail and debris. Patient relates relief following the procedure. She will continue to monitor the areas daily, inspect her feet, wear protective shoe gear when ambulatory, moisturizer to maintain skin integrity and follow in this office in approximately 3 months, sooner p.r.n.    Marquez Ramirez DPM

## 2023-09-06 ENCOUNTER — OFFICE VISIT (OUTPATIENT)
Dept: HOME HEALTH SERVICES | Facility: CLINIC | Age: 88
End: 2023-09-06
Payer: MEDICARE

## 2023-09-06 VITALS
HEIGHT: 62 IN | HEART RATE: 58 BPM | OXYGEN SATURATION: 97 % | DIASTOLIC BLOOD PRESSURE: 69 MMHG | WEIGHT: 158 LBS | BODY MASS INDEX: 29.08 KG/M2 | SYSTOLIC BLOOD PRESSURE: 161 MMHG

## 2023-09-06 DIAGNOSIS — Z00.00 ENCOUNTER FOR PREVENTIVE HEALTH EXAMINATION: Primary | ICD-10-CM

## 2023-09-06 DIAGNOSIS — J41.0 SIMPLE CHRONIC BRONCHITIS: ICD-10-CM

## 2023-09-06 DIAGNOSIS — Z78.0 POSTMENOPAUSAL: ICD-10-CM

## 2023-09-06 DIAGNOSIS — H35.3211 EXUDATIVE AGE-RELATED MACULAR DEGENERATION OF RIGHT EYE WITH ACTIVE CHOROIDAL NEOVASCULARIZATION: ICD-10-CM

## 2023-09-06 DIAGNOSIS — I70.0 ATHEROSCLEROSIS OF AORTA: ICD-10-CM

## 2023-09-06 DIAGNOSIS — I10 ESSENTIAL HYPERTENSION: Chronic | ICD-10-CM

## 2023-09-06 DIAGNOSIS — I65.21 STENOSIS OF RIGHT CAROTID ARTERY: Chronic | ICD-10-CM

## 2023-09-06 DIAGNOSIS — N18.31 STAGE 3A CHRONIC KIDNEY DISEASE: ICD-10-CM

## 2023-09-06 DIAGNOSIS — H35.3222 EXUDATIVE AGE-RELATED MACULAR DEGENERATION OF LEFT EYE WITH INACTIVE CHOROIDAL NEOVASCULARIZATION: ICD-10-CM

## 2023-09-06 DIAGNOSIS — I25.10 CORONARY ARTERY DISEASE INVOLVING NATIVE CORONARY ARTERY OF NATIVE HEART WITHOUT ANGINA PECTORIS: Chronic | ICD-10-CM

## 2023-09-06 DIAGNOSIS — E78.5 DYSLIPIDEMIA: Chronic | ICD-10-CM

## 2023-09-06 DIAGNOSIS — Z00.00 ENCOUNTER FOR MEDICARE ANNUAL WELLNESS EXAM: ICD-10-CM

## 2023-09-06 DIAGNOSIS — I73.9 PERIPHERAL VASCULAR DISEASE: ICD-10-CM

## 2023-09-06 DIAGNOSIS — R41.3 MEMORY LOSS: ICD-10-CM

## 2023-09-06 DIAGNOSIS — F33.9 DEPRESSION, RECURRENT: ICD-10-CM

## 2023-09-06 DIAGNOSIS — H90.0 CONDUCTIVE HEARING LOSS, BILATERAL: ICD-10-CM

## 2023-09-06 DIAGNOSIS — G62.9 PERIPHERAL POLYNEUROPATHY: ICD-10-CM

## 2023-09-06 DIAGNOSIS — M81.0 OSTEOPOROSIS, SENILE: ICD-10-CM

## 2023-09-06 DIAGNOSIS — I35.0 NONRHEUMATIC AORTIC VALVE STENOSIS: Chronic | ICD-10-CM

## 2023-09-06 DIAGNOSIS — H54.3 DECREASED VISION IN BOTH EYES: ICD-10-CM

## 2023-09-06 DIAGNOSIS — R32 URINARY INCONTINENCE, UNSPECIFIED TYPE: ICD-10-CM

## 2023-09-06 PROCEDURE — 1157F ADVNC CARE PLAN IN RCRD: CPT | Mod: CPTII,S$GLB,, | Performed by: NURSE PRACTITIONER

## 2023-09-06 PROCEDURE — G0439 PR MEDICARE ANNUAL WELLNESS SUBSEQUENT VISIT: ICD-10-PCS | Mod: S$GLB,,, | Performed by: NURSE PRACTITIONER

## 2023-09-06 PROCEDURE — 1160F RVW MEDS BY RX/DR IN RCRD: CPT | Mod: CPTII,S$GLB,, | Performed by: NURSE PRACTITIONER

## 2023-09-06 PROCEDURE — 1159F PR MEDICATION LIST DOCUMENTED IN MEDICAL RECORD: ICD-10-PCS | Mod: CPTII,S$GLB,, | Performed by: NURSE PRACTITIONER

## 2023-09-06 PROCEDURE — 1160F PR REVIEW ALL MEDS BY PRESCRIBER/CLIN PHARMACIST DOCUMENTED: ICD-10-PCS | Mod: CPTII,S$GLB,, | Performed by: NURSE PRACTITIONER

## 2023-09-06 PROCEDURE — 1159F MED LIST DOCD IN RCRD: CPT | Mod: CPTII,S$GLB,, | Performed by: NURSE PRACTITIONER

## 2023-09-06 PROCEDURE — G0439 PPPS, SUBSEQ VISIT: HCPCS | Mod: S$GLB,,, | Performed by: NURSE PRACTITIONER

## 2023-09-06 PROCEDURE — 1157F PR ADVANCE CARE PLAN OR EQUIV PRESENT IN MEDICAL RECORD: ICD-10-PCS | Mod: CPTII,S$GLB,, | Performed by: NURSE PRACTITIONER

## 2023-09-10 PROBLEM — F33.9 DEPRESSION, RECURRENT: Status: ACTIVE | Noted: 2023-09-10

## 2023-09-10 PROBLEM — I77.1 TORTUOUS AORTA: Status: RESOLVED | Noted: 2017-08-16 | Resolved: 2023-09-10

## 2023-09-10 NOTE — PATIENT INSTRUCTIONS
Counseling and Referral of Other Preventative  (Italic type indicates deductible and co-insurance are waived)    Patient Name: Sheree Pugh  Today's Date: 9/10/2023    Health Maintenance       Date Due Completion Date    COVID-19 Vaccine (3 - Moderna series) 04/15/2021 2/18/2021    Influenza Vaccine (1) 09/01/2023 11/4/2022    Override on 10/30/2020: Done    Aspirin/Antiplatelet Therapy 08/28/2024 8/28/2023    Lipid Panel 03/26/2027 3/26/2022    TETANUS VACCINE 12/03/2031 12/3/2021        Orders Placed This Encounter   Procedures    DXA Bone Density Axial Skeleton 1 or more sites     The following information is provided to all patients.  This information is to help you find resources for any of the problems found today that may be affecting your health:                Living healthy guide: www.[facility].[place].[facility]      Understanding Diabetes: www.diabetes.org      Eating healthy: www.cdc.gov/healthyweight      CDC home safety checklist: www.cdc.gov/steadi/patient.html      Agency on Aging: www.goea.[place].[facility]      Alcoholics anonymous (AA): www.aa.org      Physical Activity: www.josr.nih.gov/tw1lrkf      Tobacco use: www.quitwithusla.org

## 2023-09-10 NOTE — PROGRESS NOTES
"  Sheree Pugh presented for a  Medicare AWV and comprehensive Health Risk Assessment today. The following components were reviewed and updated:    Medical history  Family History  Social history  Allergies and Current Medications  Health Risk Assessment  Health Maintenance  Care Team         ** See Completed Assessments for Annual Wellness Visit within the encounter summary.**         The following assessments were completed:  Living Situation  CAGE  Depression Screening  Timed Get Up and Go  Whisper Test  Cognitive Function Screening  Nutrition Screening  ADL Screening  PAQ Screening        Vitals:    09/06/23 1110   BP: (!) 161/69   Pulse: (!) 58   SpO2: 97%   Weight: 71.7 kg (158 lb)   Height: 5' 2" (1.575 m)     Body mass index is 28.9 kg/m².  Physical Exam  Constitutional:       Appearance: Normal appearance.   HENT:      Head: Normocephalic and atraumatic.      Nose: Nose normal.   Cardiovascular:      Rate and Rhythm: Normal rate and regular rhythm.      Pulses: Normal pulses.      Heart sounds: Normal heart sounds.   Pulmonary:      Effort: Pulmonary effort is normal.      Breath sounds: Normal breath sounds.   Musculoskeletal:      Cervical back: Normal range of motion and neck supple.   Neurological:      Mental Status: She is alert.               Diagnoses and health risks identified today and associated recommendations/orders:    1. Encounter for preventive health examination  awv    2. Encounter for Medicare annual wellness exam  - Ambulatory Referral/Consult to Enhanced Annual Wellness Visit (eAWV)    3. Postmenopausal  - DXA Bone Density Axial Skeleton 1 or more sites; Future    4. Depression, recurrent  Chronic and stable. Continue current treatment. Follow with PCP.      5. Memory loss  Chronic and stable. Continue current treatment. Follow with PCP.  On aricept      6. Peripheral polyneuropathy  Chronic and stable. Continue current treatment. Follow with PCP.    7. Decreased vision in both " eyes  Chronic and stable. Continue current treatment. Follow with PCP.      8. Exudative age-related macular degeneration of left eye with inactive choroidal neovascularization  Chronic and stable. Continue current treatment. Follow with PCP.  Preservision   Seen by Opthamology      9. Exudative age-related macular degeneration of right eye with active choroidal neovascularization  Chronic and stable. Continue current treatment. Follow with PCP.      10. Conductive hearing loss, bilateral  Chronic and stable. Continue current treatment. Follow with PCP.  Hearing aids      11. Simple chronic bronchitis  Chronic and stable. Continue current treatment. Follow with PCP.  Inhaler      12. Nonrheumatic aortic valve stenosis  Chronic and stable. Continue current treatment. Follow with PCP.      13. Atherosclerosis of aorta  Chronic and stable. Continue current treatment. Follow with PCP.      14. Coronary artery disease involving native coronary artery of native heart without angina pectoris  Chronic and stable. Continue current treatment. Follow with PCP.      15. Dyslipidemia  Chronic and stable. Continue current treatment. Follow with PCP.  On statin       16. Essential hypertension  Chronic and stable. Continue current treatment. Follow with PCP.  On meds      17. Peripheral vascular disease  Chronic and stable. Continue current treatment. Follow with PCP.      18. Stenosis of right carotid artery  Chronic and stable. Continue current treatment. Follow with PCP.  Plavix and statin     19. Stage 3a chronic kidney disease  Chronic and stable. Continue current treatment. Follow with PCP.      20. Urinary incontinence, unspecified type  Chronic and stable. Continue current treatment. Follow with PCP.      21. Osteoporosis, senile  Chronic and stable. Continue current treatment. Follow with PCP.  Recheck dexa - had been on prolia in the past - stopped due to cost        Provided Sheree with a 5-10 year written screening  schedule and personal prevention plan. Recommendations were developed using the USPSTF age appropriate recommendations. Education, counseling, and referrals were provided as needed. After Visit Summary printed and given to patient which includes a list of additional screenings\tests needed.    Fu in 1 yr for AWv          Jaelyn Sampson NP  I offered to discuss advanced care planning, including how to pick a person who would make decisions for you if you were unable to make them for yourself, called a health care power of , and what kind of decisions you might make such as use of life sustaining treatments such as ventilators and tube feeding when faced with a life limiting illness recorded on a living will that they will need to know. (How you want to be cared for as you near the end of your natural life)     X  Patient has advanced directives on file, which we reviewed, and they do not wish to make changes.

## 2023-09-18 DIAGNOSIS — R60.0 BILATERAL LEG EDEMA: ICD-10-CM

## 2023-09-18 DIAGNOSIS — C50.012 MALIGNANT NEOPLASM OF NIPPLE OF LEFT BREAST IN FEMALE, UNSPECIFIED ESTROGEN RECEPTOR STATUS: ICD-10-CM

## 2023-09-18 RX ORDER — SPIRONOLACTONE 25 MG/1
25 TABLET ORAL DAILY
Qty: 90 TABLET | Refills: 3 | Status: CANCELLED | OUTPATIENT
Start: 2023-09-18 | End: 2024-09-17

## 2023-09-18 RX ORDER — TAMOXIFEN CITRATE 20 MG/1
20 TABLET ORAL DAILY
Qty: 90 TABLET | Refills: 3 | Status: CANCELLED | OUTPATIENT
Start: 2023-09-18 | End: 2024-09-17

## 2023-09-18 NOTE — TELEPHONE ENCOUNTER
No care due was identified.  Health [facility] Embedded Care Due Messages. Reference number: [id number].   9/18/2023 11:52:14 AM CDT

## 2023-09-18 NOTE — TELEPHONE ENCOUNTER
No care due was identified.  [facility] Embedded Care Due Messages. Reference number: [id number].   9/18/2023 3:40:36 PM CDT

## 2023-09-19 RX ORDER — SPIRONOLACTONE 25 MG/1
25 TABLET ORAL DAILY
Qty: 90 TABLET | Refills: 3 | Status: SHIPPED | OUTPATIENT
Start: 2023-09-19 | End: 2024-02-15 | Stop reason: SDUPTHER

## 2023-09-19 RX ORDER — TAMOXIFEN CITRATE 20 MG/1
20 TABLET ORAL DAILY
Qty: 90 TABLET | Refills: 3 | Status: SHIPPED | OUTPATIENT
Start: 2023-09-19 | End: 2024-09-18

## 2023-09-26 ENCOUNTER — PATIENT MESSAGE (OUTPATIENT)
Dept: FAMILY MEDICINE | Facility: CLINIC | Age: 88
End: 2023-09-26
Payer: MEDICARE

## 2023-10-22 RX ORDER — CYANOCOBALAMIN 1000 UG/ML
INJECTION, SOLUTION INTRAMUSCULAR; SUBCUTANEOUS
Qty: 10 ML | Refills: 3 | Status: SHIPPED | OUTPATIENT
Start: 2023-10-22

## 2023-10-28 DIAGNOSIS — J41.0 SIMPLE CHRONIC BRONCHITIS: ICD-10-CM

## 2023-10-28 NOTE — TELEPHONE ENCOUNTER
Care Due:                  Date            Visit Type   Department     Provider  --------------------------------------------------------------------------------                                EP -                              [facility]  Last Visit: 05-      CARE ([facility])   JOANNA Walker                               -                              [facility]  Next Visit: 11-      CARE ([facility])   MEDICINE       Piotr Walker                                                            Last  Test          Frequency    Reason                     Performed    Due Date  --------------------------------------------------------------------------------    CMP.........  12 months..  spironolactone...........  09- 09-    Health [facility] Embedded Care Due Messages. Reference number: [id number].   10/28/2023 1:43:03 PM CDT

## 2023-10-29 NOTE — TELEPHONE ENCOUNTER
Refill Routing Note   Medication(s) are not appropriate for processing by [facility] for the following reason(s):      New or recently adjusted medication    [facility] action(s):  Defer Care Due:  Labs due            Appointments  past 12m or future 3m with PCP    Date Provider   Last Visit   5/11/2023 Piotr Walker MD   Next Visit   11/13/2023 Piotr Walker MD   ED visits in past 90 days: 0        Note composed:3:22 AM 10/29/2023

## 2023-10-30 RX ORDER — TIOTROPIUM BROMIDE INHALATION SPRAY 3.12 UG/1
2 SPRAY, METERED RESPIRATORY (INHALATION) DAILY
Qty: 12 G | Refills: 1 | Status: SHIPPED | OUTPATIENT
Start: 2023-10-30

## 2023-11-13 ENCOUNTER — TELEPHONE (OUTPATIENT)
Dept: FAMILY MEDICINE | Facility: CLINIC | Age: 88
End: 2023-11-13
Payer: MEDICARE

## 2023-11-13 ENCOUNTER — OFFICE VISIT (OUTPATIENT)
Dept: FAMILY MEDICINE | Facility: CLINIC | Age: 88
End: 2023-11-13
Payer: MEDICARE

## 2023-11-13 VITALS
BODY MASS INDEX: 28.88 KG/M2 | DIASTOLIC BLOOD PRESSURE: 68 MMHG | OXYGEN SATURATION: 96 % | WEIGHT: 156.94 LBS | HEART RATE: 64 BPM | SYSTOLIC BLOOD PRESSURE: 134 MMHG | HEIGHT: 62 IN

## 2023-11-13 DIAGNOSIS — E78.2 MIXED HYPERLIPIDEMIA: ICD-10-CM

## 2023-11-13 DIAGNOSIS — R60.0 BILATERAL LEG EDEMA: ICD-10-CM

## 2023-11-13 DIAGNOSIS — J41.0 SIMPLE CHRONIC BRONCHITIS: ICD-10-CM

## 2023-11-13 DIAGNOSIS — I10 ESSENTIAL HYPERTENSION: Chronic | ICD-10-CM

## 2023-11-13 DIAGNOSIS — M81.0 OSTEOPOROSIS, SENILE: ICD-10-CM

## 2023-11-13 DIAGNOSIS — I25.10 CORONARY ARTERY DISEASE INVOLVING NATIVE CORONARY ARTERY OF NATIVE HEART WITHOUT ANGINA PECTORIS: Chronic | ICD-10-CM

## 2023-11-13 DIAGNOSIS — I35.0 NONRHEUMATIC AORTIC VALVE STENOSIS: Chronic | ICD-10-CM

## 2023-11-13 DIAGNOSIS — H54.3 DECREASED VISION IN BOTH EYES: ICD-10-CM

## 2023-11-13 DIAGNOSIS — Z00.00 WELLNESS EXAMINATION: Primary | ICD-10-CM

## 2023-11-13 PROBLEM — R19.03 RIGHT LOWER QUADRANT ABDOMINAL MASS: Status: RESOLVED | Noted: 2022-03-25 | Resolved: 2023-11-13

## 2023-11-13 PROCEDURE — 1160F PR REVIEW ALL MEDS BY PRESCRIBER/CLIN PHARMACIST DOCUMENTED: ICD-10-PCS | Mod: HCNC,CPTII,S$GLB, | Performed by: INTERNAL MEDICINE

## 2023-11-13 PROCEDURE — 99214 OFFICE O/P EST MOD 30 MIN: CPT | Mod: HCNC,S$GLB,, | Performed by: INTERNAL MEDICINE

## 2023-11-13 PROCEDURE — 99999 PR PBB SHADOW E&M-EST. PATIENT-LVL IV: CPT | Mod: PBBFAC,HCNC,, | Performed by: INTERNAL MEDICINE

## 2023-11-13 PROCEDURE — 3288F FALL RISK ASSESSMENT DOCD: CPT | Mod: HCNC,CPTII,S$GLB, | Performed by: INTERNAL MEDICINE

## 2023-11-13 PROCEDURE — 1101F PR PT FALLS ASSESS DOC 0-1 FALLS W/OUT INJ PAST YR: ICD-10-PCS | Mod: HCNC,CPTII,S$GLB, | Performed by: INTERNAL MEDICINE

## 2023-11-13 PROCEDURE — 1159F MED LIST DOCD IN RCRD: CPT | Mod: HCNC,CPTII,S$GLB, | Performed by: INTERNAL MEDICINE

## 2023-11-13 PROCEDURE — 1126F PR PAIN SEVERITY QUANTIFIED, NO PAIN PRESENT: ICD-10-PCS | Mod: HCNC,CPTII,S$GLB, | Performed by: INTERNAL MEDICINE

## 2023-11-13 PROCEDURE — 99999 PR PBB SHADOW E&M-EST. PATIENT-LVL IV: ICD-10-PCS | Mod: PBBFAC,HCNC,, | Performed by: INTERNAL MEDICINE

## 2023-11-13 PROCEDURE — 3288F PR FALLS RISK ASSESSMENT DOCUMENTED: ICD-10-PCS | Mod: HCNC,CPTII,S$GLB, | Performed by: INTERNAL MEDICINE

## 2023-11-13 PROCEDURE — 1159F PR MEDICATION LIST DOCUMENTED IN MEDICAL RECORD: ICD-10-PCS | Mod: HCNC,CPTII,S$GLB, | Performed by: INTERNAL MEDICINE

## 2023-11-13 PROCEDURE — 1101F PT FALLS ASSESS-DOCD LE1/YR: CPT | Mod: HCNC,CPTII,S$GLB, | Performed by: INTERNAL MEDICINE

## 2023-11-13 PROCEDURE — 1157F ADVNC CARE PLAN IN RCRD: CPT | Mod: HCNC,CPTII,S$GLB, | Performed by: INTERNAL MEDICINE

## 2023-11-13 PROCEDURE — 1126F AMNT PAIN NOTED NONE PRSNT: CPT | Mod: HCNC,CPTII,S$GLB, | Performed by: INTERNAL MEDICINE

## 2023-11-13 PROCEDURE — 1157F PR ADVANCE CARE PLAN OR EQUIV PRESENT IN MEDICAL RECORD: ICD-10-PCS | Mod: HCNC,CPTII,S$GLB, | Performed by: INTERNAL MEDICINE

## 2023-11-13 PROCEDURE — 1160F RVW MEDS BY RX/DR IN RCRD: CPT | Mod: HCNC,CPTII,S$GLB, | Performed by: INTERNAL MEDICINE

## 2023-11-13 PROCEDURE — 99214 PR OFFICE/OUTPT VISIT, EST, LEVL IV, 30-39 MIN: ICD-10-PCS | Mod: HCNC,S$GLB,, | Performed by: INTERNAL MEDICINE

## 2023-11-13 NOTE — TELEPHONE ENCOUNTER
Spoke with patient son in regards to the patient 1:00 pm appointment. Patient will come for 1:40 pm today.

## 2023-11-13 NOTE — PROGRESS NOTES
"[facility]  Primary Care   [address]       Patient ID: Sheree Pugh     Chief Complaint:   Chief Complaint   Patient presents with    Follow-up     6 month lymphedema due to venous insufficiency           HPI:  Annual exam and overall she is doing very well.  Shortness of breath is controlled with a combination of Spiriva and the stents that she had in the past.  Her bilateral lower extremity edema looks to be well-controlled with a combination of Lasix and compression socks/wrapping.  Vital signs look very good.  She is going to get a flu shot from the pharmacy today and politely declines a COVID booster.  Bone density in 2020 did show some mild thinning in her spine but osteoporosis in her hips.  She and I both agree that we do not feel the need to check another bone density scan. She was on Prolia, but it stopped in 2021 due to cost. She has Right posterior shoulder pain due to a rotator cuff strain. Has persistent Left forearm numbness since abrasions from falling 2 years ago.     Review of Systems       Right shoulder pain     Objective:      Physical Exam   Physical Exam       Trace bilateral lower extremity edema   Kyphosis   Heart murmur     Vitals:   Vitals:    11/13/23 1344   BP: 134/68   Pulse: 64   SpO2: 96%   Weight: 71.2 kg (156 lb 15.5 oz)   Height: 5' 2" (1.575 m)        Assessment:           Plan:       Sheree Pugh  was seen today for follow-up and may need lab work.    Diagnoses and all orders for this visit:    Sheree was seen today for follow-up.    Diagnoses and all orders for this visit:    Wellness examination    Decreased vision in both eyes  Stable    Simple chronic bronchitis  Controlled with med     Nonrheumatic aortic valve stenosis  Murmur is stable    Coronary artery disease involving native coronary artery of native heart without angina pectoris  Stable  Per Dr. Jay     Mixed hyperlipidemia  Continue Atorvastatin     Essential " hypertension  Controlled with med     Osteoporosis, senile  Can't afford prolia     Bilateral leg edema  Controlled with med          Piotr Walker MD

## 2023-11-29 NOTE — TELEPHONE ENCOUNTER
Rec'd fax from [facility], Dr Newton's offc; Dr Jay referred pt for appt; fax states pt declined appt at this time stating she needs stress test 1st & pt can contact their offc whenever she is ready to schedule.   room air

## 2023-12-04 ENCOUNTER — PATIENT MESSAGE (OUTPATIENT)
Dept: PODIATRY | Facility: CLINIC | Age: 88
End: 2023-12-04

## 2024-03-26 DIAGNOSIS — G47.62 SLEEP RELATED LEG CRAMPS: ICD-10-CM

## 2024-03-26 RX ORDER — GABAPENTIN 100 MG/1
100 CAPSULE ORAL NIGHTLY
Qty: 30 CAPSULE | Refills: 11 | Status: CANCELLED | OUTPATIENT
Start: 2024-03-26 | End: 2025-03-26

## 2024-03-26 NOTE — TELEPHONE ENCOUNTER
No care due was identified.  Health [facility] Embedded Care Due Messages. Reference number: [id number].   3/26/2024 1:52:16 PM CDT

## 2024-03-26 NOTE — TELEPHONE ENCOUNTER
No care due was identified.  Health [facility] Embedded Care Due Messages. Reference number: [id number].   3/26/2024 5:08:32 AM CDT

## 2024-03-27 RX ORDER — GABAPENTIN 100 MG/1
100 CAPSULE ORAL NIGHTLY
Qty: 30 CAPSULE | Refills: 11 | Status: SHIPPED | OUTPATIENT
Start: 2024-03-27 | End: 2025-03-27

## 2024-04-11 ENCOUNTER — PATIENT MESSAGE (OUTPATIENT)
Dept: FAMILY MEDICINE | Facility: CLINIC | Age: 89
End: 2024-04-11
Payer: MEDICARE

## 2024-04-30 ENCOUNTER — LAB VISIT (OUTPATIENT)
Dept: LAB | Facility: HOSPITAL | Age: 89
End: 2024-04-30
Attending: INTERNAL MEDICINE
Payer: MEDICARE

## 2024-04-30 DIAGNOSIS — Z95.5 STENTED CORONARY ARTERY: ICD-10-CM

## 2024-04-30 DIAGNOSIS — I73.9 PERIPHERAL VASCULAR DISEASE: ICD-10-CM

## 2024-04-30 DIAGNOSIS — I10 ESSENTIAL HYPERTENSION: Chronic | ICD-10-CM

## 2024-04-30 DIAGNOSIS — I35.0 NONRHEUMATIC AORTIC VALVE STENOSIS: Chronic | ICD-10-CM

## 2024-04-30 DIAGNOSIS — N18.31 STAGE 3A CHRONIC KIDNEY DISEASE: ICD-10-CM

## 2024-04-30 DIAGNOSIS — E78.5 DYSLIPIDEMIA: Chronic | ICD-10-CM

## 2024-04-30 DIAGNOSIS — I70.1 RAS (RENAL ARTERY STENOSIS): ICD-10-CM

## 2024-04-30 DIAGNOSIS — I65.21 STENOSIS OF RIGHT CAROTID ARTERY: Chronic | ICD-10-CM

## 2024-04-30 DIAGNOSIS — I25.10 CORONARY ARTERY DISEASE INVOLVING NATIVE CORONARY ARTERY OF NATIVE HEART WITHOUT ANGINA PECTORIS: Chronic | ICD-10-CM

## 2024-04-30 LAB
ALBUMIN SERPL BCP-MCNC: 3.4 G/DL (ref 3.5–5.2)
ALP SERPL-CCNC: 65 U/L (ref 55–135)
ALT SERPL W/O P-5'-P-CCNC: 8 U/L (ref 10–44)
ANION GAP SERPL CALC-SCNC: 8 MMOL/L (ref 8–16)
AST SERPL-CCNC: 14 U/L (ref 10–40)
BASOPHILS # BLD AUTO: 0.07 K/UL (ref 0–0.2)
BASOPHILS NFR BLD: 0.9 % (ref 0–1.9)
BILIRUB SERPL-MCNC: 0.3 MG/DL (ref 0.1–1)
BUN SERPL-MCNC: 20 MG/DL (ref 10–30)
CALCIUM SERPL-MCNC: 9.1 MG/DL (ref 8.7–10.5)
CHLORIDE SERPL-SCNC: 109 MMOL/L (ref 95–110)
CHOLEST SERPL-MCNC: 121 MG/DL (ref 120–199)
CHOLEST/HDLC SERPL: 2.6 {RATIO} (ref 2–5)
CO2 SERPL-SCNC: 25 MMOL/L (ref 23–29)
CREAT SERPL-MCNC: 1.2 MG/DL (ref 0.5–1.4)
DIFFERENTIAL METHOD BLD: ABNORMAL
EOSINOPHIL # BLD AUTO: 0.2 K/UL (ref 0–0.5)
EOSINOPHIL NFR BLD: 3.2 % (ref 0–8)
ERYTHROCYTE [DISTWIDTH] IN BLOOD BY AUTOMATED COUNT: 14.6 % (ref 11.5–14.5)
EST. GFR  (NO RACE VARIABLE): 41.4 ML/MIN/1.73 M^2
GLUCOSE SERPL-MCNC: 87 MG/DL (ref 70–110)
HCT VFR BLD AUTO: 34.7 % (ref 37–48.5)
HDLC SERPL-MCNC: 47 MG/DL (ref 40–75)
HDLC SERPL: 38.8 % (ref 20–50)
HGB BLD-MCNC: 11.3 G/DL (ref 12–16)
IMM GRANULOCYTES # BLD AUTO: 0.02 K/UL (ref 0–0.04)
IMM GRANULOCYTES NFR BLD AUTO: 0.3 % (ref 0–0.5)
LDLC SERPL CALC-MCNC: 55.8 MG/DL (ref 63–159)
LYMPHOCYTES # BLD AUTO: 2.6 K/UL (ref 1–4.8)
LYMPHOCYTES NFR BLD: 34.4 % (ref 18–48)
MCH RBC QN AUTO: 32.2 PG (ref 27–31)
MCHC RBC AUTO-ENTMCNC: 32.6 G/DL (ref 32–36)
MCV RBC AUTO: 99 FL (ref 82–98)
MONOCYTES # BLD AUTO: 0.9 K/UL (ref 0.3–1)
MONOCYTES NFR BLD: 12.3 % (ref 4–15)
NEUTROPHILS # BLD AUTO: 3.6 K/UL (ref 1.8–7.7)
NEUTROPHILS NFR BLD: 48.9 % (ref 38–73)
NONHDLC SERPL-MCNC: 74 MG/DL
NRBC BLD-RTO: 0 /100 WBC
PLATELET # BLD AUTO: 211 K/UL (ref 150–450)
PMV BLD AUTO: 10 FL (ref 9.2–12.9)
POTASSIUM SERPL-SCNC: 5.2 MMOL/L (ref 3.5–5.1)
PROT SERPL-MCNC: 6.3 G/DL (ref 6–8.4)
RBC # BLD AUTO: 3.51 M/UL (ref 4–5.4)
SODIUM SERPL-SCNC: 142 MMOL/L (ref 136–145)
TRIGL SERPL-MCNC: 91 MG/DL (ref 30–150)
TSH SERPL DL<=0.005 MIU/L-ACNC: 3.01 UIU/ML (ref 0.4–4)
WBC # BLD AUTO: 7.41 K/UL (ref 3.9–12.7)

## 2024-04-30 PROCEDURE — 85025 COMPLETE CBC W/AUTO DIFF WBC: CPT | Mod: HCNC | Performed by: INTERNAL MEDICINE

## 2024-04-30 PROCEDURE — 80061 LIPID PANEL: CPT | Mod: HCNC | Performed by: INTERNAL MEDICINE

## 2024-04-30 PROCEDURE — 80053 COMPREHEN METABOLIC PANEL: CPT | Mod: HCNC | Performed by: INTERNAL MEDICINE

## 2024-04-30 PROCEDURE — 36415 COLL VENOUS BLD VENIPUNCTURE: CPT | Mod: HCNC,PO | Performed by: INTERNAL MEDICINE

## 2024-04-30 PROCEDURE — 84443 ASSAY THYROID STIM HORMONE: CPT | Mod: HCNC | Performed by: INTERNAL MEDICINE

## 2024-05-13 ENCOUNTER — OFFICE VISIT (OUTPATIENT)
Dept: FAMILY MEDICINE | Facility: CLINIC | Age: 89
End: 2024-05-13
Payer: MEDICARE

## 2024-05-13 VITALS
BODY MASS INDEX: 28.39 KG/M2 | DIASTOLIC BLOOD PRESSURE: 78 MMHG | WEIGHT: 155.19 LBS | SYSTOLIC BLOOD PRESSURE: 136 MMHG | HEART RATE: 80 BPM | OXYGEN SATURATION: 96 %

## 2024-05-13 DIAGNOSIS — F33.9 DEPRESSION, RECURRENT: ICD-10-CM

## 2024-05-13 DIAGNOSIS — N18.32 STAGE 3B CHRONIC KIDNEY DISEASE: ICD-10-CM

## 2024-05-13 DIAGNOSIS — H54.3 DECREASED VISION IN BOTH EYES: ICD-10-CM

## 2024-05-13 DIAGNOSIS — I25.10 CORONARY ARTERY DISEASE INVOLVING NATIVE CORONARY ARTERY OF NATIVE HEART WITHOUT ANGINA PECTORIS: Chronic | ICD-10-CM

## 2024-05-13 DIAGNOSIS — H91.93 DECREASED HEARING OF BOTH EARS: ICD-10-CM

## 2024-05-13 DIAGNOSIS — J41.0 SIMPLE CHRONIC BRONCHITIS: ICD-10-CM

## 2024-05-13 DIAGNOSIS — R41.3 MEMORY LOSS: ICD-10-CM

## 2024-05-13 DIAGNOSIS — R26.2 DIFFICULTY WALKING: ICD-10-CM

## 2024-05-13 DIAGNOSIS — N18.31 STAGE 3A CHRONIC KIDNEY DISEASE: ICD-10-CM

## 2024-05-13 DIAGNOSIS — M46.96 UNSPECIFIED INFLAMMATORY SPONDYLOPATHY, LUMBAR REGION: ICD-10-CM

## 2024-05-13 DIAGNOSIS — R60.0 BILATERAL LEG EDEMA: ICD-10-CM

## 2024-05-13 DIAGNOSIS — E78.2 MIXED HYPERLIPIDEMIA: ICD-10-CM

## 2024-05-13 DIAGNOSIS — I10 ESSENTIAL HYPERTENSION: Primary | Chronic | ICD-10-CM

## 2024-05-13 DIAGNOSIS — N39.46 MIXED STRESS AND URGE URINARY INCONTINENCE: ICD-10-CM

## 2024-05-13 DIAGNOSIS — H35.3222 EXUDATIVE AGE-RELATED MACULAR DEGENERATION OF LEFT EYE WITH INACTIVE CHOROIDAL NEOVASCULARIZATION: ICD-10-CM

## 2024-05-13 PROCEDURE — 1101F PT FALLS ASSESS-DOCD LE1/YR: CPT | Mod: HCNC,CPTII,S$GLB, | Performed by: INTERNAL MEDICINE

## 2024-05-13 PROCEDURE — 99999 PR PBB SHADOW E&M-EST. PATIENT-LVL IV: CPT | Mod: PBBFAC,HCNC,, | Performed by: INTERNAL MEDICINE

## 2024-05-13 PROCEDURE — G2211 COMPLEX E/M VISIT ADD ON: HCPCS | Mod: HCNC,S$GLB,, | Performed by: INTERNAL MEDICINE

## 2024-05-13 PROCEDURE — 1159F MED LIST DOCD IN RCRD: CPT | Mod: HCNC,CPTII,S$GLB, | Performed by: INTERNAL MEDICINE

## 2024-05-13 PROCEDURE — 99214 OFFICE O/P EST MOD 30 MIN: CPT | Mod: HCNC,S$GLB,, | Performed by: INTERNAL MEDICINE

## 2024-05-13 PROCEDURE — 1157F ADVNC CARE PLAN IN RCRD: CPT | Mod: HCNC,CPTII,S$GLB, | Performed by: INTERNAL MEDICINE

## 2024-05-13 PROCEDURE — 1126F AMNT PAIN NOTED NONE PRSNT: CPT | Mod: HCNC,CPTII,S$GLB, | Performed by: INTERNAL MEDICINE

## 2024-05-13 PROCEDURE — 3288F FALL RISK ASSESSMENT DOCD: CPT | Mod: HCNC,CPTII,S$GLB, | Performed by: INTERNAL MEDICINE

## 2024-05-13 PROCEDURE — 1160F RVW MEDS BY RX/DR IN RCRD: CPT | Mod: HCNC,CPTII,S$GLB, | Performed by: INTERNAL MEDICINE

## 2024-05-13 NOTE — PROGRESS NOTES
[facility]  Primary Care   [address]       Patient ID: Sheree Pugh     Chief Complaint:   Chief Complaint   Patient presents with    Follow-up        HPI:  Routine follow-up and she does have a few things to discuss today.  She says she feels a bruise like sensation on the left side of her head just superior and anterior to her ear.  I do feel a bony prominence that is longer than it was wider at the area in question but do believe that is part of her skull.  I did examine her ear and her ear canal is slightly irritated from her hearing aids but I do not think that is causing the pain.  The only things in that area that I can recall are trigeminal neuralgia or temporal arteritis.  For now I would like to monitor this rather than subject her to more lab work.  She does have a few spots that she feels because her vision is impaired and some of them are old areas where she is scratched and some of them are benign-appearing moles that we can monitor.  She is concerned about some pain under her right arm because she does have a history of breast cancer.  I do not appreciate any lymphadenopathy in either axilla.  I do think the pain could be coming from some degenerative joint disease in her neck.  Labs from her cardiologist's are reviewed and her kidney function is stable.  I would like her to eat some more protein and we have decided that she can eat eggs each morning.  Her cholesterol and thyroid are very well controlled.    Review of Systems       Arm and scalp pain     Objective:      Physical Exam   Physical Exam       No lymphadenopathy in either axilla     Vitals:   Vitals:    05/13/24 1418   BP: 136/78   BP Location: Left arm   Patient Position: Sitting   BP Method: Medium (Manual)   Pulse: 80   SpO2: 96%   Weight: 70.4 kg (155 lb 3.3 oz)        Assessment:           Plan:       Sheree Pugh  was seen today for follow-up and may need lab work.    Diagnoses and all orders for  this visit:    Sheree was seen today for follow-up.    Diagnoses and all orders for this visit:    Essential hypertension  Controlled with med     Decreased hearing of both ears  Hearing aids have helped     Simple chronic bronchitis  Stable with inhalers     Coronary artery disease involving native coronary artery of native heart without angina pectoris  Stable , continue Plavix     Stage 3a chronic kidney disease  Stable    Mixed stress and urge urinary incontinence  Controlled with Oxybutinin     Difficulty walking  Stable    Stage 3b chronic kidney disease  Stable     Exudative age-related macular degeneration of left eye with inactive choroidal neovascularization  Stable     Unspecified inflammatory spondylopathy, lumbar region  Controlled with Gabapentin and Nortriptyline     Depression, recurrent  Controlled without med     Memory loss  Controlled with Aricept     Decreased vision in both eyes  Stable    Mixed hyperlipidemia  Controlled with Atorvastatin      Bilateral leg edema  Controlled with Lasix as needed      Visit today included increased complexity associated with the care of the episodic problem hypertension hyperlipidemia Memory loss addressed and managing the longitudinal care of the patient due to the serious and/or complex managed problem(s) .      Piotr Walker MD

## 2024-05-21 ENCOUNTER — TELEPHONE (OUTPATIENT)
Dept: PODIATRY | Facility: CLINIC | Age: 89
End: 2024-05-21
Payer: MEDICARE

## 2024-05-21 NOTE — TELEPHONE ENCOUNTER
Spoke with the patients son to reschedule the appointment per provider request. He requested that they be placed on the wait list for an earlier appointment. Provider has an emergency surgery. Appointment was scheduled for 6/17/24 @ 4:40 pm. Expressed understanding of the message.

## 2024-06-03 DIAGNOSIS — J41.0 SIMPLE CHRONIC BRONCHITIS: ICD-10-CM

## 2024-06-03 DIAGNOSIS — N39.41 URGE INCONTINENCE OF URINE: ICD-10-CM

## 2024-06-03 RX ORDER — TIOTROPIUM BROMIDE INHALATION SPRAY 3.12 UG/1
2 SPRAY, METERED RESPIRATORY (INHALATION) DAILY
Qty: 12 G | Refills: 3 | Status: SHIPPED | OUTPATIENT
Start: 2024-06-03

## 2024-06-03 RX ORDER — OXYBUTYNIN CHLORIDE 10 MG/1
10 TABLET, EXTENDED RELEASE ORAL DAILY
Qty: 90 TABLET | Refills: 3 | Status: SHIPPED | OUTPATIENT
Start: 2024-06-03

## 2024-06-03 RX ORDER — OXYBUTYNIN CHLORIDE 10 MG/1
10 TABLET, EXTENDED RELEASE ORAL DAILY
Qty: 90 TABLET | Refills: 3 | Status: CANCELLED | OUTPATIENT
Start: 2024-06-03 | End: 2025-06-03

## 2024-06-03 NOTE — TELEPHONE ENCOUNTER
No care due was identified.  [facility] Embedded Care Due Messages. Reference number: [id number].   6/03/2024 9:51:36 AM CDT

## 2024-06-03 NOTE — TELEPHONE ENCOUNTER
No care due was identified.  [facility] Embedded Care Due Messages. Reference number: [id number].   6/03/2024 9:51:05 AM CDT

## 2024-06-03 NOTE — TELEPHONE ENCOUNTER
Refill Decision Note   Sheree Pugh  is requesting a refill authorization.  Brief Assessment and Rationale for Refill:  Approve     Medication Therapy Plan:         Comments:     Note composed:10:24 AM 06/03/2024

## 2024-06-05 ENCOUNTER — TELEPHONE (OUTPATIENT)
Dept: PHARMACY | Facility: CLINIC | Age: 89
End: 2024-06-05
Payer: MEDICARE

## 2024-06-05 NOTE — TELEPHONE ENCOUNTER
I have spoken with the patient son and I have added the patient to the waitlist for her [employer].

## 2024-06-06 ENCOUNTER — PATIENT MESSAGE (OUTPATIENT)
Dept: FAMILY MEDICINE | Facility: CLINIC | Age: 89
End: 2024-06-06
Payer: MEDICARE

## 2024-06-06 DIAGNOSIS — R41.3 MEMORY LOSS: ICD-10-CM

## 2024-06-06 RX ORDER — DONEPEZIL HYDROCHLORIDE 5 MG/1
5 TABLET, FILM COATED ORAL NIGHTLY
Qty: 90 TABLET | Refills: 3 | Status: SHIPPED | OUTPATIENT
Start: 2024-06-06 | End: 2025-06-06

## 2024-06-06 RX ORDER — DONEPEZIL HYDROCHLORIDE 5 MG/1
5 TABLET, FILM COATED ORAL NIGHTLY
Qty: 90 TABLET | Refills: 3 | Status: CANCELLED | OUTPATIENT
Start: 2024-06-06 | End: 2025-06-06

## 2024-06-07 DIAGNOSIS — G62.9 PERIPHERAL POLYNEUROPATHY: ICD-10-CM

## 2024-06-07 DIAGNOSIS — G47.01 INSOMNIA DUE TO MEDICAL CONDITION: ICD-10-CM

## 2024-06-07 NOTE — TELEPHONE ENCOUNTER
No care due was identified.  [facility] Embedded Care Due Messages. Reference number: [id number].   6/07/2024 3:51:25 PM CDT

## 2024-06-08 RX ORDER — NORTRIPTYLINE HYDROCHLORIDE 10 MG/1
10 CAPSULE ORAL NIGHTLY
Qty: 90 CAPSULE | Refills: 3 | Status: SHIPPED | OUTPATIENT
Start: 2024-06-08 | End: 2025-06-08

## 2024-06-08 NOTE — TELEPHONE ENCOUNTER
Refill Routing Note   Medication(s) are not appropriate for processing by [facility] for the following reason(s):        Drug-disease interaction    [facility] action(s):  Defer        Medication Therapy Plan: Drug-Disease: nortriptyline and Stented coronary artery; Coronary artery disease involving native coronary artery of native heart without angina pectoris      Appointments  past 12m or future 3m with PCP    Date Provider   Last Visit   5/13/2024 Piotr Walker MD   Next Visit   11/13/2024 Piotr Walker MD   ED visits in past 90 days: 0        Note composed:10:13 PM 06/07/2024

## 2024-06-19 ENCOUNTER — OFFICE VISIT (OUTPATIENT)
Dept: PODIATRY | Facility: CLINIC | Age: 89
End: 2024-06-19
Payer: MEDICARE

## 2024-06-19 VITALS — WEIGHT: 155.19 LBS | HEIGHT: 62 IN | BODY MASS INDEX: 28.56 KG/M2

## 2024-06-19 DIAGNOSIS — I87.2 VENOUS INSUFFICIENCY OF BOTH LOWER EXTREMITIES: ICD-10-CM

## 2024-06-19 DIAGNOSIS — B35.1 ONYCHOMYCOSIS: ICD-10-CM

## 2024-06-19 DIAGNOSIS — G60.9 IDIOPATHIC PERIPHERAL NEUROPATHY: Primary | ICD-10-CM

## 2024-06-19 PROCEDURE — 1101F PT FALLS ASSESS-DOCD LE1/YR: CPT | Mod: HCNC,CPTII,S$GLB, | Performed by: PODIATRIST

## 2024-06-19 PROCEDURE — 1159F MED LIST DOCD IN RCRD: CPT | Mod: HCNC,CPTII,S$GLB, | Performed by: PODIATRIST

## 2024-06-19 PROCEDURE — 3288F FALL RISK ASSESSMENT DOCD: CPT | Mod: HCNC,CPTII,S$GLB, | Performed by: PODIATRIST

## 2024-06-19 PROCEDURE — 11721 DEBRIDE NAIL 6 OR MORE: CPT | Mod: Q9,HCNC,S$GLB, | Performed by: PODIATRIST

## 2024-06-19 PROCEDURE — 1157F ADVNC CARE PLAN IN RCRD: CPT | Mod: HCNC,CPTII,S$GLB, | Performed by: PODIATRIST

## 2024-06-19 PROCEDURE — 1126F AMNT PAIN NOTED NONE PRSNT: CPT | Mod: HCNC,CPTII,S$GLB, | Performed by: PODIATRIST

## 2024-06-19 PROCEDURE — 99999 PR PBB SHADOW E&M-EST. PATIENT-LVL II: CPT | Mod: PBBFAC,HCNC,, | Performed by: PODIATRIST

## 2024-06-19 PROCEDURE — 99213 OFFICE O/P EST LOW 20 MIN: CPT | Mod: 25,HCNC,S$GLB, | Performed by: PODIATRIST

## 2024-06-19 NOTE — PROGRESS NOTES
Subjective:      Patient ID: Sheree Pugh is a 89 y.o. female.    Chief Complaint: No chief complaint on file.      Sheree is a 89 y.o. female who presents to the clinic for evaluation and treatment of high risk feet. Sheree has a past medical history of Anticoagulant long-term use, ARMD (age related macular degeneration), Arthritis, Breast cancer, Cataract, COPD (chronic obstructive pulmonary disease), Coronary artery disease, Disorder of kidney and ureter, DE LOS SANTOS (dyspnea on exertion), Elevated cholesterol, Heart disease, Hypertension, Insomnia, Macular degeneration, Obstetrical blood-clot embolism, postpartum, PVD (peripheral vascular disease), Retinal detachment, Right lower quadrant abdominal mass (03/25/2022), Stented coronary artery (02/25/2019), and Urinary incontinence. The patient's chief complaint is toenails that are in need of trimming.  Denies pain from the nails with today's exam.  Has not attempted to self trim on account of poor visual acuity.  Notes having an up tick in neuropathy pain in bilateral foot. States symptoms occur nocturnally and will coincide with cramping of the calf muscles.  She has not attempted to self treat.  Denies any additional pedal complaints.      PCP: Piotr Walker MD    Date Last Seen by PCP: 5/24      Hemoglobin A1C   Date Value Ref Range Status   02/01/2020 5.7 (H) 4.0 - 5.6 % Final     Comment:     ADA Screening Guidelines:  5.7-6.4%  Consistent with prediabetes  >or=6.5%  Consistent with diabetes  High levels of fetal hemoglobin interfere with the HbA1C  assay. Heterozygous hemoglobin variants (HbS, HgC, etc)do  not significantly interfere with this assay.   However, presence of multiple variants may affect accuracy.     09/01/2018 5.8 (H) 0.0 - 5.6 % Final     Comment:     Reference Interval:  5.0 - 5.6 Normal   5.7 - 6.4 High Risk   > 6.5 Diabetic    Hgb A1c results are standardized based on the (NGSP) National   Glycohemoglobin Standardization Program.     Hemoglobin A1C levels are related to mean serum/plasma glucose   during the preceding 2-3 months.        2015 5.8 4.5 - 6.2 % Final       Review of Systems   Constitutional: Negative for chills and fever.   Cardiovascular:  Positive for leg swelling. Negative for claudication.   Skin:  Positive for nail changes.   Musculoskeletal:  Positive for joint swelling. Negative for muscle cramps, muscle weakness and myalgias.   Gastrointestinal:  Negative for nausea and vomiting.   Neurological:  Positive for numbness and paresthesias.   Psychiatric/Behavioral:  Negative for altered mental status.            Objective:      Physical Exam  Constitutional:       Appearance: Normal appearance. She is not ill-appearing.   Cardiovascular:      Pulses:           Dorsalis pedis pulses are 2+ on the right side and 2+ on the left side.        Posterior tibial pulses are 2+ on the right side and 2+ on the left side.      Comments: CFT is < 3 seconds bilateral.  Pedal hair growth is decreased bilateral.  Varicosities noted bilateral.   Toes are cool to touch bilateral.    Musculoskeletal:         General: Swelling and tenderness present.      Right lower le+ Edema present.      Left lower le+ Edema present.      Comments: Muscle strength 5/5 in all muscle groups bilateral.  No tenderness nor crepitation with ROM of foot/ankle joints bilateral.  Bilateral pes planus foot type.   Skin:     General: Skin is warm and dry.      Capillary Refill: Capillary refill takes 2 to 3 seconds.      Findings: No bruising, ecchymosis, erythema, signs of injury, laceration, lesion, petechiae, rash or wound.      Comments: Pedal skin appears edematous bilateral.  Toenails x 10 appear thickened by 4 mm, elongated by 8 mm, and discolored with subungual debris.     Neurological:      Mental Status: She is alert.      Sensory: Sensory deficit present.      Motor: No weakness or atrophy.      Comments: Protective sensation per [place]vi  monofilament is absent bilateral.  Light touch is absent bilateral.               Assessment:       Encounter Diagnoses   Name Primary?    Idiopathic peripheral neuropathy Yes    Venous insufficiency of both lower extremities     Onychomycosis              Plan:       Diagnoses and all orders for this visit:    Idiopathic peripheral neuropathy    Venous insufficiency of both lower extremities    Onychomycosis          I counseled the patient on her conditions, their implications and medical management.    - Advised to elevate the limbs while at rest.  Also, to continue monitoring for signs of stasis dermatitis and venous blistering.     - Recommend applying CBD oil to the feet and legs daily to address neuropathy related paresthesias.       - Shoe inspection. Patient instructed on proper foot hygeine. We discussed wearing proper shoe gear, daily foot inspections, never walking without protective shoe gear, never putting sharp instruments to feet, routine podiatric nail visits every 3 months.      - With patient's permission, nails were aggressively reduced and debrided x 10 to their soft tissue attachment mechanically and with electric , removing all offending nail and debris. Patient relates relief following the procedure. She will continue to monitor the areas daily, inspect her feet, wear protective shoe gear when ambulatory, moisturizer to maintain skin integrity and follow in this office in approximately 3 months, sooner p.r.n.    Marquez Ramirez DPM

## 2024-08-26 DIAGNOSIS — C50.012 MALIGNANT NEOPLASM OF NIPPLE OF LEFT BREAST IN FEMALE, UNSPECIFIED ESTROGEN RECEPTOR STATUS: ICD-10-CM

## 2024-08-26 RX ORDER — TAMOXIFEN CITRATE 20 MG/1
20 TABLET ORAL DAILY
Qty: 90 TABLET | Refills: 3 | Status: SHIPPED | OUTPATIENT
Start: 2024-08-26 | End: 2025-08-26

## 2024-09-19 ENCOUNTER — OFFICE VISIT (OUTPATIENT)
Dept: PODIATRY | Facility: CLINIC | Age: 89
End: 2024-09-19
Payer: MEDICARE

## 2024-09-19 VITALS — WEIGHT: 155.19 LBS | HEIGHT: 62 IN | BODY MASS INDEX: 28.56 KG/M2

## 2024-09-19 DIAGNOSIS — G60.9 IDIOPATHIC PERIPHERAL NEUROPATHY: Primary | ICD-10-CM

## 2024-09-19 DIAGNOSIS — I87.2 VENOUS INSUFFICIENCY OF BOTH LOWER EXTREMITIES: ICD-10-CM

## 2024-09-19 DIAGNOSIS — R60.0 PERIPHERAL EDEMA: ICD-10-CM

## 2024-09-19 DIAGNOSIS — B35.1 ONYCHOMYCOSIS: ICD-10-CM

## 2024-09-19 PROCEDURE — 99999 PR PBB SHADOW E&M-EST. PATIENT-LVL III: CPT | Mod: PBBFAC,HCNC,, | Performed by: PODIATRIST

## 2024-09-19 NOTE — PROGRESS NOTES
Subjective:      Patient ID: Sheree Pugh is a 89 y.o. female.    Chief Complaint: Nail Care      Sheree is a 89 y.o. female who presents to the clinic for evaluation and treatment of high risk feet. Sheree has a past medical history of Anticoagulant long-term use, ARMD (age related macular degeneration), Arthritis, Breast cancer, Cataract, COPD (chronic obstructive pulmonary disease), Coronary artery disease, Disorder of kidney and ureter, DE LOS SANTOS (dyspnea on exertion), Elevated cholesterol, Heart disease, Hypertension, Insomnia, Macular degeneration, Obstetrical blood-clot embolism, postpartum, PVD (peripheral vascular disease), Retinal detachment, Right lower quadrant abdominal mass (03/25/2022), Stented coronary artery (02/25/2019), and Urinary incontinence. The patient's chief complaint is toenails that are in need of trimming.  Denies pain from the nails with today's exam.  Has not attempted to self trim on account of poor visual acuity.  Denies any additional pedal complaints.      PCP: Piotr Walker MD    Date Last Seen by PCP: 5/24      Hemoglobin A1C   Date Value Ref Range Status   02/01/2020 5.7 (H) 4.0 - 5.6 % Final     Comment:     ADA Screening Guidelines:  5.7-6.4%  Consistent with prediabetes  >or=6.5%  Consistent with diabetes  High levels of fetal hemoglobin interfere with the HbA1C  assay. Heterozygous hemoglobin variants (HbS, HgC, etc)do  not significantly interfere with this assay.   However, presence of multiple variants may affect accuracy.     09/01/2018 5.8 (H) 0.0 - 5.6 % Final     Comment:     Reference Interval:  5.0 - 5.6 Normal   5.7 - 6.4 High Risk   > 6.5 Diabetic    Hgb A1c results are standardized based on the (NGSP) National   Glycohemoglobin Standardization Program.    Hemoglobin A1C levels are related to mean serum/plasma glucose   during the preceding 2-3 months.        04/13/2015 5.8 4.5 - 6.2 % Final       Review of Systems   Constitutional: Negative for chills and fever.    Cardiovascular:  Positive for leg swelling. Negative for claudication.   Skin:  Positive for nail changes.   Musculoskeletal:  Positive for joint swelling. Negative for muscle cramps, muscle weakness and myalgias.   Gastrointestinal:  Negative for nausea and vomiting.   Neurological:  Positive for numbness and paresthesias.   Psychiatric/Behavioral:  Negative for altered mental status.            Objective:      Physical Exam  Constitutional:       Appearance: Normal appearance. She is not ill-appearing.   Cardiovascular:      Pulses:           Dorsalis pedis pulses are 2+ on the right side and 2+ on the left side.        Posterior tibial pulses are 2+ on the right side and 2+ on the left side.      Comments: CFT is < 3 seconds bilateral.  Pedal hair growth is decreased bilateral.  Varicosities noted bilateral.   Toes are cool to touch bilateral.    Musculoskeletal:         General: Swelling and tenderness present.      Right lower le+ Edema present.      Left lower le+ Edema present.      Comments: Muscle strength 5/5 in all muscle groups bilateral.  No tenderness nor crepitation with ROM of foot/ankle joints bilateral.  Bilateral pes planus foot type.   Skin:     General: Skin is warm and dry.      Capillary Refill: Capillary refill takes 2 to 3 seconds.      Findings: No bruising, ecchymosis, erythema, signs of injury, laceration, lesion, petechiae, rash or wound.      Comments: Pedal skin appears edematous bilateral.  Toenails x 10 appear thickened by 4 mm, elongated by 8 mm, and discolored with subungual debris.     Neurological:      Mental Status: She is alert.      Sensory: Sensory deficit present.      Motor: No weakness or atrophy.      Comments: Protective sensation per [place]-Elvi monofilament is absent bilateral.  Light touch is absent bilateral.               Assessment:       Encounter Diagnoses   Name Primary?    Idiopathic peripheral neuropathy Yes    Venous insufficiency of both  lower extremities     Peripheral edema     Onychomycosis              Plan:       Sheree was seen today for nail care.    Diagnoses and all orders for this visit:    Idiopathic peripheral neuropathy    Venous insufficiency of both lower extremities    Peripheral edema    Onychomycosis          I counseled the patient on her conditions, their implications and medical management.    - Advised to elevate the limbs while at rest.  Also, to continue monitoring for signs of stasis dermatitis and venous blistering.     - Shoe inspection. Patient instructed on proper foot hygeine. We discussed wearing proper shoe gear, daily foot inspections, never walking without protective shoe gear, never putting sharp instruments to feet, routine podiatric nail visits every 3 months.      - With patient's permission, nails were aggressively reduced and debrided x 10 to their soft tissue attachment mechanically and with electric , removing all offending nail and debris. Patient relates relief following the procedure. She will continue to monitor the areas daily, inspect her feet, wear protective shoe gear when ambulatory, moisturizer to maintain skin integrity and follow in this office in approximately 3 months, sooner p.r.n.    Marquez Ramirez DPM

## 2024-09-23 ENCOUNTER — LAB VISIT (OUTPATIENT)
Dept: LAB | Facility: HOSPITAL | Age: 89
End: 2024-09-23
Attending: FAMILY MEDICINE
Payer: MEDICARE

## 2024-09-23 ENCOUNTER — PATIENT MESSAGE (OUTPATIENT)
Dept: FAMILY MEDICINE | Facility: CLINIC | Age: 89
End: 2024-09-23

## 2024-09-23 ENCOUNTER — OFFICE VISIT (OUTPATIENT)
Dept: FAMILY MEDICINE | Facility: CLINIC | Age: 89
End: 2024-09-23
Payer: MEDICARE

## 2024-09-23 VITALS
HEIGHT: 62 IN | WEIGHT: 154.75 LBS | DIASTOLIC BLOOD PRESSURE: 60 MMHG | OXYGEN SATURATION: 97 % | SYSTOLIC BLOOD PRESSURE: 140 MMHG | BODY MASS INDEX: 28.48 KG/M2 | HEART RATE: 64 BPM

## 2024-09-23 DIAGNOSIS — R59.1 LYMPHADENOPATHY: ICD-10-CM

## 2024-09-23 DIAGNOSIS — R68.84 JAW PAIN: Primary | ICD-10-CM

## 2024-09-23 DIAGNOSIS — R09.82 POSTNASAL DRIP: ICD-10-CM

## 2024-09-23 DIAGNOSIS — R51.9 TEMPORAL PAIN: ICD-10-CM

## 2024-09-23 DIAGNOSIS — R68.84 JAW PAIN: ICD-10-CM

## 2024-09-23 LAB
BASOPHILS # BLD AUTO: 0.06 K/UL (ref 0–0.2)
BASOPHILS NFR BLD: 0.7 % (ref 0–1.9)
CRP SERPL-MCNC: 0.3 MG/L (ref 0–8.2)
DIFFERENTIAL METHOD BLD: ABNORMAL
EOSINOPHIL # BLD AUTO: 0.1 K/UL (ref 0–0.5)
EOSINOPHIL NFR BLD: 1.3 % (ref 0–8)
ERYTHROCYTE [DISTWIDTH] IN BLOOD BY AUTOMATED COUNT: 14 % (ref 11.5–14.5)
ERYTHROCYTE [SEDIMENTATION RATE] IN BLOOD BY PHOTOMETRIC METHOD: 10 MM/HR (ref 0–36)
HCT VFR BLD AUTO: 35.9 % (ref 37–48.5)
HGB BLD-MCNC: 11.6 G/DL (ref 12–16)
IMM GRANULOCYTES # BLD AUTO: 0.03 K/UL (ref 0–0.04)
IMM GRANULOCYTES NFR BLD AUTO: 0.4 % (ref 0–0.5)
LYMPHOCYTES # BLD AUTO: 2.3 K/UL (ref 1–4.8)
LYMPHOCYTES NFR BLD: 27.3 % (ref 18–48)
MCH RBC QN AUTO: 31.5 PG (ref 27–31)
MCHC RBC AUTO-ENTMCNC: 32.3 G/DL (ref 32–36)
MCV RBC AUTO: 98 FL (ref 82–98)
MONOCYTES # BLD AUTO: 0.9 K/UL (ref 0.3–1)
MONOCYTES NFR BLD: 10.7 % (ref 4–15)
NEUTROPHILS # BLD AUTO: 5.1 K/UL (ref 1.8–7.7)
NEUTROPHILS NFR BLD: 59.6 % (ref 38–73)
NRBC BLD-RTO: 0 /100 WBC
PLATELET # BLD AUTO: 247 K/UL (ref 150–450)
PMV BLD AUTO: 10 FL (ref 9.2–12.9)
RBC # BLD AUTO: 3.68 M/UL (ref 4–5.4)
WBC # BLD AUTO: 8.5 K/UL (ref 3.9–12.7)

## 2024-09-23 PROCEDURE — 1125F AMNT PAIN NOTED PAIN PRSNT: CPT | Mod: HCNC,CPTII,S$GLB, | Performed by: PHYSICIAN ASSISTANT

## 2024-09-23 PROCEDURE — 85025 COMPLETE CBC W/AUTO DIFF WBC: CPT | Mod: HCNC | Performed by: PHYSICIAN ASSISTANT

## 2024-09-23 PROCEDURE — 86140 C-REACTIVE PROTEIN: CPT | Mod: HCNC | Performed by: PHYSICIAN ASSISTANT

## 2024-09-23 PROCEDURE — 85652 RBC SED RATE AUTOMATED: CPT | Mod: HCNC | Performed by: PHYSICIAN ASSISTANT

## 2024-09-23 PROCEDURE — 36415 COLL VENOUS BLD VENIPUNCTURE: CPT | Mod: HCNC,PO | Performed by: PHYSICIAN ASSISTANT

## 2024-09-23 PROCEDURE — 99999 PR PBB SHADOW E&M-EST. PATIENT-LVL V: CPT | Mod: PBBFAC,HCNC,, | Performed by: PHYSICIAN ASSISTANT

## 2024-09-23 PROCEDURE — 99214 OFFICE O/P EST MOD 30 MIN: CPT | Mod: HCNC,S$GLB,, | Performed by: PHYSICIAN ASSISTANT

## 2024-09-23 PROCEDURE — 1101F PT FALLS ASSESS-DOCD LE1/YR: CPT | Mod: HCNC,CPTII,S$GLB, | Performed by: PHYSICIAN ASSISTANT

## 2024-09-23 PROCEDURE — 1160F RVW MEDS BY RX/DR IN RCRD: CPT | Mod: HCNC,CPTII,S$GLB, | Performed by: PHYSICIAN ASSISTANT

## 2024-09-23 PROCEDURE — 1159F MED LIST DOCD IN RCRD: CPT | Mod: HCNC,CPTII,S$GLB, | Performed by: PHYSICIAN ASSISTANT

## 2024-09-23 PROCEDURE — 3288F FALL RISK ASSESSMENT DOCD: CPT | Mod: HCNC,CPTII,S$GLB, | Performed by: PHYSICIAN ASSISTANT

## 2024-09-23 PROCEDURE — 1157F ADVNC CARE PLAN IN RCRD: CPT | Mod: HCNC,CPTII,S$GLB, | Performed by: PHYSICIAN ASSISTANT

## 2024-09-23 RX ORDER — FLUTICASONE PROPIONATE 50 MCG
1 SPRAY, SUSPENSION (ML) NASAL DAILY
Qty: 16 G | Refills: 3 | Status: SHIPPED | OUTPATIENT
Start: 2024-09-23

## 2024-09-23 NOTE — PATIENT INSTRUCTIONS
A few reminders from today:    Flonase spray in each nostril once daily  Labs today  F/U with dentist  F/U with Dr. Walker as scheduled    Follow up with me if needed.   Please go to ER/urgent care if after hours or symptoms persist/worsen.     Do not hesitate to get in touch with me should you have any further questions.     Thank you for trusting me with your care!  I wish you health and happiness.    -Madalyn Quiñonez PA-C

## 2024-09-23 NOTE — Clinical Note
She still has that temporal pain, occasional jaw pain. Was ok with getting ESR/CRP. Strangely, she also had very tender supraclavicular node, even though it was not obviously enlarged on exam. Declined imaging. Wants to monitor and follow up with you as scheduled

## 2024-09-23 NOTE — PROGRESS NOTES
"Subjective     Patient ID: Sheree Pugh is a 89 y.o. female.    Chief Complaint: pain    HPI      Pt is known to me, PCP Dr. Walker.      Pt is a 89 year old female with lumbar DDD, COPD, CAD, right carotid stenosis, aortic valve stenosis, HTN, renal artery stenosis, hx of left breast cancer, diverticulosis, osteoporosis, and insomnia. She presents today complaining of postnasal drip with intermittent left submandibular gland swelling. This has been an ongoing problem for years. Not currently taking her flonase.     Pt also complains of intermittent jaw pain. States this only occurs on occasion when she is chewing. The pain is sudden, sharp, and short lived. To her, it feels like her dentures move in a strange way sometimes, and this causes the pain. She has had trouble finding bottom ones that fit her well. Last visit with dentist was last month. Pt continues to complain of intermittent sharp pain anterior to left ear, states the area of tender. Sometimes, she feels a "bulging vein" in the area. 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 No scalp tenderness. No fever, chills. No cough or shortness of breath.     Review of Systems   Constitutional:  Negative for activity change.   HENT:  Positive for trouble swallowing. Negative for hearing loss.    Eyes:  Negative for discharge.   Respiratory:  Negative for chest tightness and wheezing.    Cardiovascular:  Negative for chest pain and palpitations.   Gastrointestinal:  Negative for constipation, diarrhea and vomiting.   Genitourinary:  Negative for difficulty urinating and hematuria.   Neurological:  Negative for headaches.   Psychiatric/Behavioral:  Negative for dysphoric mood.    All other systems reviewed and are negative.         Objective     Physical Exam  Constitutional:       General: She is not in acute distress.     Appearance: Normal appearance. She is not ill-appearing, toxic-appearing or diaphoretic.   HENT:      Head: " Normocephalic and atraumatic.      Right Ear: Tympanic membrane, ear canal and external ear normal. There is no impacted cerumen.      Left Ear: Tympanic membrane, ear canal and external ear normal. There is no impacted cerumen.      Nose: Nose normal. No congestion or rhinorrhea.      Mouth/Throat:      Mouth: Mucous membranes are moist.      Pharynx: Oropharynx is clear. No oropharyngeal exudate or posterior oropharyngeal erythema.      Comments: Examined bottom gums, no ulcerations or wounds  Cardiovascular:      Heart sounds: Normal heart sounds. No murmur heard.     No friction rub. No gallop.   Pulmonary:      Effort: No respiratory distress.      Breath sounds: Normal breath sounds. No stridor. No wheezing, rhonchi or rales.   Chest:      Chest wall: No tenderness.          Comments: Palpable, tender node left supraclavicular region, not obviously enlarged.   Musculoskeletal:      Right lower leg: No edema.      Left lower leg: No edema.      Comments: Tenderness of left TMJ, tenderness of left temporal area   Neurological:      General: No focal deficit present.      Mental Status: She is alert and oriented to person, place, and time. Mental status is at baseline.   Psychiatric:         Mood and Affect: Mood normal.         Behavior: Behavior normal.         Thought Content: Thought content normal.         Judgment: Judgment normal.       1. Jaw pain  -recommend following up with dentist for eval for TMJ, as well as to make sure dentures are fitting properly. Discussed possibility of temporal arteritis based on symptoms. Pt opts to get bloodwork. Would be open to steroid, but would not go any further in diagnostic workup/treatment.   - Sedimentation rate; Future  - C-REACTIVE PROTEIN; Future  - CBC W/ AUTO DIFFERENTIAL; Future    2. Temporal pain  - see above    3. Postnasal drip  - fluticasone propionate (FLONASE) 50 mcg/actuation nasal spray; Spray 1 spray (50 mcg total) by Each Nostril route once daily.   Dispense: 16 g; Refill: 3    4. Lymphadenopathy  -not enlarged, but tender above left clavicle, continue to monitor, offered imaging today but patient declines further diagnostic imaging unless symptoms worsen. She understands risk associated with this. No additional symptoms present to suggest infection.     RTC/ER precautions given. F/U as scheduled with [facility]

## 2024-09-24 DIAGNOSIS — R59.1 LYMPHADENOPATHY: Primary | ICD-10-CM

## 2024-09-24 NOTE — PROGRESS NOTES
Ms. Sheree, your blood work looks great. The inflammatory markers, which we tested due to the pain on the side of your face, are normal. This means that inflammation of that artery that we discussed is very unlikely. Your blood count is stable as compared to past blood draws. One thing that we could get done in the next few days is a chest x ray. This will make sure there is no easily treatable infection that could be causing the painful lymph node above your left collar bone. I went ahead and placed the order and you can schedule this whenever you like.

## 2024-10-31 DIAGNOSIS — E53.8 VITAMIN B12 DEFICIENCY: Primary | ICD-10-CM

## 2024-10-31 RX ORDER — CYANOCOBALAMIN 1000 UG/ML
INJECTION, SOLUTION INTRAMUSCULAR; SUBCUTANEOUS
Qty: 10 ML | Refills: 3 | Status: CANCELLED | OUTPATIENT
Start: 2024-10-31

## 2024-11-02 RX ORDER — CYANOCOBALAMIN 1000 UG/ML
1000 INJECTION, SOLUTION INTRAMUSCULAR; SUBCUTANEOUS
Qty: 10 ML | Refills: 3 | Status: SHIPPED | OUTPATIENT
Start: 2024-11-02 | End: 2025-11-02

## 2024-11-13 ENCOUNTER — OFFICE VISIT (OUTPATIENT)
Dept: FAMILY MEDICINE | Facility: CLINIC | Age: 89
End: 2024-11-13
Payer: MEDICARE

## 2024-11-13 VITALS
OXYGEN SATURATION: 96 % | WEIGHT: 155 LBS | HEART RATE: 68 BPM | SYSTOLIC BLOOD PRESSURE: 138 MMHG | BODY MASS INDEX: 28.35 KG/M2 | DIASTOLIC BLOOD PRESSURE: 70 MMHG

## 2024-11-13 DIAGNOSIS — Z23 NEED FOR IMMUNIZATION AGAINST INFLUENZA: ICD-10-CM

## 2024-11-13 DIAGNOSIS — I25.10 CORONARY ARTERY DISEASE INVOLVING NATIVE CORONARY ARTERY OF NATIVE HEART WITHOUT ANGINA PECTORIS: ICD-10-CM

## 2024-11-13 DIAGNOSIS — N39.46 MIXED STRESS AND URGE URINARY INCONTINENCE: ICD-10-CM

## 2024-11-13 DIAGNOSIS — E78.2 MIXED HYPERLIPIDEMIA: ICD-10-CM

## 2024-11-13 DIAGNOSIS — I10 ESSENTIAL HYPERTENSION: Primary | ICD-10-CM

## 2024-11-13 DIAGNOSIS — G47.01 INSOMNIA DUE TO MEDICAL CONDITION: ICD-10-CM

## 2024-11-13 DIAGNOSIS — R60.0 BILATERAL LEG EDEMA: ICD-10-CM

## 2024-11-13 DIAGNOSIS — N18.32 STAGE 3B CHRONIC KIDNEY DISEASE: ICD-10-CM

## 2024-11-13 DIAGNOSIS — R26.2 DIFFICULTY WALKING: ICD-10-CM

## 2024-11-13 DIAGNOSIS — R41.3 MEMORY LOSS: ICD-10-CM

## 2024-11-13 PROCEDURE — 1101F PT FALLS ASSESS-DOCD LE1/YR: CPT | Mod: HCNC,CPTII,S$GLB, | Performed by: INTERNAL MEDICINE

## 2024-11-13 PROCEDURE — 1159F MED LIST DOCD IN RCRD: CPT | Mod: HCNC,CPTII,S$GLB, | Performed by: INTERNAL MEDICINE

## 2024-11-13 PROCEDURE — 99214 OFFICE O/P EST MOD 30 MIN: CPT | Mod: HCNC,S$GLB,, | Performed by: INTERNAL MEDICINE

## 2024-11-13 PROCEDURE — 1126F AMNT PAIN NOTED NONE PRSNT: CPT | Mod: HCNC,CPTII,S$GLB, | Performed by: INTERNAL MEDICINE

## 2024-11-13 PROCEDURE — 1157F ADVNC CARE PLAN IN RCRD: CPT | Mod: HCNC,CPTII,S$GLB, | Performed by: INTERNAL MEDICINE

## 2024-11-13 PROCEDURE — 3288F FALL RISK ASSESSMENT DOCD: CPT | Mod: HCNC,CPTII,S$GLB, | Performed by: INTERNAL MEDICINE

## 2024-11-13 PROCEDURE — G2211 COMPLEX E/M VISIT ADD ON: HCPCS | Mod: HCNC,S$GLB,, | Performed by: INTERNAL MEDICINE

## 2024-11-13 PROCEDURE — 99999 PR PBB SHADOW E&M-EST. PATIENT-LVL IV: CPT | Mod: PBBFAC,HCNC,, | Performed by: INTERNAL MEDICINE

## 2024-11-13 PROCEDURE — 1160F RVW MEDS BY RX/DR IN RCRD: CPT | Mod: HCNC,CPTII,S$GLB, | Performed by: INTERNAL MEDICINE

## 2024-11-13 NOTE — PROGRESS NOTES
[facility]  Primary Care   [address] [facility]       Patient ID: Sheree Pugh     Chief Complaint:   Chief Complaint   Patient presents with    Follow-up        HPI:  Routine follow-up for hypertension which is very well-controlled.  Hearing is an issue she may get another hearing test at the beginning of next year because right now she can really only hear out of her right ear.  She tries to stay as active as she can and has her good days and bad days.  Her son who is in attendance notices that when she does not get out she has a bit more down but that corrects with time activity.  I think this is normal.  Her legs look really good today and she is controlling her edema with a combination of spironolactone and as-needed Lasix and prompting them up and using some compression socks.  Vital signs look good.  Most recent labs taken my by physician assistant look great.  She had seen my assistant in September for some jaw P which was likely related to her dentures and some TMJ.  She also noticed a tender area in the left supraclavicular area which could be lymph node but that resolved and I can not palpate any abnormalities at this time.  She did complete 5 years of tamoxifen and I would like her to stop it after she finishes her current bottle.  She does note some bone pain when someone pushes on her arms are squeezes her too hard and I think that is a function of her age.  I do wonder if the tamoxifen could be causing that has well.  She does not need any refills at this time and she is interested in a flu shot today.    Review of Systems       Decreased hearing     Objective:      Physical Exam   Physical Exam       Trace bilateral lower extremity edema     Vitals:   Vitals:    11/13/24 1416   BP: 138/70   BP Location: Left arm   Patient Position: Sitting   Pulse: 68   SpO2: 96%   Weight: 70.3 kg (154 lb 15.7 oz)        Assessment:           Plan:       Sheree Pugh  was seen today for  follow-up and may need lab work.    Diagnoses and all orders for this visit:    Sheree was seen today for follow-up.    Diagnoses and all orders for this visit:    Essential hypertension  Controlled with a combination of diltiazem and Imdur and spironolactone and some as-needed clonidine.  She is not being over or under medicated.    Need for immunization against influenza  -     influenza (adjuvanted) (Fluad) 45 mcg/0.5 mL IM vaccine (> or = 66 yo) 0.5 mL    Stage 3b chronic kidney disease  Stable and monitor labs    Difficulty walking  Stable    Mixed stress and urge urinary incontinence  Mostly controlled with oxybutynin    Mixed hyperlipidemia  Continue atorvastatin    Bilateral leg edema  Controlled with a combination of spironolactone and as-needed Lasix    Memory loss  Stable with Aricept    Insomnia due to medical condition  Controlled with the Pamelor    Coronary artery disease involving native coronary artery of native heart without angina pectoris  Controlled with atorvastatin and Plavix    Visit today included increased complexity associated with the care of the episodic problem hypertension urinary incontinence hyperlipidemia leg edema memory loss insomnia addressed and managing the longitudinal care of the patient due to the serious and/or complex managed problem(s) .           Piotr Walker MD

## 2024-11-14 ENCOUNTER — OFFICE VISIT (OUTPATIENT)
Dept: HOME HEALTH SERVICES | Facility: CLINIC | Age: 89
End: 2024-11-14
Payer: MEDICARE

## 2024-11-14 VITALS — DIASTOLIC BLOOD PRESSURE: 56 MMHG | SYSTOLIC BLOOD PRESSURE: 139 MMHG | OXYGEN SATURATION: 96 % | HEART RATE: 67 BPM

## 2024-11-14 DIAGNOSIS — I87.2 LYMPHEDEMA DUE TO VENOUS INSUFFICIENCY: ICD-10-CM

## 2024-11-14 DIAGNOSIS — H54.8 LEGAL BLINDNESS: ICD-10-CM

## 2024-11-14 DIAGNOSIS — I73.9 PERIPHERAL VASCULAR DISEASE: ICD-10-CM

## 2024-11-14 DIAGNOSIS — M81.0 OSTEOPOROSIS, SENILE: ICD-10-CM

## 2024-11-14 DIAGNOSIS — I25.10 CORONARY ARTERY DISEASE INVOLVING NATIVE CORONARY ARTERY OF NATIVE HEART WITHOUT ANGINA PECTORIS: Chronic | ICD-10-CM

## 2024-11-14 DIAGNOSIS — H35.3222 EXUDATIVE AGE-RELATED MACULAR DEGENERATION OF LEFT EYE WITH INACTIVE CHOROIDAL NEOVASCULARIZATION: ICD-10-CM

## 2024-11-14 DIAGNOSIS — H90.0 CONDUCTIVE HEARING LOSS, BILATERAL: ICD-10-CM

## 2024-11-14 DIAGNOSIS — G62.9 PERIPHERAL POLYNEUROPATHY: ICD-10-CM

## 2024-11-14 DIAGNOSIS — N18.32 STAGE 3B CHRONIC KIDNEY DISEASE: ICD-10-CM

## 2024-11-14 DIAGNOSIS — G47.00 INSOMNIA, UNSPECIFIED TYPE: ICD-10-CM

## 2024-11-14 DIAGNOSIS — I70.0 ATHEROSCLEROSIS OF AORTA: ICD-10-CM

## 2024-11-14 DIAGNOSIS — I89.0 LYMPHEDEMA DUE TO VENOUS INSUFFICIENCY: ICD-10-CM

## 2024-11-14 DIAGNOSIS — F33.9 DEPRESSION, RECURRENT: ICD-10-CM

## 2024-11-14 DIAGNOSIS — E78.2 MIXED HYPERLIPIDEMIA: ICD-10-CM

## 2024-11-14 DIAGNOSIS — H35.3211 EXUDATIVE AGE-RELATED MACULAR DEGENERATION OF RIGHT EYE WITH ACTIVE CHOROIDAL NEOVASCULARIZATION: ICD-10-CM

## 2024-11-14 DIAGNOSIS — I10 ESSENTIAL HYPERTENSION: Chronic | ICD-10-CM

## 2024-11-14 DIAGNOSIS — Z00.00 ENCOUNTER FOR PREVENTIVE HEALTH EXAMINATION: Primary | ICD-10-CM

## 2024-11-14 PROCEDURE — 1159F MED LIST DOCD IN RCRD: CPT | Mod: CPTII,S$GLB,, | Performed by: NURSE PRACTITIONER

## 2024-11-14 PROCEDURE — 3288F FALL RISK ASSESSMENT DOCD: CPT | Mod: CPTII,S$GLB,, | Performed by: NURSE PRACTITIONER

## 2024-11-14 PROCEDURE — 1160F RVW MEDS BY RX/DR IN RCRD: CPT | Mod: CPTII,S$GLB,, | Performed by: NURSE PRACTITIONER

## 2024-11-14 PROCEDURE — 1101F PT FALLS ASSESS-DOCD LE1/YR: CPT | Mod: CPTII,S$GLB,, | Performed by: NURSE PRACTITIONER

## 2024-11-14 PROCEDURE — G0439 PPPS, SUBSEQ VISIT: HCPCS | Mod: S$GLB,,, | Performed by: NURSE PRACTITIONER

## 2024-11-14 PROCEDURE — 1123F ACP DISCUSS/DSCN MKR DOCD: CPT | Mod: CPTII,S$GLB,, | Performed by: NURSE PRACTITIONER

## 2024-11-18 NOTE — PROGRESS NOTES
Sheree Pugh presented for a follow-up Medicare AWV today. The following components were reviewed and updated:    Medical history  Family History  Social history  Allergies and Current Medications  Health Risk Assessment  Health Maintenance  Care Team    **See Completed Assessments for Annual Wellness visit with in the encounter summary    The following assessments were completed:  Depression Screening  Cognitive function Screening  Timed Get Up Test  Whisper Test      Opioid documentation:      Patient does not have a current opioid prescription.          Vitals:    11/14/24 1139   BP: (!) 139/56   BP Location: Left arm   Patient Position: Sitting   Pulse: 67   SpO2: 96%     There is no height or weight on file to calculate BMI.       Physical Exam  Constitutional:       Appearance: Normal appearance.   HENT:      Head: Normocephalic and atraumatic.      Nose: Nose normal.      Mouth/Throat:      Mouth: Mucous membranes are moist.   Cardiovascular:      Rate and Rhythm: Normal rate and regular rhythm.   Pulmonary:      Effort: Pulmonary effort is normal.      Breath sounds: Normal breath sounds.   Abdominal:      General: Bowel sounds are normal.      Palpations: Abdomen is soft.   Musculoskeletal:      Cervical back: Normal range of motion and neck supple.   Skin:     General: Skin is warm and dry.   Neurological:      General: No focal deficit present.      Mental Status: She is alert and oriented to person, place, and time.   Psychiatric:         Mood and Affect: Mood normal.         Behavior: Behavior normal.           Diagnoses and health risks identified today and associated recommendations/orders:  1. Encounter for preventive health examination  Awv completed      2. Peripheral polyneuropathy  Chronic and stable. Continue current treatment. Follow with PCP.      3. Depression, recurrent  Chronic and stable. Continue current treatment. Follow with PCP.      4. Exudative age-related macular degeneration of  left eye with inactive choroidal neovascularization    5. Exudative age-related macular degeneration of right eye with active choroidal neovascularization  Seeing ophthalmology  Able to get around in her apartment      6. Legal blindness - Left Eye  Chronic and stable. Continue current treatment. Follow with PCP.      7. Conductive hearing loss, bilateral  Chronic and stable. Continue current treatment. Follow with PCP.    8. Peripheral vascular disease  Chronic and stable. Continue current treatment. Follow with PCP.  On lipitor      9. Mixed hyperlipidemia  Chronic and stable. Continue current treatment. Follow with PCP.  On lipitor    10. Essential hypertension  On meds BP stable    11. Coronary artery disease involving native coronary artery of native heart without angina pectoris  Chronic and stable. Continue current treatment. Follow with PCP.  Cardiology once year      12. Atherosclerosis of aorta  On lipitor      13. Osteoporosis, senile  Chronic and stable. Continue current treatment. Follow with PCP.      14. Stage 3b chronic kidney disease  Chronic and stable. Continue current treatment. Follow with PCP.  Followed with labs      15. Insomnia, unspecified type  Pamelor PRN      16. Lymphedema due to venous insufficiency  Elevate, has compression stockings        Provided Sheree with a 5-10 year written screening schedule and personal prevention plan. Recommendations were developed using the USPSTF age appropriate recommendations. Education, counseling, and referrals were provided as needed.  After Visit Summary printed and given to patient which includes a list of additional screenings\tests needed.    Fu in 1 yr for radha Sampson, MONAE, APRN

## 2024-11-18 NOTE — PATIENT INSTRUCTIONS
Counseling and Referral of Other Preventative  (Italic type indicates deductible and co-insurance are waived)    Patient Name: Sheree Pugh  Today's Date: 11/18/2024    Health Maintenance       Date Due Completion Date    Aspirin/Antiplatelet Therapy 11/13/2025 11/13/2024    Lipid Panel 04/30/2029 4/30/2024    TETANUS VACCINE 12/03/2031 12/3/2021        No orders of the defined types were placed in this encounter.    The following information is provided to all patients.  This information is to help you find resources for any of the problems found today that may be affecting your health:                  Living healthy guide: www.[facility].[place].[facility]      Understanding Diabetes: www.diabetes.org      Eating healthy: www.cdc.gov/healthyweight      CDC home safety checklist: www.cdc.gov/steadi/patient.html      Agency on Aging: www.goea.[place]      Alcoholics anonymous (AA): www.aa.org      Physical Activity: www.josr.nih.gov/xl3pxee      Tobacco use: www.quitwithusla.org

## 2024-11-22 ENCOUNTER — PATIENT MESSAGE (OUTPATIENT)
Dept: FAMILY MEDICINE | Facility: CLINIC | Age: 89
End: 2024-11-22
Payer: MEDICARE

## 2025-03-06 ENCOUNTER — TELEPHONE (OUTPATIENT)
Dept: ADMINISTRATIVE | Facility: CLINIC | Age: OVER 89
End: 2025-03-06
Payer: MEDICARE

## 2025-03-06 NOTE — TELEPHONE ENCOUNTER
----- Message from Richard sent at 3/6/2025 10:58 AM CST -----  Contact: Self  Type: Needs Medical AdviceWho Called:  PatientBest Call Back Number: [phone] Information: Patient is requesting a call back from Jaelyn Adrián as soon as possible, as it is important she says

## 2025-03-18 DIAGNOSIS — G47.62 SLEEP RELATED LEG CRAMPS: ICD-10-CM

## 2025-03-18 RX ORDER — GABAPENTIN 100 MG/1
100 CAPSULE ORAL NIGHTLY
Qty: 90 CAPSULE | Refills: 3 | Status: SHIPPED | OUTPATIENT
Start: 2025-03-18 | End: 2026-03-18

## 2025-03-18 NOTE — TELEPHONE ENCOUNTER
No care due was identified.  [facility] Embedded Care Due Messages. Reference number: [id number].   3/18/2025 5:08:24 AM CDT

## 2025-03-24 ENCOUNTER — OFFICE VISIT (OUTPATIENT)
Dept: HOME HEALTH SERVICES | Facility: CLINIC | Age: OVER 89
End: 2025-03-24
Payer: MEDICARE

## 2025-03-24 DIAGNOSIS — N18.32 STAGE 3B CHRONIC KIDNEY DISEASE: ICD-10-CM

## 2025-03-24 DIAGNOSIS — G47.00 INSOMNIA, UNSPECIFIED TYPE: ICD-10-CM

## 2025-03-24 DIAGNOSIS — H90.0 CONDUCTIVE HEARING LOSS, BILATERAL: ICD-10-CM

## 2025-03-24 DIAGNOSIS — E78.2 MIXED HYPERLIPIDEMIA: ICD-10-CM

## 2025-03-24 DIAGNOSIS — H54.8 LEGAL BLINDNESS: ICD-10-CM

## 2025-03-24 DIAGNOSIS — H35.3222 EXUDATIVE AGE-RELATED MACULAR DEGENERATION OF LEFT EYE WITH INACTIVE CHOROIDAL NEOVASCULARIZATION: ICD-10-CM

## 2025-03-24 DIAGNOSIS — I65.21 STENOSIS OF RIGHT CAROTID ARTERY: Chronic | ICD-10-CM

## 2025-03-24 DIAGNOSIS — I10 ESSENTIAL HYPERTENSION: Chronic | ICD-10-CM

## 2025-03-24 DIAGNOSIS — Z00.00 ENCOUNTER FOR MEDICARE ANNUAL WELLNESS EXAM: Primary | ICD-10-CM

## 2025-03-24 DIAGNOSIS — I73.9 PERIPHERAL VASCULAR DISEASE: ICD-10-CM

## 2025-03-24 DIAGNOSIS — J41.0 SIMPLE CHRONIC BRONCHITIS: ICD-10-CM

## 2025-03-24 DIAGNOSIS — I25.10 CORONARY ARTERY DISEASE INVOLVING NATIVE CORONARY ARTERY OF NATIVE HEART WITHOUT ANGINA PECTORIS: Chronic | ICD-10-CM

## 2025-03-24 DIAGNOSIS — H35.3211 EXUDATIVE AGE-RELATED MACULAR DEGENERATION OF RIGHT EYE WITH ACTIVE CHOROIDAL NEOVASCULARIZATION: ICD-10-CM

## 2025-03-24 DIAGNOSIS — R41.3 MEMORY LOSS: ICD-10-CM

## 2025-03-24 DIAGNOSIS — M81.0 OSTEOPOROSIS, SENILE: ICD-10-CM

## 2025-03-24 DIAGNOSIS — G62.9 PERIPHERAL POLYNEUROPATHY: ICD-10-CM

## 2025-03-24 DIAGNOSIS — I70.0 ATHEROSCLEROSIS OF AORTA: ICD-10-CM

## 2025-03-24 DIAGNOSIS — F33.9 DEPRESSION, RECURRENT: ICD-10-CM

## 2025-03-26 NOTE — PATIENT INSTRUCTIONS
Counseling and Referral of Other Preventative  (Italic type indicates deductible and co-insurance are waived)    Patient Name: Sheree Pugh  Today's Date: 3/25/2025    Health Maintenance       Date Due Completion Date    Aspirin/Antiplatelet Therapy 03/25/2026 3/25/2025    Lipid Panel 04/30/2029 4/30/2024    TETANUS VACCINE 12/03/2031 12/3/2021        No orders of the defined types were placed in this encounter.    The following information is provided to all patients.  This information is to help you find resources for any of the problems found today that may be affecting your health:                  Living healthy guide: www.[facility].[place].[facility]      Understanding Diabetes: www.diabetes.org      Eating healthy: www.cdc.gov/healthyweight      CDC home safety checklist: www.cdc.gov/steadi/patient.html      Agency on Aging: www.goea.[place]      Alcoholics anonymous (AA): www.aa.org      Physical Activity: www.josr.nih.gov/wk6odwh      Tobacco use: www.quitwithusla.org

## 2025-03-28 VITALS
WEIGHT: 150 LBS | DIASTOLIC BLOOD PRESSURE: 60 MMHG | HEART RATE: 73 BPM | SYSTOLIC BLOOD PRESSURE: 139 MMHG | OXYGEN SATURATION: 96 % | HEIGHT: 62 IN | BODY MASS INDEX: 27.6 KG/M2

## 2025-03-28 NOTE — PROGRESS NOTES
"  Sheree Pugh presented for a follow-up Medicare AWV today. The following components were reviewed and updated:    Medical history  Family History  Social history  Allergies and Current Medications  Health Risk Assessment  Health Maintenance  Care Team    **See Completed Assessments for Annual Wellness visit with in the encounter summary    The following assessments were completed:  Depression Screening  Cognitive function Screening  Timed Get Up Test  Whisper Test      Opioid documentation:      Patient does not have a current opioid prescription.          Vitals:    03/24/25 1130   BP: 139/60   Pulse: 73   SpO2: 96%   Weight: 68 kg (150 lb)   Height: 5' 2" (1.575 m)     Body mass index is 27.44 kg/m².       Physical Exam  Constitutional:       Appearance: Normal appearance.   HENT:      Head: Normocephalic and atraumatic.      Nose: Nose normal.      Mouth/Throat:      Mouth: Mucous membranes are moist.   Eyes:      Extraocular Movements: Extraocular movements intact.      Pupils: Pupils are equal, round, and reactive to light.   Cardiovascular:      Rate and Rhythm: Normal rate and regular rhythm.   Pulmonary:      Effort: Pulmonary effort is normal.      Breath sounds: Normal breath sounds.   Abdominal:      General: Bowel sounds are normal.      Palpations: Abdomen is soft.   Musculoskeletal:         General: Normal range of motion.      Cervical back: Normal range of motion and neck supple.   Skin:     General: Skin is warm and dry.   Neurological:      General: No focal deficit present.      Mental Status: She is alert and oriented to person, place, and time.   Psychiatric:         Mood and Affect: Mood normal.         Behavior: Behavior normal.           Diagnoses and health risks identified today and associated recommendations/orders:  1. Encounter for Medicare annual wellness exam  Awv completed    2. Exudative age-related macular degeneration of left eye with inactive choroidal neovascularization  Has " issues with her vision - lives alone but has modifications. Has adapted to her home.  Reviewed all her medications with her     3. Simple chronic bronchitis  On spiriva and albuterol inhaler - followed with PcP      4. Peripheral polyneuropathy  On gabapentin - stable - no increased issues or falls      5. Memory loss  On aricept- able to care for her self with some assistance with with family for finance and driving      6. Depression, recurrent  Mood has improved over the last months - on nortriptylline per PCP      7. Legal blindness - Left Eye  AREDS, stable - able to care for her self at home      8. Exudative age-related macular degeneration of right eye with active choroidal neovascularization  Followed with ophthalmology - able to care for herself at home      9. Conductive hearing loss, bilateral  Hearing aids      10. Stenosis of right carotid artery  Plavix and lipitor - stable -seeing cardiology      11. Peripheral vascular disease  Stable - plavix and lipitor      12. Mixed hyperlipidemia  On statin - stable - LDL 55      13. Essential hypertension  On BP meds - BP stable - no increased issues      14. Atherosclerosis of aorta  On plavix and lipitor - stable - no issues      15. Coronary artery disease involving native coronary artery of native heart without angina pectoris  On plavix and lipitor- no issues - followed with PCP      16. Stage 3b chronic kidney disease  On meds - followed with labs and PCP      17. Osteoporosis, senile  On vit d replacement - monitored with Dexa      18. Insomnia, unspecified type  On nortriptylline - sleeping improved - feeling better        Provided Sheree with a 5-10 year written screening schedule and personal prevention plan. Recommendations were developed using the USPSTF age appropriate recommendations. Education, counseling, and referrals were provided as needed.  After Visit Summary printed and given to patient which includes a list of additional screenings\tests  needed.    Fu in 1 yr for awv            Jaelyn Sampson, DNP, APRN

## 2025-05-14 ENCOUNTER — HOSPITAL ENCOUNTER (OUTPATIENT)
Dept: RADIOLOGY | Facility: HOSPITAL | Age: OVER 89
Discharge: HOME | End: 2025-05-14
Attending: INTERNAL MEDICINE
Payer: MEDICARE

## 2025-05-14 ENCOUNTER — OFFICE VISIT (OUTPATIENT)
Dept: FAMILY MEDICINE | Facility: CLINIC | Age: OVER 89
End: 2025-05-14
Payer: MEDICARE

## 2025-05-14 VITALS
WEIGHT: 152.13 LBS | HEART RATE: 69 BPM | OXYGEN SATURATION: 97 % | DIASTOLIC BLOOD PRESSURE: 64 MMHG | SYSTOLIC BLOOD PRESSURE: 132 MMHG | BODY MASS INDEX: 27.99 KG/M2 | HEIGHT: 62 IN

## 2025-05-14 DIAGNOSIS — M54.42 CHRONIC BILATERAL LOW BACK PAIN WITH BILATERAL SCIATICA: ICD-10-CM

## 2025-05-14 DIAGNOSIS — I10 ESSENTIAL HYPERTENSION: ICD-10-CM

## 2025-05-14 DIAGNOSIS — G47.01 INSOMNIA DUE TO MEDICAL CONDITION: ICD-10-CM

## 2025-05-14 DIAGNOSIS — R26.2 DIFFICULTY WALKING: ICD-10-CM

## 2025-05-14 DIAGNOSIS — R60.0 BILATERAL LEG EDEMA: ICD-10-CM

## 2025-05-14 DIAGNOSIS — M54.12 CERVICAL RADICULOPATHY: ICD-10-CM

## 2025-05-14 DIAGNOSIS — R41.3 MEMORY LOSS: ICD-10-CM

## 2025-05-14 DIAGNOSIS — I25.10 CORONARY ARTERY DISEASE INVOLVING NATIVE CORONARY ARTERY OF NATIVE HEART WITHOUT ANGINA PECTORIS: ICD-10-CM

## 2025-05-14 DIAGNOSIS — G89.29 CHRONIC BILATERAL LOW BACK PAIN WITH BILATERAL SCIATICA: ICD-10-CM

## 2025-05-14 DIAGNOSIS — M54.41 CHRONIC BILATERAL LOW BACK PAIN WITH BILATERAL SCIATICA: ICD-10-CM

## 2025-05-14 DIAGNOSIS — H91.93 HEARING IMPAIRED PERSON, BILATERAL: ICD-10-CM

## 2025-05-14 DIAGNOSIS — H54.3 IMPAIRED VISION IN BOTH EYES: ICD-10-CM

## 2025-05-14 DIAGNOSIS — M54.12 CERVICAL RADICULOPATHY: Primary | ICD-10-CM

## 2025-05-14 DIAGNOSIS — E78.2 MIXED HYPERLIPIDEMIA: ICD-10-CM

## 2025-05-14 PROCEDURE — 99214 OFFICE O/P EST MOD 30 MIN: CPT | Mod: HCNC,S$GLB,, | Performed by: INTERNAL MEDICINE

## 2025-05-14 PROCEDURE — 1159F MED LIST DOCD IN RCRD: CPT | Mod: CPTII,HCNC,S$GLB, | Performed by: INTERNAL MEDICINE

## 2025-05-14 PROCEDURE — 99999 PR PBB SHADOW E&M-EST. PATIENT-LVL IV: CPT | Mod: PBBFAC,HCNC,, | Performed by: INTERNAL MEDICINE

## 2025-05-14 PROCEDURE — 1157F ADVNC CARE PLAN IN RCRD: CPT | Mod: CPTII,HCNC,S$GLB, | Performed by: INTERNAL MEDICINE

## 2025-05-14 PROCEDURE — 1160F RVW MEDS BY RX/DR IN RCRD: CPT | Mod: CPTII,HCNC,S$GLB, | Performed by: INTERNAL MEDICINE

## 2025-05-14 PROCEDURE — 72050 X-RAY EXAM NECK SPINE 4/5VWS: CPT | Mod: TC,HCNC,FY,PO

## 2025-05-14 PROCEDURE — G2211 COMPLEX E/M VISIT ADD ON: HCPCS | Mod: HCNC,S$GLB,, | Performed by: INTERNAL MEDICINE

## 2025-05-14 PROCEDURE — 72050 X-RAY EXAM NECK SPINE 4/5VWS: CPT | Mod: 26,HCNC,, | Performed by: RADIOLOGY

## 2025-05-14 PROCEDURE — 3288F FALL RISK ASSESSMENT DOCD: CPT | Mod: CPTII,HCNC,S$GLB, | Performed by: INTERNAL MEDICINE

## 2025-05-14 PROCEDURE — 1101F PT FALLS ASSESS-DOCD LE1/YR: CPT | Mod: CPTII,HCNC,S$GLB, | Performed by: INTERNAL MEDICINE

## 2025-05-14 RX ORDER — DONEPEZIL HYDROCHLORIDE 5 MG/1
5 TABLET, FILM COATED ORAL NIGHTLY
Qty: 90 TABLET | Refills: 3 | Status: SHIPPED | OUTPATIENT
Start: 2025-05-14

## 2025-05-14 RX ORDER — METHYLPREDNISOLONE 4 MG/1
TABLET ORAL
Qty: 21 TABLET | Refills: 0 | Status: SHIPPED | OUTPATIENT
Start: 2025-05-14 | End: 2025-06-04

## 2025-05-14 RX ORDER — NORTRIPTYLINE HYDROCHLORIDE 10 MG/1
10 CAPSULE ORAL NIGHTLY
Qty: 90 CAPSULE | Refills: 3 | Status: SHIPPED | OUTPATIENT
Start: 2025-05-14

## 2025-05-14 NOTE — PROGRESS NOTES
"[facility]  Primary Care   [address] [facility]       Patient ID: Sheree Pugh     Chief Complaint:   Chief Complaint   Patient presents with    Wellness     6 month follow up        HPI:  Routine follow-up and her main complaints today are recurrent bilateral lower extremity edema despite taking his furosemide and spironolactone.  I do encourage her to wear her compression socks when she can and if she can not I do want her to proper feet up while she is reclining.  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  She also complains of pain running down the left arm and up the left side of her neck into her scalp and I think that is probably due to degenerative joint disease and her cervical spine.  I will get an x-ray to confirm and we will see how she responds to a Medrol Dosepak to decrease inflammation around that nerve.  She requests a appointment with podiatry for routine nail care so I will ask my staff to get that for her.  I did refill a few other medicines as well.  Vital signs look very good.  We will [facility] because transportation is an issue and would be good for some going to look in on her least once a month.  We will gauge the need for home health at a later date since she does have good social support.    Review of Systems       Cervicalgia     Objective:      Physical Exam   Physical Exam       Ambulates in wheelchair     Vitals:   Vitals:    05/14/25 1408   BP: 132/64   Pulse: 69   SpO2: 97%   Weight: 69 kg (152 lb 1.9 oz)   Height: 5' 2" (1.575 m)        Assessment:           Plan:       Sheree Pugh  was seen today for follow-up and may need lab work.    Diagnoses and all orders for this visit:    Sheree was seen today for wellness.    Diagnoses and all orders for this " visit:    Cervical radiculopathy  -     methylPREDNISolone (MEDROL DOSEPACK) 4 mg tablet; use as directed on package  -     X-Ray Cervical Spine Complete 5 view; Future  Monitor symptoms on steroids     Chronic bilateral low back pain with bilateral sciatica  -     methylPREDNISolone (MEDROL DOSEPACK) 4 mg tablet; use as directed on package  -     nortriptyline (PAMELOR) 10 MG capsule; Take 1 capsule (10 mg total) by mouth every evening.  Monitor symptoms on steroids    Memory loss  -     donepeziL (ARICEPT) 5 MG tablet; Take 1 tablet (5 mg total) by mouth every evening.  Controlled with Aricept     Insomnia due to medical condition  -     nortriptyline (PAMELOR) 10 MG capsule; Take 1 capsule (10 mg total) by mouth every evening.  Controlled with Pamelor     Difficulty walking  Ambulates in wheelchair     Essential hypertension  Controlled with Diltiazem and Imdur     Mixed hyperlipidemia  Controlled with Lipitor      Bilateral leg edema  Persists despite Furosemide and Spironolactone due to venous insufficiency     Coronary artery disease involving native coronary artery of native heart without angina pectoris  -     Ambulatory referral/consult to [facility]; Future  Stable with Plavix and Imdur and Lipitor     Hearing impaired person, bilateral  Stable     Impaired vision in both eyes  Stable     Visit today included increased complexity associated with the care of the episodic problem Cervical radiculopathy Lumbago with sciatica hypertension hyperlipidemia  addressed and managing the longitudinal care of the patient due to the serious and/or complex managed problem(s) .           Piotr Walker MD

## 2025-05-16 ENCOUNTER — TELEPHONE (OUTPATIENT)
Dept: HOME HEALTH SERVICES | Facility: CLINIC | Age: OVER 89
End: 2025-05-16
Payer: MEDICARE

## 2025-05-16 NOTE — TELEPHONE ENCOUNTER
Contacted pt to schedule an appointment regarding a referral placed for a medical home visit with a nurse practitioner.    Patient has been scheduled   1 = Total assistance

## 2025-05-18 ENCOUNTER — RESULTS FOLLOW-UP (OUTPATIENT)
Dept: FAMILY MEDICINE | Facility: CLINIC | Age: OVER 89
End: 2025-05-18

## 2025-05-21 ENCOUNTER — CARE AT HOME (OUTPATIENT)
Dept: HOME HEALTH SERVICES | Facility: CLINIC | Age: OVER 89
End: 2025-05-21
Payer: MEDICARE

## 2025-05-21 DIAGNOSIS — I25.10 CORONARY ARTERY DISEASE INVOLVING NATIVE CORONARY ARTERY OF NATIVE HEART WITHOUT ANGINA PECTORIS: ICD-10-CM

## 2025-05-21 DIAGNOSIS — I87.2 LYMPHEDEMA DUE TO VENOUS INSUFFICIENCY: ICD-10-CM

## 2025-05-21 DIAGNOSIS — H54.3 IMPAIRED VISION IN BOTH EYES: ICD-10-CM

## 2025-05-21 DIAGNOSIS — I89.0 LYMPHEDEMA DUE TO VENOUS INSUFFICIENCY: ICD-10-CM

## 2025-05-21 DIAGNOSIS — H91.93 HEARING IMPAIRED PERSON, BILATERAL: ICD-10-CM

## 2025-05-21 DIAGNOSIS — M51.361 DEGENERATION OF INTERVERTEBRAL DISC OF LUMBAR REGION WITH LOWER EXTREMITY PAIN: Primary | ICD-10-CM

## 2025-05-21 PROCEDURE — 99350 HOME/RES VST EST HIGH MDM 60: CPT | Mod: S$GLB,,, | Performed by: NURSE PRACTITIONER

## 2025-05-21 PROCEDURE — 1160F RVW MEDS BY RX/DR IN RCRD: CPT | Mod: CPTII,S$GLB,, | Performed by: NURSE PRACTITIONER

## 2025-05-21 PROCEDURE — 1126F AMNT PAIN NOTED NONE PRSNT: CPT | Mod: CPTII,S$GLB,, | Performed by: NURSE PRACTITIONER

## 2025-05-21 PROCEDURE — 3288F FALL RISK ASSESSMENT DOCD: CPT | Mod: CPTII,S$GLB,, | Performed by: NURSE PRACTITIONER

## 2025-05-21 PROCEDURE — 1157F ADVNC CARE PLAN IN RCRD: CPT | Mod: CPTII,S$GLB,, | Performed by: NURSE PRACTITIONER

## 2025-05-21 PROCEDURE — 1159F MED LIST DOCD IN RCRD: CPT | Mod: CPTII,S$GLB,, | Performed by: NURSE PRACTITIONER

## 2025-05-21 PROCEDURE — 1101F PT FALLS ASSESS-DOCD LE1/YR: CPT | Mod: CPTII,S$GLB,, | Performed by: NURSE PRACTITIONER

## 2025-05-22 VITALS
SYSTOLIC BLOOD PRESSURE: 132 MMHG | OXYGEN SATURATION: 97 % | DIASTOLIC BLOOD PRESSURE: 78 MMHG | RESPIRATION RATE: 20 BRPM | TEMPERATURE: 98 F | HEART RATE: 58 BPM

## 2025-05-22 NOTE — PROGRESS NOTES
[facility]  Medical Chronic Care Home Visit    Encounter Provider: Madyson Sanchez   PCP: Piotr Walker MD  Consult Requested By: Dr. Piotr Walker    HISTORY OF PRESENT ILLNESS      Patient ID: Sheree Pugh is a 89 y.o. female is being seen in the home due to physical debility that presents a taxing effort to leave the home, to mitigate high risk of hospital readmission and/or due to the limited availability of reliable or safe options for transportation to the point of access to the provider. Prior to treatment on this visit the chart was reviewed and patient verbal consent was obtained.    Chronic medical conditions synopsis:  Sheree is a 89-year-old female with a history of HTN, CAD, COPD, hx of breast cancer and ARMD.    Patient being seen today at request of PCP. She had a recent visit with PCP for recurrent bilateral lower extremity edema despite taking his furosemide and spironolactone. She does wear her compression socks daily.  She was also seen for complaints of shooting pains going down both legs, possibly sciatica due to known degenerative joint disease in her lumbar spine. Other complaints of pain running down the left arm and up the left side of her neck into her scalp were addressed during PCP visit.  Imaging was ordered and done, Medrol Dosepak was given [facility] was ordered because transportation is an issue and would be good for some going to look in on her least once a month.     With this [facility] visit, patient greets provider at door of her apartment. She is cooking Mora beans on arrival. She lives independently despite significant hearing and visual deficits. She oriented x 4, very pleasant and interactive. She reports that she manages her meds because she knows what her pills feel like due to poor vision. Son assists with MD appointments at times but patient does have difficulty getting reliable transportation.    Patient successfully demonstrates  "today that she is able to put on her own compression stockings daily-it takes awhile, she gets slightly dyspneic and has to rest. Compression stockings have a side zipper.    Patient reports still having intermittent shooting pains in her legs and arms. Endorses completing Medrol Dose pack with minimal relief.    No distress noted. [facility] contact information provided to patient and left in home. Will follow patient in home every 8-12 weeks and/or as needed due to taxing effort to leave home.    DECISION MAKING TODAY       Assessment & Plan:  1. 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  Recent medrol dose pack by PCP.  Currently stable.  Fall precautions and safety advised.        2. Coronary artery disease involving native coronary artery of native heart without angina pectoris  Assessment & Plan:  Chronic issue, stable, no chest pain reported.  Continue plavix, furosemide, isosorbide, spironolactone.  BLE with trace edema today, worsens as day goes on reportedly.  Wears compression stockings daily.  Followed by Dr. Jay.    Orders:  -     Ambulatory referral/consult to [facility] - [facility]    3. 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  Still lives independently, cooks, manages her medications.  Son is available and does oversee things.  Fall precautions and safety advised.      4. Hearing impaired person, bilateral  Assessment & Plan:  Chronic issue.  Able to communicate but difficult.  Has hearing aids but does not use.      5. Lymphedema due to venous insufficiency  Assessment & Plan:  Chronic issue, wears compression stockings daily. Skin currently intact today. Patient demonstrates successful stocking placement independently today.  Elevates feet when at rest, reportedly and takes diuretics as prescribed.  Followed by PCP and Cardiology.        "       ENVIRONMENT OF CARE      Family and/or Caregiver present at visit?  No  Name of Caregiver: none  History provided by: patient    4Ms for Medical Decision-Making in Older Adults    Last Completed EAWV: 3/24/2025    MEDICATIONS:  High Risk Medications:  Total Active Medications: 2  gabapentin - 100 MG  nortriptyline - 10 MG    MOBILITY:  Activities of Daily Living:      3/24/2025    11:13 AM   Activities of Daily Living   Ambulation Assistance Required   Ambulation Assistance Walker (wheeled)   Dressing Independent   Transfers Independent   Toileting Continent of bowel;Continent of bladder   Feeding Independent   Cleaning home/Chores Assistance Required   Telephone use Independent   Shopping Assistance Required   Paying bills Assistance Required   Taking meds Assistance Required     Fall Risk:      5/21/2025     8:00 AM 5/14/2025     2:20 PM 3/24/2025    12:00 PM   Fall Risk Assessment - Outpatient   Mobility Status Ambulatory w/ assistance Ambulatory Ambulatory   Number of falls 0 0 0   Identified as fall risk True False False     Disability Status:      3/24/2025    11:14 AM   Disability Status   Are you deaf or do you have serious difficulty hearing? Y   Are you blind or do you have serious difficulty seeing, even when wearing glasses? Y   Because of a physical, mental, or emotional condition, do you have serious difficulty concentrating, remembering, or making decisions? Y   Do you have serious difficulty walking or climbing stairs? Y   Do you have difficulty dressing or bathing? N   Because of a physical, mental, or emotional condition, do you have difficulty doing errands alone such as visiting a doctor's office or shopping? Y     Nutrition Screening:      3/24/2025    11:13 AM   Nutrition Screening   Has food intake declined over the past three months due to loss of appetite, digestive problems, chewing or swallowing difficulties? No decrease in food intake   Involuntary weight loss during the last 3  "months? No weight loss   Mobility? Goes out   Has the patient suffered psychological stress or acute disease in the past three months? No   Neuropsychological problems? No psychological problems   Body Mass Index (BMI)?  BMI 23 or greater   Screening Score 14   Interpretation Normal nutritional status    Screening Score: 0-7 Malnourished, 8-11 At Risk, 12-14 Normal  Get Up and Go:      3/24/2025    11:13 AM 9/6/2023    11:14 AM 11/28/2022    10:43 AM 3/18/2019    12:53 PM 4/18/2018     1:49 PM 8/16/2017     1:27 PM   Get Up and Go   Trial 1 20 seconds 18 seconds 35 seconds 9 seconds 10 seconds 8 seconds     Whisper Test:      3/24/2025    11:13 AM   Whisper Test   Whisper Test Abnormal           MENTATION:   Has Dementia Dx: No  Has Anxiety Dx: No    Depression Patient Health Questionnaire:      3/24/2025    11:13 AM   Depression Patient Health Questionnaire   Over the last two weeks how often have you been bothered by little interest or pleasure in doing things Not at all   Over the last two weeks how often have you been bothered by feeling down, depressed or hopeless Not at all   PHQ-2 Total Score 0     Cognitive Function Screening:      3/24/2025    11:19 AM   Cognitive Function Screening   Clock Drawing Test --   Mini-Cog 3 Minute Recall 2     Cognitive Function Screening Total - Less than 4 = Abnormal,  Greater than or equal to 4 = Normal        WHAT MATTERS MOST:  Advance Care Planning   ACP Status:   Patient has had an ACP conversation  Living Will: Yes  Power of : Yes  [place]: No    What is most important right now is to focus on spending time at home and remaining as independent as possible    Accordingly, we have decided that the best plan to meet the patient's goals includes continuing with treatment      What matters most to patient today is: "to keep living on my own"           Is patient hospice appropriate: No  (If needed, use PPS <30 or FAST score >7)  Was referral to hospice placed: " No    Impression upon entering the home:  Physical Dwelling: assisted living facility (name of facility: [facility])   Appearance of home environment: cleaniness: clean, walking pathways: clear, lighting: adequate, and home structure: sound structure  Functional Status: minimal assistance  Mobility: ambulatory with device  Nutritional access: adequate intake and access  Home Health: No, and does not need it at this time   DME/Supplies: rolling walker     Diagnostic tests reviewed/disposition: No diagnosic tests pending after this hospitalization.  Disease/illness education: CAD and bilateral LE edema management, fall prevention and safety related to poor vision  Establishment or re-establishment of referral orders for community resources: No other necessary community resources.   Discussion with other health care providers: No discussion with other health care providers necessary.   Does patient have a PCP at [facility]? Yes   Repatriation plan with PCP? [facility] reason: transportation   Does patient have an ostomy (ileostomy, colostomy, suprapubic catheter, nephrostomy tube, tracheostomy, PEG tube, pleurex catheter, cholecystostomy, etc)? No  Were BPAs reviewed? Yes    Social History     Socioeconomic History    Marital status:    Tobacco Use    Smoking status: Former     Current packs/day: 0.00     Types: Cigarettes     Quit date: 1/3/2000     Years since quittin.4    Smokeless tobacco: Never   Substance and Sexual Activity    Alcohol use: No    Drug use: No     Social Drivers of Health     Financial Resource Strain: Low Risk  (2024)    Overall Financial Resource Strain (CARDIA)     Difficulty of Paying Living Expenses: Not hard at all   Food Insecurity: No Food Insecurity (2024)    Hunger Vital Sign     Worried About Running Out of Food in the Last Year: Never true     Ran Out of Food in the Last Year: Never true   Transportation Needs: No Transportation Needs (2024)    PRAPARE -  Transportation     Lack of Transportation (Medical): No     Lack of Transportation (Non-Medical): No   Physical Activity: Sufficiently Active (11/14/2024)    Exercise Vital Sign     Days of Exercise per Week: 7 days     Minutes of Exercise per Session: 30 min   Stress: No Stress Concern Present (11/14/2024)    [employer] - Occupational Stress Questionnaire     Feeling of Stress : Not at all   Housing Stability: Unknown (11/14/2024)    Housing Stability Vital Sign     Unable to Pay for Housing in the Last Year: No     Homeless in the Last Year: No         OBJECTIVE:     Vital Signs:  Vitals:    05/21/25 1050   BP: 132/78   Pulse: (!) 58   Resp: 20   Temp: 97.8 °F (36.6 °C)       Review of Systems   Constitutional:  Positive for fatigue. Negative for activity change, appetite change and unexpected weight change.   HENT:  Positive for hearing loss.    Eyes:  Positive for visual disturbance.   Respiratory:  Positive for shortness of breath (with exertion). Negative for cough, chest tightness and wheezing.    Cardiovascular:  Positive for leg swelling. Negative for chest pain and palpitations.   Gastrointestinal: Negative.    Endocrine: Negative.    Genitourinary: Negative.    Musculoskeletal:  Positive for arthralgias, back pain and myalgias.   Skin: Negative.    Neurological:  Positive for weakness (mild). Negative for dizziness and light-headedness.   Hematological:  Bruises/bleeds easily.   Psychiatric/Behavioral: Negative.         Physical Exam:  Physical Exam  Constitutional:       General: She is not in acute distress.     Appearance: She is not ill-appearing.   HENT:      Head: Normocephalic and atraumatic.      Right Ear: External ear normal.      Left Ear: External ear normal.      Ears:      Comments: Very hard of hearing     Nose: Nose normal.      Mouth/Throat:      Mouth: Mucous membranes are dry.      Pharynx: Oropharynx is clear.   Eyes:      Extraocular Movements:  Extraocular movements intact.      Conjunctiva/sclera: Conjunctivae normal.      Pupils: Pupils are equal, round, and reactive to light.   Cardiovascular:      Rate and Rhythm: Normal rate and regular rhythm.      Pulses: Normal pulses.      Heart sounds: Normal heart sounds.   Pulmonary:      Breath sounds: No wheezing, rhonchi or rales.      Comments: Mild dyspnea with exertion, resolves quickly with rest  Abdominal:      General: Bowel sounds are normal. There is no distension.      Palpations: Abdomen is soft.      Tenderness: There is no abdominal tenderness.   Musculoskeletal:         General: Swelling (trace BLE edema) present. Normal range of motion.      Cervical back: Normal range of motion and neck supple.      Comments: Profound thoracic kyphosis   Skin:     General: Skin is warm and dry.      Capillary Refill: Capillary refill takes 2 to 3 seconds.      Findings: Bruising present.   Neurological:      Mental Status: She is alert and oriented to person, place, and time.      Motor: Weakness (mild) present.      Gait: Gait abnormal.   Psychiatric:         Mood and Affect: Mood normal.         Behavior: Behavior normal.         Thought Content: Thought content normal.         Judgment: Judgment normal.         INSTRUCTIONS FOR PATIENT:     Scheduled Follow-up, Appts Reviewed with Modifications if Needed: Yes  Future Appointments   Date Time Provider Department Center   7/2/2025  4:00 PM Marquez Ramirez DPM [facility] POD [place]   11/12/2025  2:20 PM Piotr Walker MD [facility] MED [place]   2/19/2026 10:15 AM Terell Jay MD [facility] CARD [facility] Card       Current Medications[1]    Medication Reconciliation:  Were medications changed during this appointment? No  Were medications in the home? Yes  Is the patient taking the medications as directed? Yes  Does the patient/caregiver understand the medications and changes? Yes  Does updated med list accurately reflects meds patient is currently taking? Yes    I  spent a total of 45 minutes on the day of the visit.This includes face to face time and non-face to face time preparing to see the patient (eg, review of tests), obtaining and/or reviewing separately obtained history, documenting clinical information in the electronic or other health record, independently interpreting results and communicating results to the patient/family/caregiver, or care coordinator.        Signature: Madyson Sanchez NP         [1]   Current Outpatient Medications:     ACETAMINOPHEN (TYLENOL ARTHRITIS ORAL), Take 2 tablets by mouth daily as needed. , Disp: , Rfl:     albuterol (VENTOLIN HFA) 90 mcg/actuation inhaler, Inhale 2 puffs into the lungs every 6 (six) hours as needed for Wheezing or Shortness of Breath., Disp: 18 g, Rfl: 2    atorvastatin (LIPITOR) 20 MG tablet, Take 1 tablet (20 mg total) by mouth once daily., Disp: 90 tablet, Rfl: 4    clopidogreL (PLAVIX) 75 mg tablet, Take 1 tablet (75 mg total) by mouth once daily., Disp: 90 tablet, Rfl: 4    cyanocobalamin 1,000 mcg/mL injection, Inject 1 mL (1,000 mcg total) into the muscle every 21 days., Disp: 10 mL, Rfl: 3    diltiaZEM (CARDIZEM CD) 180 MG 24 hr capsule, Take 1 capsule (180 mg total) by mouth every morning., Disp: 90 capsule, Rfl: 4    donepeziL (ARICEPT) 5 MG tablet, Take 1 tablet (5 mg total) by mouth every evening., Disp: 90 tablet, Rfl: 3    fluticasone propionate (FLONASE) 50 mcg/actuation nasal spray, Spray 1 spray (50 mcg total) by Each Nostril route once daily., Disp: 16 g, Rfl: 3    furosemide (LASIX) 20 MG tablet, Take 1 tablet (20 mg total) by mouth daily as needed (edema)., Disp: 90 tablet, Rfl: 4    gabapentin (NEURONTIN) 100 MG capsule, Take 1 capsule (100 mg total) by mouth every evening., Disp: 90 capsule, Rfl: 3    isosorbide mononitrate (IMDUR) 60 MG 24 hr tablet, Take 1 tablet (60 mg total) by mouth every evening., Disp: 90 tablet, Rfl: 4    nortriptyline (PAMELOR) 10 MG capsule, Take 1 capsule (10 mg  "total) by mouth every evening., Disp: 90 capsule, Rfl: 3    ondansetron (ZOFRAN) 4 MG tablet, Take 1 tablet (4 mg total) by mouth every 8 (eight) hours as needed for Nausea., Disp: 12 tablet, Rfl: 0    oxybutynin (DITROPAN-XL) 10 MG 24 hr tablet, Take 1 tablet (10 mg total) by mouth once daily., Disp: 90 tablet, Rfl: 3    spironolactone (ALDACTONE) 25 MG tablet, Take 1 tablet (25 mg total) by mouth once daily., Disp: 90 tablet, Rfl: 3    syringe with needle (BD LUER-FRANCI SYRINGE) 3 mL 25 gauge x 1" Syrg, USE TO INJECT B-12 AS DIRECTED, Disp: 10 Syringe, Rfl: 11    tiotropium bromide (SPIRIVA RESPIMAT) 2.5 mcg/actuation inhaler, Inhale 2 puffs into the lungs daily., Disp: 12 g, Rfl: 3    vitamins  A,C,E-zinc-copper (PRESERVISION AREDS) 14,320-226-200 unit-mg-unit Cap, Take by mouth., Disp: , Rfl:     methylPREDNISolone (MEDROL DOSEPACK) 4 mg tablet, use as directed on package (Patient not taking: Reported on 5/22/2025), Disp: 21 tablet, Rfl: 0    "

## 2025-05-26 NOTE — ASSESSMENT & PLAN NOTE
Chronic issue.  Still lives independently, cooks, manages her medications.  Son is available and does oversee things.  Fall precautions and safety advised.

## 2025-05-26 NOTE — ASSESSMENT & PLAN NOTE
"Chronic issue with "shooting pains" down both legs.  Recent medrol dose pack by PCP.  Currently stable.  Fall precautions and safety advised.    "

## 2025-05-26 NOTE — ASSESSMENT & PLAN NOTE
Chronic issue, wears compression stockings daily. Skin currently intact today. Patient demonstrates successful stocking placement independently today.  Elevates feet when at rest, reportedly and takes diuretics as prescribed.  Followed by PCP and Cardiology.

## 2025-05-26 NOTE — ASSESSMENT & PLAN NOTE
Chronic issue, stable, no chest pain reported.  Continue plavix, furosemide, isosorbide, spironolactone.  BLE with trace edema today, worsens as day goes on reportedly.  Wears compression stockings daily.  Followed by Dr. Jay.

## 2025-06-16 DIAGNOSIS — N39.41 URGE INCONTINENCE OF URINE: ICD-10-CM

## 2025-06-16 RX ORDER — OXYBUTYNIN CHLORIDE 10 MG/1
10 TABLET, EXTENDED RELEASE ORAL DAILY
Qty: 90 TABLET | Refills: 3 | Status: SHIPPED | OUTPATIENT
Start: 2025-06-16

## 2025-06-16 NOTE — TELEPHONE ENCOUNTER
Refill Decision Note   Sheree Pugh  is requesting a refill authorization.  Brief Assessment and Rationale for Refill:  Approve     Medication Therapy Plan:        Comments:     Note composed:3:31 PM 06/16/2025

## 2025-06-16 NOTE — TELEPHONE ENCOUNTER
No care due was identified.  [facility] Embedded Care Due Messages. Reference number: [id number].   6/16/2025 3:30:38 PM CDT

## 2025-07-01 ENCOUNTER — CARE AT HOME (OUTPATIENT)
Dept: HOME HEALTH SERVICES | Facility: CLINIC | Age: OVER 89
End: 2025-07-01
Payer: MEDICARE

## 2025-07-01 DIAGNOSIS — J42 CHRONIC BRONCHITIS, UNSPECIFIED CHRONIC BRONCHITIS TYPE: Primary | ICD-10-CM

## 2025-07-01 RX ORDER — GUAIFENESIN 600 MG/1
1200 TABLET, EXTENDED RELEASE ORAL 2 TIMES DAILY
Qty: 120 TABLET | Refills: 1 | Status: SHIPPED | OUTPATIENT
Start: 2025-07-01

## 2025-07-02 PROBLEM — J42 CHRONIC BRONCHITIS: Status: ACTIVE | Noted: 2021-11-13

## 2025-07-02 NOTE — ASSESSMENT & PLAN NOTE
"Patient has a cough over last few days with mucus that is difficult to expectorate. Does not feel ill she reports and does not want antibiotics unless absolutely necessary.  Requesting "something to make the mucus come up easier".  Encouraged adequate oral intake.  Will send guaifenesin to pharmacy.  Report any worsening symptoms, if shortness of breath, go to ER. She verbalized understanding.   "

## 2025-07-02 NOTE — PROGRESS NOTES
"Audio Only Telehealth Visit     The patient location is: [place], [place]  The chief complaint leading to consultation is: cough with thick mucus, difficult to expectorate  Visit type: Virtual visit with audio only (telephone)  Total time spent in medical discussion with patient: 16 minutes  Total time spent on date of the encounter:20 minutes       The reason for the audio only service rather than synchronous audio and video virtual visit was related to technical difficulties or patient preference/necessity.       Each patient to whom I provide medical services by telemedicine is:  (1) informed of the relationship between the physician and patient and the respective role of any other health care provider with respect to management of the patient; and (2) notified that they may decline to receive medical services by telemedicine and may withdraw from such care at any time. Patient verbally consented to receive this service via voice-only telephone call.    Sheree is a 89-year-old female with a history of HTN, CAD, COPD, hx of breast cancer and ARMD.     HPI:   Patient reporting cough that is difficult to expectorate "thick mucus". Denies any fever, chills, decreased appetite, +COPD dx and patient is concerned. No sick contacts. Declines antibiotic.        Review of Systems   Constitutional:  Negative for chills, fever and malaise/fatigue.   HENT:  Positive for congestion. Negative for ear pain, sinus pain and sore throat.    Respiratory:  Positive for cough, sputum production (thick and difficult to expectorate. what was expectorated was reported as "clear") and shortness of breath (with exertion, chronic). Negative for hemoptysis and wheezing.    Cardiovascular: Negative.    Gastrointestinal:  Negative for abdominal pain, diarrhea, nausea and vomiting.           Medications Ordered Prior to Encounter[1]    Assessment and plan:       Chronic bronchitis  Patient has a cough over last few days with mucus that is " "difficult to expectorate. Does not feel ill she reports and does not want antibiotics unless absolutely necessary.  Requesting "something to make the mucus come up easier".  Encouraged adequate oral intake.  Will send guaifenesin to pharmacy.  Report any worsening symptoms, if shortness of breath, go to ER. She verbalized understanding.       This includes face to face time and non-face to face time preparing to see the patient (eg, review of tests), obtaining and/or reviewing separately obtained history, documenting clinical information in the electronic or other health record, independently interpreting results and communicating results to the patient/family/caregiver, or care coordinator.            GUERO Delarosa               This service was not originating from a related E/M service provided within the previous 7 days nor will  to an E/M service or procedure within the next 24 hours or my soonest available appointment.  Prevailing standard of care was able to be met in this audio-only visit.             [1]   Current Outpatient Medications on File Prior to Visit   Medication Sig Dispense Refill    ACETAMINOPHEN (TYLENOL ARTHRITIS ORAL) Take 2 tablets by mouth daily as needed.       albuterol (VENTOLIN HFA) 90 mcg/actuation inhaler Inhale 2 puffs into the lungs every 6 (six) hours as needed for Wheezing or Shortness of Breath. 18 g 2    atorvastatin (LIPITOR) 20 MG tablet Take 1 tablet (20 mg total) by mouth once daily. 90 tablet 4    clopidogreL (PLAVIX) 75 mg tablet Take 1 tablet (75 mg total) by mouth once daily. 90 tablet 4    cyanocobalamin 1,000 mcg/mL injection Inject 1 mL (1,000 mcg total) into the muscle every 21 days. 10 mL 3    diltiaZEM (CARDIZEM CD) 180 MG 24 hr capsule Take 1 capsule (180 mg total) by mouth every morning. 90 capsule 4    donepeziL (ARICEPT) 5 MG tablet Take 1 tablet (5 mg total) by mouth every evening. 90 tablet 3    fluticasone propionate (FLONASE) 50 mcg/actuation " "nasal spray Spray 1 spray (50 mcg total) by Each Nostril route once daily. 16 g 3    furosemide (LASIX) 20 MG tablet Take 1 tablet (20 mg total) by mouth daily as needed (edema). 90 tablet 4    gabapentin (NEURONTIN) 100 MG capsule Take 1 capsule (100 mg total) by mouth every evening. 90 capsule 3    isosorbide mononitrate (IMDUR) 60 MG 24 hr tablet Take 1 tablet (60 mg total) by mouth every evening. 90 tablet 4    nortriptyline (PAMELOR) 10 MG capsule Take 1 capsule (10 mg total) by mouth every evening. 90 capsule 3    ondansetron (ZOFRAN) 4 MG tablet Take 1 tablet (4 mg total) by mouth every 8 (eight) hours as needed for Nausea. 12 tablet 0    oxybutynin (DITROPAN-XL) 10 MG 24 hr tablet Take 1 tablet (10 mg total) by mouth once daily. 90 tablet 3    spironolactone (ALDACTONE) 25 MG tablet Take 1 tablet (25 mg total) by mouth once daily. 90 tablet 3    syringe with needle (BD LUER-FRANCI SYRINGE) 3 mL 25 gauge x 1" Syrg USE TO INJECT B-12 AS DIRECTED 10 Syringe 11    tiotropium bromide (SPIRIVA RESPIMAT) 2.5 mcg/actuation inhaler Inhale 2 puffs into the lungs daily. 12 g 3    vitamins  A,C,E-zinc-copper (PRESERVISION AREDS) 14,320-226-200 unit-mg-unit Cap Take by mouth.       No current facility-administered medications on file prior to visit.     "

## 2025-07-11 ENCOUNTER — CARE AT HOME (OUTPATIENT)
Dept: HOME HEALTH SERVICES | Facility: CLINIC | Age: OVER 89
End: 2025-07-11
Payer: MEDICARE

## 2025-07-11 VITALS
OXYGEN SATURATION: 97 % | DIASTOLIC BLOOD PRESSURE: 70 MMHG | RESPIRATION RATE: 20 BRPM | TEMPERATURE: 98 F | SYSTOLIC BLOOD PRESSURE: 142 MMHG | HEART RATE: 64 BPM

## 2025-07-11 DIAGNOSIS — I10 ESSENTIAL HYPERTENSION: Chronic | ICD-10-CM

## 2025-07-11 DIAGNOSIS — J42 CHRONIC BRONCHITIS, UNSPECIFIED CHRONIC BRONCHITIS TYPE: ICD-10-CM

## 2025-07-11 DIAGNOSIS — I87.2 LYMPHEDEMA DUE TO VENOUS INSUFFICIENCY: ICD-10-CM

## 2025-07-11 DIAGNOSIS — I73.9 PERIPHERAL VASCULAR DISEASE: ICD-10-CM

## 2025-07-11 DIAGNOSIS — I89.0 LYMPHEDEMA DUE TO VENOUS INSUFFICIENCY: ICD-10-CM

## 2025-07-11 DIAGNOSIS — H91.93 HEARING IMPAIRED PERSON, BILATERAL: Primary | ICD-10-CM

## 2025-07-11 PROCEDURE — 1159F MED LIST DOCD IN RCRD: CPT | Mod: CPTII,S$GLB,, | Performed by: NURSE PRACTITIONER

## 2025-07-11 PROCEDURE — 3288F FALL RISK ASSESSMENT DOCD: CPT | Mod: CPTII,S$GLB,, | Performed by: NURSE PRACTITIONER

## 2025-07-11 PROCEDURE — 1101F PT FALLS ASSESS-DOCD LE1/YR: CPT | Mod: CPTII,S$GLB,, | Performed by: NURSE PRACTITIONER

## 2025-07-11 PROCEDURE — 1126F AMNT PAIN NOTED NONE PRSNT: CPT | Mod: CPTII,S$GLB,, | Performed by: NURSE PRACTITIONER

## 2025-07-11 PROCEDURE — 1160F RVW MEDS BY RX/DR IN RCRD: CPT | Mod: CPTII,S$GLB,, | Performed by: NURSE PRACTITIONER

## 2025-07-11 PROCEDURE — 99349 HOME/RES VST EST MOD MDM 40: CPT | Mod: S$GLB,,, | Performed by: NURSE PRACTITIONER

## 2025-07-11 PROCEDURE — 1157F ADVNC CARE PLAN IN RCRD: CPT | Mod: CPTII,S$GLB,, | Performed by: NURSE PRACTITIONER

## 2025-07-11 NOTE — PROGRESS NOTES
"[facility]  Medical Chronic Care Home Visit    Encounter Provider: Madyson Sanchez   PCP: Piotr Walker MD  Consult Requested By: No ref. provider found    HISTORY OF PRESENT ILLNESS      Patient ID: Sheree Pugh is a 89 y.o. female is being seen in the home due to physical debility that presents a taxing effort to leave the home, to mitigate high risk of hospital readmission and/or due to the limited availability of reliable or safe options for transportation to the point of access to the provider. Prior to treatment on this visit the chart was reviewed and patient verbal consent was obtained.    Chronic medical conditions synopsis:  Sheree is a 89-year-old female with a history of HTN, CAD, COPD, hx of breast cancer and ARMD.     With this [facility] visit, patient greets provider at door of her apartment. She lives independently despite significant hearing and visual deficits. She is oriented x 4, very pleasant and interactive. She reports that she manages her meds because she knows what her pills feel like due to poor vision. Son assists with MD appointments at times but patient does have difficulty getting reliable transportation. She also has issues with someone assisting her with appointments due poor vision when she doesn't want to ask her son to help her with appointments.      Patient had a recent ER visit for "mucus in my eyes but they told me my COPD was acting up".  Completed course of prednisone and Zpac. No coughing, spits up mucus every morning for an hour or so until she uses Flonase and then the mucus stops. She does display dyspnea with mild exertion, including conversation, that she takes rest periods for and thus dyspnea resolves.       No distress noted. [facility] contact information provided to patient and left in home. Will follow patient in home every 8-12 weeks and/or as needed due to taxing effort to leave home.         DECISION MAKING TODAY "       Assessment & Plan:  1. Hearing impaired person, bilateral  Assessment & Plan:  Chronic issue.  Able to communicate but difficult.  Has hearing aids but does not use.        2. Essential hypertension  Assessment & Plan:  Chronic, stable on diuretics and isosorbide.  Followed by Cardiology.      3. Peripheral vascular disease  Assessment & Plan:  Chronic issue with BLE lymphedema present.  Continue plavix, compression stockings, diuretics and elevate feet while seated if possible.  Followed by PCP.      4. Lymphedema due to venous insufficiency  Assessment & Plan:  Chronic issue, wears compression stockings daily. Skin currently intact today. Assisted patient with stocking placement today.  Elevates feet when at rest, reportedly and takes diuretics as prescribed.  Followed by PCP and Cardiology.       5. Chronic bronchitis, unspecified chronic bronchitis type  Assessment & Plan:  Chronic issue, recent ER visit and treated for COPD exacerbation.  Symptoms currently controlled other than some dyspnea with mild exertion and conversation that resolves with rest.  Continue mucinex as needed for sputum difficult to expectorate-she verbalizes understanding.             ENVIRONMENT OF CARE      Family and/or Caregiver present at visit?  No  Name of Caregiver: none  History provided by: patient    4Ms for Medical Decision-Making in Older Adults    Last Completed EAWV: 3/24/2025    MEDICATIONS:  High Risk Medications:  Total Active Medications: 2  gabapentin - 100 MG  nortriptyline - 10 MG    MOBILITY:  Activities of Daily Living:      3/24/2025    11:13 AM   Activities of Daily Living   Ambulation Assistance Required   Ambulation Assistance Walker (wheeled)   Dressing Independent   Transfers Independent   Toileting Continent of bowel;Continent of bladder   Feeding Independent   Cleaning home/Chores Assistance Required   Telephone use Independent   Shopping Assistance Required   Paying bills Assistance Required   Taking  meds Assistance Required     Fall Risk:      7/11/2025    10:00 AM 5/21/2025     8:00 AM 5/14/2025     2:20 PM   Fall Risk Assessment - Outpatient   Mobility Status Ambulatory w/ assistance Ambulatory w/ assistance Ambulatory   Number of falls 0 0 0   Identified as fall risk True True False     Disability Status:      3/24/2025    11:14 AM   Disability Status   Are you deaf or do you have serious difficulty hearing? Y   Are you blind or do you have serious difficulty seeing, even when wearing glasses? Y   Because of a physical, mental, or emotional condition, do you have serious difficulty concentrating, remembering, or making decisions? Y   Do you have serious difficulty walking or climbing stairs? Y   Do you have difficulty dressing or bathing? N   Because of a physical, mental, or emotional condition, do you have difficulty doing errands alone such as visiting a doctor's office or shopping? Y     Nutrition Screening:      3/24/2025    11:13 AM   Nutrition Screening   Has food intake declined over the past three months due to loss of appetite, digestive problems, chewing or swallowing difficulties? No decrease in food intake   Involuntary weight loss during the last 3 months? No weight loss   Mobility? Goes out   Has the patient suffered psychological stress or acute disease in the past three months? No   Neuropsychological problems? No psychological problems   Body Mass Index (BMI)?  BMI 23 or greater   Screening Score 14   Interpretation Normal nutritional status    Screening Score: 0-7 Malnourished, 8-11 At Risk, 12-14 Normal  Get Up and Go:      3/24/2025    11:13 AM 9/6/2023    11:14 AM 11/28/2022    10:43 AM 3/18/2019    12:53 PM 4/18/2018     1:49 PM 8/16/2017     1:27 PM   Get Up and Go   Trial 1 20 seconds 18 seconds 35 seconds 9 seconds 10 seconds 8 seconds     Whisper Test:      3/24/2025    11:13 AM   Whisper Test   Whisper Test Abnormal           MENTATION:   Has Dementia Dx: No  Has Anxiety Dx:  "No    Depression Patient Health Questionnaire:      3/24/2025    11:13 AM   Depression Patient Health Questionnaire   Over the last two weeks how often have you been bothered by little interest or pleasure in doing things Not at all   Over the last two weeks how often have you been bothered by feeling down, depressed or hopeless Not at all   PHQ-2 Total Score 0     Cognitive Function Screening:      3/24/2025    11:19 AM   Cognitive Function Screening   Clock Drawing Test --   Mini-Cog 3 Minute Recall 2     Cognitive Function Screening Total - Less than 4 = Abnormal,  Greater than or equal to 4 = Normal        WHAT MATTERS MOST:  Advance Care Planning   ACP Status:   Patient has had an ACP conversation  Living Will: Yes  Power of : Yes  [place]: No    What is most important right now is to focus on spending time at homeremaining as independent as possible    Accordingly, we have decided that the best plan to meet the patient's goals includes continuing with treatment      What matters most to patient today is: "to have better hearing and vision"           Is patient hospice appropriate: No  (If needed, use PPS <30 or FAST score >7)  Was referral to hospice placed: No    Impression upon entering the home:  Physical Dwelling: assisted living facility (name of facility: [facility])   Appearance of home environment: cleaniness: clean, walking pathways: clear, lighting: adequate, and home structure: sound structure  Functional Status: minimal assistance  Mobility: ambulatory with device  Nutritional access: adequate intake and access  Home Health: No, and does not need it at this time   DME/Supplies: rolling walker and bedside commode     Diagnostic tests reviewed/disposition: I have reviewed all completed as well as pending diagnostic tests at the time of discharge.  Disease/illness education: recent COPD exacerbation, advanced age, poor hearing and vision  Establishment or re-establishment of referral orders " for community resources: No other necessary community resources.   Discussion with other health care providers: No discussion with other health care providers necessary.   Does patient have a PCP at [facility]? Yes   Repatriation plan with PCP? Care at Home reason: transportation   Does patient have an ostomy (ileostomy, colostomy, suprapubic catheter, nephrostomy tube, tracheostomy, PEG tube, pleurex catheter, cholecystostomy, etc)? No  Were BPAs reviewed? Yes    Social History     Socioeconomic History    Marital status:    Tobacco Use    Smoking status: Former     Current packs/day: 0.00     Types: Cigarettes     Quit date: 1/3/2000     Years since quittin.5    Smokeless tobacco: Never   Substance and Sexual Activity    Alcohol use: No    Drug use: No     Social Drivers of Health     Financial Resource Strain: Low Risk  (2024)    Overall Financial Resource Strain (CARDIA)     Difficulty of Paying Living Expenses: Not hard at all   Food Insecurity: No Food Insecurity (2024)    Hunger Vital Sign     Worried About Running Out of Food in the Last Year: Never true     Ran Out of Food in the Last Year: Never true   Transportation Needs: No Transportation Needs (2024)    PRAPARE - Transportation     Lack of Transportation (Medical): No     Lack of Transportation (Non-Medical): No   Physical Activity: Sufficiently Active (2024)    Exercise Vital Sign     Days of Exercise per Week: 7 days     Minutes of Exercise per Session: 30 min   Stress: No Stress Concern Present (2024)    [employer] - Occupational Stress Questionnaire     Feeling of Stress : Not at all   Housing Stability: Unknown (2024)    Housing Stability Vital Sign     Unable to Pay for Housing in the Last Year: No     Homeless in the Last Year: No         OBJECTIVE:     Vital Signs:  Vitals:    25 0930   BP: (!) 142/70   Pulse: 64   Resp: 20   Temp: 98.4 °F (36.9 °C)     Review of  Systems   Constitutional:  Positive for fatigue. Negative for activity change, appetite change and unexpected weight change.   HENT:  Positive for hearing loss.    Eyes:  Positive for visual disturbance.   Respiratory:  Positive for shortness of breath (with exertion). Negative for cough, chest tightness and wheezing.    Cardiovascular:  Positive for leg swelling. Negative for chest pain and palpitations.   Gastrointestinal: Negative.    Endocrine: Negative.    Genitourinary: Negative.    Musculoskeletal:  Positive for arthralgias, back pain and myalgias.   Skin: Negative.    Neurological:  Positive for weakness (mild). Negative for dizziness and light-headedness.   Hematological:  Bruises/bleeds easily.   Psychiatric/Behavioral: Negative.           Physical Exam:  Physical Exam  Constitutional:       General: She is not in acute distress.     Appearance: She is not ill-appearing.   HENT:      Head: Normocephalic and atraumatic.      Right Ear: External ear normal.      Left Ear: External ear normal.      Ears:      Comments: Very hard of hearing     Nose: Nose normal.      Mouth/Throat:      Mouth: Mucous membranes are dry.      Pharynx: Oropharynx is clear.   Eyes:      Extraocular Movements: Extraocular movements intact.      Conjunctiva/sclera: Conjunctivae normal.      Pupils: Pupils are equal, round, and reactive to light.   Cardiovascular:      Rate and Rhythm: Normal rate and regular rhythm.      Pulses: Normal pulses.      Heart sounds: Normal heart sounds.   Pulmonary:      Breath sounds: No wheezing, rhonchi or rales.      Comments: Mild dyspnea with exertion, resolves quickly with rest  Abdominal:      General: Bowel sounds are normal. There is no distension.      Palpations: Abdomen is soft.      Tenderness: There is no abdominal tenderness.   Musculoskeletal:         General: Swelling (trace BLE edema) present. Normal range of motion.      Cervical back: Normal range of motion and neck supple.       Comments: Profound thoracic kyphosis   Skin:     General: Skin is warm and dry.      Capillary Refill: Capillary refill takes 2 to 3 seconds.      Findings: Bruising present.   Neurological:      Mental Status: She is alert and oriented to person, place, and time.      Motor: Weakness (mild) present.      Gait: Gait abnormal.   Psychiatric:         Mood and Affect: Mood normal.         Behavior: Behavior normal.         Thought Content: Thought content normal.         Judgment: Judgment normal.        INSTRUCTIONS FOR PATIENT:     Scheduled Follow-up, Appts Reviewed with Modifications if Needed: Yes  Future Appointments   Date Time Provider Department Center   7/28/2025 11:20 AM Marquez Ramirez DPM [facility] POD [place]   11/12/2025  2:20 PM Piotr Walker MD [facility] FAM MED [place]   2/19/2026 10:15 AM Terell Jay MD [facility]       Current Medications[1]    Medication Reconciliation:  Were medications changed during this appointment? No  Were medications in the home? Yes  Is the patient taking the medications as directed? Yes  Does the patient/caregiver understand the medications and changes? Yes  Does updated med list accurately reflects meds patient is currently taking? Yes    I spent a total of 40 minutes on the day of the visit.This includes face to face time and non-face to face time preparing to see the patient (eg, review of tests), obtaining and/or reviewing separately obtained history, documenting clinical information in the electronic or other health record, independently interpreting results and communicating results to the patient/family/caregiver, or care coordinator.        Signature: Madyson Sanchez NP         [1]   Current Outpatient Medications:     ACETAMINOPHEN (TYLENOL ARTHRITIS ORAL), Take 2 tablets by mouth daily as needed. , Disp: , Rfl:     albuterol (VENTOLIN HFA) 90 mcg/actuation inhaler, Inhale 2 puffs into the lungs every 6 (six) hours as needed for Wheezing or Shortness  "of Breath., Disp: 18 g, Rfl: 2    atorvastatin (LIPITOR) 20 MG tablet, Take 1 tablet (20 mg total) by mouth once daily., Disp: 90 tablet, Rfl: 4    clopidogreL (PLAVIX) 75 mg tablet, Take 1 tablet (75 mg total) by mouth once daily., Disp: 90 tablet, Rfl: 4    cyanocobalamin 1,000 mcg/mL injection, Inject 1 mL (1,000 mcg total) into the muscle every 21 days., Disp: 10 mL, Rfl: 3    diltiaZEM (CARDIZEM CD) 180 MG 24 hr capsule, Take 1 capsule (180 mg total) by mouth every morning., Disp: 90 capsule, Rfl: 4    donepeziL (ARICEPT) 5 MG tablet, Take 1 tablet (5 mg total) by mouth every evening., Disp: 90 tablet, Rfl: 3    fluticasone propionate (FLONASE) 50 mcg/actuation nasal spray, Spray 1 spray (50 mcg total) by Each Nostril route once daily., Disp: 16 g, Rfl: 3    furosemide (LASIX) 20 MG tablet, Take 1 tablet (20 mg total) by mouth daily as needed (edema)., Disp: 90 tablet, Rfl: 4    gabapentin (NEURONTIN) 100 MG capsule, Take 1 capsule (100 mg total) by mouth every evening., Disp: 90 capsule, Rfl: 3    guaiFENesin (MUCINEX) 600 mg 12 hr tablet, Take 2 tablets (1,200 mg total) by mouth 2 (two) times daily., Disp: 120 tablet, Rfl: 1    isosorbide mononitrate (IMDUR) 60 MG 24 hr tablet, Take 1 tablet (60 mg total) by mouth every evening., Disp: 90 tablet, Rfl: 4    nortriptyline (PAMELOR) 10 MG capsule, Take 1 capsule (10 mg total) by mouth every evening., Disp: 90 capsule, Rfl: 3    ondansetron (ZOFRAN) 4 MG tablet, Take 1 tablet (4 mg total) by mouth every 8 (eight) hours as needed for Nausea., Disp: 12 tablet, Rfl: 0    oxybutynin (DITROPAN-XL) 10 MG 24 hr tablet, Take 1 tablet (10 mg total) by mouth once daily., Disp: 90 tablet, Rfl: 3    spironolactone (ALDACTONE) 25 MG tablet, Take 1 tablet (25 mg total) by mouth once daily., Disp: 90 tablet, Rfl: 3    syringe with needle (BD LUER-FRANCI SYRINGE) 3 mL 25 gauge x 1" Syrg, USE TO INJECT B-12 AS DIRECTED, Disp: 10 Syringe, Rfl: 11    tiotropium bromide (SPIRIVA " RESPIMAT) 2.5 mcg/actuation inhaler, Inhale 2 puffs into the lungs daily., Disp: 12 g, Rfl: 3    vitamins  A,C,E-zinc-copper (PRESERVISION AREDS) [phone] unit-mg-unit Cap, Take by mouth., Disp: , Rfl:     azithromycin (Z-TIM) 250 MG tablet, Take 1 tablet (250 mg total) by mouth once daily. Take first 2 tablets together, then 1 every day until finished. (Patient not taking: Reported on 7/11/2025), Disp: 6 tablet, Rfl: 0

## 2025-07-15 NOTE — ASSESSMENT & PLAN NOTE
Chronic issue, wears compression stockings daily. Skin currently intact today. Assisted patient with stocking placement today.  Elevates feet when at rest, reportedly and takes diuretics as prescribed.  Followed by PCP and Cardiology.

## 2025-07-15 NOTE — ASSESSMENT & PLAN NOTE
Chronic issue with BLE lymphedema present.  Continue plavix, compression stockings, diuretics and elevate feet while seated if possible.  Followed by PCP.

## 2025-07-15 NOTE — ASSESSMENT & PLAN NOTE
Chronic issue, recent ER visit and treated for COPD exacerbation.  Symptoms currently controlled other than some dyspnea with mild exertion and conversation that resolves with rest.  Continue mucinex as needed for sputum difficult to expectorate-she verbalizes understanding.

## 2025-07-28 ENCOUNTER — OFFICE VISIT (OUTPATIENT)
Dept: PODIATRY | Facility: CLINIC | Age: OVER 89
End: 2025-07-28
Payer: MEDICARE

## 2025-07-28 VITALS — HEIGHT: 57 IN | BODY MASS INDEX: 30.92 KG/M2 | WEIGHT: 143.31 LBS

## 2025-07-28 DIAGNOSIS — B35.1 ONYCHOMYCOSIS: ICD-10-CM

## 2025-07-28 DIAGNOSIS — I87.2 VENOUS INSUFFICIENCY OF BOTH LOWER EXTREMITIES: ICD-10-CM

## 2025-07-28 DIAGNOSIS — L84 CORN OR CALLUS: ICD-10-CM

## 2025-07-28 DIAGNOSIS — G60.9 IDIOPATHIC PERIPHERAL NEUROPATHY: Primary | ICD-10-CM

## 2025-07-28 PROCEDURE — 99999 PR PBB SHADOW E&M-EST. PATIENT-LVL III: CPT | Mod: PBBFAC,HCNC,, | Performed by: PODIATRIST

## 2025-07-28 NOTE — PROGRESS NOTES
Subjective:      Patient ID: Sheree Pugh is a 89 y.o. female.    Chief Complaint: Nail Care (Nail care)      Sheree is a 89 y.o. female who presents to the clinic for evaluation and treatment of high risk feet. Sheree has a past medical history of Anticoagulant long-term use, ARMD (age related macular degeneration), Arthritis, Breast cancer, Cataract, COPD (chronic obstructive pulmonary disease), Coronary artery disease, Disorder of kidney and ureter, [place] (dyspnea on exertion), Elevated cholesterol, Heart disease, Hypertension, Insomnia, Macular degeneration, Obstetrical blood-clot embolism, postpartum, PVD (peripheral vascular disease), Retinal detachment, Right lower quadrant abdominal mass (03/25/2022), Stented coronary artery (02/25/2019), and Urinary incontinence. The patient's chief complaint is toenails that are in need of trimming.  Denies pain from the nails with today's exam.  Has not attempted to self trim on account of poor visual acuity.  She is also experiencing discomfort from a callus between the Lt. 4th and 5th toes.  Notes being unable to treat on her own.  Denies any additional pedal complaints.      PCP: Piotr Walker MD    Date Last Seen by PCP: 5/25      Hemoglobin A1C   Date Value Ref Range Status   02/01/2020 5.7 (H) 4.0 - 5.6 % Final     Comment:     ADA Screening Guidelines:  5.7-6.4%  Consistent with prediabetes  >or=6.5%  Consistent with diabetes  High levels of fetal hemoglobin interfere with the HbA1C  assay. Heterozygous hemoglobin variants (HbS, HgC, etc)do  not significantly interfere with this assay.   However, presence of multiple variants may affect accuracy.     09/01/2018 5.8 (H) 0.0 - 5.6 % Final     Comment:     Reference Interval:  5.0 - 5.6 Normal   5.7 - 6.4 High Risk   > 6.5 Diabetic    Hgb A1c results are standardized based on the (NGSP) National   Glycohemoglobin Standardization Program.    Hemoglobin A1C levels are related to mean serum/plasma glucose   during the  preceding 2-3 months.        2015 5.8 4.5 - 6.2 % Final       Review of Systems   Constitutional: Negative for chills and fever.   Cardiovascular:  Positive for leg swelling. Negative for claudication.   Skin:  Positive for nail changes.   Musculoskeletal:  Positive for joint swelling. Negative for muscle cramps, muscle weakness and myalgias.   Gastrointestinal:  Negative for nausea and vomiting.   Neurological:  Positive for numbness and paresthesias.   Psychiatric/Behavioral:  Negative for altered mental status.            Objective:      Physical Exam  Constitutional:       Appearance: Normal appearance. She is not ill-appearing.   Cardiovascular:      Pulses:           Dorsalis pedis pulses are 2+ on the right side and 2+ on the left side.        Posterior tibial pulses are 2+ on the right side and 2+ on the left side.      Comments: CFT is < 3 seconds bilateral.  Pedal hair growth is decreased bilateral.  Varicosities noted bilateral.   Toes are cool to touch bilateral.    Musculoskeletal:         General: Swelling and tenderness present.      Right lower le+ Edema present.      Left lower le+ Edema present.      Comments: Muscle strength 5/5 in all muscle groups bilateral.  No tenderness nor crepitation with ROM of foot/ankle joints bilateral.  Bilateral pes planus foot type.   Skin:     General: Skin is warm and dry.      Capillary Refill: Capillary refill takes 2 to 3 seconds.      Findings: Lesion present. No bruising, ecchymosis, erythema, signs of injury, laceration, petechiae, rash or wound.      Comments: Pedal skin appears edematous bilateral.  Toenails x 10 appear thickened by 4 mm, elongated by 8 mm, and discolored with subungual debris.  Hyperkeratotic lesion noted along the medial aspect of the Lt. 5th toe.   Neurological:      Mental Status: She is alert.      Sensory: Sensory deficit present.      Motor: No weakness or atrophy.      Comments: Protective sensation per  [place] monofilament is absent bilateral.  Light touch is absent bilateral.               Assessment:       Encounter Diagnoses   Name Primary?    Idiopathic peripheral neuropathy Yes    Venous insufficiency of both lower extremities     Onychomycosis     Corn or callus                Plan:       Sheree was seen today for nail care.    Diagnoses and all orders for this visit:    Idiopathic peripheral neuropathy    Venous insufficiency of both lower extremities    Onychomycosis    Corn or callus            I counseled the patient on her conditions, their implications and medical management.    - Advised to elevate the limbs while at rest.  Also, to continue monitoring for signs of stasis dermatitis and venous blistering.     - Shoe inspection. Patient instructed on proper foot hygeine. We discussed wearing proper shoe gear, daily foot inspections, never walking without protective shoe gear, never putting sharp instruments to feet, routine podiatric nail visits every 3 months.      - With patient's permission, nails were aggressively reduced and debrided x 10 to their soft tissue attachment mechanically and with electric , removing all offending nail and debris.  Also, a sterile #15 scalpel was used to trim a lesion x 1 down to smooth appearing skin without incident.  Patient relates relief following the procedure. She will continue to monitor the areas daily, inspect her feet, wear protective shoe gear when ambulatory, moisturizer to maintain skin integrity and follow in this office in approximately 4 months, sooner p.r.n.    Marquez Ramirez DPM

## 2025-08-25 ENCOUNTER — TELEPHONE (OUTPATIENT)
Dept: FAMILY MEDICINE | Facility: CLINIC | Age: OVER 89
End: 2025-08-25
Payer: MEDICARE

## 2025-08-27 ENCOUNTER — PATIENT MESSAGE (OUTPATIENT)
Dept: FAMILY MEDICINE | Facility: CLINIC | Age: OVER 89
End: 2025-08-27
Payer: MEDICARE

## (undated) DEVICE — SOL GONAK

## (undated) DEVICE — DRESSING EYE OVAL LF

## (undated) DEVICE — ELECTRODE BLD EXT INSUL 1

## (undated) DEVICE — SEE MEDLINE ITEM 157148

## (undated) DEVICE — SUT 7/0 18IN COATED VICRYL

## (undated) DEVICE — ELECTRODE BLADE W/SLEEVE 2.75

## (undated) DEVICE — SHEILD & GARTERS FOX METAL EYE

## (undated) DEVICE — PACK TOTAL PLUS 25G VITRECTOMY

## (undated) DEVICE — SEE MEDLINE ITEM 157128

## (undated) DEVICE — SYR 10CC LUER LOCK

## (undated) DEVICE — SEE MEDLINE ITEM 146417

## (undated) DEVICE — SEE MEDLINE ITEM 157131

## (undated) DEVICE — PROBE ILLUM FLEX CURVE LASER

## (undated) DEVICE — NEEDLE HYPODERMIC HUB LUER LOC

## (undated) DEVICE — SUT 3-0 VICRYL SH CR/8 18

## (undated) DEVICE — SEE MEDLINE ITEM 157216

## (undated) DEVICE — SYRINGE 30CC LL W/O NDL

## (undated) DEVICE — TRAY MUSCLE LID EYE

## (undated) DEVICE — SYR 1CC TB SG 27GX1/2

## (undated) DEVICE — APPLICATOR CHLORAPREP ORN 26ML

## (undated) DEVICE — KIT GREY EYE

## (undated) DEVICE — CORD FOR BIPOLAR FORCEPS 12

## (undated) DEVICE — GLOVE SURG BIOGEL LATEX SZ 7.5

## (undated) DEVICE — SEE MEDLINE ITEM 146313

## (undated) DEVICE — NDL HYPO A BEVEL 30X1/2

## (undated) DEVICE — SEE L#120831

## (undated) DEVICE — SUT SILK 2-0 PS 18IN BLACK

## (undated) DEVICE — SUT MONOCRYL 4-0 PS-2

## (undated) DEVICE — BACKFLUSH 25GA SOFT-TIP DISP

## (undated) DEVICE — TAPE MEDIPORE 4IN X 2YDS

## (undated) DEVICE — SOL BALANCED SALT 500ML

## (undated) DEVICE — GOWN SURGICAL X-LARGE

## (undated) DEVICE — LENS VITRCTMY OPHTH 30DEG 59DE

## (undated) DEVICE — DRAIN CHANNEL ROUND 15FR

## (undated) DEVICE — SUT 3-0 VICRYL / SH (J416)

## (undated) DEVICE — CAUTERY BOVIE PENCIL

## (undated) DEVICE — PACK INJ VISC FLD 20G/23G SIL

## (undated) DEVICE — SOL BSS BALANCED SALT

## (undated) DEVICE — SOL BETADINE 5%

## (undated) DEVICE — SYR DISP LL 5CC

## (undated) DEVICE — KNIFE OPHTH MICRO UNITOME 5MM

## (undated) DEVICE — PACK INSTRUMENT COVER DISPO

## (undated) DEVICE — NDL 22GA X1 1/2 REG BEVEL

## (undated) DEVICE — SEE MEDLINE ITEM 152529

## (undated) DEVICE — DRAPE LAP TIBURON 77X122IN

## (undated) DEVICE — Device

## (undated) DEVICE — COVER MAYO STAND REINFRCD 30

## (undated) DEVICE — KIT PERFLUOROCARBON LIQUID

## (undated) DEVICE — KIT COVER GENERAL PURPOSE

## (undated) DEVICE — SUT SILK 3-0 SH 18IN BLACK

## (undated) DEVICE — ELECTRODE BLD 1 INCH TEFLON

## (undated) DEVICE — CLOSURE SKIN STERI STRIP 1/2X4

## (undated) DEVICE — CONTAINER SPECIMEN STRL 4OZ

## (undated) DEVICE — CANNULA OPTHALMIC SOF TIP 25G

## (undated) DEVICE — SOL WATER STRL IRR 1000ML

## (undated) DEVICE — SEE MEDLINE ITEM 146292

## (undated) DEVICE — FORCEP GRIESHABER MAXGRIP 25G

## (undated) DEVICE — GAUZE SPONGE BULKEE 6X6.75IN

## (undated) DEVICE — PACK AUTO GAS FILL

## (undated) DEVICE — ELECTRODE REM PLYHSV RETURN 9

## (undated) DEVICE — GLOVE BIOGEL ECLIPSE SZ 6.5

## (undated) DEVICE — FORCEP GRASPING 25GA SMOOTH